# Patient Record
Sex: FEMALE | Race: ASIAN | NOT HISPANIC OR LATINO | URBAN - METROPOLITAN AREA
[De-identification: names, ages, dates, MRNs, and addresses within clinical notes are randomized per-mention and may not be internally consistent; named-entity substitution may affect disease eponyms.]

---

## 2021-10-31 ENCOUNTER — INPATIENT (INPATIENT)
Facility: HOSPITAL | Age: 86
LOS: 30 days | Discharge: NURSING, REHAB OR LONG-TERM CARE | DRG: 4 | End: 2021-12-01
Attending: INTERNAL MEDICINE | Admitting: INTERNAL MEDICINE
Payer: MEDICARE

## 2021-10-31 VITALS
SYSTOLIC BLOOD PRESSURE: 146 MMHG | DIASTOLIC BLOOD PRESSURE: 118 MMHG | HEART RATE: 152 BPM | RESPIRATION RATE: 48 BRPM | WEIGHT: 89.95 LBS | OXYGEN SATURATION: 79 %

## 2021-10-31 LAB
ALBUMIN SERPL ELPH-MCNC: 2.3 G/DL — LOW (ref 3.5–5)
ANION GAP SERPL CALC-SCNC: 14 MMOL/L — SIGNIFICANT CHANGE UP (ref 5–17)
BUN SERPL-MCNC: 44 MG/DL — HIGH (ref 7–18)
CALCIUM SERPL-MCNC: 9 MG/DL — SIGNIFICANT CHANGE UP (ref 8.4–10.5)
CHLORIDE SERPL-SCNC: 122 MMOL/L — HIGH (ref 96–108)
CO2 SERPL-SCNC: 18 MMOL/L — LOW (ref 22–31)
GLUCOSE SERPL-MCNC: 279 MG/DL — HIGH (ref 70–99)
HCT VFR BLD CALC: 41.7 % — SIGNIFICANT CHANGE UP (ref 34.5–45)
HGB BLD-MCNC: 12.4 G/DL — SIGNIFICANT CHANGE UP (ref 11.5–15.5)
MCHC RBC-ENTMCNC: 29.7 GM/DL — LOW (ref 32–36)
MCHC RBC-ENTMCNC: 30.1 PG — SIGNIFICANT CHANGE UP (ref 27–34)
MCV RBC AUTO: 101.2 FL — HIGH (ref 80–100)
POTASSIUM SERPL-MCNC: 4.2 MMOL/L — SIGNIFICANT CHANGE UP (ref 3.5–5.3)
POTASSIUM SERPL-SCNC: 4.2 MMOL/L — SIGNIFICANT CHANGE UP (ref 3.5–5.3)
RBC # BLD: 4.12 M/UL — SIGNIFICANT CHANGE UP (ref 3.8–5.2)
RBC # FLD: 13.6 % — SIGNIFICANT CHANGE UP (ref 10.3–14.5)
SARS-COV-2 RNA SPEC QL NAA+PROBE: SIGNIFICANT CHANGE UP
SODIUM SERPL-SCNC: 154 MMOL/L — HIGH (ref 135–145)
WBC # BLD: 5.8 K/UL — SIGNIFICANT CHANGE UP (ref 3.8–10.5)
WBC # FLD AUTO: 5.8 K/UL — SIGNIFICANT CHANGE UP (ref 3.8–10.5)

## 2021-10-31 PROCEDURE — 71045 X-RAY EXAM CHEST 1 VIEW: CPT | Mod: 26

## 2021-10-31 PROCEDURE — 31500 INSERT EMERGENCY AIRWAY: CPT

## 2021-10-31 PROCEDURE — 99291 CRITICAL CARE FIRST HOUR: CPT | Mod: CS,25

## 2021-10-31 RX ORDER — PIPERACILLIN AND TAZOBACTAM 4; .5 G/20ML; G/20ML
3.38 INJECTION, POWDER, LYOPHILIZED, FOR SOLUTION INTRAVENOUS ONCE
Refills: 0 | Status: COMPLETED | OUTPATIENT
Start: 2021-10-31 | End: 2021-10-31

## 2021-10-31 RX ORDER — SODIUM CHLORIDE 9 MG/ML
2000 INJECTION INTRAMUSCULAR; INTRAVENOUS; SUBCUTANEOUS ONCE
Refills: 0 | Status: COMPLETED | OUTPATIENT
Start: 2021-10-31 | End: 2021-10-31

## 2021-10-31 RX ORDER — CHLORHEXIDINE GLUCONATE 213 G/1000ML
15 SOLUTION TOPICAL EVERY 12 HOURS
Refills: 0 | Status: DISCONTINUED | OUTPATIENT
Start: 2021-10-31 | End: 2021-11-13

## 2021-10-31 RX ORDER — VANCOMYCIN HCL 1 G
1000 VIAL (EA) INTRAVENOUS ONCE
Refills: 0 | Status: COMPLETED | OUTPATIENT
Start: 2021-10-31 | End: 2021-10-31

## 2021-10-31 NOTE — ED ADULT NURSE NOTE - NSIMPLEMENTINTERV_GEN_ALL_ED
Implemented All Fall with Harm Risk Interventions:  [place] to call system. Call bell, personal items and telephone within reach. Instruct patient to call for assistance. Room bathroom lighting operational. Non-slip footwear when patient is off stretcher. Physically safe environment: no spills, clutter or unnecessary equipment. Stretcher in lowest position, wheels locked, appropriate side rails in place. Provide visual cue, wrist band, yellow gown, etc. Monitor gait and stability. Monitor for mental status changes and reorient to person, place, and time. Review medications for side effects contributing to fall risk. Reinforce activity limits and safety measures with patient and family. Provide visual clues: red socks.

## 2021-10-31 NOTE — ED ADULT NURSE NOTE - OBJECTIVE STATEMENT
Patient brought in to the ED by EMS as a respiratory notification for shortness of breath. Stage I noted to Rt. buttock, skin tear to left elbow and redness to B/L heels. . Patient brought in to the ED by EMS as a respiratory notification for shortness of breath. Scar from old sore noted to Rt. buttock, skin tear to left elbow and redness to B/L heels. Patient brought in to the ED by EMS as a respiratory notification for shortness of breath. Scar from old wound noted to Rt. buttock, skin tear to left elbow and redness to B/L heels. Patient brought in to the ED by EMS as a respiratory notification for shortness of breath. Scar from old wound noted to Rt. buttock, skin tear to left elbow and redness to B/L heels. Scratches noted to Left thigh.

## 2021-11-01 DIAGNOSIS — E43 UNSPECIFIED SEVERE PROTEIN-CALORIE MALNUTRITION: ICD-10-CM

## 2021-11-01 DIAGNOSIS — F03.90 UNSPECIFIED DEMENTIA WITHOUT BEHAVIORAL DISTURBANCE: ICD-10-CM

## 2021-11-01 DIAGNOSIS — J96.01 ACUTE RESPIRATORY FAILURE WITH HYPOXIA: ICD-10-CM

## 2021-11-01 DIAGNOSIS — A41.9 SEPSIS, UNSPECIFIED ORGANISM: ICD-10-CM

## 2021-11-01 DIAGNOSIS — Z51.5 ENCOUNTER FOR PALLIATIVE CARE: ICD-10-CM

## 2021-11-01 DIAGNOSIS — R53.2 FUNCTIONAL QUADRIPLEGIA: ICD-10-CM

## 2021-11-01 LAB
A1C WITH ESTIMATED AVERAGE GLUCOSE RESULT: 5.8 % — HIGH (ref 4–5.6)
ALBUMIN SERPL ELPH-MCNC: 1.6 G/DL — LOW (ref 3.5–5)
ALBUMIN SERPL ELPH-MCNC: 1.7 G/DL — LOW (ref 3.5–5)
ALP SERPL-CCNC: 46 U/L — SIGNIFICANT CHANGE UP (ref 40–120)
ALP SERPL-CCNC: 52 U/L — SIGNIFICANT CHANGE UP (ref 40–120)
ALP SERPL-CCNC: 69 U/L — SIGNIFICANT CHANGE UP (ref 40–120)
ALT FLD-CCNC: 19 U/L DA — SIGNIFICANT CHANGE UP (ref 10–60)
ALT FLD-CCNC: 21 U/L DA — SIGNIFICANT CHANGE UP (ref 10–60)
ALT FLD-CCNC: 31 U/L DA — SIGNIFICANT CHANGE UP (ref 10–60)
ANION GAP SERPL CALC-SCNC: 10 MMOL/L — SIGNIFICANT CHANGE UP (ref 5–17)
ANION GAP SERPL CALC-SCNC: 8 MMOL/L — SIGNIFICANT CHANGE UP (ref 5–17)
ANISOCYTOSIS BLD QL: SLIGHT — SIGNIFICANT CHANGE UP
ANISOCYTOSIS BLD QL: SLIGHT — SIGNIFICANT CHANGE UP
APPEARANCE UR: ABNORMAL
APTT BLD: 16.6 SEC — LOW (ref 27.5–35.5)
AST SERPL-CCNC: 20 U/L — SIGNIFICANT CHANGE UP (ref 10–40)
AST SERPL-CCNC: 29 U/L — SIGNIFICANT CHANGE UP (ref 10–40)
AST SERPL-CCNC: 47 U/L — HIGH (ref 10–40)
BACTERIA # UR AUTO: ABNORMAL /HPF
BASE EXCESS BLDA CALC-SCNC: -4.6 MMOL/L — LOW (ref -2–3)
BASE EXCESS BLDV CALC-SCNC: -5.3 MMOL/L — SIGNIFICANT CHANGE UP
BASOPHILS # BLD AUTO: 0 K/UL — SIGNIFICANT CHANGE UP (ref 0–0.2)
BASOPHILS NFR BLD AUTO: 0 % — SIGNIFICANT CHANGE UP (ref 0–2)
BILIRUB SERPL-MCNC: 0.3 MG/DL — SIGNIFICANT CHANGE UP (ref 0.2–1.2)
BILIRUB SERPL-MCNC: 0.3 MG/DL — SIGNIFICANT CHANGE UP (ref 0.2–1.2)
BILIRUB SERPL-MCNC: 0.5 MG/DL — SIGNIFICANT CHANGE UP (ref 0.2–1.2)
BILIRUB UR-MCNC: NEGATIVE — SIGNIFICANT CHANGE UP
BLOOD GAS COMMENTS ARTERIAL: SIGNIFICANT CHANGE UP
BUN SERPL-MCNC: 40 MG/DL — HIGH (ref 7–18)
BUN SERPL-MCNC: 42 MG/DL — HIGH (ref 7–18)
CALCIUM SERPL-MCNC: 7.2 MG/DL — LOW (ref 8.4–10.5)
CALCIUM SERPL-MCNC: 7.5 MG/DL — LOW (ref 8.4–10.5)
CHLORIDE SERPL-SCNC: 119 MMOL/L — HIGH (ref 96–108)
CHLORIDE SERPL-SCNC: 121 MMOL/L — HIGH (ref 96–108)
CHLORIDE UR-SCNC: 40 MMOL/L — SIGNIFICANT CHANGE UP
CO2 SERPL-SCNC: 19 MMOL/L — LOW (ref 22–31)
CO2 SERPL-SCNC: 22 MMOL/L — SIGNIFICANT CHANGE UP (ref 22–31)
COLOR SPEC: YELLOW — SIGNIFICANT CHANGE UP
CREAT ?TM UR-MCNC: 109 MG/DL — SIGNIFICANT CHANGE UP
CREAT SERPL-MCNC: 1.42 MG/DL — HIGH (ref 0.5–1.3)
CREAT SERPL-MCNC: 1.68 MG/DL — HIGH (ref 0.5–1.3)
CREAT SERPL-MCNC: 1.71 MG/DL — HIGH (ref 0.5–1.3)
DACRYOCYTES BLD QL SMEAR: SLIGHT — SIGNIFICANT CHANGE UP
DIFF PNL FLD: ABNORMAL
E COLI DNA BLD POS QL NAA+NON-PROBE: SIGNIFICANT CHANGE UP
ELLIPTOCYTES BLD QL SMEAR: SLIGHT — SIGNIFICANT CHANGE UP
EOSINOPHIL # BLD AUTO: 0 K/UL — SIGNIFICANT CHANGE UP (ref 0–0.5)
EOSINOPHIL NFR BLD AUTO: 0 % — SIGNIFICANT CHANGE UP (ref 0–6)
EPI CELLS # UR: SIGNIFICANT CHANGE UP /HPF
ESTIMATED AVERAGE GLUCOSE: 120 MG/DL — HIGH (ref 68–114)
GLUCOSE SERPL-MCNC: 277 MG/DL — HIGH (ref 70–99)
GLUCOSE SERPL-MCNC: 96 MG/DL — SIGNIFICANT CHANGE UP (ref 70–99)
GLUCOSE UR QL: NEGATIVE — SIGNIFICANT CHANGE UP
GRAM STN FLD: SIGNIFICANT CHANGE UP
GRAM STN FLD: SIGNIFICANT CHANGE UP
HCO3 BLDA-SCNC: 22 MMOL/L — SIGNIFICANT CHANGE UP (ref 21–28)
HCO3 BLDV-SCNC: 20 MMOL/L — LOW (ref 22–29)
HCT VFR BLD CALC: 30.3 % — LOW (ref 34.5–45)
HCT VFR BLD CALC: 33.3 % — LOW (ref 34.5–45)
HGB BLD-MCNC: 10 G/DL — LOW (ref 11.5–15.5)
HGB BLD-MCNC: 9 G/DL — LOW (ref 11.5–15.5)
HOROWITZ INDEX BLDA+IHG-RTO: 100 — SIGNIFICANT CHANGE UP
HYPOCHROMIA BLD QL: SLIGHT — SIGNIFICANT CHANGE UP
INR BLD: 1.11 RATIO — SIGNIFICANT CHANGE UP (ref 0.88–1.16)
INR BLD: 1.21 RATIO — HIGH (ref 0.88–1.16)
KETONES UR-MCNC: ABNORMAL
LACTATE SERPL-SCNC: 11.2 MMOL/L — CRITICAL HIGH (ref 0.7–2)
LACTATE SERPL-SCNC: 4.2 MMOL/L — CRITICAL HIGH (ref 0.7–2)
LACTATE SERPL-SCNC: 4.9 MMOL/L — CRITICAL HIGH (ref 0.7–2)
LEUKOCYTE ESTERASE UR-ACNC: ABNORMAL
LYMPHOCYTES # BLD AUTO: 0.75 K/UL — LOW (ref 1–3.3)
LYMPHOCYTES # BLD AUTO: 0.85 K/UL — LOW (ref 1–3.3)
LYMPHOCYTES # BLD AUTO: 0.88 K/UL — LOW (ref 1–3.3)
LYMPHOCYTES # BLD AUTO: 13 % — SIGNIFICANT CHANGE UP (ref 13–44)
LYMPHOCYTES # BLD AUTO: 14 % — SIGNIFICANT CHANGE UP (ref 13–44)
LYMPHOCYTES # BLD AUTO: 14 % — SIGNIFICANT CHANGE UP (ref 13–44)
MAGNESIUM SERPL-MCNC: 1.8 MG/DL — SIGNIFICANT CHANGE UP (ref 1.6–2.6)
MAGNESIUM SERPL-MCNC: 2.1 MG/DL — SIGNIFICANT CHANGE UP (ref 1.6–2.6)
MANUAL SMEAR VERIFICATION: SIGNIFICANT CHANGE UP
MANUAL SMEAR VERIFICATION: SIGNIFICANT CHANGE UP
MCHC RBC-ENTMCNC: 29.7 GM/DL — LOW (ref 32–36)
MCHC RBC-ENTMCNC: 29.8 PG — SIGNIFICANT CHANGE UP (ref 27–34)
MCHC RBC-ENTMCNC: 30 GM/DL — LOW (ref 32–36)
MCHC RBC-ENTMCNC: 30.6 PG — SIGNIFICANT CHANGE UP (ref 27–34)
MCV RBC AUTO: 100.3 FL — HIGH (ref 80–100)
MCV RBC AUTO: 101.8 FL — HIGH (ref 80–100)
METHOD TYPE: SIGNIFICANT CHANGE UP
MONOCYTES # BLD AUTO: 0.06 K/UL — SIGNIFICANT CHANGE UP (ref 0–0.9)
MONOCYTES # BLD AUTO: 0.19 K/UL — SIGNIFICANT CHANGE UP (ref 0–0.9)
MONOCYTES # BLD AUTO: 0.24 K/UL — SIGNIFICANT CHANGE UP (ref 0–0.9)
MONOCYTES NFR BLD AUTO: 1 % — LOW (ref 2–14)
MONOCYTES NFR BLD AUTO: 3 % — SIGNIFICANT CHANGE UP (ref 2–14)
MONOCYTES NFR BLD AUTO: 4 % — SIGNIFICANT CHANGE UP (ref 2–14)
NEUTROPHILS # BLD AUTO: 4.52 K/UL — SIGNIFICANT CHANGE UP (ref 1.8–7.4)
NEUTROPHILS # BLD AUTO: 4.98 K/UL — SIGNIFICANT CHANGE UP (ref 1.8–7.4)
NEUTROPHILS # BLD AUTO: 5.22 K/UL — SIGNIFICANT CHANGE UP (ref 1.8–7.4)
NEUTROPHILS NFR BLD AUTO: 69 % — SIGNIFICANT CHANGE UP (ref 43–77)
NEUTROPHILS NFR BLD AUTO: 82 % — HIGH (ref 43–77)
NEUTROPHILS NFR BLD AUTO: 83 % — HIGH (ref 43–77)
NITRITE UR-MCNC: NEGATIVE — SIGNIFICANT CHANGE UP
NRBC # BLD: 0 /100 — SIGNIFICANT CHANGE UP (ref 0–0)
NRBC # BLD: 0 /100 — SIGNIFICANT CHANGE UP (ref 0–0)
NT-PROBNP SERPL-SCNC: 4457 PG/ML — HIGH (ref 0–450)
OSMOLALITY UR: 430 MOS/KG — SIGNIFICANT CHANGE UP (ref 50–1200)
PCO2 BLDA: 46 MMHG — HIGH (ref 32–35)
PCO2 BLDV: 37 MMHG — LOW (ref 39–42)
PH BLDA: 7.29 — LOW (ref 7.35–7.45)
PH BLDV: 7.34 — SIGNIFICANT CHANGE UP (ref 7.32–7.43)
PH UR: 8 — SIGNIFICANT CHANGE UP (ref 5–8)
PHOSPHATE SERPL-MCNC: 3.1 MG/DL — SIGNIFICANT CHANGE UP (ref 2.5–4.5)
PHOSPHATE SERPL-MCNC: 3.6 MG/DL — SIGNIFICANT CHANGE UP (ref 2.5–4.5)
PLAT MORPH BLD: NORMAL — SIGNIFICANT CHANGE UP
PLAT MORPH BLD: NORMAL — SIGNIFICANT CHANGE UP
PLATELET # BLD AUTO: 236 K/UL — SIGNIFICANT CHANGE UP (ref 150–400)
PLATELET # BLD AUTO: 256 K/UL — SIGNIFICANT CHANGE UP (ref 150–400)
PLATELET # BLD AUTO: 271 K/UL — SIGNIFICANT CHANGE UP (ref 150–400)
PO2 BLDA: 275 MMHG — HIGH (ref 83–108)
PO2 BLDV: 35 MMHG — SIGNIFICANT CHANGE UP
POIKILOCYTOSIS BLD QL AUTO: SLIGHT — SIGNIFICANT CHANGE UP
POIKILOCYTOSIS BLD QL AUTO: SLIGHT — SIGNIFICANT CHANGE UP
POTASSIUM SERPL-MCNC: 3.6 MMOL/L — SIGNIFICANT CHANGE UP (ref 3.5–5.3)
POTASSIUM SERPL-MCNC: 3.8 MMOL/L — SIGNIFICANT CHANGE UP (ref 3.5–5.3)
POTASSIUM SERPL-SCNC: 3.6 MMOL/L — SIGNIFICANT CHANGE UP (ref 3.5–5.3)
POTASSIUM SERPL-SCNC: 3.8 MMOL/L — SIGNIFICANT CHANGE UP (ref 3.5–5.3)
PROCALCITONIN SERPL-MCNC: 44 NG/ML — HIGH (ref 0.02–0.1)
PROT SERPL-MCNC: 5.3 G/DL — LOW (ref 6–8.3)
PROT SERPL-MCNC: 5.7 G/DL — LOW (ref 6–8.3)
PROT SERPL-MCNC: 7.3 G/DL — SIGNIFICANT CHANGE UP (ref 6–8.3)
PROT UR-MCNC: 100
PROTHROM AB SERPL-ACNC: 13.1 SEC — SIGNIFICANT CHANGE UP (ref 10.6–13.6)
PROTHROM AB SERPL-ACNC: 14.3 SEC — HIGH (ref 10.6–13.6)
RBC # BLD: 3.02 M/UL — LOW (ref 3.8–5.2)
RBC # BLD: 3.27 M/UL — LOW (ref 3.8–5.2)
RBC # FLD: 13.6 % — SIGNIFICANT CHANGE UP (ref 10.3–14.5)
RBC # FLD: 13.8 % — SIGNIFICANT CHANGE UP (ref 10.3–14.5)
RBC BLD AUTO: ABNORMAL
RBC BLD AUTO: ABNORMAL
RBC CASTS # UR COMP ASSIST: ABNORMAL /HPF (ref 0–2)
SAO2 % BLDA: 99 % — SIGNIFICANT CHANGE UP
SAO2 % BLDV: 66.1 % — SIGNIFICANT CHANGE UP
SODIUM SERPL-SCNC: 149 MMOL/L — HIGH (ref 135–145)
SODIUM SERPL-SCNC: 150 MMOL/L — HIGH (ref 135–145)
SODIUM UR-SCNC: 27 MMOL/L — SIGNIFICANT CHANGE UP
SP GR SPEC: 1.01 — SIGNIFICANT CHANGE UP (ref 1.01–1.02)
SPECIMEN SOURCE: SIGNIFICANT CHANGE UP
SPECIMEN SOURCE: SIGNIFICANT CHANGE UP
TRI-PHOS CRY UR QL COMP ASSIST: ABNORMAL /HPF
TROPONIN I, HIGH SENSITIVITY RESULT: 196 NG/L — HIGH
TROPONIN I, HIGH SENSITIVITY RESULT: 380.4 NG/L — HIGH
UROBILINOGEN FLD QL: NEGATIVE — SIGNIFICANT CHANGE UP
WBC # BLD: 6.07 K/UL — SIGNIFICANT CHANGE UP (ref 3.8–10.5)
WBC # BLD: 6.29 K/UL — SIGNIFICANT CHANGE UP (ref 3.8–10.5)
WBC # FLD AUTO: 6.07 K/UL — SIGNIFICANT CHANGE UP (ref 3.8–10.5)
WBC # FLD AUTO: 6.29 K/UL — SIGNIFICANT CHANGE UP (ref 3.8–10.5)
WBC UR QL: ABNORMAL /HPF (ref 0–5)

## 2021-11-01 PROCEDURE — 71045 X-RAY EXAM CHEST 1 VIEW: CPT | Mod: 26,76

## 2021-11-01 PROCEDURE — 32556 INSERT CATH PLEURA W/O IMAGE: CPT | Mod: LT

## 2021-11-01 PROCEDURE — 99223 1ST HOSP IP/OBS HIGH 75: CPT

## 2021-11-01 PROCEDURE — 71045 X-RAY EXAM CHEST 1 VIEW: CPT | Mod: 26

## 2021-11-01 PROCEDURE — 71045 X-RAY EXAM CHEST 1 VIEW: CPT | Mod: 26,77

## 2021-11-01 PROCEDURE — 93010 ELECTROCARDIOGRAM REPORT: CPT

## 2021-11-01 PROCEDURE — 99223 1ST HOSP IP/OBS HIGH 75: CPT | Mod: 57

## 2021-11-01 RX ORDER — ACETAMINOPHEN 500 MG
650 TABLET ORAL ONCE
Refills: 0 | Status: COMPLETED | OUTPATIENT
Start: 2021-11-01 | End: 2021-11-01

## 2021-11-01 RX ORDER — ROCURONIUM BROMIDE 10 MG/ML
50 VIAL (ML) INTRAVENOUS ONCE
Refills: 0 | Status: COMPLETED | OUTPATIENT
Start: 2021-11-01 | End: 2021-11-01

## 2021-11-01 RX ORDER — SODIUM CHLORIDE 9 MG/ML
2000 INJECTION, SOLUTION INTRAVENOUS ONCE
Refills: 0 | Status: COMPLETED | OUTPATIENT
Start: 2021-11-01 | End: 2021-11-01

## 2021-11-01 RX ORDER — AZITHROMYCIN 500 MG/1
500 TABLET, FILM COATED ORAL EVERY 24 HOURS
Refills: 0 | Status: DISCONTINUED | OUTPATIENT
Start: 2021-11-01 | End: 2021-11-06

## 2021-11-01 RX ORDER — SODIUM CHLORIDE 9 MG/ML
10 INJECTION INTRAMUSCULAR; INTRAVENOUS; SUBCUTANEOUS
Refills: 0 | Status: DISCONTINUED | OUTPATIENT
Start: 2021-11-01 | End: 2021-12-01

## 2021-11-01 RX ORDER — SODIUM CHLORIDE 9 MG/ML
1000 INJECTION INTRAMUSCULAR; INTRAVENOUS; SUBCUTANEOUS ONCE
Refills: 0 | Status: COMPLETED | OUTPATIENT
Start: 2021-11-01 | End: 2021-11-01

## 2021-11-01 RX ORDER — SODIUM CHLORIDE 9 MG/ML
1000 INJECTION, SOLUTION INTRAVENOUS ONCE
Refills: 0 | Status: COMPLETED | OUTPATIENT
Start: 2021-11-01 | End: 2021-11-01

## 2021-11-01 RX ORDER — CEFTRIAXONE 500 MG/1
1000 INJECTION, POWDER, FOR SOLUTION INTRAMUSCULAR; INTRAVENOUS EVERY 24 HOURS
Refills: 0 | Status: DISCONTINUED | OUTPATIENT
Start: 2021-11-01 | End: 2021-11-02

## 2021-11-01 RX ORDER — FENTANYL CITRATE 50 UG/ML
0.5 INJECTION INTRAVENOUS
Qty: 5000 | Refills: 0 | Status: DISCONTINUED | OUTPATIENT
Start: 2021-11-01 | End: 2021-11-01

## 2021-11-01 RX ORDER — HEPARIN SODIUM 5000 [USP'U]/ML
5000 INJECTION INTRAVENOUS; SUBCUTANEOUS EVERY 8 HOURS
Refills: 0 | Status: DISCONTINUED | OUTPATIENT
Start: 2021-11-01 | End: 2021-11-10

## 2021-11-01 RX ORDER — FENTANYL CITRATE 50 UG/ML
0.5 INJECTION INTRAVENOUS
Qty: 2500 | Refills: 0 | Status: DISCONTINUED | OUTPATIENT
Start: 2021-11-01 | End: 2021-11-02

## 2021-11-01 RX ORDER — SODIUM CHLORIDE 9 MG/ML
1000 INJECTION, SOLUTION INTRAVENOUS
Refills: 0 | Status: DISCONTINUED | OUTPATIENT
Start: 2021-11-02 | End: 2021-11-08

## 2021-11-01 RX ORDER — FENTANYL CITRATE 50 UG/ML
0.5 INJECTION INTRAVENOUS
Qty: 2500 | Refills: 0 | Status: DISCONTINUED | OUTPATIENT
Start: 2021-11-01 | End: 2021-11-01

## 2021-11-01 RX ORDER — SODIUM CHLORIDE 9 MG/ML
1000 INJECTION, SOLUTION INTRAVENOUS
Refills: 0 | Status: DISCONTINUED | OUTPATIENT
Start: 2021-11-01 | End: 2021-11-08

## 2021-11-01 RX ORDER — NOREPINEPHRINE BITARTRATE/D5W 8 MG/250ML
0.05 PLASTIC BAG, INJECTION (ML) INTRAVENOUS
Qty: 16 | Refills: 0 | Status: DISCONTINUED | OUTPATIENT
Start: 2021-11-01 | End: 2021-11-02

## 2021-11-01 RX ORDER — DEXMEDETOMIDINE HYDROCHLORIDE IN 0.9% SODIUM CHLORIDE 4 UG/ML
0.5 INJECTION INTRAVENOUS
Qty: 200 | Refills: 0 | Status: DISCONTINUED | OUTPATIENT
Start: 2021-11-01 | End: 2021-11-02

## 2021-11-01 RX ORDER — ETOMIDATE 2 MG/ML
20 INJECTION INTRAVENOUS ONCE
Refills: 0 | Status: COMPLETED | OUTPATIENT
Start: 2021-11-01 | End: 2021-11-01

## 2021-11-01 RX ADMIN — AZITHROMYCIN 255 MILLIGRAM(S): 500 TABLET, FILM COATED ORAL at 04:25

## 2021-11-01 RX ADMIN — HEPARIN SODIUM 5000 UNIT(S): 5000 INJECTION INTRAVENOUS; SUBCUTANEOUS at 21:12

## 2021-11-01 RX ADMIN — CHLORHEXIDINE GLUCONATE 15 MILLILITER(S): 213 SOLUTION TOPICAL at 17:59

## 2021-11-01 RX ADMIN — Medication 1.92 MICROGRAM(S)/KG/MIN: at 14:08

## 2021-11-01 RX ADMIN — SODIUM CHLORIDE 1000 MILLILITER(S): 9 INJECTION, SOLUTION INTRAVENOUS at 07:14

## 2021-11-01 RX ADMIN — FENTANYL CITRATE 2.04 MICROGRAM(S)/KG/HR: 50 INJECTION INTRAVENOUS at 03:25

## 2021-11-01 RX ADMIN — HEPARIN SODIUM 5000 UNIT(S): 5000 INJECTION INTRAVENOUS; SUBCUTANEOUS at 15:21

## 2021-11-01 RX ADMIN — CHLORHEXIDINE GLUCONATE 15 MILLILITER(S): 213 SOLUTION TOPICAL at 05:41

## 2021-11-01 RX ADMIN — Medication 50 MILLIGRAM(S): at 01:30

## 2021-11-01 RX ADMIN — Medication 650 MILLIGRAM(S): at 03:17

## 2021-11-01 RX ADMIN — CEFTRIAXONE 100 MILLIGRAM(S): 500 INJECTION, POWDER, FOR SOLUTION INTRAMUSCULAR; INTRAVENOUS at 05:28

## 2021-11-01 RX ADMIN — Medication 650 MILLIGRAM(S): at 00:54

## 2021-11-01 RX ADMIN — SODIUM CHLORIDE 2000 MILLILITER(S): 9 INJECTION INTRAMUSCULAR; INTRAVENOUS; SUBCUTANEOUS at 03:30

## 2021-11-01 RX ADMIN — HEPARIN SODIUM 5000 UNIT(S): 5000 INJECTION INTRAVENOUS; SUBCUTANEOUS at 05:41

## 2021-11-01 RX ADMIN — DEXMEDETOMIDINE HYDROCHLORIDE IN 0.9% SODIUM CHLORIDE 5.1 MICROGRAM(S)/KG/HR: 4 INJECTION INTRAVENOUS at 04:26

## 2021-11-01 RX ADMIN — ETOMIDATE 20 MILLIGRAM(S): 2 INJECTION INTRAVENOUS at 01:30

## 2021-11-01 RX ADMIN — SODIUM CHLORIDE 75 MILLILITER(S): 9 INJECTION, SOLUTION INTRAVENOUS at 04:26

## 2021-11-01 RX ADMIN — SODIUM CHLORIDE 75 MILLILITER(S): 9 INJECTION, SOLUTION INTRAVENOUS at 10:59

## 2021-11-01 RX ADMIN — Medication 250 MILLIGRAM(S): at 01:25

## 2021-11-01 RX ADMIN — SODIUM CHLORIDE 2000 MILLILITER(S): 9 INJECTION, SOLUTION INTRAVENOUS at 02:59

## 2021-11-01 RX ADMIN — PIPERACILLIN AND TAZOBACTAM 200 GRAM(S): 4; .5 INJECTION, POWDER, LYOPHILIZED, FOR SOLUTION INTRAVENOUS at 00:20

## 2021-11-01 RX ADMIN — SODIUM CHLORIDE 2000 MILLILITER(S): 9 INJECTION INTRAMUSCULAR; INTRAVENOUS; SUBCUTANEOUS at 00:20

## 2021-11-01 NOTE — CONSULT NOTE ADULT - SUBJECTIVE AND OBJECTIVE BOX
HPI:  89 yo female from [facility] with medical h/o of HTN, Dementia, CVA with R sided hemiparesis, aphasia, parkinson's, GERD, CKD bedbound at baseline, dependant on assistance for ADL comes in with respiratory failure. Patient was found to have respiratory distress, saturating in 70s when EMS arrived, was placed on NRB on field. She was emergently intubated on arrival in ED. As per NH records patient was having fever and cough for past few days. Today she was found to have respiratory distress. She is vaccinated with moderna. Spoke to family son,  who stated the patient to remain full code. No other history could be obtained.  (01 Nov 2021 00:14)    Interval history: under ICU care, intubated, hypotensive. Full code on file.       PAST MEDICAL & SURGICAL HISTORY:  HTN (hypertension)    Dementia        SOCIAL HISTORY:  , has children, grandchildren  Admitted from:   [facility]        Surrogate/HCP/Guardian:     Jered dahl son        Phone#: [phone]  Dr. Tommie Dahl grandson     FAMILY HISTORY:    Baseline ADLs (prior to admission): confused, bedbound    Allergies    No Known Allergies    Intolerances      Present Symptoms:      Review of Systems:   Unable to obtain due to poor mentation     MEDICATIONS  (STANDING):  azithromycin  IVPB 500 milliGRAM(s) IV Intermittent every 24 hours  cefTRIAXone   IVPB 1000 milliGRAM(s) IV Intermittent every 24 hours  chlorhexidine 0.12% Liquid 15 milliLiter(s) Oral Mucosa every 12 hours  dexMEDEtomidine Infusion 0.5 MICROgram(s)/kG/Hr (5.1 mL/Hr) IV Continuous <Continuous>  fentaNYL   Infusion 0.5 MICROgram(s)/kG/Hr (2.04 mL/Hr) IV Continuous <Continuous>  heparin   Injectable 5000 Unit(s) SubCutaneous every 8 hours  lactated ringers. 1000 milliLiter(s) (75 mL/Hr) IV Continuous <Continuous>  norepinephrine Infusion 0.05 MICROgram(s)/kG/Min (1.92 mL/Hr) IV Continuous <Continuous>    MEDICATIONS  (PRN):  sodium chloride 0.9% lock flush 10 milliLiter(s) IV Push every 1 hour PRN Pre/post blood products, medications, blood draw, and to maintain line patency      PHYSICAL EXAM:    Vital Signs Last 24 Hrs  T(C): 35.6 (01 Nov 2021 07:00), Max: 39.9 (01 Nov 2021 00:25)  T(F): 96 (01 Nov 2021 07:00), Max: 103.8 (01 Nov 2021 00:25)  HR: 94 (01 Nov 2021 14:00) (85 - 152)  BP: 84/44 (01 Nov 2021 14:00) (68/40 - 146/118)  BP(mean): 46 (01 Nov 2021 14:00) (31 - 93)  RR: 23 (01 Nov 2021 12:00) (18 - 48)  SpO2: 97% (01 Nov 2021 12:35) (79% - 100%)       Karnofsky Performance Score/Palliative Performance Status Version2: 20     %     GENERAL: intubated, sedated, cachectic, NAD  HEENT: Atraumatic, oropharynx clear, neck supple  CHEST/LUNG: unlabored  HEART: Regular rate and rhythm    ABDOMEN: Soft, Nontender, Nondistended   MUSCULOSKELETAL:  No  edema, bedbound  NERVOUS SYSTEM:  sedated  SKIN: No rashes or lesions noted  Oral intake: npo       LABS:                        10.0   6.07  )-----------( 271      ( 01 Nov 2021 06:11 )             33.3     11-01    150<H>  |  121<H>  |  42<H>  ----------------------------<  277<H>  3.6   |  19<L>  |  1.68<H>    Ca    7.2<L>      01 Nov 2021 06:11  Phos  3.6     11-01  Mg     2.1     11-01    TPro  5.7<L>  /  Alb  1.7<L>  /  TBili  0.3  /  DBili  x   /  AST  29  /  ALT  19  /  AlkPhos  52  11-01        RADIOLOGY & ADDITIONAL STUDIES:    ADVANCE DIRECTIVES:

## 2021-11-01 NOTE — ED PROVIDER NOTE - OBJECTIVE STATEMENT
89 y/o F, nursing home resident with a significant PMHx of CVA with R sided hemiparesis, aphasia, parkinson's, GERD, dementia, CKD, presents to the ED in respiratory distress. Per EMS, patient was noted to have O2 saturation in the 70s in the nursing home, improved to 85% when placed on nonrebreather, and then transported to the ED. Patient is full code per  nursing home paperwork. Emergency contacts: Dr. Tommie Pathak (grandson) [phone], Jesus Pathak (spouse) [phone], Jered Pathak (son) [phone].

## 2021-11-01 NOTE — CONSULT NOTE ADULT - PROBLEM SELECTOR RECOMMENDATION 4
Clinical evidence indicates that the patient has Severe protein calorie malnutrition/ 3rd degree    In context of     Chronic Illness (>1 month)    Energy/Food intake <50% of estimated energy requirement >5 days   Body Fat loss: Severe   (Cachexia, temporal wasting,  muscle atrophy)     Strength: weakened severe (bedbound)    Recommend:   trial of NGT feeds  nutrition eval

## 2021-11-01 NOTE — CONSULT NOTE ADULT - CONVERSATION DETAILS
Spoke with patient's grandson by phone Dr Tommie Pathak regarding patient's current condition, hypotensive, may need central line/pressor support, guarded prognosis. Addressed risks/benefits of LST such as CPR, central lines/pressors in the context of her advanced illness and debilitated state. He stated that he understands that aggressive measures likely to be overly burdensome in her condition, however, his grandfather is primary decisionmaker, they assist w discussions, and he is having a difficult time coming to terms with her decline. He understands the severity of her condition and will speak with his father and grandfather again when we get off the phone. For now, given his last conversation with his grandfather, she remains FULL CODE until he can readdress w them again. Support provided.

## 2021-11-01 NOTE — H&P ADULT - ATTENDING COMMENTS
Patient seen and examined upon consultation request ar 11.35PM. Data reviewed. Case and plan of care discussed with ICU resident and agree with note except as otherwise stated in my note as follows.  This is 89 year old woman sent from [facility] with fever/cough and noted to be in acute respiratory distress in the ED and intubated. Exam- cachetic woman orally intubated and sedated, dry tongue and oral mucous membranes,  Labs/imaging reviewed.   Assessment  Acute hypoxemic respiratory failure from bacterial pneumonia, R/o SARS-CoV-2 infection  Severe Hypovolemia with dehydration  Hypernatremia from fluid deficit  CARO on CKD  Plan  Continue mechanical ventilator support. Adjust vent settings with ABG results.  IV fluid hydration to at least 4liters of LR and thereafter maintenance.   Send sputum culture, blood cultures,   Start IV Ceftriaxone and azithromycin for CAP  Commence free water via NG tube to correct water deficit causing hypernatremia.  Trend troponins, 12 lead EKG  Start tube feeding and bowel regime  DVT and GI prophylaxis.    Condition: Critical, Prognosis: Poor

## 2021-11-01 NOTE — H&P ADULT - CONVERSATION DETAILS
Spoke to Son and grandson. As per them, patient's  would like everything to be done. Patient remains FULL code. Patient's grandson Tommie is a radiologist at [employer].

## 2021-11-01 NOTE — ED PROVIDER NOTE - CLINICAL SUMMARY MEDICAL DECISION MAKING FREE TEXT BOX
89 y/o F, nursing home resident with a significant PMHx of CVA with R sided hemiparesis, aphasia, parkinson's, GERD, dementia, CKD, presents to the ED in respiratory distress. In the ED, patient noted to be in respiratory distress and emergently intubated. Will obtain labs, EKG, ICU consult for further management and given empiric antibiotics.

## 2021-11-01 NOTE — PROGRESS NOTE ADULT - ASSESSMENT
89 y/o F, nursing home resident with a significant medical history of CVA with R sided hemiparesis, aphasia, parkinson's, GERD, dementia, CKD, presents to the ED in respiratory distress, intubated while in ED. Patient is being admitted to medicine for sepsis and AHRF likely 2/2 ?PNA    # Acute hypoxic respiratory failure  # Sepsis 2/2 PNA  # Lactic acidosis  # Joby on CKD  # Hypernatremia    Neuro:  Patient is Aox0 at present  Started on fentanyl for pain  Will start on propofol    Pulmonary  Acute hypoxic respiratory failure  Patient presented with AHRF, intubated in ED  As per NH records, patient had cough and fever for past few days  CXR shows LLL infiltrate  [facility] had covid outbreak, will put on isolation. First covid was negative  s/p one dose vanc and zosyn  Will start on rocephin and zithro  Cultures were sent  ABG showed 7.26/46/275/22  Monitor resp status    Cardiovascular  Sinus tachycardia  Patient is in sinus tachycardia  Likely 2/2 sepsis  EKG showed sinus rythm with ST changes in inferior leads  Will consult cardiology in AM  Trop was mildly elevated  Will trend down trop    Gastrointestinal  NPO for now    ID  Sepsis 2/2 unknown source  Patient was tachycardic, had high temp 103.8  No leucocytosis  CXR shows LLL infiltrate  s/p 2L bolus  Started on LR @75cc/hr  Was given one dose of vanc and zosyn  Will start on rocephin and zithro   f/u cx  f/u lactate    Renal  Patient has h/o CKD  Clinically patient is severely dehydrated  has hypernatremia, likely 2/2 volume deficit  Not producing any urine  s/p 2 L bolus  Will start on standing IVF  Monitor I/O  Cr is elevated, unknown baseline  WIll send UA once she has some urine in the bag  f/u UA  Monitor BMP    Endo  BS are elevated  No h/o DM  Monitor BS    Prophylactic  On heparin for dvt prophylaxis  on PPI for GI prophylaxis    Palliative consult in AM 89 y/o F, nursing home resident with a significant medical history of CVA with R sided hemiparesis, aphasia, parkinson's, GERD, dementia, CKD, presents to the ED in respiratory distress, intubated while in ED. Patient is being admitted to medicine for sepsis and AHRF likely 2/2 ?PNA    # Acute hypoxic respiratory failure  # Sepsis 2/2 PNA  # Lactic acidosis  # Joby on CKD  # Hypernatremia    Neuro:  Patient is Aox0 at present  Started on fentanyl for pain  Will start on propofol    Pulmonary  Acute hypoxic respiratory failure  Patient presented with AHRF, intubated in ED  As per NH records, patient had cough and fever for past few days  CXR shows LLL infiltrate  [facility] had covid outbreak, will put on isolation. First covid was negative  s/p one dose vanc and zosyn  Will start on rocephin and zithro  Cultures were sent  ABG showed 7.26/46/275/22  repeat ABG at 1pm today  Monitor resp status    Cardiovascular  Sinus tachycardia  Patient is in sinus tachycardia  Likely 2/2 sepsis  EKG showed sinus rythm with ST changes in inferior leads  Will consult cardiology in AM  Trop was mildly elevated  Will trend down trop    Gastrointestinal  NPO for now  NGT inserted today    ID  Sepsis 2/2 unknown source  Patient was tachycardic, had high temp 103.8  No leucocytosis  CXR shows LLL infiltrate  s/p 2L bolus  Started on LR @75cc/hr  Was given one dose of vanc and zosyn  Will start on rocephin and zithro   cx - No evidence of pneumothorax, no infiltrates or effusion  lactate - 11.2>4.9    Renal  Patient has h/o CKD  Clinically patient is severely dehydrated  has hypernatremia, likely 2/2 volume deficit  Not producing any urine  s/p 2 L bolus  Will start on standing IVF  Monitor I/O  Cr is elevated, unknown baseline  WIll send UA once she has some urine in the bag  f/u UA  Monitor BMP    Endo  BS are elevated  No h/o DM  f/u A1c  Monitor BS    Prophylactic  On heparin for dvt prophylaxis (hold for central insertion)  on PPI for GI prophylaxis    Palliative consult - hold until repeat LABS

## 2021-11-01 NOTE — H&P ADULT - NSHPPHYSICALEXAM_GEN_ALL_CORE
Vital Signs (24 Hrs):  T(C): --  HR: 152 (10-31-21 @ 22:53) (152 - 152)  BP: 146/118 (10-31-21 @ 22:53) (146/118 - 146/118)  RR: 48 (10-31-21 @ 22:53) (48 - 48)  SpO2: 79% (10-31-21 @ 22:53) (79% - 79%)

## 2021-11-01 NOTE — ED PROVIDER NOTE - PHYSICAL EXAMINATION
Respiratory distress, tachypneic, accessory muscle use.  Tachycardic.   Patient lethargic.   Contracted R upper and lower extremities.   Belly flat, soft.

## 2021-11-01 NOTE — PROCEDURE NOTE - NSPROCDETAILS_GEN_ALL_CORE
secured in place/sterile dressing applied/supine position
guidewire recovered/lumen(s) aspirated and flushed/sterile dressing applied/sterile technique, catheter placed/ultrasound guidance with use of sterile gel and probe cove

## 2021-11-01 NOTE — H&P ADULT - ASSESSMENT
89 y/o F, nursing home resident with a significant medical history of CVA with R sided hemiparesis, aphasia, parkinson's, GERD, dementia, CKD, presents to the ED in respiratory distress, intubated while in ED. Patient is being admitted to medicine for sepsis and AHRF likely 2/2 ?PNA    #Acute hypoxic respiratory failure  #Sepsis 2/2 PNA  #Lactic acidosis  #CKD    Neuro:  Patient is Aox0 at present  Started on fentanyl for pain  Will start on propofol    Pulmonary  Acute hypoxic respiratory failure  Patient presented with AHRF, intubated in ED  As per NH records, patient had cough and fever for past few days  CXR shows LLL infiltrate  [facility] had covid outbreak, will put on isolation. First covid was negative  s/p one dose vanc and zosyn  Will start on rocephin and zithro  Cultures were sent  ABG showed...    Cardiovascular  Sinus tachycardia  Patient is in sinus tachycardia  Likely 2/2 sepsis  EKG showed sinus rythm with ST changes in inferior leads  Will consult cardiology in AM  Trop was mildly elevated  Will trend down trop    Gastrointestinal  NPO for now    ID  Sepsis 2/2 unknown source  Patient was tachycardic, had high temp 103.8  No leucocytosis  CXR shows LLL infiltrate  s/p 2L bolus  Started on LR @75cc/hr  Was given one dose of vanc and zosyn  Will start on rocephin and zithro   f/u cx  f/u lactate    Renal  Patient has h/o CKD  Clinically patient is severely dehydrated  Not producing any urine  s/p 2 L bolus  Will start on standing IVF  Monitor I/O  Cr is elevated, unknown baseline  WIll send UA once she has some urine in the bag  f/u UA  Monitor BMP    Endo  BS are elevated  No h/o DM  Monitor BS    Prophylactic  On heparin for dvt prophylaxis  on PPI for GI prophylaxis     89 y/o F, nursing home resident with a significant medical history of CVA with R sided hemiparesis, aphasia, parkinson's, GERD, dementia, CKD, presents to the ED in respiratory distress, intubated while in ED. Patient is being admitted to medicine for sepsis and AHRF likely 2/2 ?PNA    # Acute hypoxic respiratory failure  # Sepsis 2/2 PNA  # Lactic acidosis  # Joby on CKD  # Hypernatremia    Neuro:  Patient is Aox0 at present  Started on fentanyl for pain  Will start on propofol    Pulmonary  Acute hypoxic respiratory failure  Patient presented with AHRF, intubated in ED  As per NH records, patient had cough and fever for past few days  CXR shows LLL infiltrate  [facility] had covid outbreak, will put on isolation. First covid was negative  s/p one dose vanc and zosyn  Will start on rocephin and zithro  Cultures were sent  ABG showed...    Cardiovascular  Sinus tachycardia  Patient is in sinus tachycardia  Likely 2/2 sepsis  EKG showed sinus rythm with ST changes in inferior leads  Will consult cardiology in AM  Trop was mildly elevated  Will trend down trop    Gastrointestinal  NPO for now    ID  Sepsis 2/2 unknown source  Patient was tachycardic, had high temp 103.8  No leucocytosis  CXR shows LLL infiltrate  s/p 2L bolus  Started on LR @75cc/hr  Was given one dose of vanc and zosyn  Will start on rocephin and zithro   f/u cx  f/u lactate    Renal  Patient has h/o CKD  Clinically patient is severely dehydrated  has hypernatremia, likely 2/2 volume deficit  Not producing any urine  s/p 2 L bolus  Will start on standing IVF  Monitor I/O  Cr is elevated, unknown baseline  WIll send UA once she has some urine in the bag  f/u UA  Monitor BMP    Endo  BS are elevated  No h/o DM  Monitor BS    Prophylactic  On heparin for dvt prophylaxis  on PPI for GI prophylaxis     89 y/o F, nursing home resident with a significant medical history of CVA with R sided hemiparesis, aphasia, parkinson's, GERD, dementia, CKD, presents to the ED in respiratory distress, intubated while in ED. Patient is being admitted to medicine for sepsis and AHRF likely 2/2 ?PNA    # Acute hypoxic respiratory failure  # Sepsis 2/2 PNA  # Lactic acidosis  # Joby on CKD  # Hypernatremia    Neuro:  Patient is Aox0 at present  Started on fentanyl for pain  Will start on propofol    Pulmonary  Acute hypoxic respiratory failure  Patient presented with AHRF, intubated in ED  As per NH records, patient had cough and fever for past few days  CXR shows LLL infiltrate  [facility] had covid outbreak, will put on isolation. First covid was negative  s/p one dose vanc and zosyn  Will start on rocephin and zithro  Cultures were sent  ABG showed 7.26/46/275/22  Monitor resp status    Cardiovascular  Sinus tachycardia  Patient is in sinus tachycardia  Likely 2/2 sepsis  EKG showed sinus rythm with ST changes in inferior leads  Will consult cardiology in AM  Trop was mildly elevated  Will trend down trop    Gastrointestinal  NPO for now    ID  Sepsis 2/2 unknown source  Patient was tachycardic, had high temp 103.8  No leucocytosis  CXR shows LLL infiltrate  s/p 2L bolus  Started on LR @75cc/hr  Was given one dose of vanc and zosyn  Will start on rocephin and zithro   f/u cx  f/u lactate    Renal  Patient has h/o CKD  Clinically patient is severely dehydrated  has hypernatremia, likely 2/2 volume deficit  Not producing any urine  s/p 2 L bolus  Will start on standing IVF  Monitor I/O  Cr is elevated, unknown baseline  WIll send UA once she has some urine in the bag  f/u UA  Monitor BMP    Endo  BS are elevated  No h/o DM  Monitor BS    Prophylactic  On heparin for dvt prophylaxis  on PPI for GI prophylaxis    Palliative consult in AM

## 2021-11-01 NOTE — ED PROVIDER NOTE - CARE PLAN
Principal Discharge DX:	Acute respiratory failure with hypoxia  Secondary Diagnosis:	Pneumonia  Secondary Diagnosis:	Severe sepsis   1

## 2021-11-01 NOTE — CONSULT NOTE ADULT - ASSESSMENT
89y.o. Female with iatrogenic L pneumo    -28Fr L chest tube placed at bedside. Minimal bleeding. Chest tube connected to Pleurevac on suction  -f/u post-procedure CXR  -Daily CXR  -Keep chest tube while intubated  -Sedation and pain control per ICU team  -Will follow

## 2021-11-01 NOTE — CONSULT NOTE ADULT - PROBLEM SELECTOR RECOMMENDATION 9
on antibiotics for L PNA, fluid boluses as tolerated. may need central line/pressors. Cultures pending. Remains full code for now, grandson to readdress w family.

## 2021-11-01 NOTE — CONSULT NOTE ADULT - PROBLEM SELECTOR RECOMMENDATION 5
GOC discussion as above. Grandson to readdress GOC w family, patient's  is primary decisionmaker but son and grandson assist w communication. Palliative will follow.

## 2021-11-01 NOTE — CONSULT NOTE ADULT - SUBJECTIVE AND OBJECTIVE BOX
Patient is a 89y old  Female who presents with a chief complaint of AHRF (2021 14:13)      HPI  Called see and eval 89y.o. Female w/PMH significant for HTN, dementia for L pneumo. Pt admitted from [facility] for respiratory failure. Pt was urgently intubated in ER. After consent from family, pt had NGT and L IJ central line placed. Post procedure CXR showed large L pneumo. Currently saturating well on vent support. On pressor.     PAST MEDICAL & SURGICAL HISTORY:  HTN (hypertension)    Dementia    MEDICATIONS  (STANDING):  azithromycin  IVPB 500 milliGRAM(s) IV Intermittent every 24 hours  cefTRIAXone   IVPB 1000 milliGRAM(s) IV Intermittent every 24 hours  chlorhexidine 0.12% Liquid 15 milliLiter(s) Oral Mucosa every 12 hours  dexMEDEtomidine Infusion 0.5 MICROgram(s)/kG/Hr (5.1 mL/Hr) IV Continuous <Continuous>  fentaNYL   Infusion 0.5 MICROgram(s)/kG/Hr (2.04 mL/Hr) IV Continuous <Continuous>  heparin   Injectable 5000 Unit(s) SubCutaneous every 8 hours  lactated ringers. 1000 milliLiter(s) (75 mL/Hr) IV Continuous <Continuous>  norepinephrine Infusion 0.05 MICROgram(s)/kG/Min (1.92 mL/Hr) IV Continuous <Continuous>    MEDICATIONS  (PRN):  sodium chloride 0.9% lock flush 10 milliLiter(s) IV Push every 1 hour PRN Pre/post blood products, medications, blood draw, and to maintain line patency      Allergies    No Known Allergies    Vital Signs Last 24 Hrs  T(C): 35.9 (2021 15:42), Max: 39.9 (2021 00:25)  T(F): 96.7 (2021 15:42), Max: 103.8 (2021 00:25)  HR: 89 (2021 17:00) (85 - 152)  BP: 116/42 (2021 17:00) (68/40 - 146/118)  BP(mean): 61 (2021 17:00) (31 - 93)  RR: 20 (2021 17:00) (18 - 48)  SpO2: 97% (2021 17:00) (79% - 100%)    Physical:  Gen: Intubated. NAD.  Chest: Decreased BS L lung throughout.    LABS:                        9.0    6.29  )-----------( 236      ( 2021 14:26 )             30.3                  149<H>  |  119<H>  |  40<H>  ----------------------------<  96  3.8   |  22  |  1.42<H>    Ca    7.5<L>      2021 14:26  Phos  3.1       Mg     1.8         TPro  5.3<L>  /  Alb  1.6<L>  /  TBili  0.3  /  DBili  x   /  AST  47<H>  /  ALT  31  /  AlkPhos  46              PT/INR - ( 2021 14:26 )   PT: 14.3 sec;   INR: 1.21 ratio    PTT - ( 31 Oct 2021 23:55 )  PTT:16.6 sec    Urinalysis Basic - ( 2021 17:09 )    Color: Yellow / Appearance: very cloudy / S.010 / pH: x  Gluc: x / Ketone: Trace  / Bili: Negative / Urobili: Negative   Blood: x / Protein: 100 / Nitrite: Negative   Leuk Esterase: Moderate / RBC: 25-50 /HPF / WBC 26-50 /HPF   Sq Epi: x / Non Sq Epi: Few /HPF / Bacteria: Many /HPF    RADIOLOGY & ADDITIONAL STUDIES:  < from: Xray Chest 1 View-PORTABLE IMMEDIATE (Xray Chest 1 View-PORTABLE IMMEDIATE .) (21 @ 14:25) >  Gross heart enlargement and endotracheal tube remain.    Present film shows an NG tube inserted with tip in the lower esophagus and a left jugular line with the tip in the superior vena caval area.    Earlier in the day there is some degree left lower lobe infiltrate which may have evolved into atelectasis.    Follow-up AP erect chest on 2021 at 2:18 PM. NG tube now just enters the stomach area.    Both of the last 2 films raise the question of a upper outer left pneumothorax roughly 25-30%.    < end of copied text >

## 2021-11-01 NOTE — H&P ADULT - HISTORY OF PRESENT ILLNESS
89 yo female from [facility] with medical h/o of HTN, Dementia, bedbound at baseline, dependant on assistance for ADL comes in with respiratory failure. Patient was found to have respiratory distress, saturating in 70s when EMS arrived, was intubated on field. As per NH records patient was having fever and cough for past few days. Today she was found to have respiratory distress. She is vaccinated with moderna. Spoke to family son,  who stated the patient to remain full code. No other history could be obtained 89 yo female from [facility] with medical h/o of HTN, Dementia, CVA with R sided hemiparesis, aphasia, parkinson's, GERD, CKD bedbound at baseline, dependant on assistance for ADL comes in with respiratory failure. Patient was found to have respiratory distress, saturating in 70s when EMS arrived, was placed on NRB on field. She was emergently intubated on arrival in ED. As per NH records patient was having fever and cough for past few days. Today she was found to have respiratory distress. She is vaccinated with moderna. Spoke to family son,  who stated the patient to remain full code. No other history could be obtained.

## 2021-11-01 NOTE — PROGRESS NOTE ADULT - SUBJECTIVE AND OBJECTIVE BOX
INTERVAL HPI/OVERNIGHT EVENTS: ***    PRESSORS: [ ] YES [ ] NO  WHICH:    ANTIBIOTICS:                      Antimicrobial:  azithromycin  IVPB 500 milliGRAM(s) IV Intermittent every 24 hours  cefTRIAXone   IVPB 1000 milliGRAM(s) IV Intermittent every 24 hours    Cardiovascular:    Pulmonary:    Hematalogic:  heparin   Injectable 5000 Unit(s) SubCutaneous every 8 hours    Other:  chlorhexidine 0.12% Liquid 15 milliLiter(s) Oral Mucosa every 12 hours  dexMEDEtomidine Infusion 0.5 MICROgram(s)/kG/Hr IV Continuous <Continuous>  fentaNYL   Infusion 0.5 MICROgram(s)/kG/Hr IV Continuous <Continuous>  lactated ringers Bolus 2000 milliLiter(s) IV Bolus once  lactated ringers. 1000 milliLiter(s) IV Continuous <Continuous>    azithromycin  IVPB 500 milliGRAM(s) IV Intermittent every 24 hours  cefTRIAXone   IVPB 1000 milliGRAM(s) IV Intermittent every 24 hours  chlorhexidine 0.12% Liquid 15 milliLiter(s) Oral Mucosa every 12 hours  dexMEDEtomidine Infusion 0.5 MICROgram(s)/kG/Hr IV Continuous <Continuous>  fentaNYL   Infusion 0.5 MICROgram(s)/kG/Hr IV Continuous <Continuous>  heparin   Injectable 5000 Unit(s) SubCutaneous every 8 hours  lactated ringers Bolus 2000 milliLiter(s) IV Bolus once  lactated ringers. 1000 milliLiter(s) IV Continuous <Continuous>    Drug Dosing Weight  Height (cm): 168 (01 Nov 2021 02:20)  Weight (kg): 40.9 (01 Nov 2021 02:20)  BMI (kg/m2): 14.5 (01 Nov 2021 02:20)  BSA (m2): 1.43 (01 Nov 2021 02:20)    CENTRAL LINE: [ ] YES [ ] NO  LOCATION:   DATE INSERTED:  REMOVE: [ ] YES [ ] NO  EXPLAIN:    SAEED: [ ] YES [ ] NO    DATE INSERTED:  REMOVE:  [ ] YES [ ] NO  EXPLAIN:    A-LINE:  [ ] YES [ ] NO  LOCATION:   DATE INSERTED:  REMOVE:  [ ] YES [ ] NO  EXPLAIN:    PMH -reviewed admission note, no change since admission    ICU Vital Signs Last 24 Hrs  T(C): 35.6 (01 Nov 2021 07:00), Max: 39.9 (01 Nov 2021 00:25)  T(F): 96 (01 Nov 2021 07:00), Max: 103.8 (01 Nov 2021 00:25)  HR: 90 (01 Nov 2021 10:00) (90 - 152)  BP: 77/40 (01 Nov 2021 10:00) (68/40 - 146/118)  BP(mean): 45 (01 Nov 2021 10:00) (45 - 93)  ABP: --  ABP(mean): --  RR: 21 (01 Nov 2021 10:00) (18 - 48)  SpO2: 94% (01 Nov 2021 10:00) (79% - 100%)      ABG - ( 01 Nov 2021 01:19 )  pH, Arterial: 7.29  pH, Blood: x     /  pCO2: 46    /  pO2: 275   / HCO3: 22    / Base Excess: -4.6  /  SaO2: 99                    10-31 @ 07:01  -  11-01 @ 07:00  --------------------------------------------------------  IN: 2323 mL / OUT: 0 mL / NET: 2323 mL        Mode: AC/ CMV (Assist Control/ Continuous Mandatory Ventilation)  RR (machine): 20  TV (machine): 400  FiO2: 90  PEEP: 5  ITime: 0.8  MAP: 5  PIP: 10      PHYSICAL EXAM:    GENERAL: NAD, well-groomed, well-developed  HEAD:  Atraumatic, Normocephalic  EYES: EOMI, PERRLA, conjunctiva and sclera clear  ENMT: No tonsillar erythema, exudates, or enlargement; Moist mucous membranes, Good dentition, No lesions  NECK: Supple, normal appearance, No JVD; Normal thyroid; Trachea midline  NERVOUS SYSTEM:  Alert & Oriented X3, Good concentration; Motor Strength 5/5 B/L upper and lower extremities; DTRs 2+ intact and symmetric  CHEST/LUNG: No chest deformity; Normal percussion bilaterally; No rales, rhonchi, wheezing   HEART: Regular rate and rhythm; No murmurs, rubs, or gallops  ABDOMEN: Soft, Nontender, Nondistended; Bowel sounds present  EXTREMITIES:  2+ Peripheral Pulses, No clubbing, cyanosis, or edema  LYMPH: No lymphadenopathy noted  SKIN: No rashes or lesions; Good capillary refill      LABS:  CBC Full  -  ( 01 Nov 2021 06:11 )  WBC Count : x  RBC Count : 3.27 M/uL  Hemoglobin : 10.0 g/dL  Hematocrit : 33.3 %  Platelet Count - Automated : 271 K/uL  Mean Cell Volume : 101.8 fl  Mean Cell Hemoglobin : 30.6 pg  Mean Cell Hemoglobin Concentration : 30.0 gm/dL  Auto Neutrophil # : x  Auto Lymphocyte # : x  Auto Monocyte # : x  Auto Eosinophil # : x  Auto Basophil # : x  Auto Neutrophil % : x  Auto Lymphocyte % : x  Auto Monocyte % : x  Auto Eosinophil % : x  Auto Basophil % : x    11-01    150<H>  |  121<H>  |  42<H>  ----------------------------<  277<H>  3.6   |  19<L>  |  1.68<H>    Ca    7.2<L>      01 Nov 2021 06:11  Phos  3.6     11-01  Mg     2.1     11-01    TPro  5.7<L>  /  Alb  1.7<L>  /  TBili  0.3  /  DBili  x   /  AST  29  /  ALT  19  /  AlkPhos  52  11-01    PT/INR - ( 31 Oct 2021 23:55 )   PT: 13.1 sec;   INR: 1.11 ratio         PTT - ( 31 Oct 2021 23:55 )  PTT:16.6 sec        RADIOLOGY & ADDITIONAL STUDIES REVIEWED:  ***    GOALS OF CARE DISCUSSION WITH PATIENT/FAMILY/PROXY:    CRITICAL CARE TIME SPENT: 35 minutes INTERVAL HPI/OVERNIGHT EVENTS: ***    Patient examined at bedside, stable, sedated and intubated. Recently admitted earlier this morning. She had episodes of hypotension (77/40). Given boluses of LR. Still hypotensive at 73/40. Will insert central line today. Repeat labs at 1pm.     PRESSORS: [ ] YES [ ] NO  WHICH:    ANTIBIOTICS:                      Antimicrobial:  azithromycin  IVPB 500 milliGRAM(s) IV Intermittent every 24 hours  cefTRIAXone   IVPB 1000 milliGRAM(s) IV Intermittent every 24 hours    Cardiovascular:    Pulmonary:    Hematalogic:  heparin   Injectable 5000 Unit(s) SubCutaneous every 8 hours    Other:  chlorhexidine 0.12% Liquid 15 milliLiter(s) Oral Mucosa every 12 hours  dexMEDEtomidine Infusion 0.5 MICROgram(s)/kG/Hr IV Continuous <Continuous>  fentaNYL   Infusion 0.5 MICROgram(s)/kG/Hr IV Continuous <Continuous>  lactated ringers Bolus 2000 milliLiter(s) IV Bolus once  lactated ringers. 1000 milliLiter(s) IV Continuous <Continuous>    azithromycin  IVPB 500 milliGRAM(s) IV Intermittent every 24 hours  cefTRIAXone   IVPB 1000 milliGRAM(s) IV Intermittent every 24 hours  chlorhexidine 0.12% Liquid 15 milliLiter(s) Oral Mucosa every 12 hours  dexMEDEtomidine Infusion 0.5 MICROgram(s)/kG/Hr IV Continuous <Continuous>  fentaNYL   Infusion 0.5 MICROgram(s)/kG/Hr IV Continuous <Continuous>  heparin   Injectable 5000 Unit(s) SubCutaneous every 8 hours  lactated ringers Bolus 2000 milliLiter(s) IV Bolus once  lactated ringers. 1000 milliLiter(s) IV Continuous <Continuous>    Drug Dosing Weight  Height (cm): 168 (01 Nov 2021 02:20)  Weight (kg): 40.9 (01 Nov 2021 02:20)  BMI (kg/m2): 14.5 (01 Nov 2021 02:20)  BSA (m2): 1.43 (01 Nov 2021 02:20)    CENTRAL LINE: [ ] YES [X] NO  LOCATION:   DATE INSERTED:  REMOVE: [ ] YES [ ] NO  EXPLAIN:    SAEED: [X] YES [ ] NO    DATE INSERTED: 11/1/2021  REMOVE:  [ ] YES [ ] NO  EXPLAIN:    A-LINE:  [ ] YES [X] NO  LOCATION:   DATE INSERTED:  REMOVE:  [ ] YES [ ] NO  EXPLAIN:    PMH -reviewed admission note, no change since admission    ICU Vital Signs Last 24 Hrs  T(C): 35.6 (01 Nov 2021 07:00), Max: 39.9 (01 Nov 2021 00:25)  T(F): 96 (01 Nov 2021 07:00), Max: 103.8 (01 Nov 2021 00:25)  HR: 90 (01 Nov 2021 10:00) (90 - 152)  BP: 77/40 (01 Nov 2021 10:00) (68/40 - 146/118)  BP(mean): 45 (01 Nov 2021 10:00) (45 - 93)  ABP: --  ABP(mean): --  RR: 21 (01 Nov 2021 10:00) (18 - 48)  SpO2: 94% (01 Nov 2021 10:00) (79% - 100%)      ABG - ( 01 Nov 2021 01:19 )  pH, Arterial: 7.29  pH, Blood: x     /  pCO2: 46    /  pO2: 275   / HCO3: 22    / Base Excess: -4.6  /  SaO2: 99                    10-31 @ 07:01  -  11-01 @ 07:00  --------------------------------------------------------  IN: 2323 mL / OUT: 0 mL / NET: 2323 mL        Mode: AC/ CMV (Assist Control/ Continuous Mandatory Ventilation)  RR (machine): 20  TV (machine): 400  FiO2: 90  PEEP: 5  ITime: 0.8  MAP: 5  PIP: 10      PHYSICAL EXAM:    GENERAL: NAD, sedated and intubated to mechanical ventilator  HEAD:  Atraumatic, Normocephalic  EYES: EOMI, PERRLA, conjunctiva and sclera clear  ENMT: No tonsillar erythema, exudates, or enlargement; Moist mucous membranes, Good dentition, No lesions  NECK: Supple, normal appearance, No JVD; Normal thyroid; Trachea midline  NERVOUS SYSTEM: sedated and cannot be fully assessed  CHEST/LUNG: No chest deformity; Normal percussion bilaterally; No rales, rhonchi, wheezing   HEART: Regular rate and rhythm; No murmurs, rubs, or gallops  ABDOMEN: Soft, Nontender, Nondistended; Bowel sounds present  EXTREMITIES:  2+ Peripheral Pulses, No clubbing, cyanosis, or edema  LYMPH: No lymphadenopathy noted  SKIN: No rashes or lesions; Good capillary refill      LABS:  CBC Full  -  ( 01 Nov 2021 06:11 )  WBC Count : x  RBC Count : 3.27 M/uL  Hemoglobin : 10.0 g/dL  Hematocrit : 33.3 %  Platelet Count - Automated : 271 K/uL  Mean Cell Volume : 101.8 fl  Mean Cell Hemoglobin : 30.6 pg  Mean Cell Hemoglobin Concentration : 30.0 gm/dL  Auto Neutrophil # : x  Auto Lymphocyte # : x  Auto Monocyte # : x  Auto Eosinophil # : x  Auto Basophil # : x  Auto Neutrophil % : x  Auto Lymphocyte % : x  Auto Monocyte % : x  Auto Eosinophil % : x  Auto Basophil % : x    11-01    150<H>  |  121<H>  |  42<H>  ----------------------------<  277<H>  3.6   |  19<L>  |  1.68<H>    Ca    7.2<L>      01 Nov 2021 06:11  Phos  3.6     11-01  Mg     2.1     11-01    TPro  5.7<L>  /  Alb  1.7<L>  /  TBili  0.3  /  DBili  x   /  AST  29  /  ALT  19  /  AlkPhos  52  11-01    PT/INR - ( 31 Oct 2021 23:55 )   PT: 13.1 sec;   INR: 1.11 ratio         PTT - ( 31 Oct 2021 23:55 )  PTT:16.6 sec        RADIOLOGY & ADDITIONAL STUDIES REVIEWED:  ***    < from: Xray Chest 1 View-PORTABLE IMMEDIATE (Xray Chest 1 View-PORTABLE IMMEDIATE .) (11.01.21 @ 04:03) >  INTERPRETATION:  EXAMINATION: XR CHEST IMMEDIATE    DATE OF EXAM: 11/1/2021 4:03 AM    HISTORY: Attempted central line placement, rule out pneumothorax    COMPARISON: Chest x-ray from yesterday.    FINDINGS:    The patient is rotated. Within this limitation, no pneumothorax. No infiltrate or effusion. Endotracheal tube is stable.      IMPRESSION:    1.  No evidence of pneumothorax.    < end of copied text >      GOALS OF CARE DISCUSSION WITH PATIENT/FAMILY/PROXY:    CRITICAL CARE TIME SPENT: 35 minutes

## 2021-11-01 NOTE — CHART NOTE - NSCHARTNOTEFT_GEN_A_CORE
We called the patient's grandson, Dr. Jered Pathak ([phone]) and gave him an update regarding her grandmother. We informed them that we placed an NGT and [facility] central line. CXR was done which revealed pneumothorax. Thoracic surgery was called immediately to assess and left chest tube was placed. We also informed him that the patient is currently on pressors and the BP has since improved (110/70s). We also started her on tube feeding ([facility]). The grandson understands the situation and would like to have regular updates.

## 2021-11-01 NOTE — PROCEDURE NOTE - NSTIMEOUT_GEN_A_CORE
POD #1    S: patient reports positive pain. Wants pain meds. Tolerating PO. Voiding.      O  Temp:  [97.7 °F (36.5 °C)-98.5 °F (36.9 °C)] 98.4 °F (36.9 °C)  Pulse:  [64-99] 64  Resp:  [18] 18  SpO2:  [98 %-100 %] 100 %  BP: ()/(50-65) 95/50   Gen: A&Ox3, NAD  Abd: soft, appropriate ttp, incision with bandage c/d/i  Ext:  No edema    Lab Results   Component Value Date    WBC 8.76 2018    HGB 7.5 (L) 2018    HCT 23.7 (L) 2018    MCV 84 2018     2018     A POS    AP: 31 yo now  female s/p rLTCS, doing well  Continue routine pp care  Anemia from expected blood loss from surgery - asymptomatic currently - IRON BID ordered      shahida reynolds MD  
Patient's first and last name, , procedure, and correct site confirmed prior to the start of procedure.
Patient's first and last name, , procedure, and correct site confirmed prior to the start of procedure.

## 2021-11-02 LAB
ALBUMIN SERPL ELPH-MCNC: 1.6 G/DL — LOW (ref 3.5–5)
ALP SERPL-CCNC: 67 U/L — SIGNIFICANT CHANGE UP (ref 40–120)
ALT FLD-CCNC: 32 U/L DA — SIGNIFICANT CHANGE UP (ref 10–60)
ANION GAP SERPL CALC-SCNC: 7 MMOL/L — SIGNIFICANT CHANGE UP (ref 5–17)
ANISOCYTOSIS BLD QL: SLIGHT — SIGNIFICANT CHANGE UP
AST SERPL-CCNC: 44 U/L — HIGH (ref 10–40)
BASE EXCESS BLDA CALC-SCNC: -3.6 MMOL/L — LOW (ref -2–3)
BASOPHILS # BLD AUTO: 0 K/UL — SIGNIFICANT CHANGE UP (ref 0–0.2)
BASOPHILS NFR BLD AUTO: 0 % — SIGNIFICANT CHANGE UP (ref 0–2)
BILIRUB SERPL-MCNC: 0.3 MG/DL — SIGNIFICANT CHANGE UP (ref 0.2–1.2)
BLOOD GAS COMMENTS ARTERIAL: SIGNIFICANT CHANGE UP
BUN SERPL-MCNC: 38 MG/DL — HIGH (ref 7–18)
CALCIUM SERPL-MCNC: 7.5 MG/DL — LOW (ref 8.4–10.5)
CHLORIDE SERPL-SCNC: 116 MMOL/L — HIGH (ref 96–108)
CO2 SERPL-SCNC: 24 MMOL/L — SIGNIFICANT CHANGE UP (ref 22–31)
CREAT SERPL-MCNC: 1.26 MG/DL — SIGNIFICANT CHANGE UP (ref 0.5–1.3)
EOSINOPHIL # BLD AUTO: 0 K/UL — SIGNIFICANT CHANGE UP (ref 0–0.5)
EOSINOPHIL NFR BLD AUTO: 0 % — SIGNIFICANT CHANGE UP (ref 0–6)
GLUCOSE SERPL-MCNC: 145 MG/DL — HIGH (ref 70–99)
HCO3 BLDA-SCNC: 21 MMOL/L — SIGNIFICANT CHANGE UP (ref 21–28)
HCT VFR BLD CALC: 29.6 % — LOW (ref 34.5–45)
HGB BLD-MCNC: 8.9 G/DL — LOW (ref 11.5–15.5)
HOROWITZ INDEX BLDA+IHG-RTO: 80 — SIGNIFICANT CHANGE UP
HYPOGRAN NEUTS BLD QL SMEAR: PRESENT — SIGNIFICANT CHANGE UP
HYPOSEGMENTATION: PRESENT — SIGNIFICANT CHANGE UP
LG PLATELETS BLD QL AUTO: SLIGHT — SIGNIFICANT CHANGE UP
LYMPHOCYTES # BLD AUTO: 1.39 K/UL — SIGNIFICANT CHANGE UP (ref 1–3.3)
LYMPHOCYTES # BLD AUTO: 10 % — LOW (ref 13–44)
MANUAL SMEAR VERIFICATION: SIGNIFICANT CHANGE UP
MCHC RBC-ENTMCNC: 29.5 PG — SIGNIFICANT CHANGE UP (ref 27–34)
MCHC RBC-ENTMCNC: 30.1 GM/DL — LOW (ref 32–36)
MCV RBC AUTO: 98 FL — SIGNIFICANT CHANGE UP (ref 80–100)
METAMYELOCYTES # FLD: 2 % — HIGH (ref 0–0)
MONOCYTES # BLD AUTO: 0.14 K/UL — SIGNIFICANT CHANGE UP (ref 0–0.9)
MONOCYTES NFR BLD AUTO: 1 % — LOW (ref 2–14)
MYELOCYTES NFR BLD: 1 % — HIGH (ref 0–0)
NEUTROPHILS # BLD AUTO: 11.81 K/UL — HIGH (ref 1.8–7.4)
NEUTROPHILS NFR BLD AUTO: 57 % — SIGNIFICANT CHANGE UP (ref 43–77)
NEUTS BAND # BLD: 28 % — HIGH (ref 0–8)
NRBC # BLD: 0 /100 — SIGNIFICANT CHANGE UP (ref 0–0)
OVALOCYTES BLD QL SMEAR: SLIGHT — SIGNIFICANT CHANGE UP
PCO2 BLDA: 34 MMHG — SIGNIFICANT CHANGE UP (ref 32–35)
PH BLDA: 7.39 — SIGNIFICANT CHANGE UP (ref 7.35–7.45)
PLAT MORPH BLD: NORMAL — SIGNIFICANT CHANGE UP
PLATELET # BLD AUTO: 276 K/UL — SIGNIFICANT CHANGE UP (ref 150–400)
PO2 BLDA: 131 MMHG — HIGH (ref 83–108)
POIKILOCYTOSIS BLD QL AUTO: SLIGHT — SIGNIFICANT CHANGE UP
POLYCHROMASIA BLD QL SMEAR: SLIGHT — SIGNIFICANT CHANGE UP
POTASSIUM SERPL-MCNC: 4.1 MMOL/L — SIGNIFICANT CHANGE UP (ref 3.5–5.3)
POTASSIUM SERPL-SCNC: 4.1 MMOL/L — SIGNIFICANT CHANGE UP (ref 3.5–5.3)
PROT SERPL-MCNC: 5.6 G/DL — LOW (ref 6–8.3)
RBC # BLD: 3.02 M/UL — LOW (ref 3.8–5.2)
RBC # FLD: 13.9 % — SIGNIFICANT CHANGE UP (ref 10.3–14.5)
RBC BLD AUTO: ABNORMAL
SAO2 % BLDA: 99 % — SIGNIFICANT CHANGE UP
SODIUM SERPL-SCNC: 147 MMOL/L — HIGH (ref 135–145)
VARIANT LYMPHS # BLD: 1 % — SIGNIFICANT CHANGE UP (ref 0–6)
WBC # BLD: 13.89 K/UL — HIGH (ref 3.8–10.5)
WBC # FLD AUTO: 13.89 K/UL — HIGH (ref 3.8–10.5)

## 2021-11-02 PROCEDURE — 71045 X-RAY EXAM CHEST 1 VIEW: CPT | Mod: 26

## 2021-11-02 PROCEDURE — 93010 ELECTROCARDIOGRAM REPORT: CPT

## 2021-11-02 RX ORDER — FENTANYL CITRATE 50 UG/ML
0.5 INJECTION INTRAVENOUS
Qty: 2500 | Refills: 0 | Status: DISCONTINUED | OUTPATIENT
Start: 2021-11-02 | End: 2021-11-08

## 2021-11-02 RX ORDER — ACETAMINOPHEN 500 MG
1000 TABLET ORAL ONCE
Refills: 0 | Status: COMPLETED | OUTPATIENT
Start: 2021-11-02 | End: 2021-11-02

## 2021-11-02 RX ORDER — CEFTRIAXONE 500 MG/1
INJECTION, POWDER, FOR SOLUTION INTRAMUSCULAR; INTRAVENOUS
Refills: 0 | Status: DISCONTINUED | OUTPATIENT
Start: 2021-11-02 | End: 2021-11-02

## 2021-11-02 RX ORDER — PANTOPRAZOLE SODIUM 20 MG/1
40 TABLET, DELAYED RELEASE ORAL DAILY
Refills: 0 | Status: DISCONTINUED | OUTPATIENT
Start: 2021-11-02 | End: 2021-11-07

## 2021-11-02 RX ORDER — PHENYLEPHRINE HYDROCHLORIDE 10 MG/ML
0.1 INJECTION INTRAVENOUS
Qty: 160 | Refills: 0 | Status: DISCONTINUED | OUTPATIENT
Start: 2021-11-02 | End: 2021-11-02

## 2021-11-02 RX ORDER — PROPOFOL 10 MG/ML
10 INJECTION, EMULSION INTRAVENOUS
Qty: 1000 | Refills: 0 | Status: DISCONTINUED | OUTPATIENT
Start: 2021-11-02 | End: 2021-11-05

## 2021-11-02 RX ORDER — CEFTRIAXONE 500 MG/1
1000 INJECTION, POWDER, FOR SOLUTION INTRAMUSCULAR; INTRAVENOUS EVERY 24 HOURS
Refills: 0 | Status: DISCONTINUED | OUTPATIENT
Start: 2021-11-03 | End: 2021-11-06

## 2021-11-02 RX ORDER — FENTANYL CITRATE 50 UG/ML
0.5 INJECTION INTRAVENOUS
Qty: 2500 | Refills: 0 | Status: DISCONTINUED | OUTPATIENT
Start: 2021-11-02 | End: 2021-11-02

## 2021-11-02 RX ORDER — CEFTRIAXONE 500 MG/1
2000 INJECTION, POWDER, FOR SOLUTION INTRAMUSCULAR; INTRAVENOUS EVERY 24 HOURS
Refills: 0 | Status: DISCONTINUED | OUTPATIENT
Start: 2021-11-02 | End: 2021-11-02

## 2021-11-02 RX ORDER — PHENYLEPHRINE HYDROCHLORIDE 10 MG/ML
0.3 INJECTION INTRAVENOUS
Qty: 160 | Refills: 0 | Status: DISCONTINUED | OUTPATIENT
Start: 2021-11-02 | End: 2021-11-02

## 2021-11-02 RX ORDER — CEFTRIAXONE 500 MG/1
1000 INJECTION, POWDER, FOR SOLUTION INTRAMUSCULAR; INTRAVENOUS ONCE
Refills: 0 | Status: COMPLETED | OUTPATIENT
Start: 2021-11-02 | End: 2021-11-02

## 2021-11-02 RX ORDER — PHENYLEPHRINE HYDROCHLORIDE 10 MG/ML
3.7 INJECTION INTRAVENOUS
Qty: 160 | Refills: 0 | Status: DISCONTINUED | OUTPATIENT
Start: 2021-11-02 | End: 2021-11-03

## 2021-11-02 RX ADMIN — SODIUM CHLORIDE 75 MILLILITER(S): 9 INJECTION, SOLUTION INTRAVENOUS at 21:12

## 2021-11-02 RX ADMIN — AZITHROMYCIN 255 MILLIGRAM(S): 500 TABLET, FILM COATED ORAL at 04:21

## 2021-11-02 RX ADMIN — SODIUM CHLORIDE 75 MILLILITER(S): 9 INJECTION, SOLUTION INTRAVENOUS at 15:16

## 2021-11-02 RX ADMIN — CEFTRIAXONE 100 MILLIGRAM(S): 500 INJECTION, POWDER, FOR SOLUTION INTRAMUSCULAR; INTRAVENOUS at 13:00

## 2021-11-02 RX ADMIN — SODIUM CHLORIDE 75 MILLILITER(S): 9 INJECTION, SOLUTION INTRAVENOUS at 05:05

## 2021-11-02 RX ADMIN — FENTANYL CITRATE 2.05 MICROGRAM(S)/KG/HR: 50 INJECTION INTRAVENOUS at 21:16

## 2021-11-02 RX ADMIN — Medication 1000 MILLIGRAM(S): at 19:11

## 2021-11-02 RX ADMIN — PROPOFOL 2.45 MICROGRAM(S)/KG/MIN: 10 INJECTION, EMULSION INTRAVENOUS at 21:11

## 2021-11-02 RX ADMIN — CHLORHEXIDINE GLUCONATE 15 MILLILITER(S): 213 SOLUTION TOPICAL at 05:03

## 2021-11-02 RX ADMIN — HEPARIN SODIUM 5000 UNIT(S): 5000 INJECTION INTRAVENOUS; SUBCUTANEOUS at 23:06

## 2021-11-02 RX ADMIN — PHENYLEPHRINE HYDROCHLORIDE 28.4 MICROGRAM(S)/KG/MIN: 10 INJECTION INTRAVENOUS at 19:10

## 2021-11-02 RX ADMIN — HEPARIN SODIUM 5000 UNIT(S): 5000 INJECTION INTRAVENOUS; SUBCUTANEOUS at 05:03

## 2021-11-02 RX ADMIN — Medication 400 MILLIGRAM(S): at 18:41

## 2021-11-02 RX ADMIN — PANTOPRAZOLE SODIUM 40 MILLIGRAM(S): 20 TABLET, DELAYED RELEASE ORAL at 12:43

## 2021-11-02 RX ADMIN — CHLORHEXIDINE GLUCONATE 15 MILLILITER(S): 213 SOLUTION TOPICAL at 17:16

## 2021-11-02 RX ADMIN — CEFTRIAXONE 100 MILLIGRAM(S): 500 INJECTION, POWDER, FOR SOLUTION INTRAMUSCULAR; INTRAVENOUS at 04:55

## 2021-11-02 RX ADMIN — HEPARIN SODIUM 5000 UNIT(S): 5000 INJECTION INTRAVENOUS; SUBCUTANEOUS at 13:21

## 2021-11-02 NOTE — CHART NOTE - NSCHARTNOTEFT_GEN_A_CORE
Patient spiked a fever of 100.7 at around 5pm. She is also tachycardic at 130-140s bpm. We prescribed 1000 mg IV Tylenol. Repeat blood cultures to be redrawn tomorrow 48 hrs after initial culture.

## 2021-11-02 NOTE — PROGRESS NOTE ADULT - SUBJECTIVE AND OBJECTIVE BOX
INTERVAL HPI/OVERNIGHT EVENTS: ***    PRESSORS: [ ] YES [ ] NO  WHICH:    ANTIBIOTICS:                      Antimicrobial:  azithromycin  IVPB 500 milliGRAM(s) IV Intermittent every 24 hours  cefTRIAXone   IVPB 1000 milliGRAM(s) IV Intermittent every 24 hours    Cardiovascular:  norepinephrine Infusion 0.05 MICROgram(s)/kG/Min IV Continuous <Continuous>    Pulmonary:    Hematalogic:  heparin   Injectable 5000 Unit(s) SubCutaneous every 8 hours    Other:  chlorhexidine 0.12% Liquid 15 milliLiter(s) Oral Mucosa every 12 hours  dexMEDEtomidine Infusion 0.5 MICROgram(s)/kG/Hr IV Continuous <Continuous>  fentaNYL   Infusion 0.5 MICROgram(s)/kG/Hr IV Continuous <Continuous>  lactated ringers. 1000 milliLiter(s) IV Continuous <Continuous>  lactated ringers. 1000 milliLiter(s) IV Continuous <Continuous>  pantoprazole   Suspension 40 milliGRAM(s) Oral daily  sodium chloride 0.9% lock flush 10 milliLiter(s) IV Push every 1 hour PRN    azithromycin  IVPB 500 milliGRAM(s) IV Intermittent every 24 hours  cefTRIAXone   IVPB 1000 milliGRAM(s) IV Intermittent every 24 hours  chlorhexidine 0.12% Liquid 15 milliLiter(s) Oral Mucosa every 12 hours  dexMEDEtomidine Infusion 0.5 MICROgram(s)/kG/Hr IV Continuous <Continuous>  fentaNYL   Infusion 0.5 MICROgram(s)/kG/Hr IV Continuous <Continuous>  heparin   Injectable 5000 Unit(s) SubCutaneous every 8 hours  lactated ringers. 1000 milliLiter(s) IV Continuous <Continuous>  lactated ringers. 1000 milliLiter(s) IV Continuous <Continuous>  norepinephrine Infusion 0.05 MICROgram(s)/kG/Min IV Continuous <Continuous>  pantoprazole   Suspension 40 milliGRAM(s) Oral daily  sodium chloride 0.9% lock flush 10 milliLiter(s) IV Push every 1 hour PRN    Drug Dosing Weight  Height (cm): 168 (2021 02:20)  Weight (kg): 40.9 (2021 02:20)  BMI (kg/m2): 14.5 (2021 02:20)  BSA (m2): 1.43 (2021 02:20)    CENTRAL LINE: [ ] YES [ ] NO  LOCATION:   DATE INSERTED:  REMOVE: [ ] YES [ ] NO  EXPLAIN:    SAEED: [ ] YES [ ] NO    DATE INSERTED:  REMOVE:  [ ] YES [ ] NO  EXPLAIN:    A-LINE:  [ ] YES [ ] NO  LOCATION:   DATE INSERTED:  REMOVE:  [ ] YES [ ] NO  EXPLAIN:    PMH -reviewed admission note, no change since admission    ICU Vital Signs Last 24 Hrs  T(C): 37 (2021 04:00), Max: 37 (2021 04:00)  T(F): 98.6 (2021 04:00), Max: 98.6 (2021 04:00)  HR: 116 (2021 07:00) (85 - 116)  BP: 123/46 (2021 07:00) (69/45 - 147/53)  BP(mean): 63 (2021 07:00) (31 - 78)  ABP: --  ABP(mean): --  RR: 20 (2021 04:00) (19 - 23)  SpO2: 98% (2021 06:30) (94% - 100%)      ABG - ( 2021 09:34 )  pH, Arterial: 7.39  pH, Blood: x     /  pCO2: 34    /  pO2: 131   / HCO3: 21    / Base Excess: -3.6  /  SaO2: 99                     @ 07:01  -   @ 07:00  --------------------------------------------------------  IN: 4023.9 mL / OUT: 425 mL / NET: 3598.9 mL        Mode: AC/ CMV (Assist Control/ Continuous Mandatory Ventilation)  RR (machine): 20  TV (machine): 400  FiO2: 80  PEEP: 5  ITime: 1  MAP: 10  PIP: 25      PHYSICAL EXAM:    GENERAL: NAD, well-groomed, well-developed  HEAD:  Atraumatic, Normocephalic  EYES: EOMI, PERRLA, conjunctiva and sclera clear  ENMT: No tonsillar erythema, exudates, or enlargement; Moist mucous membranes, Good dentition, No lesions  NECK: Supple, normal appearance, No JVD; Normal thyroid; Trachea midline  NERVOUS SYSTEM:  Alert & Oriented X3, Good concentration; Motor Strength 5/5 B/L upper and lower extremities; DTRs 2+ intact and symmetric  CHEST/LUNG: No chest deformity; Normal percussion bilaterally; No rales, rhonchi, wheezing   HEART: Regular rate and rhythm; No murmurs, rubs, or gallops  ABDOMEN: Soft, Nontender, Nondistended; Bowel sounds present  EXTREMITIES:  2+ Peripheral Pulses, No clubbing, cyanosis, or edema  LYMPH: No lymphadenopathy noted  SKIN: No rashes or lesions; Good capillary refill      LABS:  CBC Full  -  ( 2021 04:04 )  WBC Count : 13.89 K/uL  RBC Count : 3.02 M/uL  Hemoglobin : 8.9 g/dL  Hematocrit : 29.6 %  Platelet Count - Automated : 276 K/uL  Mean Cell Volume : 98.0 fl  Mean Cell Hemoglobin : 29.5 pg  Mean Cell Hemoglobin Concentration : 30.1 gm/dL  Auto Neutrophil # : 11.81 K/uL  Auto Lymphocyte # : 1.39 K/uL  Auto Monocyte # : 0.14 K/uL  Auto Eosinophil # : 0.00 K/uL  Auto Basophil # : 0.00 K/uL  Auto Neutrophil % : 57.0 %  Auto Lymphocyte % : 10.0 %  Auto Monocyte % : 1.0 %  Auto Eosinophil % : 0.0 %  Auto Basophil % : 0.0 %        147<H>  |  116<H>  |  38<H>  ----------------------------<  145<H>  4.1   |  24  |  1.26    Ca    7.5<L>      2021 04:04  Phos  3.1       Mg     1.8         TPro  5.6<L>  /  Alb  1.6<L>  /  TBili  0.3  /  DBili  x   /  AST  44<H>  /  ALT  32  /  AlkPhos  67      PT/INR - ( 2021 14:26 )   PT: 14.3 sec;   INR: 1.21 ratio         PTT - ( 31 Oct 2021 23:55 )  PTT:16.6 sec  Urinalysis Basic - ( 2021 17:09 )    Color: Yellow / Appearance: very cloudy / S.010 / pH: x  Gluc: x / Ketone: Trace  / Bili: Negative / Urobili: Negative   Blood: x / Protein: 100 / Nitrite: Negative   Leuk Esterase: Moderate / RBC: 25-50 /HPF / WBC 26-50 /HPF   Sq Epi: x / Non Sq Epi: Few /HPF / Bacteria: Many /HPF      Culture Results:   No growth to date. ( @ 03:56)  Culture Results:   Growth in anaerobic bottle: Gram Negative Rods  ***Blood Panel PCR results on this specimen are available  approximately 3 hours after the Gram stain result.***  Gram stain, PCR, and/or culture results may not always  correspond due to difference in methodologies.  ************************************************************  This PCR assay was performed by multiplex PCR. This  Assay tests for 66 bacterial and resistance gene targets.  Please refer to the [facility] Labs test directory  at [email or web address] for details. ( @ 03:56)      RADIOLOGY & ADDITIONAL STUDIES REVIEWED:  ***    GOALS OF CARE DISCUSSION WITH PATIENT/FAMILY/PROXY:    CRITICAL CARE TIME SPENT: 35 minutes INTERVAL HPI/OVERNIGHT EVENTS: ***    Patient was examined at bedside. Stable, NAD and sedated. Intubated to mechanical ventilator. She had chest tube inserted at the left due to pneumothorax. Chest tube is intact and attached to drain. No leaks present. No other significant concerns overnight.    PRESSORS: [X] YES [ ] NO  WHICH: levophed    ANTIBIOTICS:                      Antimicrobial:  azithromycin  IVPB 500 milliGRAM(s) IV Intermittent every 24 hours  cefTRIAXone   IVPB 1000 milliGRAM(s) IV Intermittent every 24 hours    Cardiovascular:  norepinephrine Infusion 0.05 MICROgram(s)/kG/Min IV Continuous <Continuous>    Pulmonary:    Hematalogic:  heparin   Injectable 5000 Unit(s) SubCutaneous every 8 hours    Other:  chlorhexidine 0.12% Liquid 15 milliLiter(s) Oral Mucosa every 12 hours  dexMEDEtomidine Infusion 0.5 MICROgram(s)/kG/Hr IV Continuous <Continuous>  fentaNYL   Infusion 0.5 MICROgram(s)/kG/Hr IV Continuous <Continuous>  lactated ringers. 1000 milliLiter(s) IV Continuous <Continuous>  lactated ringers. 1000 milliLiter(s) IV Continuous <Continuous>  pantoprazole   Suspension 40 milliGRAM(s) Oral daily  sodium chloride 0.9% lock flush 10 milliLiter(s) IV Push every 1 hour PRN    azithromycin  IVPB 500 milliGRAM(s) IV Intermittent every 24 hours  cefTRIAXone   IVPB 1000 milliGRAM(s) IV Intermittent every 24 hours  chlorhexidine 0.12% Liquid 15 milliLiter(s) Oral Mucosa every 12 hours  dexMEDEtomidine Infusion 0.5 MICROgram(s)/kG/Hr IV Continuous <Continuous>  fentaNYL   Infusion 0.5 MICROgram(s)/kG/Hr IV Continuous <Continuous>  heparin   Injectable 5000 Unit(s) SubCutaneous every 8 hours  lactated ringers. 1000 milliLiter(s) IV Continuous <Continuous>  lactated ringers. 1000 milliLiter(s) IV Continuous <Continuous>  norepinephrine Infusion 0.05 MICROgram(s)/kG/Min IV Continuous <Continuous>  pantoprazole   Suspension 40 milliGRAM(s) Oral daily  sodium chloride 0.9% lock flush 10 milliLiter(s) IV Push every 1 hour PRN    Drug Dosing Weight  Height (cm): 168 (2021 02:20)  Weight (kg): 40.9 (2021 02:20)  BMI (kg/m2): 14.5 (2021 02:20)  BSA (m2): 1.43 (2021 02:20)    CENTRAL LINE: [X] YES [ ] NO  LOCATION: [facility]   DATE INSERTED: 21  REMOVE: [ ] YES [ ] NO  EXPLAIN:    SAEED: [X] YES [ ] NO    DATE INSERTED: 21  REMOVE:  [ ] YES [ ] NO  EXPLAIN:    A-LINE:  [ ] YES [X] NO  LOCATION:   DATE INSERTED:  REMOVE:  [ ] YES [ ] NO  EXPLAIN:    PMH -reviewed admission note, no change since admission    ICU Vital Signs Last 24 Hrs  T(C): 37 (2021 04:00), Max: 37 (2021 04:00)  T(F): 98.6 (2021 04:00), Max: 98.6 (2021 04:00)  HR: 116 (2021 07:00) (85 - 116)  BP: 123/46 (2021 07:00) (69/45 - 147/53)  BP(mean): 63 (2021 07:00) (31 - 78)  ABP: --  ABP(mean): --  RR: 20 (2021 04:00) (19 - 23)  SpO2: 98% (2021 06:30) (94% - 100%)      ABG - ( 2021 09:34 )  pH, Arterial: 7.39  pH, Blood: x     /  pCO2: 34    /  pO2: 131   / HCO3: 21    / Base Excess: -3.6  /  SaO2: 99                     @ 07:01  -   @ 07:00  --------------------------------------------------------  IN: 4023.9 mL / OUT: 425 mL / NET: 3598.9 mL        Mode: AC/ CMV (Assist Control/ Continuous Mandatory Ventilation)  RR (machine): 20  TV (machine): 400  FiO2: 80  PEEP: 5  ITime: 1  MAP: 10  PIP: 25      PHYSICAL EXAM:    GENERAL: NAD, sedated and intubated to mechanical ventilator  HEAD:  Atraumatic, Normocephalic  EYES: EOMI, PERRLA, conjunctiva and sclera clear  ENMT: No tonsillar erythema, exudates, or enlargement; Moist mucous membranes, Good dentition, No lesions  NECK: Supple, normal appearance, No JVD; Normal thyroid; Trachea midline  NERVOUS SYSTEM:  sedated and intubated to mechanical ventilator, cannot fully assess  CHEST/LUNG: No chest deformity; Normal percussion bilaterally; No rales, rhonchi, wheezing   HEART: Regular rate and rhythm; No murmurs, rubs, or gallops  ABDOMEN: Soft, Nontender, Nondistended; Bowel sounds present  EXTREMITIES:  2+ Peripheral Pulses, No clubbing, cyanosis, or edema  LYMPH: No lymphadenopathy noted  SKIN: No rashes or lesions; Good capillary refill      LABS:  CBC Full  -  ( 2021 04:04 )  WBC Count : 13.89 K/uL  RBC Count : 3.02 M/uL  Hemoglobin : 8.9 g/dL  Hematocrit : 29.6 %  Platelet Count - Automated : 276 K/uL  Mean Cell Volume : 98.0 fl  Mean Cell Hemoglobin : 29.5 pg  Mean Cell Hemoglobin Concentration : 30.1 gm/dL  Auto Neutrophil # : 11.81 K/uL  Auto Lymphocyte # : 1.39 K/uL  Auto Monocyte # : 0.14 K/uL  Auto Eosinophil # : 0.00 K/uL  Auto Basophil # : 0.00 K/uL  Auto Neutrophil % : 57.0 %  Auto Lymphocyte % : 10.0 %  Auto Monocyte % : 1.0 %  Auto Eosinophil % : 0.0 %  Auto Basophil % : 0.0 %        147<H>  |  116<H>  |  38<H>  ----------------------------<  145<H>  4.1   |  24  |  1.26    Ca    7.5<L>      2021 04:04  Phos  3.1       Mg     1.8         TPro  5.6<L>  /  Alb  1.6<L>  /  TBili  0.3  /  DBili  x   /  AST  44<H>  /  ALT  32  /  AlkPhos  67      PT/INR - ( 2021 14:26 )   PT: 14.3 sec;   INR: 1.21 ratio         PTT - ( 31 Oct 2021 23:55 )  PTT:16.6 sec  Urinalysis Basic - ( 2021 17:09 )    Color: Yellow / Appearance: very cloudy / S.010 / pH: x  Gluc: x / Ketone: Trace  / Bili: Negative / Urobili: Negative   Blood: x / Protein: 100 / Nitrite: Negative   Leuk Esterase: Moderate / RBC: 25-50 /HPF / WBC 26-50 /HPF   Sq Epi: x / Non Sq Epi: Few /HPF / Bacteria: Many /HPF      Culture Results:   No growth to date. ( @ 03:56)  Culture Results:   Growth in anaerobic bottle: Gram Negative Rods  ***Blood Panel PCR results on this specimen are available  approximately 3 hours after the Gram stain result.***  Gram stain, PCR, and/or culture results may not always  correspond due to difference in methodologies.  ************************************************************  This PCR assay was performed by multiplex PCR. This  Assay tests for 66 bacterial and resistance gene targets.  Please refer to the [facility] test directory  at [email or web address] for details. ( @ 03:56)      RADIOLOGY & ADDITIONAL STUDIES REVIEWED:  ***    < from: Xray Chest 1 View-PORTABLE IMMEDIATE (Xray Chest 1 View-PORTABLE IMMEDIATE .) (21 @ 14:25) >  INTERPRETATION:  AP chest on 2021 at 1:43 PM. Patient had NG tube placement. Patient has a CVA.    Gross heart enlargement and endotracheal tube remain.    Present film shows an NG tube inserted with tip in the lower esophagus and a left jugular line with the tip in the superior vena caval area.    Earlier in the day there is some degree left lower lobe infiltrate which may have evolved into atelectasis.    Follow-up AP erect chest on 2021 at 2:18 PM. NG tube now just enters the stomach area.    Both of the last 2 films raise the question of a upper outer left pneumothorax roughly 25-30%.    IMPRESSION: Critical finding. Probable left pneumothorax. Other findings as above. Discussed with Dr. Rigo with read back at 2:56 PM.    < end of copied text >      GOALS OF CARE DISCUSSION WITH PATIENT/FAMILY/PROXY: FULL CODE    CRITICAL CARE TIME SPENT: 35 minutes

## 2021-11-02 NOTE — CONSULT NOTE ADULT - SUBJECTIVE AND OBJECTIVE BOX
HPI:  89 yo female from [facility] with medical h/o of HTN, Dementia, CVA with R sided hemiparesis, aphasia, parkinson's, GERD, CKD bedbound at baseline, dependant on assistance for ADL comes in with respiratory failure. Patient was found to have respiratory distress, saturating in 70s when EMS arrived, was placed on NRB on field. She was emergently intubated on arrival in ED. As per NH records patient was having fever and cough for past few days. Today she was found to have respiratory distress. She is vaccinated with moderna. Spoke to family son,  who stated the patient to remain full code. No other history could be obtained.  (2021 00:14)      PAST MEDICAL & SURGICAL HISTORY:  HTN (hypertension)    Dementia        No Known Allergies      Meds:  azithromycin  IVPB 500 milliGRAM(s) IV Intermittent every 24 hours  cefTRIAXone   IVPB      chlorhexidine 0.12% Liquid 15 milliLiter(s) Oral Mucosa every 12 hours  dexMEDEtomidine Infusion 0.5 MICROgram(s)/kG/Hr IV Continuous <Continuous>  heparin   Injectable 5000 Unit(s) SubCutaneous every 8 hours  lactated ringers. 1000 milliLiter(s) IV Continuous <Continuous>  lactated ringers. 1000 milliLiter(s) IV Continuous <Continuous>  norepinephrine Infusion 0.05 MICROgram(s)/kG/Min IV Continuous <Continuous>  pantoprazole   Suspension 40 milliGRAM(s) Oral daily  sodium chloride 0.9% lock flush 10 milliLiter(s) IV Push every 1 hour PRN      SOCIAL HISTORY:  Smoker:  YES / NO        PACK YEARS:                         WHEN QUIT?  ETOH use:  YES / NO               FREQUENCY / QUANTITY:  Ilicit Drug use:  YES / NO  Occupation:  Assisted device use (Cane / Walker):  Live with:    FAMILY HISTORY:      VITALS:  Vital Signs Last 24 Hrs  T(C): 37.6 (2021 19:32), Max: 38.2 (2021 16:07)  T(F): 99.7 (2021 19:32), Max: 100.7 (2021 16:07)  HR: 132 (2021 18:15) (92 - 135)  BP: 132/46 (2021 18:15) (83/45 - 147/53)  BP(mean): 64 (2021 18:15) (49 - 78)  RR: 24 (2021 18:15) (19 - 25)  SpO2: 90% (2021 18:15) (90% - 100%)    LABS/DIAGNOSTIC TESTS:                          8.9    13.89 )-----------( 276      ( 2021 04:04 )             29.6     WBC Count: 13.89 K/uL ( @ 04:04)  WBC Count: 6.29 K/uL ( @ 14:26)  WBC Count: 6.07 K/uL ( @ 06:11)  WBC Count: 5.80 K/uL (10-31 @ 23:34)          147<H>  |  116<H>  |  38<H>  ----------------------------<  145<H>  4.1   |  24  |  1.26    Ca    7.5<L>      2021 04:04  Phos  3.1       Mg     1.8         TPro  5.6<L>  /  Alb  1.6<L>  /  TBili  0.3  /  DBili  x   /  AST  44<H>  /  ALT  32  /  AlkPhos  67        Urinalysis Basic - ( 2021 17:09 )    Color: Yellow / Appearance: very cloudy / S.010 / pH: x  Gluc: x / Ketone: Trace  / Bili: Negative / Urobili: Negative   Blood: x / Protein: 100 / Nitrite: Negative   Leuk Esterase: Moderate / RBC: 25-50 /HPF / WBC 26-50 /HPF   Sq Epi: x / Non Sq Epi: Few /HPF / Bacteria: Many /HPF        LIVER FUNCTIONS - ( 2021 04:04 )  Alb: 1.6 g/dL / Pro: 5.6 g/dL / ALK PHOS: 67 U/L / ALT: 32 U/L DA / AST: 44 U/L / GGT: x             PT/INR - ( 2021 14:26 )   PT: 14.3 sec;   INR: 1.21 ratio         PTT - ( 31 Oct 2021 23:55 )  PTT:16.6 sec    LACTATE:    ABG - ABG - ( 2021 09:34 )  pH, Arterial: 7.39  pH, Blood: x     /  pCO2: 34    /  pO2: 131   / HCO3: 21    / Base Excess: -3.6  /  SaO2: 99                  CULTURES:   .Sputum Sputum   @ 21:18   Moderate Streptococcus agalactiae (Group B) isolated  Group B streptococci are susceptible to ampicillin,  penicillin and cefazolin, but may be resistant to  erythromycin and clindamycin.  Recommendations for intrapartum prophylaxis for Group B  streptococci are penicillin or ampicillin.  Normal Respiratory [place] present  --    Rare polymorphonuclear leukocytes per low power field  No Squamous epithelial cells per low power field  Rare Yeast like cells seen per oil power field  Rare Gram Positive Rods seen per oil power field  Rare Gram positive cocci in pairs seen per oil power field      .Blood Blood-Peripheral   @ 03:56   No growth to date.  --  Blood Culture PCR    Culture - Blood (21 @ 03:56)   - Escherichia coli: Detec   Gram Stain:   Growth in anaerobic bottle: Gram Negative Rods   Specimen Source: .Blood Blood-Peripheral   Organism: Blood Culture PCR   Culture Results:   Growth in anaerobic bottle: Escherichia coli       RADIOLOGY:< from: Xray Chest 1 View- PORTABLE-Urgent (Xray Chest 1 View- PORTABLE-Urgent .) (21 @ 18:51) >  EXAM:  XR CHEST PORTABLE URGENT 1V                            PROCEDURE DATE:  2021          INTERPRETATION:  Portable chest radiograph    CLINICAL INFORMATION: LEFT pneumothorax. Chest tube placement.    TECHNIQUE:  Portable  AP view of the chest.    COMPARISON: 2021 chest available for review.    FINDINGS: ET tube tip above tracheal bifurcation.  NG tube tip beyond GE junction.    RIGHT chest tube tip overlies apex. LEFT lung reexpanded.  LEFT perihilar/basilar diffuse airspace disease.  RIGHT basilar diffuse airspace disease and/or effusion.  Visualized osseous structures are intact.    IMPRESSION: Reexpansion LEFT lung following chest tube placement.  LEFT perihilar basilar diffuse airspace disease.  RIGHT basilar diffuse airspace disease and/or effusion.        FOLLOW-UP AP PORTABLE CHEST RADIOGRAPH 2021 AT 9:02 AM:  LEFT chest tube in place. No pneumothorax.  Increased airspace consolidation LEFT lower lobe.  Residual bibasilar of airspace disease and/or effusion.   ET tube tip above tracheal bifurcation.  NG tube tip beyond GE junction.    --- End of Report ---            MILO FRANCOIS MD; Attending Radiologist  This document has been electronically signed. 2021  2:54PM    < end of copied text >        ROS  [  ] UNABLE TO ELICIT               HPI:  89 yo female from [facility] with medical h/o of HTN, Dementia, CVA with R sided hemiparesis, aphasia, parkinson's, GERD, CKD bedbound at baseline, dependant on assistance for ADL comes in with respiratory failure. Patient was found to have respiratory distress, saturating in 70s when EMS arrived, was placed on NRB on field. She was emergently intubated on arrival in ED. As per NH records patient was having fever and cough for past few days. Today she was found to have respiratory distress. She is vaccinated with moderna. Spoke to family son,  who stated the patient to remain full code. No other history could be obtained.  (2021 00:14)      History as above, asked to see this patient who presented from [facility] with resp distress and hypoxia and was intubated here, she is hypotensive also and hence on pressor support. She is currently in the ICU, she is intubated , ve         PAST MEDICAL & SURGICAL HISTORY:  HTN (hypertension)    Dementia        No Known Allergies      Meds:  azithromycin  IVPB 500 milliGRAM(s) IV Intermittent every 24 hours  cefTRIAXone   IVPB      chlorhexidine 0.12% Liquid 15 milliLiter(s) Oral Mucosa every 12 hours  dexMEDEtomidine Infusion 0.5 MICROgram(s)/kG/Hr IV Continuous <Continuous>  heparin   Injectable 5000 Unit(s) SubCutaneous every 8 hours  lactated ringers. 1000 milliLiter(s) IV Continuous <Continuous>  lactated ringers. 1000 milliLiter(s) IV Continuous <Continuous>  norepinephrine Infusion 0.05 MICROgram(s)/kG/Min IV Continuous <Continuous>  pantoprazole   Suspension 40 milliGRAM(s) Oral daily  sodium chloride 0.9% lock flush 10 milliLiter(s) IV Push every 1 hour PRN      SOCIAL HISTORY: unknown    FAMILY HISTORY: unknown      VITALS:  Vital Signs Last 24 Hrs  T(C): 37.6 (2021 19:32), Max: 38.2 (2021 16:07)  T(F): 99.7 (2021 19:32), Max: 100.7 (2021 16:07)  HR: 132 (2021 18:15) (92 - 135)  BP: 132/46 (2021 18:15) (83/45 - 147/53)  BP(mean): 64 (2021 18:15) (49 - 78)  RR: 24 (2021 18:15) (19 - 25)  SpO2: 90% (2021 18:15) (90% - 100%)    LABS/DIAGNOSTIC TESTS:                          8.9    13.89 )-----------( 276      ( 2021 04:04 )             29.6     WBC Count: 13.89 K/uL ( @ 04:04)  WBC Count: 6.29 K/uL ( @ 14:26)  WBC Count: 6.07 K/uL ( @ 06:11)  WBC Count: 5.80 K/uL (10-31 @ 23:34)          147<H>  |  116<H>  |  38<H>  ----------------------------<  145<H>  4.1   |  24  |  1.26    Ca    7.5<L>      2021 04:04  Phos  3.1       Mg     1.8         TPro  5.6<L>  /  Alb  1.6<L>  /  TBili  0.3  /  DBili  x   /  AST  44<H>  /  ALT  32  /  AlkPhos  67        Urinalysis Basic - ( 2021 17:09 )    Color: Yellow / Appearance: very cloudy / S.010 / pH: x  Gluc: x / Ketone: Trace  / Bili: Negative / Urobili: Negative   Blood: x / Protein: 100 / Nitrite: Negative   Leuk Esterase: Moderate / RBC: 25-50 /HPF / WBC 26-50 /HPF   Sq Epi: x / Non Sq Epi: Few /HPF / Bacteria: Many /HPF        LIVER FUNCTIONS - ( 2021 04:04 )  Alb: 1.6 g/dL / Pro: 5.6 g/dL / ALK PHOS: 67 U/L / ALT: 32 U/L DA / AST: 44 U/L / GGT: x             PT/INR - ( 2021 14:26 )   PT: 14.3 sec;   INR: 1.21 ratio         PTT - ( 31 Oct 2021 23:55 )  PTT:16.6 sec    LACTATE:    ABG - ABG - ( 2021 09:34 )  pH, Arterial: 7.39  pH, Blood: x     /  pCO2: 34    /  pO2: 131   / HCO3: 21    / Base Excess: -3.6  /  SaO2: 99                  CULTURES:   .Sputum Sputum   @ 21:18   Moderate Streptococcus agalactiae (Group B) isolated  Group B streptococci are susceptible to ampicillin,  penicillin and cefazolin, but may be resistant to  erythromycin and clindamycin.  Recommendations for intrapartum prophylaxis for Group B  streptococci are penicillin or ampicillin.  Normal Respiratory [place] present  --    Rare polymorphonuclear leukocytes per low power field  No Squamous epithelial cells per low power field  Rare Yeast like cells seen per oil power field  Rare Gram Positive Rods seen per oil power field  Rare Gram positive cocci in pairs seen per oil power field      .Blood Blood-Peripheral   @ 03:56   No growth to date.  --  Blood Culture PCR    Culture - Blood (21 @ 03:56)   - Escherichia coli: Detec   Gram Stain:   Growth in anaerobic bottle: Gram Negative Rods   Specimen Source: .Blood Blood-Peripheral   Organism: Blood Culture PCR   Culture Results:   Growth in anaerobic bottle: Escherichia coli       RADIOLOGY:< from: Xray Chest 1 View- PORTABLE-Urgent (Xray Chest 1 View- PORTABLE-Urgent .) (21 @ 18:51) >  EXAM:  XR CHEST PORTABLE URGENT 1V                            PROCEDURE DATE:  2021          INTERPRETATION:  Portable chest radiograph    CLINICAL INFORMATION: LEFT pneumothorax. Chest tube placement.    TECHNIQUE:  Portable  AP view of the chest.    COMPARISON: 2021 chest available for review.    FINDINGS: ET tube tip above tracheal bifurcation.  NG tube tip beyond GE junction.    RIGHT chest tube tip overlies apex. LEFT lung reexpanded.  LEFT perihilar/basilar diffuse airspace disease.  RIGHT basilar diffuse airspace disease and/or effusion.  Visualized osseous structures are intact.    IMPRESSION: Reexpansion LEFT lung following chest tube placement.  LEFT perihilar basilar diffuse airspace disease.  RIGHT basilar diffuse airspace disease and/or effusion.        FOLLOW-UP AP PORTABLE CHEST RADIOGRAPH 2021 AT 9:02 AM:  LEFT chest tube in place. No pneumothorax.  Increased airspace consolidation LEFT lower lobe.  Residual bibasilar of airspace disease and/or effusion.   ET tube tip above tracheal bifurcation.  NG tube tip beyond GE junction.    --- End of Report ---            MILO FRANCOIS MD; Attending Radiologist  This document has been electronically signed. 2021  2:54PM    < end of copied text >        ROS  [ x ] UNABLE TO ELICIT               HPI:  89 yo female from [facility] with medical h/o of HTN, Dementia, CVA with R sided hemiparesis, aphasia, parkinson's, GERD, CKD bedbound at baseline, dependant on assistance for ADL comes in with respiratory failure. Patient was found to have respiratory distress, saturating in 70s when EMS arrived, was placed on NRB on field. She was emergently intubated on arrival in ED. As per NH records patient was having fever and cough for past few days. Today she was found to have respiratory distress. She is vaccinated with moderna. Spoke to family son,  who stated the patient to remain full code. No other history could be obtained.  (2021 00:14)      History as above, asked to see this patient who presented from [facility] with resp distress and hypoxia and was intubated here, she is hypotensive also and hence on pressor support. She is currently in the ICU, she is intubated , vent dependent and is on pressor support for septic shock, she was found to have pneumonia here along with a UTI and E. Coli Bacteremia. She is febrile and has an increasing WBC count as well.        PAST MEDICAL & SURGICAL HISTORY:  HTN (hypertension)    Dementia        No Known Allergies      Meds:  azithromycin  IVPB 500 milliGRAM(s) IV Intermittent every 24 hours  cefTRIAXone   IVPB      chlorhexidine 0.12% Liquid 15 milliLiter(s) Oral Mucosa every 12 hours  dexMEDEtomidine Infusion 0.5 MICROgram(s)/kG/Hr IV Continuous <Continuous>  heparin   Injectable 5000 Unit(s) SubCutaneous every 8 hours  lactated ringers. 1000 milliLiter(s) IV Continuous <Continuous>  lactated ringers. 1000 milliLiter(s) IV Continuous <Continuous>  norepinephrine Infusion 0.05 MICROgram(s)/kG/Min IV Continuous <Continuous>  pantoprazole   Suspension 40 milliGRAM(s) Oral daily  sodium chloride 0.9% lock flush 10 milliLiter(s) IV Push every 1 hour PRN      SOCIAL HISTORY: unknown    FAMILY HISTORY: unknown      VITALS:  Vital Signs Last 24 Hrs  T(C): 37.6 (2021 19:32), Max: 38.2 (2021 16:07)  T(F): 99.7 (2021 19:32), Max: 100.7 (2021 16:07)  HR: 132 (2021 18:15) (92 - 135)  BP: 132/46 (2021 18:15) (83/45 - 147/53)  BP(mean): 64 (2021 18:15) (49 - 78)  RR: 24 (2021 18:15) (19 - 25)  SpO2: 90% (2021 18:15) (90% - 100%)    LABS/DIAGNOSTIC TESTS:                          8.9    13.89 )-----------( 276      ( 2021 04:04 )             29.6     WBC Count: 13.89 K/uL ( @ 04:04)  WBC Count: 6.29 K/uL ( @ 14:26)  WBC Count: 6.07 K/uL ( @ 06:11)  WBC Count: 5.80 K/uL (10-31 @ 23:34)          147<H>  |  116<H>  |  38<H>  ----------------------------<  145<H>  4.1   |  24  |  1.26    Ca    7.5<L>      2021 04:04  Phos  3.1       Mg     1.8         TPro  5.6<L>  /  Alb  1.6<L>  /  TBili  0.3  /  DBili  x   /  AST  44<H>  /  ALT  32  /  AlkPhos  67        Urinalysis Basic - ( 2021 17:09 )    Color: Yellow / Appearance: very cloudy / S.010 / pH: x  Gluc: x / Ketone: Trace  / Bili: Negative / Urobili: Negative   Blood: x / Protein: 100 / Nitrite: Negative   Leuk Esterase: Moderate / RBC: 25-50 /HPF / WBC 26-50 /HPF   Sq Epi: x / Non Sq Epi: Few /HPF / Bacteria: Many /HPF        LIVER FUNCTIONS - ( 2021 04:04 )  Alb: 1.6 g/dL / Pro: 5.6 g/dL / ALK PHOS: 67 U/L / ALT: 32 U/L DA / AST: 44 U/L / GGT: x             PT/INR - ( 2021 14:26 )   PT: 14.3 sec;   INR: 1.21 ratio         PTT - ( 31 Oct 2021 23:55 )  PTT:16.6 sec    LACTATE:    ABG - ABG - ( 2021 09:34 )  pH, Arterial: 7.39  pH, Blood: x     /  pCO2: 34    /  pO2: 131   / HCO3: 21    / Base Excess: -3.6  /  SaO2: 99                  CULTURES:   .Sputum Sputum   @ 21:18   Moderate Streptococcus agalactiae (Group B) isolated  Group B streptococci are susceptible to ampicillin,  penicillin and cefazolin, but may be resistant to  erythromycin and clindamycin.  Recommendations for intrapartum prophylaxis for Group B  streptococci are penicillin or ampicillin.  Normal Respiratory [place] present  --    Rare polymorphonuclear leukocytes per low power field  No Squamous epithelial cells per low power field  Rare Yeast like cells seen per oil power field  Rare Gram Positive Rods seen per oil power field  Rare Gram positive cocci in pairs seen per oil power field      .Blood Blood-Peripheral   @ 03:56   No growth to date.  --  Blood Culture PCR    Culture - Blood (21 @ 03:56)   - Escherichia coli: Detec   Gram Stain:   Growth in anaerobic bottle: Gram Negative Rods   Specimen Source: .Blood Blood-Peripheral   Organism: Blood Culture PCR   Culture Results:   Growth in anaerobic bottle: Escherichia coli       RADIOLOGY:< from: Xray Chest 1 View- PORTABLE-Urgent (Xray Chest 1 View- PORTABLE-Urgent .) (21 @ 18:51) >  EXAM:  XR CHEST PORTABLE URGENT 1V                            PROCEDURE DATE:  2021          INTERPRETATION:  Portable chest radiograph    CLINICAL INFORMATION: LEFT pneumothorax. Chest tube placement.    TECHNIQUE:  Portable  AP view of the chest.    COMPARISON: 2021 chest available for review.    FINDINGS: ET tube tip above tracheal bifurcation.  NG tube tip beyond GE junction.    RIGHT chest tube tip overlies apex. LEFT lung reexpanded.  LEFT perihilar/basilar diffuse airspace disease.  RIGHT basilar diffuse airspace disease and/or effusion.  Visualized osseous structures are intact.    IMPRESSION: Reexpansion LEFT lung following chest tube placement.  LEFT perihilar basilar diffuse airspace disease.  RIGHT basilar diffuse airspace disease and/or effusion.        FOLLOW-UP AP PORTABLE CHEST RADIOGRAPH 2021 AT 9:02 AM:  LEFT chest tube in place. No pneumothorax.  Increased airspace consolidation LEFT lower lobe.  Residual bibasilar of airspace disease and/or effusion.   ET tube tip above tracheal bifurcation.  NG tube tip beyond GE junction.    --- End of Report ---            MILO FRANCOIS MD; Attending Radiologist  This document has been electronically signed. 2021  2:54PM    < end of copied text >        ROS  [ x ] UNABLE TO ELICIT

## 2021-11-02 NOTE — CONSULT NOTE ADULT - ASSESSMENT
UTI  Bacteremia  Pneumonia  Septic Shock  Fevers  Leukocytosis    Plan - UTI  Bacteremia  Pneumonia  Septic Shock  Fevers  Leukocytosis    Plan - Decrease Rocephin 1 gm iv q24hrs  Cont Azithromycin 500mgs iv q24 hrs  repeat blood cultures  prognosis poor

## 2021-11-02 NOTE — PROGRESS NOTE ADULT - ASSESSMENT
89 y/o F, nursing home resident with a significant medical history of CVA with R sided hemiparesis, aphasia, parkinson's, GERD, dementia, CKD, presents to the ED in respiratory distress, intubated while in ED. Patient is being admitted to medicine for sepsis and AHRF likely 2/2 ?PNA    # Acute hypoxic respiratory failure  # Sepsis 2/2 PNA  # Lactic acidosis  # Joby on CKD  # Hypernatremia    Neuro:  Patient is Aox0 at present  Started on fentanyl for pain  Will start on propofol    Pulmonary  Acute hypoxic respiratory failure  Patient presented with AHRF, intubated in ED  As per NH records, patient had cough and fever for past few days  CXR shows LLL infiltrate  [facility] had covid outbreak, will put on isolation. First covid was negative  s/p one dose vanc and zosyn  Will start on rocephin and zithro  Cultures were sent  ABG showed 7.26/46/275/22  repeat ABG at 1pm today  Monitor resp status    Cardiovascular  Sinus tachycardia  Patient is in sinus tachycardia  Likely 2/2 sepsis  EKG showed sinus rythm with ST changes in inferior leads  Will consult cardiology in AM  Trop was mildly elevated  Will trend down trop    Gastrointestinal  NPO for now  NGT inserted today    ID  Sepsis 2/2 unknown source  Patient was tachycardic, had high temp 103.8  No leucocytosis  CXR shows LLL infiltrate  s/p 2L bolus  Started on LR @75cc/hr  Was given one dose of vanc and zosyn  Will start on rocephin and zithro   cx - No evidence of pneumothorax, no infiltrates or effusion  lactate - 11.2>4.9    Renal  Patient has h/o CKD  Clinically patient is severely dehydrated  has hypernatremia, likely 2/2 volume deficit  Not producing any urine  s/p 2 L bolus  Will start on standing IVF  Monitor I/O  Cr is elevated, unknown baseline  WIll send UA once she has some urine in the bag  f/u UA  Monitor BMP    Endo  BS are elevated  No h/o DM  f/u A1c  Monitor BS    Prophylactic  On heparin for dvt prophylaxis (hold for central insertion)  on PPI for GI prophylaxis    Palliative consult - hold until repeat LABS 89 y/o F, nursing home resident with a significant medical history of CVA with R sided hemiparesis, aphasia, parkinson's, GERD, dementia, CKD, presents to the ED in respiratory distress, intubated while in ED. Patient is being admitted to medicine for sepsis and AHRF likely 2/2 ?PNA    # Acute hypoxic respiratory failure  # Sepsis 2/2 PNA, E. coli bacteremia  # Lactic acidosis  # Joby on CKD  # Hypernatremia    Neuro:  Patient is sedated on Precedex  Patient is Aox0  Started on fentanyl for pain    Pulmonary  Acute hypoxic respiratory failure  Patient presented with AHRF, intubated in ED  As per NH records, patient had cough and fever for past few days  CXR shows LLL infiltrate  [facility] had covid outbreak, will put on isolation. First covid was negative  s/p one dose vanc and zosyn  Will start on rocephin 2mg and zithro  Cultures were sent- E. coli bacteremia  ABG showed 7.39/34/131/21 this morning  Monitor resp status    Cardiovascular  Sinus tachycardia  Patient is in sinus tachycardia  Likely 2/2 sepsis  EKG showed sinus rhythm with ST changes in inferior leads  Will consult cardiology in AM  Trop was mildly elevated but trended down    Gastrointestinal  NPO for now  NGT inserted today    ID  Sepsis 2/2 unknown source  Patient was tachycardic, had high temp 103.8  No leucocytosis  CXR shows LLL infiltrate  s/p 2L bolus  Started on LR @75cc/hr  Was given one dose of vanc and zosyn  Will start on rocephin and zithro   cx - No evidence of pneumothorax, no infiltrates or effusion  Blood Cx - E. coli bacteremia  lactate - 11.2>4.9    Renal  Patient has h/o CKD  Clinically patient is severely dehydrated  has hypernatremia, likely 2/2 volume deficit  Not producing any urine  s/p 2 L bolus  on standing IVF  Monitor I/O  Cr is elevated, unknown baseline (trending down)  WIll send UA once she has some urine in the bag  UA - pos. for UTI  f/u UCx  Monitor BMP    Endo  BS are elevated  No h/o DM  A1c - 5.8  Monitor BS    Prophylactic  On heparin for dvt prophylaxis (hold for central insertion)  on PPI for GI prophylaxis    Palliative consult - hold until repeat LABS

## 2021-11-03 LAB
-  AMIKACIN: SIGNIFICANT CHANGE UP
-  AMPICILLIN/SULBACTAM: SIGNIFICANT CHANGE UP
-  AMPICILLIN: SIGNIFICANT CHANGE UP
-  AZTREONAM: SIGNIFICANT CHANGE UP
-  CEFAZOLIN: SIGNIFICANT CHANGE UP
-  CEFEPIME: SIGNIFICANT CHANGE UP
-  CEFOXITIN: SIGNIFICANT CHANGE UP
-  CEFTRIAXONE: SIGNIFICANT CHANGE UP
-  CIPROFLOXACIN: SIGNIFICANT CHANGE UP
-  ERTAPENEM: SIGNIFICANT CHANGE UP
-  GENTAMICIN: SIGNIFICANT CHANGE UP
-  IMIPENEM: SIGNIFICANT CHANGE UP
-  LEVOFLOXACIN: SIGNIFICANT CHANGE UP
-  MEROPENEM: SIGNIFICANT CHANGE UP
-  PIPERACILLIN/TAZOBACTAM: SIGNIFICANT CHANGE UP
-  TOBRAMYCIN: SIGNIFICANT CHANGE UP
-  TRIMETHOPRIM/SULFAMETHOXAZOLE: SIGNIFICANT CHANGE UP
ALBUMIN SERPL ELPH-MCNC: 1.1 G/DL — LOW (ref 3.5–5)
ALP SERPL-CCNC: 95 U/L — SIGNIFICANT CHANGE UP (ref 40–120)
ALT FLD-CCNC: 25 U/L DA — SIGNIFICANT CHANGE UP (ref 10–60)
ANION GAP SERPL CALC-SCNC: 5 MMOL/L — SIGNIFICANT CHANGE UP (ref 5–17)
ANISOCYTOSIS BLD QL: SLIGHT — SIGNIFICANT CHANGE UP
AST SERPL-CCNC: 30 U/L — SIGNIFICANT CHANGE UP (ref 10–40)
BASE EXCESS BLDA CALC-SCNC: -1.5 MMOL/L — SIGNIFICANT CHANGE UP (ref -2–3)
BASOPHILS # BLD AUTO: 0 K/UL — SIGNIFICANT CHANGE UP (ref 0–0.2)
BASOPHILS NFR BLD AUTO: 0 % — SIGNIFICANT CHANGE UP (ref 0–2)
BILIRUB SERPL-MCNC: 0.2 MG/DL — SIGNIFICANT CHANGE UP (ref 0.2–1.2)
BLOOD GAS COMMENTS ARTERIAL: SIGNIFICANT CHANGE UP
BUN SERPL-MCNC: 31 MG/DL — HIGH (ref 7–18)
CALCIUM SERPL-MCNC: 7.6 MG/DL — LOW (ref 8.4–10.5)
CHLORIDE SERPL-SCNC: 114 MMOL/L — HIGH (ref 96–108)
CO2 SERPL-SCNC: 25 MMOL/L — SIGNIFICANT CHANGE UP (ref 22–31)
CREAT SERPL-MCNC: 0.96 MG/DL — SIGNIFICANT CHANGE UP (ref 0.5–1.3)
CULTURE RESULTS: NO GROWTH — SIGNIFICANT CHANGE UP
CULTURE RESULTS: SIGNIFICANT CHANGE UP
CULTURE RESULTS: SIGNIFICANT CHANGE UP
ELLIPTOCYTES BLD QL SMEAR: SLIGHT — SIGNIFICANT CHANGE UP
EOSINOPHIL # BLD AUTO: 0 K/UL — SIGNIFICANT CHANGE UP (ref 0–0.5)
EOSINOPHIL NFR BLD AUTO: 0 % — SIGNIFICANT CHANGE UP (ref 0–6)
GLUCOSE SERPL-MCNC: 168 MG/DL — HIGH (ref 70–99)
HCO3 BLDA-SCNC: 23 MMOL/L — SIGNIFICANT CHANGE UP (ref 21–28)
HCT VFR BLD CALC: 26.8 % — LOW (ref 34.5–45)
HGB BLD-MCNC: 8.2 G/DL — LOW (ref 11.5–15.5)
HOROWITZ INDEX BLDA+IHG-RTO: 40 — SIGNIFICANT CHANGE UP
HYPOGRAN NEUTS BLD QL SMEAR: PRESENT — SIGNIFICANT CHANGE UP
HYPOSEGMENTATION: PRESENT — SIGNIFICANT CHANGE UP
LACTATE SERPL-SCNC: 3.2 MMOL/L — HIGH (ref 0.7–2)
LG PLATELETS BLD QL AUTO: SLIGHT — SIGNIFICANT CHANGE UP
LYMPHOCYTES # BLD AUTO: 1.06 K/UL — SIGNIFICANT CHANGE UP (ref 1–3.3)
LYMPHOCYTES # BLD AUTO: 7 % — LOW (ref 13–44)
M PNEUMO DNA SPEC QL NAA+PROBE: NEGATIVE — SIGNIFICANT CHANGE UP
MAGNESIUM SERPL-MCNC: 1.8 MG/DL — SIGNIFICANT CHANGE UP (ref 1.6–2.6)
MANUAL SMEAR VERIFICATION: SIGNIFICANT CHANGE UP
MCHC RBC-ENTMCNC: 29.8 PG — SIGNIFICANT CHANGE UP (ref 27–34)
MCHC RBC-ENTMCNC: 30.6 GM/DL — LOW (ref 32–36)
MCV RBC AUTO: 97.5 FL — SIGNIFICANT CHANGE UP (ref 80–100)
METHOD TYPE: SIGNIFICANT CHANGE UP
MICROCYTES BLD QL: SLIGHT — SIGNIFICANT CHANGE UP
MONOCYTES # BLD AUTO: 0.3 K/UL — SIGNIFICANT CHANGE UP (ref 0–0.9)
MONOCYTES NFR BLD AUTO: 2 % — SIGNIFICANT CHANGE UP (ref 2–14)
NEUTROPHILS # BLD AUTO: 13.81 K/UL — HIGH (ref 1.8–7.4)
NEUTROPHILS NFR BLD AUTO: 89 % — HIGH (ref 43–77)
NEUTS BAND # BLD: 2 % — SIGNIFICANT CHANGE UP (ref 0–8)
NRBC # BLD: 0 /100 — SIGNIFICANT CHANGE UP (ref 0–0)
ORGANISM # SPEC MICROSCOPIC CNT: SIGNIFICANT CHANGE UP
OVALOCYTES BLD QL SMEAR: SLIGHT — SIGNIFICANT CHANGE UP
PCO2 BLDA: 37 MMHG — HIGH (ref 32–35)
PH BLDA: 7.4 — SIGNIFICANT CHANGE UP (ref 7.35–7.45)
PHOSPHATE SERPL-MCNC: 2.5 MG/DL — SIGNIFICANT CHANGE UP (ref 2.5–4.5)
PLAT MORPH BLD: NORMAL — SIGNIFICANT CHANGE UP
PLATELET # BLD AUTO: 220 K/UL — SIGNIFICANT CHANGE UP (ref 150–400)
PLATELET COUNT - ESTIMATE: NORMAL — SIGNIFICANT CHANGE UP
PO2 BLDA: 77 MMHG — LOW (ref 83–108)
POIKILOCYTOSIS BLD QL AUTO: SLIGHT — SIGNIFICANT CHANGE UP
POLYCHROMASIA BLD QL SMEAR: SLIGHT — SIGNIFICANT CHANGE UP
POTASSIUM SERPL-MCNC: 3.5 MMOL/L — SIGNIFICANT CHANGE UP (ref 3.5–5.3)
POTASSIUM SERPL-SCNC: 3.5 MMOL/L — SIGNIFICANT CHANGE UP (ref 3.5–5.3)
PROCALCITONIN SERPL-MCNC: 35.1 NG/ML — HIGH (ref 0.02–0.1)
PROT SERPL-MCNC: 4.8 G/DL — LOW (ref 6–8.3)
RBC # BLD: 2.75 M/UL — LOW (ref 3.8–5.2)
RBC # FLD: 14.4 % — SIGNIFICANT CHANGE UP (ref 10.3–14.5)
RBC BLD AUTO: ABNORMAL
SAO2 % BLDA: 97 % — SIGNIFICANT CHANGE UP
SCHISTOCYTES BLD QL AUTO: SLIGHT — SIGNIFICANT CHANGE UP
SODIUM SERPL-SCNC: 144 MMOL/L — SIGNIFICANT CHANGE UP (ref 135–145)
SPECIMEN SOURCE: SIGNIFICANT CHANGE UP
WBC # BLD: 15.18 K/UL — HIGH (ref 3.8–10.5)
WBC # FLD AUTO: 15.18 K/UL — HIGH (ref 3.8–10.5)

## 2021-11-03 PROCEDURE — 71045 X-RAY EXAM CHEST 1 VIEW: CPT | Mod: 26

## 2021-11-03 PROCEDURE — 99232 SBSQ HOSP IP/OBS MODERATE 35: CPT | Mod: 57

## 2021-11-03 RX ORDER — ACETAMINOPHEN 500 MG
1000 TABLET ORAL ONCE
Refills: 0 | Status: COMPLETED | OUTPATIENT
Start: 2021-11-03 | End: 2021-11-03

## 2021-11-03 RX ORDER — PHENYLEPHRINE HYDROCHLORIDE 10 MG/ML
2.2 INJECTION INTRAVENOUS
Qty: 160 | Refills: 0 | Status: DISCONTINUED | OUTPATIENT
Start: 2021-11-03 | End: 2021-11-05

## 2021-11-03 RX ORDER — SENNA PLUS 8.6 MG/1
2 TABLET ORAL AT BEDTIME
Refills: 0 | Status: DISCONTINUED | OUTPATIENT
Start: 2021-11-03 | End: 2021-11-11

## 2021-11-03 RX ORDER — MAGNESIUM SULFATE 500 MG/ML
1 VIAL (ML) INJECTION ONCE
Refills: 0 | Status: COMPLETED | OUTPATIENT
Start: 2021-11-03 | End: 2021-11-03

## 2021-11-03 RX ORDER — CHLORHEXIDINE GLUCONATE 213 G/1000ML
1 SOLUTION TOPICAL
Refills: 0 | Status: DISCONTINUED | OUTPATIENT
Start: 2021-11-03 | End: 2021-11-13

## 2021-11-03 RX ORDER — SODIUM CHLORIDE 9 MG/ML
1000 INJECTION, SOLUTION INTRAVENOUS
Refills: 0 | Status: DISCONTINUED | OUTPATIENT
Start: 2021-11-03 | End: 2021-11-08

## 2021-11-03 RX ORDER — POLYETHYLENE GLYCOL 3350 17 G/17G
17 POWDER, FOR SOLUTION ORAL DAILY
Refills: 0 | Status: DISCONTINUED | OUTPATIENT
Start: 2021-11-03 | End: 2021-11-11

## 2021-11-03 RX ORDER — POTASSIUM CHLORIDE 20 MEQ
40 PACKET (EA) ORAL ONCE
Refills: 0 | Status: COMPLETED | OUTPATIENT
Start: 2021-11-03 | End: 2021-11-03

## 2021-11-03 RX ADMIN — Medication 400 MILLIGRAM(S): at 19:59

## 2021-11-03 RX ADMIN — HEPARIN SODIUM 5000 UNIT(S): 5000 INJECTION INTRAVENOUS; SUBCUTANEOUS at 13:22

## 2021-11-03 RX ADMIN — CHLORHEXIDINE GLUCONATE 15 MILLILITER(S): 213 SOLUTION TOPICAL at 17:26

## 2021-11-03 RX ADMIN — CHLORHEXIDINE GLUCONATE 15 MILLILITER(S): 213 SOLUTION TOPICAL at 06:18

## 2021-11-03 RX ADMIN — Medication 1000 MILLIGRAM(S): at 21:15

## 2021-11-03 RX ADMIN — HEPARIN SODIUM 5000 UNIT(S): 5000 INJECTION INTRAVENOUS; SUBCUTANEOUS at 21:53

## 2021-11-03 RX ADMIN — PANTOPRAZOLE SODIUM 40 MILLIGRAM(S): 20 TABLET, DELAYED RELEASE ORAL at 11:11

## 2021-11-03 RX ADMIN — Medication 40 MILLIEQUIVALENT(S): at 09:47

## 2021-11-03 RX ADMIN — AZITHROMYCIN 255 MILLIGRAM(S): 500 TABLET, FILM COATED ORAL at 05:44

## 2021-11-03 RX ADMIN — Medication 100 GRAM(S): at 09:39

## 2021-11-03 RX ADMIN — CEFTRIAXONE 100 MILLIGRAM(S): 500 INJECTION, POWDER, FOR SOLUTION INTRAMUSCULAR; INTRAVENOUS at 05:46

## 2021-11-03 RX ADMIN — POLYETHYLENE GLYCOL 3350 17 GRAM(S): 17 POWDER, FOR SOLUTION ORAL at 11:11

## 2021-11-03 RX ADMIN — CHLORHEXIDINE GLUCONATE 1 APPLICATION(S): 213 SOLUTION TOPICAL at 17:27

## 2021-11-03 RX ADMIN — HEPARIN SODIUM 5000 UNIT(S): 5000 INJECTION INTRAVENOUS; SUBCUTANEOUS at 06:19

## 2021-11-03 RX ADMIN — SENNA PLUS 2 TABLET(S): 8.6 TABLET ORAL at 21:53

## 2021-11-03 NOTE — DIETITIAN NUTRITION RISK NOTIFICATION - TREATMENT: THE FOLLOWING DIET HAS BEEN RECOMMENDED
Diet, NPO:   Tube Feeding Modality: Nasogastric  Jevity 1.5 Bg  Total Volume for 24 Hours (mL): 720  Continuous  Starting Tube Feed Rate {mL per Hour}: 10  Increase Tube Feed Rate by (mL): 5     Every 4 hours  Until Goal Tube Feed Rate (mL per Hour): 30  Tube Feed Duration (in Hours): 24  Tube Feed Start Time: 17:00 (11-02-21 @ 12:08) [Active]  May add 1 Pkt Prosource/ day or change to Glucerna 1.5 (same rate). See document section.

## 2021-11-03 NOTE — PROGRESS NOTE ADULT - NUTRITIONAL ASSESSMENT
This patient has been assessed with a concern for Malnutrition and has been determined to have a diagnosis/diagnoses of Severe protein-calorie malnutrition and Underweight (BMI < 19).    This patient is being managed with:   Diet NPO-  Tube Feeding Modality: Nasogastric  Jevity 1.5 Bg  Total Volume for 24 Hours (mL): 720  Continuous  Starting Tube Feed Rate {mL per Hour}: 10  Increase Tube Feed Rate by (mL): 5     Every 4 hours  Until Goal Tube Feed Rate (mL per Hour): 30  Tube Feed Duration (in Hours): 24  Tube Feed Start Time: 17:00  Entered: Nov 2 2021 12:08PM

## 2021-11-03 NOTE — DIETITIAN INITIAL EVALUATION ADULT. - PERTINENT LABORATORY DATA
11-03 Na144 mmol/L Glu 168 mg/dL<H> K+ 3.5 mmol/L Cr  0.96 mg/dL BUN 31 mg/dL<H>   11-03 Phos 2.5 mg/dL   11-03 Alb 1.1 g/dL<L>        11-01-21 @ 13:17 HgbA1C 5.8

## 2021-11-03 NOTE — PROGRESS NOTE ADULT - SUBJECTIVE AND OBJECTIVE BOX
INTERVAL HPI/OVERNIGHT EVENTS:    No acute events overnight.   intubated/sedated      MEDICATIONS  (STANDING):  azithromycin  IVPB 500 milliGRAM(s) IV Intermittent every 24 hours  cefTRIAXone   IVPB 1000 milliGRAM(s) IV Intermittent every 24 hours  chlorhexidine 0.12% Liquid 15 milliLiter(s) Oral Mucosa every 12 hours  fentaNYL   Infusion. 0.501 MICROgram(s)/kG/Hr (2.05 mL/Hr) IV Continuous <Continuous>  heparin   Injectable 5000 Unit(s) SubCutaneous every 8 hours  lactated ringers. 1000 milliLiter(s) (75 mL/Hr) IV Continuous <Continuous>  lactated ringers. 1000 milliLiter(s) (75 mL/Hr) IV Continuous <Continuous>  lactated ringers. 1000 milliLiter(s) (75 mL/Hr) IV Continuous <Continuous>  pantoprazole   Suspension 40 milliGRAM(s) Oral daily  phenylephrine    Infusion 2.2 MICROgram(s)/kG/Min (16.9 mL/Hr) IV Continuous <Continuous>  propofol Infusion 10 MICROgram(s)/kG/Min (2.45 mL/Hr) IV Continuous <Continuous>    MEDICATIONS  (PRN):  sodium chloride 0.9% lock flush 10 milliLiter(s) IV Push every 1 hour PRN Pre/post blood products, medications, blood draw, and to maintain line patency      Vital Signs Last 24 Hrs  T(C): 37.6 (03 Nov 2021 04:00), Max: 39.3 (02 Nov 2021 21:00)  T(F): 99.6 (03 Nov 2021 04:00), Max: 102.7 (02 Nov 2021 21:00)  HR: 76 (03 Nov 2021 08:00) (75 - 135)  BP: 148/856 (03 Nov 2021 08:00) (83/45 - 148/856)  BP(mean): 76 (03 Nov 2021 07:00) (49 - 81)  RR: 20 (03 Nov 2021 08:00) (18 - 26)  SpO2: 98% (03 Nov 2021 08:00) (90% - 100%)      PHYSICAL EXAM  General: Alert and oriented, not in acute distress  Resp: Breathing unlabored  Abdomen: Soft, nondistended, nontender  : No orona catheter, no dysuria or hematuria  Extremities: No pedal edema    I&O's Detail    02 Nov 2021 07:01  -  03 Nov 2021 07:00  --------------------------------------------------------  IN:    FentaNYL: 30.7 mL    FentaNYL: 22 mL    Free Water: 1000 mL    Jevity 1.5: 720 mL    Lactated Ringers: 1575 mL    Norepinephrine: 17.1 mL    Phenylephrine: 19.6 mL    Phenylephrine: 312.4 mL    Propofol: 13.2 mL  Total IN: 3710 mL    OUT:    Chest Tube (mL): 190 mL    Indwelling Catheter - Urethral (mL): 1205 mL    Phenylephrine: 0 mL  Total OUT: 1395 mL    Total NET: 2315 mL          LABS:                        8.2    15.18 )-----------( 220      ( 03 Nov 2021 04:34 )             26.8             11-03    144  |  114<H>  |  31<H>  ----------------------------<  168<H>  3.5   |  25  |  0.96    Ca    7.6<L>      03 Nov 2021 04:34  Phos  2.5     11-03  Mg     1.8     11-03    TPro  4.8<L>  /  Alb  1.1<L>  /  TBili  0.2  /  DBili  x   /  AST  30  /  ALT  25  /  AlkPhos  95  11-03       INTERVAL HPI/OVERNIGHT EVENTS:    No acute events overnight.   intubated/sedated      MEDICATIONS  (STANDING):  azithromycin  IVPB 500 milliGRAM(s) IV Intermittent every 24 hours  cefTRIAXone   IVPB 1000 milliGRAM(s) IV Intermittent every 24 hours  chlorhexidine 0.12% Liquid 15 milliLiter(s) Oral Mucosa every 12 hours  fentaNYL   Infusion. 0.501 MICROgram(s)/kG/Hr (2.05 mL/Hr) IV Continuous <Continuous>  heparin   Injectable 5000 Unit(s) SubCutaneous every 8 hours  lactated ringers. 1000 milliLiter(s) (75 mL/Hr) IV Continuous <Continuous>  lactated ringers. 1000 milliLiter(s) (75 mL/Hr) IV Continuous <Continuous>  lactated ringers. 1000 milliLiter(s) (75 mL/Hr) IV Continuous <Continuous>  pantoprazole   Suspension 40 milliGRAM(s) Oral daily  phenylephrine    Infusion 2.2 MICROgram(s)/kG/Min (16.9 mL/Hr) IV Continuous <Continuous>  propofol Infusion 10 MICROgram(s)/kG/Min (2.45 mL/Hr) IV Continuous <Continuous>    MEDICATIONS  (PRN):  sodium chloride 0.9% lock flush 10 milliLiter(s) IV Push every 1 hour PRN Pre/post blood products, medications, blood draw, and to maintain line patency      Vital Signs Last 24 Hrs  T(C): 37.6 (03 Nov 2021 04:00), Max: 39.3 (02 Nov 2021 21:00)  T(F): 99.6 (03 Nov 2021 04:00), Max: 102.7 (02 Nov 2021 21:00)  HR: 76 (03 Nov 2021 08:00) (75 - 135)  BP: 148/856 (03 Nov 2021 08:00) (83/45 - 148/856)  BP(mean): 76 (03 Nov 2021 07:00) (49 - 81)  RR: 20 (03 Nov 2021 08:00) (18 - 26)  SpO2: 98% (03 Nov 2021 08:00) (90% - 100%)      PHYSICAL EXAM  General: sedated  Resp: intubated  Chest: L pigtail in place, suction, no airleak, 350cc in pleurVAc    I&O's Detail    02 Nov 2021 07:01  -  03 Nov 2021 07:00  --------------------------------------------------------  IN:    FentaNYL: 30.7 mL    FentaNYL: 22 mL    Free Water: 1000 mL    Jevity 1.5: 720 mL    Lactated Ringers: 1575 mL    Norepinephrine: 17.1 mL    Phenylephrine: 19.6 mL    Phenylephrine: 312.4 mL    Propofol: 13.2 mL  Total IN: 3710 mL    OUT:    Chest Tube (mL): 190 mL    Indwelling Catheter - Urethral (mL): 1205 mL    Phenylephrine: 0 mL  Total OUT: 1395 mL    Total NET: 2315 mL          LABS:                        8.2    15.18 )-----------( 220      ( 03 Nov 2021 04:34 )             26.8             11-03    144  |  114<H>  |  31<H>  ----------------------------<  168<H>  3.5   |  25  |  0.96    Ca    7.6<L>      03 Nov 2021 04:34  Phos  2.5     11-03  Mg     1.8     11-03    TPro  4.8<L>  /  Alb  1.1<L>  /  TBili  0.2  /  DBili  x   /  AST  30  /  ALT  25  /  AlkPhos  95  11-03

## 2021-11-03 NOTE — DIETITIAN INITIAL EVALUATION ADULT. - ADD RECOMMEND
PCM (severe). Vent/ TF (see above) If glucoses elevated >180 ongoing, may consider change to Glucerna 1.5 (30 ml/hr: same kcals, protein 59 gm protein). MD to monitor. RD available.

## 2021-11-03 NOTE — PROGRESS NOTE ADULT - ASSESSMENT
Patient was examined at bedside, sedated and intubated to mechanical ventilator. She had 2 febrile episodes yesterday (100.8-102.7F). She is currently on Rocephin 1g and Azithromycin 500 mg IV. She was seen by IDS (Dr. Wong) yesterday. She was also tachycardic and EKG showed SR and new-onset PACs. She has some air leak at her chest tube drainage. No other significant events overnight.  oraly intubated  awake eyes opn  vsstable  pt is known to me from NH  was admitted  sepsis esecondary to UTI has gram neg bacterimia  pt was intubated  on admission  but had pneumothorax has chest tube   vs stable afebrile  orally intubated  lungs, heart abd   labs hgb 8.2  wbc 15  k 3.5   a/p  resp failure on vent  with chest tube   C T  surgeon board    bacterimia  cont antibx    s/p cva, dementia , 89  bed bound poor prognosis  palleative

## 2021-11-03 NOTE — PROGRESS NOTE ADULT - ASSESSMENT
89 yoF with L ptx, iatrogenic, s/p L pigtail CT placement     CT with no airleak  vent setting at peep 5, 40 fio2    - continue on low sxn while intubated  - pigtail care per protocol  - mng per ICU

## 2021-11-03 NOTE — DIETITIAN INITIAL EVALUATION ADULT. - OTHER INFO
Pt seen 11/2 and 11/3. TF in progress at 30 ml/hr. This PM, Propofol @2.5 ml/hr (if x24 hrs= 66 kcals. Discussed with PGY 1 and RN 11/2. Pt admit with CARO on CKD, noted as severely dehydrated. No pressure injury, functional quad (bedbound), poor prognosis, being followed by Palliative care

## 2021-11-03 NOTE — PROGRESS NOTE ADULT - SUBJECTIVE AND OBJECTIVE BOX
INTERVAL HPI/OVERNIGHT EVENTS: ***    PRESSORS: [ ] YES [ ] NO  WHICH:    ANTIBIOTICS:                      Antimicrobial:  azithromycin  IVPB 500 milliGRAM(s) IV Intermittent every 24 hours  cefTRIAXone   IVPB 1000 milliGRAM(s) IV Intermittent every 24 hours    Cardiovascular:  phenylephrine    Infusion 2.2 MICROgram(s)/kG/Min IV Continuous <Continuous>    Pulmonary:    Hematalogic:  heparin   Injectable 5000 Unit(s) SubCutaneous every 8 hours    Other:  chlorhexidine 0.12% Liquid 15 milliLiter(s) Oral Mucosa every 12 hours  fentaNYL   Infusion. 0.501 MICROgram(s)/kG/Hr IV Continuous <Continuous>  lactated ringers. 1000 milliLiter(s) IV Continuous <Continuous>  lactated ringers. 1000 milliLiter(s) IV Continuous <Continuous>  lactated ringers. 1000 milliLiter(s) IV Continuous <Continuous>  pantoprazole   Suspension 40 milliGRAM(s) Oral daily  polyethylene glycol 3350 17 Gram(s) Oral daily  propofol Infusion 10 MICROgram(s)/kG/Min IV Continuous <Continuous>  senna 2 Tablet(s) Oral at bedtime  sodium chloride 0.9% lock flush 10 milliLiter(s) IV Push every 1 hour PRN    azithromycin  IVPB 500 milliGRAM(s) IV Intermittent every 24 hours  cefTRIAXone   IVPB 1000 milliGRAM(s) IV Intermittent every 24 hours  chlorhexidine 0.12% Liquid 15 milliLiter(s) Oral Mucosa every 12 hours  fentaNYL   Infusion. 0.501 MICROgram(s)/kG/Hr IV Continuous <Continuous>  heparin   Injectable 5000 Unit(s) SubCutaneous every 8 hours  lactated ringers. 1000 milliLiter(s) IV Continuous <Continuous>  lactated ringers. 1000 milliLiter(s) IV Continuous <Continuous>  lactated ringers. 1000 milliLiter(s) IV Continuous <Continuous>  pantoprazole   Suspension 40 milliGRAM(s) Oral daily  phenylephrine    Infusion 2.2 MICROgram(s)/kG/Min IV Continuous <Continuous>  polyethylene glycol 3350 17 Gram(s) Oral daily  propofol Infusion 10 MICROgram(s)/kG/Min IV Continuous <Continuous>  senna 2 Tablet(s) Oral at bedtime  sodium chloride 0.9% lock flush 10 milliLiter(s) IV Push every 1 hour PRN    Drug Dosing Weight  Height (cm): 168 (2021 02:20)  Weight (kg): 40.9 (2021 02:20)  BMI (kg/m2): 14.5 (2021 02:20)  BSA (m2): 1.43 (2021 02:20)    CENTRAL LINE: [ ] YES [ ] NO  LOCATION:   DATE INSERTED:  REMOVE: [ ] YES [ ] NO  EXPLAIN:    SAEED: [ ] YES [ ] NO    DATE INSERTED:  REMOVE:  [ ] YES [ ] NO  EXPLAIN:    A-LINE:  [ ] YES [ ] NO  LOCATION:   DATE INSERTED:  REMOVE:  [ ] YES [ ] NO  EXPLAIN:    PMH -reviewed admission note, no change since admission    ICU Vital Signs Last 24 Hrs  T(C): 37.2 (2021 09:00), Max: 39.3 (2021 21:00)  T(F): 99 (2021 09:00), Max: 102.7 (2021 21:00)  HR: 79 (2021 11:00) (75 - 132)  BP: 125/50 (2021 11:00) (83/45 - 163/63)  BP(mean): 67 (2021 11:00) (56 - 87)  ABP: --  ABP(mean): --  RR: 19 (2021 11:00) (18 - 26)  SpO2: 98% (2021 11:00) (90% - 100%)      ABG - ( 2021 03:56 )  pH, Arterial: 7.40  pH, Blood: x     /  pCO2: 37    /  pO2: 77    / HCO3: 23    / Base Excess: -1.5  /  SaO2: 97                     @ 07:01  -   @ 07:00  --------------------------------------------------------  IN: 3729.6 mL / OUT: 1395 mL / NET: 2334.6 mL        Mode: AC/ CMV (Assist Control/ Continuous Mandatory Ventilation)  RR (machine): 20  TV (machine): 400  FiO2: 40  PEEP: 5  ITime: 0.9  MAP: 9  PIP: 19      PHYSICAL EXAM:    GENERAL: NAD, well-groomed, well-developed  HEAD:  Atraumatic, Normocephalic  EYES: EOMI, PERRLA, conjunctiva and sclera clear  ENMT: No tonsillar erythema, exudates, or enlargement; Moist mucous membranes, Good dentition, No lesions  NECK: Supple, normal appearance, No JVD; Normal thyroid; Trachea midline  NERVOUS SYSTEM:  Alert & Oriented X3, Good concentration; Motor Strength 5/5 B/L upper and lower extremities; DTRs 2+ intact and symmetric  CHEST/LUNG: No chest deformity; Normal percussion bilaterally; No rales, rhonchi, wheezing   HEART: Regular rate and rhythm; No murmurs, rubs, or gallops  ABDOMEN: Soft, Nontender, Nondistended; Bowel sounds present  EXTREMITIES:  2+ Peripheral Pulses, No clubbing, cyanosis, or edema  LYMPH: No lymphadenopathy noted  SKIN: No rashes or lesions; Good capillary refill      LABS:  CBC Full  -  ( 2021 04:34 )  WBC Count : 15.18 K/uL  RBC Count : 2.75 M/uL  Hemoglobin : 8.2 g/dL  Hematocrit : 26.8 %  Platelet Count - Automated : 220 K/uL  Mean Cell Volume : 97.5 fl  Mean Cell Hemoglobin : 29.8 pg  Mean Cell Hemoglobin Concentration : 30.6 gm/dL  Auto Neutrophil # : 13.81 K/uL  Auto Lymphocyte # : 1.06 K/uL  Auto Monocyte # : 0.30 K/uL  Auto Eosinophil # : 0.00 K/uL  Auto Basophil # : 0.00 K/uL  Auto Neutrophil % : 89.0 %  Auto Lymphocyte % : 7.0 %  Auto Monocyte % : 2.0 %  Auto Eosinophil % : 0.0 %  Auto Basophil % : 0.0 %        144  |  114<H>  |  31<H>  ----------------------------<  168<H>  3.5   |  25  |  0.96    Ca    7.6<L>      2021 04:34  Phos  2.5       Mg     1.8         TPro  4.8<L>  /  Alb  1.1<L>  /  TBili  0.2  /  DBili  x   /  AST  30  /  ALT  25  /  AlkPhos  95      PT/INR - ( 2021 14:26 )   PT: 14.3 sec;   INR: 1.21 ratio           Urinalysis Basic - ( 2021 17:09 )    Color: Yellow / Appearance: very cloudy / S.010 / pH: x  Gluc: x / Ketone: Trace  / Bili: Negative / Urobili: Negative   Blood: x / Protein: 100 / Nitrite: Negative   Leuk Esterase: Moderate / RBC: 25-50 /HPF / WBC 26-50 /HPF   Sq Epi: x / Non Sq Epi: Few /HPF / Bacteria: Many /HPF      Culture Results:   Moderate Streptococcus agalactiae (Group B) isolated  Group B streptococci are susceptible to ampicillin,  penicillin and cefazolin, but may be resistant to  erythromycin and clindamycin.  Recommendations for intrapartum prophylaxis for Group B  streptococci are penicillin or ampicillin.  Normal Respiratory [place] present ( @ 21:18)  Culture Results:   No growth to date. ( @ 03:56)  Culture Results:   Growth in anaerobic bottle: Escherichia coli  ***Blood Panel PCR results on this specimen are available  approximately 3 hours after the Gram stain result.***  Gram stain, PCR, and/or culture results may not always  correspond due to difference in methodologies.  ************************************************************  This PCR assay was performed by multiplex PCR. This  Assay tests for 66 bacterial and resistance gene targets.  Please refer to the [facility] test directory  at [email or web address] for details. ( @ 03:56)      RADIOLOGY & ADDITIONAL STUDIES REVIEWED:  ***    GOALS OF CARE DISCUSSION WITH PATIENT/FAMILY/PROXY:    CRITICAL CARE TIME SPENT: 35 minutes INTERVAL HPI/OVERNIGHT EVENTS: ***    Patient was examined at bedside, sedated and intubated to mechanical ventilator. She had 2 febrile episodes yesterday (100.8-102.7F). She is currently on Rocephin 1g and Azithromycin 500 mg IV. She was seen by IDS (Dr. Wong) yesterday. She was also tachycardic and EKG showed SR and new-onset PACs. She has some air leak at her chest tube drainage. No other significant events overnight.    PRESSORS: [X] YES [ ] NO  WHICH: phenylephrine    ANTIBIOTICS:                      Antimicrobial:  azithromycin  IVPB 500 milliGRAM(s) IV Intermittent every 24 hours  cefTRIAXone   IVPB 1000 milliGRAM(s) IV Intermittent every 24 hours    Cardiovascular:  phenylephrine    Infusion 2.2 MICROgram(s)/kG/Min IV Continuous <Continuous>    Pulmonary:    Hematalogic:  heparin   Injectable 5000 Unit(s) SubCutaneous every 8 hours    Other:  chlorhexidine 0.12% Liquid 15 milliLiter(s) Oral Mucosa every 12 hours  fentaNYL   Infusion. 0.501 MICROgram(s)/kG/Hr IV Continuous <Continuous>  lactated ringers. 1000 milliLiter(s) IV Continuous <Continuous>  lactated ringers. 1000 milliLiter(s) IV Continuous <Continuous>  lactated ringers. 1000 milliLiter(s) IV Continuous <Continuous>  pantoprazole   Suspension 40 milliGRAM(s) Oral daily  polyethylene glycol 3350 17 Gram(s) Oral daily  propofol Infusion 10 MICROgram(s)/kG/Min IV Continuous <Continuous>  senna 2 Tablet(s) Oral at bedtime  sodium chloride 0.9% lock flush 10 milliLiter(s) IV Push every 1 hour PRN    azithromycin  IVPB 500 milliGRAM(s) IV Intermittent every 24 hours  cefTRIAXone   IVPB 1000 milliGRAM(s) IV Intermittent every 24 hours  chlorhexidine 0.12% Liquid 15 milliLiter(s) Oral Mucosa every 12 hours  fentaNYL   Infusion. 0.501 MICROgram(s)/kG/Hr IV Continuous <Continuous>  heparin   Injectable 5000 Unit(s) SubCutaneous every 8 hours  lactated ringers. 1000 milliLiter(s) IV Continuous <Continuous>  lactated ringers. 1000 milliLiter(s) IV Continuous <Continuous>  lactated ringers. 1000 milliLiter(s) IV Continuous <Continuous>  pantoprazole   Suspension 40 milliGRAM(s) Oral daily  phenylephrine    Infusion 2.2 MICROgram(s)/kG/Min IV Continuous <Continuous>  polyethylene glycol 3350 17 Gram(s) Oral daily  propofol Infusion 10 MICROgram(s)/kG/Min IV Continuous <Continuous>  senna 2 Tablet(s) Oral at bedtime  sodium chloride 0.9% lock flush 10 milliLiter(s) IV Push every 1 hour PRN    Drug Dosing Weight  Height (cm): 168 (2021 02:20)  Weight (kg): 40.9 (2021 02:20)  BMI (kg/m2): 14.5 (2021 02:20)  BSA (m2): 1.43 (2021 02:20)    CENTRAL LINE: [X] YES [ ] NO  LOCATION: [facility]   DATE INSERTED: 21  REMOVE: [ ] YES [ ] NO  EXPLAIN:    SAEED: [X] YES [ ] NO    DATE INSERTED: 21  REMOVE:  [ ] YES [ ] NO  EXPLAIN:    A-LINE:  [ ] YES [X] NO  LOCATION:   DATE INSERTED:  REMOVE:  [ ] YES [ ] NO  EXPLAIN:    PMH -reviewed admission note, no change since admission    ICU Vital Signs Last 24 Hrs  T(C): 37.2 (2021 09:00), Max: 39.3 (2021 21:00)  T(F): 99 (2021 09:00), Max: 102.7 (2021 21:00)  HR: 79 (2021 11:00) (75 - 132)  BP: 125/50 (2021 11:00) (83/45 - 163/63)  BP(mean): 67 (2021 11:00) (56 - 87)  ABP: --  ABP(mean): --  RR: 19 (2021 11:00) (18 - 26)  SpO2: 98% (2021 11:00) (90% - 100%)      ABG - ( 2021 03:56 )  pH, Arterial: 7.40  pH, Blood: x     /  pCO2: 37    /  pO2: 77    / HCO3: 23    / Base Excess: -1.5  /  SaO2: 97                     @ 07:01  -   @ 07:00  --------------------------------------------------------  IN: 3729.6 mL / OUT: 1395 mL / NET: 2334.6 mL        Mode: AC/ CMV (Assist Control/ Continuous Mandatory Ventilation)  RR (machine): 20  TV (machine): 400  FiO2: 40  PEEP: 5  ITime: 0.9  MAP: 9  PIP: 19      PHYSICAL EXAM:    GENERAL: NAD, sedated and intubated to mechanical ventilator  HEAD:  Atraumatic, Normocephalic  EYES: EOMI, PERRLA, conjunctiva and sclera clear  ENMT: No tonsillar erythema, exudates, or enlargement; Moist mucous membranes, Good dentition, No lesions  NECK: Supple, normal appearance, No JVD; Normal thyroid; Trachea midline  NERVOUS SYSTEM:  sedated and intubated (cannot be assessed)  CHEST/LUNG: No chest deformity; Normal percussion bilaterally; No rales, rhonchi, wheezing; left chest tube in place   HEART: Regular rate and rhythm; No murmurs, rubs, or gallops  ABDOMEN: Soft, Nontender, Nondistended; Bowel sounds present  EXTREMITIES:  2+ Peripheral Pulses, No clubbing, cyanosis, or edema  LYMPH: No lymphadenopathy noted  SKIN: No rashes or lesions; Good capillary refill      LABS:  CBC Full  -  ( 2021 04:34 )  WBC Count : 15.18 K/uL  RBC Count : 2.75 M/uL  Hemoglobin : 8.2 g/dL  Hematocrit : 26.8 %  Platelet Count - Automated : 220 K/uL  Mean Cell Volume : 97.5 fl  Mean Cell Hemoglobin : 29.8 pg  Mean Cell Hemoglobin Concentration : 30.6 gm/dL  Auto Neutrophil # : 13.81 K/uL  Auto Lymphocyte # : 1.06 K/uL  Auto Monocyte # : 0.30 K/uL  Auto Eosinophil # : 0.00 K/uL  Auto Basophil # : 0.00 K/uL  Auto Neutrophil % : 89.0 %  Auto Lymphocyte % : 7.0 %  Auto Monocyte % : 2.0 %  Auto Eosinophil % : 0.0 %  Auto Basophil % : 0.0 %        144  |  114<H>  |  31<H>  ----------------------------<  168<H>  3.5   |  25  |  0.96    Ca    7.6<L>      2021 04:34  Phos  2.5       Mg     1.8         TPro  4.8<L>  /  Alb  1.1<L>  /  TBili  0.2  /  DBili  x   /  AST  30  /  ALT  25  /  AlkPhos  95      PT/INR - ( 2021 14:26 )   PT: 14.3 sec;   INR: 1.21 ratio           Urinalysis Basic - ( 2021 17:09 )    Color: Yellow / Appearance: very cloudy / S.010 / pH: x  Gluc: x / Ketone: Trace  / Bili: Negative / Urobili: Negative   Blood: x / Protein: 100 / Nitrite: Negative   Leuk Esterase: Moderate / RBC: 25-50 /HPF / WBC 26-50 /HPF   Sq Epi: x / Non Sq Epi: Few /HPF / Bacteria: Many /HPF      Culture Results:   Moderate Streptococcus agalactiae (Group B) isolated  Group B streptococci are susceptible to ampicillin,  penicillin and cefazolin, but may be resistant to  erythromycin and clindamycin.  Recommendations for intrapartum prophylaxis for Group B  streptococci are penicillin or ampicillin.  Normal Respiratory [place] present ( @ 21:18)  Culture Results:   No growth to date. ( @ 03:56)  Culture Results:   Growth in anaerobic bottle: Escherichia coli  ***Blood Panel PCR results on this specimen are available  approximately 3 hours after the Gram stain result.***  Gram stain, PCR, and/or culture results may not always  correspond due to difference in methodologies.  ************************************************************  This PCR assay was performed by multiplex PCR. This  Assay tests for 66 bacterial and resistance gene targets.  Please refer to the [facility] Labs test directory  at [email or web address] for details. ( @ 03:56)      RADIOLOGY & ADDITIONAL STUDIES REVIEWED:  ***    GOALS OF CARE DISCUSSION WITH PATIENT/FAMILY/PROXY: FULL CODE    CRITICAL CARE TIME SPENT: 35 minutes

## 2021-11-03 NOTE — PROGRESS NOTE ADULT - SUBJECTIVE AND OBJECTIVE BOX
HPI:  89 yo female from [facility] with medical h/o of HTN, Dementia, CVA with R sided hemiparesis, aphasia, parkinson's, GERD, CKD bedbound at baseline, dependant on assistance for ADL comes in with respiratory failure. Patient was found to have respiratory distress, saturating in 70s when EMS arrived, was placed on NRB on field. She was emergently intubated on arrival in ED. As per NH records patient was having fever and cough for past few days. Today she was found to have respiratory distress. She is vaccinated with moderna. Spoke to family son,  who stated the patient to remain full code. No other history could be obtained.  (01 Nov 2021 00:14)      Patient is a 89y old  Female who presents with a chief complaint of AHRF (03 Nov 2021 16:02)      INTERVAL HPI/OVERNIGHT EVENTS:  T(C): 37.7 (11-03-21 @ 17:00), Max: 39.3 (11-02-21 @ 21:00)  HR: 82 (11-03-21 @ 19:00) (75 - 115)  BP: 118/50 (11-03-21 @ 19:00) (110/55 - 163/63)  RR: 21 (11-03-21 @ 19:00) (18 - 26)  SpO2: 93% (11-03-21 @ 19:00) (93% - 100%)  Wt(kg): --  I&O's Summary    02 Nov 2021 07:01  -  03 Nov 2021 07:00  --------------------------------------------------------  IN: 3729.6 mL / OUT: 1395 mL / NET: 2334.6 mL    03 Nov 2021 07:01  -  03 Nov 2021 19:17  --------------------------------------------------------  IN: 1224.4 mL / OUT: 760 mL / NET: 464.4 mL        REVIEW OF SYSTEMS: denies fever, chills, SOB, palpitations, chest pain, abdominal pain, nausea, vomitting, diarrhea, constipation, dizziness    MEDICATIONS  (STANDING):  azithromycin  IVPB 500 milliGRAM(s) IV Intermittent every 24 hours  cefTRIAXone   IVPB 1000 milliGRAM(s) IV Intermittent every 24 hours  chlorhexidine 0.12% Liquid 15 milliLiter(s) Oral Mucosa every 12 hours  chlorhexidine 2% Cloths 1 Application(s) Topical <User Schedule>  fentaNYL   Infusion. 0.501 MICROgram(s)/kG/Hr (2.05 mL/Hr) IV Continuous <Continuous>  heparin   Injectable 5000 Unit(s) SubCutaneous every 8 hours  lactated ringers. 1000 milliLiter(s) (75 mL/Hr) IV Continuous <Continuous>  lactated ringers. 1000 milliLiter(s) (75 mL/Hr) IV Continuous <Continuous>  lactated ringers. 1000 milliLiter(s) (75 mL/Hr) IV Continuous <Continuous>  pantoprazole   Suspension 40 milliGRAM(s) Oral daily  phenylephrine    Infusion 2.2 MICROgram(s)/kG/Min (16.9 mL/Hr) IV Continuous <Continuous>  polyethylene glycol 3350 17 Gram(s) Oral daily  propofol Infusion 10 MICROgram(s)/kG/Min (2.45 mL/Hr) IV Continuous <Continuous>  senna 2 Tablet(s) Oral at bedtime    MEDICATIONS  (PRN):  sodium chloride 0.9% lock flush 10 milliLiter(s) IV Push every 1 hour PRN Pre/post blood products, medications, blood draw, and to maintain line patency      PHYSICAL EXAM:  GENERAL: NAD, well-groomed, well-developed  HEAD:  Atraumatic, Normocephalic  EYES: EOMI, PERRLA, conjunctiva and sclera clear  ENMT: No tonsillar erythema, exudates, or enlargement; Moist mucous membranes, Good dentition, No lesions  NECK: Supple, No JVD, Normal thyroid  NERVOUS SYSTEM:  Alert & Oriented X3, Good concentration; Motor Strength 5/5 B/L upper and lower extremities; DTRs 2+ intact and symmetric  CHEST/LUNG: Clear to percussion bilaterally; No rales, rhonchi, wheezing, or rubs  HEART: Regular rate and rhythm; No murmurs, rubs, or gallops  ABDOMEN: Soft, Nontender, Nondistended; Bowel sounds present  EXTREMITIES:  2+ Peripheral Pulses, No clubbing, cyanosis, or edema  LYMPH: No lymphadenopathy noted  SKIN: No rashes or lesions  LABS:                        8.2    15.18 )-----------( 220      ( 03 Nov 2021 04:34 )             26.8     11-03    144  |  114<H>  |  31<H>  ----------------------------<  168<H>  3.5   |  25  |  0.96    Ca    7.6<L>      03 Nov 2021 04:34  Phos  2.5     11-03  Mg     1.8     11-03    TPro  4.8<L>  /  Alb  1.1<L>  /  TBili  0.2  /  DBili  x   /  AST  30  /  ALT  25  /  AlkPhos  95  11-03        CAPILLARY BLOOD GLUCOSE      POCT Blood Glucose.: 165 mg/dL (03 Nov 2021 00:44)      ABG - ( 03 Nov 2021 03:56 )  pH, Arterial: 7.40  pH, Blood: x     /  pCO2: 37    /  pO2: 77    / HCO3: 23    / Base Excess: -1.5  /  SaO2: 97

## 2021-11-03 NOTE — DIETITIAN INITIAL EVALUATION ADULT. - ENTERAL
Jevity 1.5 30x24 provides: 720 kcals, 1080 kcals, 46 gm protein. May add 1 Pkt Prosource/ day (+ 15 gm protein, 60 kcals). MD to monitor. RD available.

## 2021-11-03 NOTE — CHART NOTE - NSCHARTNOTEFT_GEN_A_CORE
The patient's grandson, Dr. Tommie Pathak ([phone]) called to get an updated regarding her grandmother. According to them, they are still discussing the GOC with the patient's  who is the HCP. They would keep in touch with Palliative Care (Dr. Michele).

## 2021-11-03 NOTE — PROGRESS NOTE ADULT - ASSESSMENT
89 y/o F, nursing home resident with a significant medical history of CVA with R sided hemiparesis, aphasia, parkinson's, GERD, dementia, CKD, presents to the ED in respiratory distress, intubated while in ED. Patient is being admitted to medicine for sepsis and AHRF likely 2/2 ?PNA    # Acute hypoxic respiratory failure  # Sepsis 2/2 PNA, E. coli bacteremia  # Lactic acidosis  # Joby on CKD  # Hypernatremia    Neuro:  Patient is sedated on Precedex  Patient is Aox0  Started on fentanyl for pain    Pulmonary  Acute hypoxic respiratory failure  Patient presented with AHRF, intubated in ED  As per NH records, patient had cough and fever for past few days  CXR shows LLL infiltrate  [facility] had covid outbreak, will put on isolation. First covid was negative  s/p one dose vanc and zosyn  Will start on rocephin 2mg and zithro  Cultures were sent- E. coli bacteremia  ABG showed 7.39/34/131/21 this morning  Monitor resp status    Cardiovascular  Sinus tachycardia  Patient is in sinus tachycardia  Likely 2/2 sepsis  EKG showed sinus rhythm with ST changes in inferior leads  Will consult cardiology in AM  Trop was mildly elevated but trended down    Gastrointestinal  NPO for now  NGT inserted today    ID  Sepsis 2/2 unknown source  Patient was tachycardic, had high temp 103.8  No leucocytosis  CXR shows LLL infiltrate  s/p 2L bolus  Started on LR @75cc/hr  Was given one dose of vanc and zosyn  Will start on rocephin and zithro   cx - No evidence of pneumothorax, no infiltrates or effusion  Blood Cx - E. coli bacteremia  lactate - 11.2>4.9    Renal  Patient has h/o CKD  Clinically patient is severely dehydrated  has hypernatremia, likely 2/2 volume deficit  Not producing any urine  s/p 2 L bolus  on standing IVF  Monitor I/O  Cr is elevated, unknown baseline (trending down)  WIll send UA once she has some urine in the bag  UA - pos. for UTI  f/u UCx  Monitor BMP    Endo  BS are elevated  No h/o DM  A1c - 5.8  Monitor BS    Prophylactic  On heparin for dvt prophylaxis (hold for central insertion)  on PPI for GI prophylaxis    Palliative consult - hold until repeat LABS 89 y/o F, nursing home resident with a significant medical history of CVA with R sided hemiparesis, aphasia, parkinson's, GERD, dementia, CKD, presents to the ED in respiratory distress, intubated while in ED. Patient is being admitted to medicine for sepsis and AHRF likely 2/2 ?PNA    # Acute hypoxic respiratory failure  # Sepsis 2/2 PNA, E. coli bacteremia  # Lactic acidosis  # Joby on CKD  # Hypernatremia    Neuro:  Patient is sedated on propofol (monitor TG)  Started on fentanyl for pain    Pulmonary  Acute hypoxic respiratory failure  Patient presented with AHRF, intubated in ED  As per NH records, patient had cough and fever for past few days  CXR shows LLL infiltrate  off isolation, covid negative  s/p one dose vanc and zosyn  on rocephin 1mg and zithro (ID: Dr. Wong)  BCx - E. coli bacteremia; Sputum - Strep agalactiae  f/u repeat BCx, UCx  ABG showed 7.40/37/77/23 this morning  Monitor resp status    Cardiovascular  Sinus tachycardia  Patient is in sinus tachycardia  Likely 2/2 sepsis  EKG showed sinus rhythm with ST changes in inferior leads (11/1/21)  repeat EKG - SR w/ new-onset PACs  Trop was mildly elevated but trended down    Gastrointestinal  NPO for now  NGT inserted today  started on tube feeds (Jevity)    ID  Sepsis 2/2  Patient was tachycardic, had high temp 103.8  WBC inc. since admission  CXR shows LLL infiltrate  s/p 2L bolus  Was given one dose of vanc and zosyn  Will start on rocephin and zithro   cx - No evidence of pneumothorax, no infiltrates or effusion  CX (s/p [facility]) - L pneumothorax > (s/p chest tube - 11/01/21)   Blood Cx - E. coli bacteremia  Sputum Cx - Strep agalactiae  f/u repeat BCx, UCx  lactate - 11.2>4.9>4.2>3.2 (trending down  ID consult (Dr. Wong)    Renal  Patient has h/o CKD  Clinically patient is severely dehydrated  has hypernatremia, likely 2/2 volume deficit  Not producing any urine  s/p 2 L bolus  on standing IVF  Monitor I/O  Cr is elevated, unknown baseline (trending down to normal)  UA - pos. for UTI  f/u UCx  Monitor BMP    Endo  BS are elevated  No h/o DM  A1c - 5.8  Monitor BS    Prophylactic  On heparin for dvt prophylaxis  on PPI for GI prophylaxis    Palliative consult - hold until repeat LABS

## 2021-11-03 NOTE — DIETITIAN INITIAL EVALUATION ADULT. - PERTINENT MEDS FT
MEDICATIONS  (STANDING):  azithromycin  IVPB 500 milliGRAM(s) IV Intermittent every 24 hours  cefTRIAXone   IVPB 1000 milliGRAM(s) IV Intermittent every 24 hours  chlorhexidine 0.12% Liquid 15 milliLiter(s) Oral Mucosa every 12 hours  chlorhexidine 2% Cloths 1 Application(s) Topical <User Schedule>  fentaNYL   Infusion. 0.501 MICROgram(s)/kG/Hr (2.05 mL/Hr) IV Continuous <Continuous>  heparin   Injectable 5000 Unit(s) SubCutaneous every 8 hours  lactated ringers. 1000 milliLiter(s) (75 mL/Hr) IV Continuous <Continuous>  lactated ringers. 1000 milliLiter(s) (75 mL/Hr) IV Continuous <Continuous>  lactated ringers. 1000 milliLiter(s) (75 mL/Hr) IV Continuous <Continuous>  pantoprazole   Suspension 40 milliGRAM(s) Oral daily  phenylephrine    Infusion 2.2 MICROgram(s)/kG/Min (16.9 mL/Hr) IV Continuous <Continuous>  polyethylene glycol 3350 17 Gram(s) Oral daily  propofol Infusion 10 MICROgram(s)/kG/Min (2.45 mL/Hr) IV Continuous <Continuous>  senna 2 Tablet(s) Oral at bedtime    MEDICATIONS  (PRN):  sodium chloride 0.9% lock flush 10 milliLiter(s) IV Push every 1 hour PRN Pre/post blood products, medications, blood draw, and to maintain line patency

## 2021-11-04 LAB
ALBUMIN SERPL ELPH-MCNC: 1 G/DL — LOW (ref 3.5–5)
ALP SERPL-CCNC: 108 U/L — SIGNIFICANT CHANGE UP (ref 40–120)
ALT FLD-CCNC: 22 U/L DA — SIGNIFICANT CHANGE UP (ref 10–60)
ANION GAP SERPL CALC-SCNC: 6 MMOL/L — SIGNIFICANT CHANGE UP (ref 5–17)
ANISOCYTOSIS BLD QL: SLIGHT — SIGNIFICANT CHANGE UP
APTT BLD: 50.4 SEC — HIGH (ref 27.5–35.5)
AST SERPL-CCNC: 26 U/L — SIGNIFICANT CHANGE UP (ref 10–40)
BASE EXCESS BLDA CALC-SCNC: -2.5 MMOL/L — LOW (ref -2–3)
BASOPHILS # BLD AUTO: 0 K/UL — SIGNIFICANT CHANGE UP (ref 0–0.2)
BASOPHILS NFR BLD AUTO: 0 % — SIGNIFICANT CHANGE UP (ref 0–2)
BILIRUB SERPL-MCNC: 0.2 MG/DL — SIGNIFICANT CHANGE UP (ref 0.2–1.2)
BLOOD GAS COMMENTS ARTERIAL: SIGNIFICANT CHANGE UP
BUN SERPL-MCNC: 22 MG/DL — HIGH (ref 7–18)
CALCIUM SERPL-MCNC: 8 MG/DL — LOW (ref 8.4–10.5)
CHLORIDE SERPL-SCNC: 111 MMOL/L — HIGH (ref 96–108)
CO2 SERPL-SCNC: 25 MMOL/L — SIGNIFICANT CHANGE UP (ref 22–31)
CREAT SERPL-MCNC: 0.76 MG/DL — SIGNIFICANT CHANGE UP (ref 0.5–1.3)
ELLIPTOCYTES BLD QL SMEAR: SLIGHT — SIGNIFICANT CHANGE UP
EOSINOPHIL # BLD AUTO: 0.11 K/UL — SIGNIFICANT CHANGE UP (ref 0–0.5)
EOSINOPHIL NFR BLD AUTO: 1 % — SIGNIFICANT CHANGE UP (ref 0–6)
GLUCOSE SERPL-MCNC: 128 MG/DL — HIGH (ref 70–99)
HCO3 BLDA-SCNC: 22 MMOL/L — SIGNIFICANT CHANGE UP (ref 21–28)
HCT VFR BLD CALC: 25.6 % — LOW (ref 34.5–45)
HGB BLD-MCNC: 7.9 G/DL — LOW (ref 11.5–15.5)
HOROWITZ INDEX BLDA+IHG-RTO: 40 — SIGNIFICANT CHANGE UP
HYPOSEGMENTATION: PRESENT — SIGNIFICANT CHANGE UP
INR BLD: 0.96 RATIO — SIGNIFICANT CHANGE UP (ref 0.88–1.16)
LACTATE SERPL-SCNC: 2.1 MMOL/L — HIGH (ref 0.7–2)
LG PLATELETS BLD QL AUTO: SLIGHT — SIGNIFICANT CHANGE UP
LYMPHOCYTES # BLD AUTO: 0.75 K/UL — LOW (ref 1–3.3)
LYMPHOCYTES # BLD AUTO: 7 % — LOW (ref 13–44)
MACROCYTES BLD QL: SLIGHT — SIGNIFICANT CHANGE UP
MAGNESIUM SERPL-MCNC: 2 MG/DL — SIGNIFICANT CHANGE UP (ref 1.6–2.6)
MANUAL SMEAR VERIFICATION: SIGNIFICANT CHANGE UP
MCHC RBC-ENTMCNC: 29.4 PG — SIGNIFICANT CHANGE UP (ref 27–34)
MCHC RBC-ENTMCNC: 30.9 GM/DL — LOW (ref 32–36)
MCV RBC AUTO: 95.2 FL — SIGNIFICANT CHANGE UP (ref 80–100)
MICROCYTES BLD QL: SLIGHT — SIGNIFICANT CHANGE UP
MONOCYTES # BLD AUTO: 0.11 K/UL — SIGNIFICANT CHANGE UP (ref 0–0.9)
MONOCYTES NFR BLD AUTO: 1 % — LOW (ref 2–14)
MRSA PCR RESULT.: SIGNIFICANT CHANGE UP
NEUTROPHILS # BLD AUTO: 9.77 K/UL — HIGH (ref 1.8–7.4)
NEUTROPHILS NFR BLD AUTO: 91 % — HIGH (ref 43–77)
NRBC # BLD: 0 /100 — SIGNIFICANT CHANGE UP (ref 0–0)
PCO2 BLDA: 35 MMHG — SIGNIFICANT CHANGE UP (ref 32–35)
PH BLDA: 7.4 — SIGNIFICANT CHANGE UP (ref 7.35–7.45)
PHOSPHATE SERPL-MCNC: 2.4 MG/DL — LOW (ref 2.5–4.5)
PLAT MORPH BLD: NORMAL — SIGNIFICANT CHANGE UP
PLATELET # BLD AUTO: 201 K/UL — SIGNIFICANT CHANGE UP (ref 150–400)
PLATELET COUNT - ESTIMATE: NORMAL — SIGNIFICANT CHANGE UP
PO2 BLDA: 85 MMHG — SIGNIFICANT CHANGE UP (ref 83–108)
POIKILOCYTOSIS BLD QL AUTO: SLIGHT — SIGNIFICANT CHANGE UP
POTASSIUM SERPL-MCNC: 4.1 MMOL/L — SIGNIFICANT CHANGE UP (ref 3.5–5.3)
POTASSIUM SERPL-SCNC: 4.1 MMOL/L — SIGNIFICANT CHANGE UP (ref 3.5–5.3)
PROCALCITONIN SERPL-MCNC: 21.1 NG/ML — HIGH (ref 0.02–0.1)
PROT SERPL-MCNC: 4.8 G/DL — LOW (ref 6–8.3)
PROTHROM AB SERPL-ACNC: 11.5 SEC — SIGNIFICANT CHANGE UP (ref 10.6–13.6)
RBC # BLD: 2.69 M/UL — LOW (ref 3.8–5.2)
RBC # FLD: 14.1 % — SIGNIFICANT CHANGE UP (ref 10.3–14.5)
RBC BLD AUTO: ABNORMAL
S AUREUS DNA NOSE QL NAA+PROBE: DETECTED
SAO2 % BLDA: 99 % — SIGNIFICANT CHANGE UP
SCHISTOCYTES BLD QL AUTO: SLIGHT — SIGNIFICANT CHANGE UP
SODIUM SERPL-SCNC: 142 MMOL/L — SIGNIFICANT CHANGE UP (ref 135–145)
TRIGL SERPL-MCNC: 131 MG/DL — SIGNIFICANT CHANGE UP
WBC # BLD: 10.74 K/UL — HIGH (ref 3.8–10.5)
WBC # FLD AUTO: 10.74 K/UL — HIGH (ref 3.8–10.5)

## 2021-11-04 PROCEDURE — 71045 X-RAY EXAM CHEST 1 VIEW: CPT | Mod: 26

## 2021-11-04 RX ORDER — ACETAMINOPHEN 500 MG
1000 TABLET ORAL ONCE
Refills: 0 | Status: COMPLETED | OUTPATIENT
Start: 2021-11-04 | End: 2021-11-04

## 2021-11-04 RX ORDER — POTASSIUM PHOSPHATE, MONOBASIC POTASSIUM PHOSPHATE, DIBASIC 236; 224 MG/ML; MG/ML
15 INJECTION, SOLUTION INTRAVENOUS ONCE
Refills: 0 | Status: COMPLETED | OUTPATIENT
Start: 2021-11-04 | End: 2021-11-04

## 2021-11-04 RX ADMIN — HEPARIN SODIUM 5000 UNIT(S): 5000 INJECTION INTRAVENOUS; SUBCUTANEOUS at 21:04

## 2021-11-04 RX ADMIN — CHLORHEXIDINE GLUCONATE 1 APPLICATION(S): 213 SOLUTION TOPICAL at 05:15

## 2021-11-04 RX ADMIN — PROPOFOL 2.45 MICROGRAM(S)/KG/MIN: 10 INJECTION, EMULSION INTRAVENOUS at 21:05

## 2021-11-04 RX ADMIN — HEPARIN SODIUM 5000 UNIT(S): 5000 INJECTION INTRAVENOUS; SUBCUTANEOUS at 13:05

## 2021-11-04 RX ADMIN — CHLORHEXIDINE GLUCONATE 15 MILLILITER(S): 213 SOLUTION TOPICAL at 17:06

## 2021-11-04 RX ADMIN — PANTOPRAZOLE SODIUM 40 MILLIGRAM(S): 20 TABLET, DELAYED RELEASE ORAL at 11:02

## 2021-11-04 RX ADMIN — Medication 400 MILLIGRAM(S): at 20:44

## 2021-11-04 RX ADMIN — Medication 1000 MILLIGRAM(S): at 21:35

## 2021-11-04 RX ADMIN — POTASSIUM PHOSPHATE, MONOBASIC POTASSIUM PHOSPHATE, DIBASIC 62.5 MILLIMOLE(S): 236; 224 INJECTION, SOLUTION INTRAVENOUS at 07:45

## 2021-11-04 RX ADMIN — HEPARIN SODIUM 5000 UNIT(S): 5000 INJECTION INTRAVENOUS; SUBCUTANEOUS at 05:15

## 2021-11-04 RX ADMIN — PHENYLEPHRINE HYDROCHLORIDE 16.9 MICROGRAM(S)/KG/MIN: 10 INJECTION INTRAVENOUS at 05:14

## 2021-11-04 RX ADMIN — SENNA PLUS 2 TABLET(S): 8.6 TABLET ORAL at 21:04

## 2021-11-04 RX ADMIN — CEFTRIAXONE 100 MILLIGRAM(S): 500 INJECTION, POWDER, FOR SOLUTION INTRAMUSCULAR; INTRAVENOUS at 05:15

## 2021-11-04 RX ADMIN — AZITHROMYCIN 255 MILLIGRAM(S): 500 TABLET, FILM COATED ORAL at 04:38

## 2021-11-04 RX ADMIN — PROPOFOL 2.45 MICROGRAM(S)/KG/MIN: 10 INJECTION, EMULSION INTRAVENOUS at 04:39

## 2021-11-04 RX ADMIN — POLYETHYLENE GLYCOL 3350 17 GRAM(S): 17 POWDER, FOR SOLUTION ORAL at 11:02

## 2021-11-04 RX ADMIN — CHLORHEXIDINE GLUCONATE 15 MILLILITER(S): 213 SOLUTION TOPICAL at 05:15

## 2021-11-04 NOTE — CHART NOTE - NSCHARTNOTEFT_GEN_A_CORE
We spoke to the patient's grandson, Dr. Tommie Pathak ([phone]) and gave him the latest update and plan for her grandmother. As per grandson, he is relaying the message to the patient's  who is the HCP and the GOC remains FULL CODE. He will reach out to Palliative Care (Dr. Michele) to discuss further the GOC.

## 2021-11-04 NOTE — PROGRESS NOTE ADULT - ASSESSMENT
89 y/o F, nursing home resident with a significant medical history of CVA with R sided hemiparesis, aphasia, parkinson's, GERD, dementia, CKD, presents to the ED in respiratory distress, intubated while in ED. Patient is being admitted to medicine for sepsis and AHRF likely 2/2 ?PNA    # Acute hypoxic respiratory failure  # Sepsis 2/2 PNA, E. coli bacteremia  # Lactic acidosis  # Joby on CKD  # Hypernatremia    Neuro:  Patient is sedated on propofol (monitor TG)  Started on fentanyl for pain    Pulmonary  Acute hypoxic respiratory failure  Patient presented with AHRF, intubated in ED  As per NH records, patient had cough and fever for past few days  CXR shows LLL infiltrate  off isolation, covid negative  s/p one dose vanc and zosyn  on rocephin 1mg and zithro (ID: Dr. Wong)  BCx - E. coli bacteremia; Sputum - Strep agalactiae  f/u repeat BCx, UCx  ABG showed 7.40/37/77/23 this morning  Monitor resp status    Cardiovascular  Sinus tachycardia  Patient is in sinus tachycardia  Likely 2/2 sepsis  EKG showed sinus rhythm with ST changes in inferior leads (11/1/21)  repeat EKG - SR w/ new-onset PACs  Trop was mildly elevated but trended down    Gastrointestinal  NPO for now  NGT inserted today  started on tube feeds (Jevity)    ID  Sepsis 2/2  Patient was tachycardic, had high temp 103.8  WBC inc. since admission  CXR shows LLL infiltrate  s/p 2L bolus  Was given one dose of vanc and zosyn  Will start on rocephin and zithro   cx - No evidence of pneumothorax, no infiltrates or effusion  CX (s/p [facility]) - L pneumothorax > (s/p chest tube - 11/01/21)   Blood Cx - E. coli bacteremia  Sputum Cx - Strep agalactiae  f/u repeat BCx, UCx  lactate - 11.2>4.9>4.2>3.2 (trending down  ID consult (Dr. Wong)    Renal  Patient has h/o CKD  Clinically patient is severely dehydrated  has hypernatremia, likely 2/2 volume deficit  Not producing any urine  s/p 2 L bolus  on standing IVF  Monitor I/O  Cr is elevated, unknown baseline (trending down to normal)  UA - pos. for UTI  f/u UCx  Monitor BMP    Endo  BS are elevated  No h/o DM  A1c - 5.8  Monitor BS    Prophylactic  On heparin for dvt prophylaxis  on PPI for GI prophylaxis    Palliative consult - hold until repeat LABS 89 y/o F, nursing home resident with a significant medical history of CVA with R sided hemiparesis, aphasia, parkinson's, GERD, dementia, CKD, presents to the ED in respiratory distress, intubated while in ED. Patient is being admitted to medicine for sepsis and AHRF likely 2/2 ?PNA    # Acute hypoxic respiratory failure  # Sepsis 2/2 PNA, E. coli bacteremia  # Lactic acidosis  # Joby on CKD  # Hypernatremia    Neuro:  Patient is sedated on propofol (monitor TG)  Started on fentanyl for pain    Pulmonary  Acute hypoxic respiratory failure  Patient presented with AHRF, intubated in ED  As per NH records, patient had cough and fever for past few days  CXR shows LLL infiltrate  off isolation, covid negative  s/p one dose vanc and zosyn  on rocephin 1mg and zithro (ID: Dr. Wong)  BCx - E. coli bacteremia; Sputum - Strep agalactiae  f/u repeat BCx, UCx  ABG showed 7.40/35/85/22 this morning  Monitor resp status    Cardiovascular  Sinus tachycardia  currently patient is not tachycardic  Likely 2/2 sepsis and dehydration  EKG showed sinus rhythm with ST changes in inferior leads (11/1/21)  repeat EKG - SR w/ new-onset PACs  Trop was mildly elevated but trended down    Gastrointestinal  NPO for now  NGT inserted today  started on tube feeds (Jevity)  Nutrition consult    ID  Sepsis 2/2  Patient was tachycardic, had high temp 103.8  WBC inc. since admission  CXR shows LLL infiltrate on admission  CXR today showed progression of infiltrates  s/p 2L bolus  Was given one dose of vanc and zosyn  Will start on rocephin and zithro   cx - No evidence of pneumothorax, no infiltrates or effusion  CX (s/p [facility]) - L pneumothorax > (s/p chest tube - 11/01/21)   Blood Cx - E. coli bacteremia; repeat BCx was neg (11/3/21)  Sputum Cx - Strep agalactiae  UCx was neg  lactate - 11.2>4.9>4.2>3.2>2.1 (trending down)  ID consult (Dr. Wong)    Renal  Patient has h/o CKD  Clinically patient is severely dehydrated  has hypernatremia, likely 2/2 volume deficit  Not producing any urine  s/p 2 L bolus  on standing IVF  Monitor I/O  Cr is elevated, unknown baseline (trending down to normal)  UA - pos. for UTI  UCx - neg  Monitor BMP    Endo  BS are elevated  No h/o DM  A1c - 5.8  Monitor BS    Prophylactic  On heparin for dvt prophylaxis  on PPI for GI prophylaxis    Palliative consult (Dr. Michele) - FULL CODE

## 2021-11-04 NOTE — PROGRESS NOTE ADULT - ASSESSMENT
seen and examined orally intubated  blinking wyes   not in nay distress  chest tube seems ok  draining  vsstable afebrile   labs noted hgb 7.9  a/p sepsis  orally intubated   pneumonia  poor prognosis  ahs pneumothorax has mild leak

## 2021-11-04 NOTE — PROGRESS NOTE ADULT - SUBJECTIVE AND OBJECTIVE BOX
INTERVAL HPI/OVERNIGHT EVENTS: ***    PRESSORS: [ ] YES [ ] NO  WHICH:    ANTIBIOTICS:                      Antimicrobial:  azithromycin  IVPB 500 milliGRAM(s) IV Intermittent every 24 hours  cefTRIAXone   IVPB 1000 milliGRAM(s) IV Intermittent every 24 hours    Cardiovascular:  phenylephrine    Infusion 2.2 MICROgram(s)/kG/Min IV Continuous <Continuous>    Pulmonary:    Hematalogic:  heparin   Injectable 5000 Unit(s) SubCutaneous every 8 hours    Other:  chlorhexidine 0.12% Liquid 15 milliLiter(s) Oral Mucosa every 12 hours  chlorhexidine 2% Cloths 1 Application(s) Topical <User Schedule>  fentaNYL   Infusion. 0.501 MICROgram(s)/kG/Hr IV Continuous <Continuous>  lactated ringers. 1000 milliLiter(s) IV Continuous <Continuous>  lactated ringers. 1000 milliLiter(s) IV Continuous <Continuous>  lactated ringers. 1000 milliLiter(s) IV Continuous <Continuous>  pantoprazole   Suspension 40 milliGRAM(s) Oral daily  polyethylene glycol 3350 17 Gram(s) Oral daily  propofol Infusion 10 MICROgram(s)/kG/Min IV Continuous <Continuous>  senna 2 Tablet(s) Oral at bedtime  sodium chloride 0.9% lock flush 10 milliLiter(s) IV Push every 1 hour PRN    azithromycin  IVPB 500 milliGRAM(s) IV Intermittent every 24 hours  cefTRIAXone   IVPB 1000 milliGRAM(s) IV Intermittent every 24 hours  chlorhexidine 0.12% Liquid 15 milliLiter(s) Oral Mucosa every 12 hours  chlorhexidine 2% Cloths 1 Application(s) Topical <User Schedule>  fentaNYL   Infusion. 0.501 MICROgram(s)/kG/Hr IV Continuous <Continuous>  heparin   Injectable 5000 Unit(s) SubCutaneous every 8 hours  lactated ringers. 1000 milliLiter(s) IV Continuous <Continuous>  lactated ringers. 1000 milliLiter(s) IV Continuous <Continuous>  lactated ringers. 1000 milliLiter(s) IV Continuous <Continuous>  pantoprazole   Suspension 40 milliGRAM(s) Oral daily  phenylephrine    Infusion 2.2 MICROgram(s)/kG/Min IV Continuous <Continuous>  polyethylene glycol 3350 17 Gram(s) Oral daily  propofol Infusion 10 MICROgram(s)/kG/Min IV Continuous <Continuous>  senna 2 Tablet(s) Oral at bedtime  sodium chloride 0.9% lock flush 10 milliLiter(s) IV Push every 1 hour PRN    Drug Dosing Weight  Height (cm): 167.6 (03 Nov 2021 16:02)  Weight (kg): 40.9 (01 Nov 2021 02:20)  BMI (kg/m2): 14.6 (03 Nov 2021 16:02)  BSA (m2): 1.43 (03 Nov 2021 16:02)    CENTRAL LINE: [ ] YES [ ] NO  LOCATION:   DATE INSERTED:  REMOVE: [ ] YES [ ] NO  EXPLAIN:    SAEED: [ ] YES [ ] NO    DATE INSERTED:  REMOVE:  [ ] YES [ ] NO  EXPLAIN:    A-LINE:  [ ] YES [ ] NO  LOCATION:   DATE INSERTED:  REMOVE:  [ ] YES [ ] NO  EXPLAIN:    PMH -reviewed admission note, no change since admission    ICU Vital Signs Last 24 Hrs  T(C): 36.1 (04 Nov 2021 04:00), Max: 38.3 (03 Nov 2021 19:45)  T(F): 97 (04 Nov 2021 04:00), Max: 100.9 (03 Nov 2021 19:45)  HR: 74 (04 Nov 2021 08:00) (67 - 140)  BP: 161/58 (04 Nov 2021 08:00) (61/32 - 174/69)  BP(mean): 84 (04 Nov 2021 08:00) (40 - 124)  ABP: --  ABP(mean): --  RR: 20 (04 Nov 2021 08:00) (18 - 24)  SpO2: 95% (04 Nov 2021 08:00) (89% - 100%)      ABG - ( 04 Nov 2021 03:56 )  pH, Arterial: 7.40  pH, Blood: x     /  pCO2: 35    /  pO2: 85    / HCO3: 22    / Base Excess: -2.5  /  SaO2: 99                    11-03 @ 07:01  -  11-04 @ 07:00  --------------------------------------------------------  IN: 3116.3 mL / OUT: 1300 mL / NET: 1816.3 mL        Mode: AC/ CMV (Assist Control/ Continuous Mandatory Ventilation)  RR (machine): 20  TV (machine): 400  FiO2: 40  PEEP: 5  ITime: 1  MAP: 9  PIP: 22      PHYSICAL EXAM:    GENERAL: NAD, well-groomed, well-developed  HEAD:  Atraumatic, Normocephalic  EYES: EOMI, PERRLA, conjunctiva and sclera clear  ENMT: No tonsillar erythema, exudates, or enlargement; Moist mucous membranes, Good dentition, No lesions  NECK: Supple, normal appearance, No JVD; Normal thyroid; Trachea midline  NERVOUS SYSTEM:  Alert & Oriented X3, Good concentration; Motor Strength 5/5 B/L upper and lower extremities; DTRs 2+ intact and symmetric  CHEST/LUNG: No chest deformity; Normal percussion bilaterally; No rales, rhonchi, wheezing   HEART: Regular rate and rhythm; No murmurs, rubs, or gallops  ABDOMEN: Soft, Nontender, Nondistended; Bowel sounds present  EXTREMITIES:  2+ Peripheral Pulses, No clubbing, cyanosis, or edema  LYMPH: No lymphadenopathy noted  SKIN: No rashes or lesions; Good capillary refill      LABS:  CBC Full  -  ( 04 Nov 2021 04:07 )  WBC Count : 10.74 K/uL  RBC Count : 2.69 M/uL  Hemoglobin : 7.9 g/dL  Hematocrit : 25.6 %  Platelet Count - Automated : 201 K/uL  Mean Cell Volume : 95.2 fl  Mean Cell Hemoglobin : 29.4 pg  Mean Cell Hemoglobin Concentration : 30.9 gm/dL  Auto Neutrophil # : 9.77 K/uL  Auto Lymphocyte # : 0.75 K/uL  Auto Monocyte # : 0.11 K/uL  Auto Eosinophil # : 0.11 K/uL  Auto Basophil # : 0.00 K/uL  Auto Neutrophil % : 91.0 %  Auto Lymphocyte % : 7.0 %  Auto Monocyte % : 1.0 %  Auto Eosinophil % : 1.0 %  Auto Basophil % : 0.0 %    11-04    142  |  111<H>  |  22<H>  ----------------------------<  128<H>  4.1   |  25  |  0.76    Ca    8.0<L>      04 Nov 2021 04:07  Phos  2.4     11-04  Mg     2.0     11-04    TPro  4.8<L>  /  Alb  1.0<L>  /  TBili  0.2  /  DBili  x   /  AST  26  /  ALT  22  /  AlkPhos  108  11-04    PT/INR - ( 04 Nov 2021 04:07 )   PT: 11.5 sec;   INR: 0.96 ratio         PTT - ( 04 Nov 2021 04:07 )  PTT:50.4 sec    Culture Results:   No growth (11-02 @ 21:01)  Culture Results:   Moderate Streptococcus agalactiae (Group B) isolated  Group B streptococci are susceptible to ampicillin,  penicillin and cefazolin, but may be resistant to  erythromycin and clindamycin.  Recommendations for intrapartum prophylaxis for Group B  streptococci are penicillin or ampicillin.  Normal Respiratory [place] present (11-01 @ 21:18)      RADIOLOGY & ADDITIONAL STUDIES REVIEWED:  ***    GOALS OF CARE DISCUSSION WITH PATIENT/FAMILY/PROXY:    CRITICAL CARE TIME SPENT: 35 minutes INTERVAL HPI/OVERNIGHT EVENTS: ***    Patient was examined at the bedside, still sedated, stable and NAD. She is intubated to mechanical ventilator. She has a left pigtail chest tube connected to a water-sealed drain. There is some air leak but the output overnight was 100cc. she had episodes of hypotension earlier in the morning, which prompted the night team to adjust her phenylephrine. She was afebrile and not tachycardic. There are no other significant events overnight.    PRESSORS: [X] YES [ ] NO  WHICH: phenylephrine    ANTIBIOTICS:                      Antimicrobial:  azithromycin  IVPB 500 milliGRAM(s) IV Intermittent every 24 hours  cefTRIAXone   IVPB 1000 milliGRAM(s) IV Intermittent every 24 hours    Cardiovascular:  phenylephrine    Infusion 2.2 MICROgram(s)/kG/Min IV Continuous <Continuous>    Pulmonary:    Hematalogic:  heparin   Injectable 5000 Unit(s) SubCutaneous every 8 hours    Other:  chlorhexidine 0.12% Liquid 15 milliLiter(s) Oral Mucosa every 12 hours  chlorhexidine 2% Cloths 1 Application(s) Topical <User Schedule>  fentaNYL   Infusion. 0.501 MICROgram(s)/kG/Hr IV Continuous <Continuous>  lactated ringers. 1000 milliLiter(s) IV Continuous <Continuous>  lactated ringers. 1000 milliLiter(s) IV Continuous <Continuous>  lactated ringers. 1000 milliLiter(s) IV Continuous <Continuous>  pantoprazole   Suspension 40 milliGRAM(s) Oral daily  polyethylene glycol 3350 17 Gram(s) Oral daily  propofol Infusion 10 MICROgram(s)/kG/Min IV Continuous <Continuous>  senna 2 Tablet(s) Oral at bedtime  sodium chloride 0.9% lock flush 10 milliLiter(s) IV Push every 1 hour PRN    azithromycin  IVPB 500 milliGRAM(s) IV Intermittent every 24 hours  cefTRIAXone   IVPB 1000 milliGRAM(s) IV Intermittent every 24 hours  chlorhexidine 0.12% Liquid 15 milliLiter(s) Oral Mucosa every 12 hours  chlorhexidine 2% Cloths 1 Application(s) Topical <User Schedule>  fentaNYL   Infusion. 0.501 MICROgram(s)/kG/Hr IV Continuous <Continuous>  heparin   Injectable 5000 Unit(s) SubCutaneous every 8 hours  lactated ringers. 1000 milliLiter(s) IV Continuous <Continuous>  lactated ringers. 1000 milliLiter(s) IV Continuous <Continuous>  lactated ringers. 1000 milliLiter(s) IV Continuous <Continuous>  pantoprazole   Suspension 40 milliGRAM(s) Oral daily  phenylephrine    Infusion 2.2 MICROgram(s)/kG/Min IV Continuous <Continuous>  polyethylene glycol 3350 17 Gram(s) Oral daily  propofol Infusion 10 MICROgram(s)/kG/Min IV Continuous <Continuous>  senna 2 Tablet(s) Oral at bedtime  sodium chloride 0.9% lock flush 10 milliLiter(s) IV Push every 1 hour PRN    Drug Dosing Weight  Height (cm): 167.6 (03 Nov 2021 16:02)  Weight (kg): 40.9 (01 Nov 2021 02:20)  BMI (kg/m2): 14.6 (03 Nov 2021 16:02)  BSA (m2): 1.43 (03 Nov 2021 16:02)    CENTRAL LINE: [ ] YES [ ] NO  LOCATION:   DATE INSERTED:  REMOVE: [ ] YES [ ] NO  EXPLAIN:    SAEED: [ ] YES [ ] NO    DATE INSERTED:  REMOVE:  [ ] YES [ ] NO  EXPLAIN:    A-LINE:  [ ] YES [ ] NO  LOCATION:   DATE INSERTED:  REMOVE:  [ ] YES [ ] NO  EXPLAIN:    PMH -reviewed admission note, no change since admission    ICU Vital Signs Last 24 Hrs  T(C): 36.1 (04 Nov 2021 04:00), Max: 38.3 (03 Nov 2021 19:45)  T(F): 97 (04 Nov 2021 04:00), Max: 100.9 (03 Nov 2021 19:45)  HR: 74 (04 Nov 2021 08:00) (67 - 140)  BP: 161/58 (04 Nov 2021 08:00) (61/32 - 174/69)  BP(mean): 84 (04 Nov 2021 08:00) (40 - 124)  ABP: --  ABP(mean): --  RR: 20 (04 Nov 2021 08:00) (18 - 24)  SpO2: 95% (04 Nov 2021 08:00) (89% - 100%)      ABG - ( 04 Nov 2021 03:56 )  pH, Arterial: 7.40  pH, Blood: x     /  pCO2: 35    /  pO2: 85    / HCO3: 22    / Base Excess: -2.5  /  SaO2: 99                    11-03 @ 07:01  -  11-04 @ 07:00  --------------------------------------------------------  IN: 3116.3 mL / OUT: 1300 mL / NET: 1816.3 mL        Mode: AC/ CMV (Assist Control/ Continuous Mandatory Ventilation)  RR (machine): 20  TV (machine): 400  FiO2: 40  PEEP: 5  ITime: 1  MAP: 9  PIP: 22      PHYSICAL EXAM:    GENERAL: NAD, sedated and intubated  HEAD:  Atraumatic, Normocephalic  EYES: EOMI, PERRLA, conjunctiva and sclera clear  ENMT: No tonsillar erythema, exudates, or enlargement; Moist mucous membranes, Good dentition, No lesions  NECK: Supple, normal appearance, No JVD; Normal thyroid; Trachea midline  NERVOUS SYSTEM:  sedated and intubated  CHEST/LUNG: No chest deformity; Normal percussion bilaterally; No rales, rhonchi, wheezing; on mechanical ventilator   HEART: Regular rate and rhythm; No murmurs, rubs, or gallops  ABDOMEN: Soft, Nontender, Nondistended; Bowel sounds present  EXTREMITIES:  2+ Peripheral Pulses, No clubbing, cyanosis, or edema  LYMPH: No lymphadenopathy noted  SKIN: No rashes or lesions; Good capillary refill      LABS:  CBC Full  -  ( 04 Nov 2021 04:07 )  WBC Count : 10.74 K/uL  RBC Count : 2.69 M/uL  Hemoglobin : 7.9 g/dL  Hematocrit : 25.6 %  Platelet Count - Automated : 201 K/uL  Mean Cell Volume : 95.2 fl  Mean Cell Hemoglobin : 29.4 pg  Mean Cell Hemoglobin Concentration : 30.9 gm/dL  Auto Neutrophil # : 9.77 K/uL  Auto Lymphocyte # : 0.75 K/uL  Auto Monocyte # : 0.11 K/uL  Auto Eosinophil # : 0.11 K/uL  Auto Basophil # : 0.00 K/uL  Auto Neutrophil % : 91.0 %  Auto Lymphocyte % : 7.0 %  Auto Monocyte % : 1.0 %  Auto Eosinophil % : 1.0 %  Auto Basophil % : 0.0 %    11-04    142  |  111<H>  |  22<H>  ----------------------------<  128<H>  4.1   |  25  |  0.76    Ca    8.0<L>      04 Nov 2021 04:07  Phos  2.4     11-04  Mg     2.0     11-04    TPro  4.8<L>  /  Alb  1.0<L>  /  TBili  0.2  /  DBili  x   /  AST  26  /  ALT  22  /  AlkPhos  108  11-04    PT/INR - ( 04 Nov 2021 04:07 )   PT: 11.5 sec;   INR: 0.96 ratio         PTT - ( 04 Nov 2021 04:07 )  PTT:50.4 sec    Culture Results:   No growth (11-02 @ 21:01)  Culture Results:   Moderate Streptococcus agalactiae (Group B) isolated  Group B streptococci are susceptible to ampicillin,  penicillin and cefazolin, but may be resistant to  erythromycin and clindamycin.  Recommendations for intrapartum prophylaxis for Group B  streptococci are penicillin or ampicillin.  Normal Respiratory [place] present (11-01 @ 21:18)      RADIOLOGY & ADDITIONAL STUDIES REVIEWED:  ***    < from: Xray Chest 1 View- PORTABLE-Routine (Xray Chest 1 View- PORTABLE-Routine in AM.) (11.03.21 @ 10:39) >  INTERPRETATION:  Chest one view    HISTORY: Aspiration pneumonia    COMPARISON STUDY: 11/2/2021    Frontal expiratory view of the chest shows the heart to be similar in size. Endotracheal tube, left jugular line, left chest tube and feeding tube are present.    The lungs show progression of right lung infiltrates with similar left infiltrates. Right pleural effusion is again noted. Left effusion is smaller and there is no evidence of pneumothorax.    IMPRESSION:  Progression of right infiltrates. No pneumothorax.    < end of copied text >      GOALS OF CARE DISCUSSION WITH PATIENT/FAMILY/PROXY: FULL CODE    CRITICAL CARE TIME SPENT: 35 minutes

## 2021-11-04 NOTE — PROGRESS NOTE ADULT - ASSESSMENT
89 yoF with L ptx,  s/p L pigtail CT placement 11/1    pigtail with airleak  vent setting at peep 5, 40 fio2    - f/u AM CXR  - continue pigtail to suction, increase to -20  - pigtail care per protocol  - remainder of care per primary team   89 yoF with L ptx,  s/p L  CT placement 11/1    CT with persistent small airleak  vent setting at peep 5, 40 fio2    - f/u AM CXR  - continue pigtail to suction, increase to -20  - pigtail care per protocol  - remainder of care per primary team

## 2021-11-04 NOTE — PROGRESS NOTE ADULT - SUBJECTIVE AND OBJECTIVE BOX
INTERVAL HPI/OVERNIGHT EVENTS:  Pt seen and examined.   Intubated/sedated    MEDICATIONS  (STANDING):  azithromycin  IVPB 500 milliGRAM(s) IV Intermittent every 24 hours  cefTRIAXone   IVPB 1000 milliGRAM(s) IV Intermittent every 24 hours  chlorhexidine 0.12% Liquid 15 milliLiter(s) Oral Mucosa every 12 hours  chlorhexidine 2% Cloths 1 Application(s) Topical <User Schedule>  fentaNYL   Infusion. 0.501 MICROgram(s)/kG/Hr (2.05 mL/Hr) IV Continuous <Continuous>  heparin   Injectable 5000 Unit(s) SubCutaneous every 8 hours  lactated ringers. 1000 milliLiter(s) (75 mL/Hr) IV Continuous <Continuous>  lactated ringers. 1000 milliLiter(s) (75 mL/Hr) IV Continuous <Continuous>  lactated ringers. 1000 milliLiter(s) (75 mL/Hr) IV Continuous <Continuous>  pantoprazole   Suspension 40 milliGRAM(s) Oral daily  phenylephrine    Infusion 2.2 MICROgram(s)/kG/Min (16.9 mL/Hr) IV Continuous <Continuous>  polyethylene glycol 3350 17 Gram(s) Oral daily  propofol Infusion 10 MICROgram(s)/kG/Min (2.45 mL/Hr) IV Continuous <Continuous>  senna 2 Tablet(s) Oral at bedtime    MEDICATIONS  (PRN):  sodium chloride 0.9% lock flush 10 milliLiter(s) IV Push every 1 hour PRN Pre/post blood products, medications, blood draw, and to maintain line patency      Vital Signs Last 24 Hrs  T(C): 36.1 (04 Nov 2021 04:00), Max: 38.3 (03 Nov 2021 19:45)  T(F): 97 (04 Nov 2021 04:00), Max: 100.9 (03 Nov 2021 19:45)  HR: 74 (04 Nov 2021 08:00) (67 - 140)  BP: 161/58 (04 Nov 2021 08:00) (61/32 - 174/69)  BP(mean): 84 (04 Nov 2021 08:00) (40 - 124)  RR: 20 (04 Nov 2021 08:00) (18 - 24)  SpO2: 95% (04 Nov 2021 08:00) (89% - 100%)    Physical:  General: intubated sedated  Chest: symmetrical chest rise, Left pigtail with 100cc output overnight, ss. +AIRLEAK. Pigtail on -10 continuous suction.    I&O's Detail    03 Nov 2021 07:01  -  04 Nov 2021 07:00  --------------------------------------------------------  IN:    FentaNYL: 50.4 mL    Free Water: 500 mL    IV PiggyBack: 400 mL    Jevity 1.5: 480 mL    Lactated Ringers: 1275 mL    Phenylephrine: 360.7 mL    Propofol: 50.2 mL  Total IN: 3116.3 mL    OUT:    Chest Tube (mL): 150 mL    Indwelling Catheter - Urethral (mL): 1150 mL  Total OUT: 1300 mL    Total NET: 1816.3 mL      LABS:             7.9    10.74 )-----------( 201      ( 04 Nov 2021 04:07 )             25.6             11-04    142  |  111<H>  |  22<H>  ----------------------------<  128<H>  4.1   |  25  |  0.76    Ca    8.0<L>      04 Nov 2021 04:07  Phos  2.4     11-04  Mg     2.0     11-04    TPro  4.8<L>  /  Alb  1.0<L>  /  TBili  0.2  /  DBili  x   /  AST  26  /  ALT  22  /  AlkPhos  108  11-04

## 2021-11-04 NOTE — PROGRESS NOTE ADULT - SUBJECTIVE AND OBJECTIVE BOX
HPI:  89 yo female from [facility] with medical h/o of HTN, Dementia, CVA with R sided hemiparesis, aphasia, parkinson's, GERD, CKD bedbound at baseline, dependant on assistance for ADL comes in with respiratory failure. Patient was found to have respiratory distress, saturating in 70s when EMS arrived, was placed on NRB on field. She was emergently intubated on arrival in ED. As per NH records patient was having fever and cough for past few days. Today she was found to have respiratory distress. She is vaccinated with moderna. Spoke to family son,  who stated the patient to remain full code. No other history could be obtained.  (01 Nov 2021 00:14)      Patient is a 89y old  Female who presents with a chief complaint of AHRF (04 Nov 2021 08:44)      INTERVAL HPI/OVERNIGHT EVENTS:  T(C): 38 (11-04-21 @ 14:00), Max: 38.3 (11-03-21 @ 19:45)  HR: 81 (11-04-21 @ 14:00) (67 - 140)  BP: 131/53 (11-04-21 @ 14:00) (61/32 - 174/69)  RR: 20 (11-04-21 @ 14:00) (17 - 24)  SpO2: 96% (11-04-21 @ 14:00) (89% - 100%)  Wt(kg): --  I&O's Summary    03 Nov 2021 07:01  -  04 Nov 2021 07:00  --------------------------------------------------------  IN: 3616.3 mL / OUT: 1365 mL / NET: 2251.3 mL    04 Nov 2021 07:01  -  04 Nov 2021 14:15  --------------------------------------------------------  IN: 494.1 mL / OUT: 325 mL / NET: 169.1 mL        REVIEW OF SYSTEMS: denies fever, chills, SOB, palpitations, chest pain, abdominal pain, nausea, vomitting, diarrhea, constipation, dizziness    MEDICATIONS  (STANDING):  azithromycin  IVPB 500 milliGRAM(s) IV Intermittent every 24 hours  cefTRIAXone   IVPB 1000 milliGRAM(s) IV Intermittent every 24 hours  chlorhexidine 0.12% Liquid 15 milliLiter(s) Oral Mucosa every 12 hours  chlorhexidine 2% Cloths 1 Application(s) Topical <User Schedule>  fentaNYL   Infusion. 0.501 MICROgram(s)/kG/Hr (2.05 mL/Hr) IV Continuous <Continuous>  heparin   Injectable 5000 Unit(s) SubCutaneous every 8 hours  lactated ringers. 1000 milliLiter(s) (75 mL/Hr) IV Continuous <Continuous>  lactated ringers. 1000 milliLiter(s) (75 mL/Hr) IV Continuous <Continuous>  lactated ringers. 1000 milliLiter(s) (75 mL/Hr) IV Continuous <Continuous>  pantoprazole   Suspension 40 milliGRAM(s) Oral daily  phenylephrine    Infusion 2.2 MICROgram(s)/kG/Min (16.9 mL/Hr) IV Continuous <Continuous>  polyethylene glycol 3350 17 Gram(s) Oral daily  propofol Infusion 10 MICROgram(s)/kG/Min (2.45 mL/Hr) IV Continuous <Continuous>  senna 2 Tablet(s) Oral at bedtime    MEDICATIONS  (PRN):  sodium chloride 0.9% lock flush 10 milliLiter(s) IV Push every 1 hour PRN Pre/post blood products, medications, blood draw, and to maintain line patency      PHYSICAL EXAM:  GENERAL: NAD, well-groomed, well-developed  HEAD:  Atraumatic, Normocephalic  EYES: EOMI, PERRLA, conjunctiva and sclera clear  ENMT: No tonsillar erythema, exudates, or enlargement; Moist mucous membranes, Good dentition, No lesions  NECK: Supple, No JVD, Normal thyroid  NERVOUS SYSTEM:  Alert & Oriented X3, Good concentration; Motor Strength 5/5 B/L upper and lower extremities; DTRs 2+ intact and symmetric  CHEST/LUNG: Clear to percussion bilaterally; No rales, rhonchi, wheezing, or rubs  HEART: Regular rate and rhythm; No murmurs, rubs, or gallops  ABDOMEN: Soft, Nontender, Nondistended; Bowel sounds present  EXTREMITIES:  2+ Peripheral Pulses, No clubbing, cyanosis, or edema  LYMPH: No lymphadenopathy noted  SKIN: No rashes or lesions  LABS:                        7.9    10.74 )-----------( 201      ( 04 Nov 2021 04:07 )             25.6     11-04    142  |  111<H>  |  22<H>  ----------------------------<  128<H>  4.1   |  25  |  0.76    Ca    8.0<L>      04 Nov 2021 04:07  Phos  2.4     11-04  Mg     2.0     11-04    TPro  4.8<L>  /  Alb  1.0<L>  /  TBili  0.2  /  DBili  x   /  AST  26  /  ALT  22  /  AlkPhos  108  11-04    PT/INR - ( 04 Nov 2021 04:07 )   PT: 11.5 sec;   INR: 0.96 ratio         PTT - ( 04 Nov 2021 04:07 )  PTT:50.4 sec    CAPILLARY BLOOD GLUCOSE      POCT Blood Glucose.: 129 mg/dL (04 Nov 2021 02:22)      ABG - ( 04 Nov 2021 03:56 )  pH, Arterial: 7.40  pH, Blood: x     /  pCO2: 35    /  pO2: 85    / HCO3: 22    / Base Excess: -2.5  /  SaO2: 99

## 2021-11-05 LAB
ALBUMIN SERPL ELPH-MCNC: 1 G/DL — LOW (ref 3.5–5)
ALP SERPL-CCNC: 143 U/L — HIGH (ref 40–120)
ALT FLD-CCNC: 24 U/L DA — SIGNIFICANT CHANGE UP (ref 10–60)
ANION GAP SERPL CALC-SCNC: 6 MMOL/L — SIGNIFICANT CHANGE UP (ref 5–17)
ANISOCYTOSIS BLD QL: SLIGHT — SIGNIFICANT CHANGE UP
AST SERPL-CCNC: 32 U/L — SIGNIFICANT CHANGE UP (ref 10–40)
BASE EXCESS BLDA CALC-SCNC: 1 MMOL/L — SIGNIFICANT CHANGE UP (ref -2–3)
BASOPHILS # BLD AUTO: 0 K/UL — SIGNIFICANT CHANGE UP (ref 0–0.2)
BASOPHILS NFR BLD AUTO: 0 % — SIGNIFICANT CHANGE UP (ref 0–2)
BILIRUB SERPL-MCNC: 0.3 MG/DL — SIGNIFICANT CHANGE UP (ref 0.2–1.2)
BLOOD GAS COMMENTS ARTERIAL: SIGNIFICANT CHANGE UP
BUN SERPL-MCNC: 22 MG/DL — HIGH (ref 7–18)
CALCIUM SERPL-MCNC: 8 MG/DL — LOW (ref 8.4–10.5)
CHLORIDE SERPL-SCNC: 110 MMOL/L — HIGH (ref 96–108)
CO2 SERPL-SCNC: 27 MMOL/L — SIGNIFICANT CHANGE UP (ref 22–31)
CREAT SERPL-MCNC: 0.72 MG/DL — SIGNIFICANT CHANGE UP (ref 0.5–1.3)
ELLIPTOCYTES BLD QL SMEAR: SLIGHT — SIGNIFICANT CHANGE UP
EOSINOPHIL # BLD AUTO: 0.22 K/UL — SIGNIFICANT CHANGE UP (ref 0–0.5)
EOSINOPHIL NFR BLD AUTO: 2 % — SIGNIFICANT CHANGE UP (ref 0–6)
GLUCOSE SERPL-MCNC: 125 MG/DL — HIGH (ref 70–99)
HCO3 BLDA-SCNC: 25 MMOL/L — SIGNIFICANT CHANGE UP (ref 21–28)
HCT VFR BLD CALC: 26.5 % — LOW (ref 34.5–45)
HGB BLD-MCNC: 8.3 G/DL — LOW (ref 11.5–15.5)
HOROWITZ INDEX BLDA+IHG-RTO: 40 — SIGNIFICANT CHANGE UP
HYPOSEGMENTATION: PRESENT — SIGNIFICANT CHANGE UP
LACTATE SERPL-SCNC: 2 MMOL/L — SIGNIFICANT CHANGE UP (ref 0.7–2)
LG PLATELETS BLD QL AUTO: SLIGHT — SIGNIFICANT CHANGE UP
LYMPHOCYTES # BLD AUTO: 0.66 K/UL — LOW (ref 1–3.3)
LYMPHOCYTES # BLD AUTO: 6 % — LOW (ref 13–44)
MACROCYTES BLD QL: SLIGHT — SIGNIFICANT CHANGE UP
MAGNESIUM SERPL-MCNC: 1.9 MG/DL — SIGNIFICANT CHANGE UP (ref 1.6–2.6)
MANUAL SMEAR VERIFICATION: SIGNIFICANT CHANGE UP
MCHC RBC-ENTMCNC: 29.4 PG — SIGNIFICANT CHANGE UP (ref 27–34)
MCHC RBC-ENTMCNC: 31.3 GM/DL — LOW (ref 32–36)
MCV RBC AUTO: 94 FL — SIGNIFICANT CHANGE UP (ref 80–100)
MICROCYTES BLD QL: SLIGHT — SIGNIFICANT CHANGE UP
MONOCYTES # BLD AUTO: 0.33 K/UL — SIGNIFICANT CHANGE UP (ref 0–0.9)
MONOCYTES NFR BLD AUTO: 3 % — SIGNIFICANT CHANGE UP (ref 2–14)
NEUTROPHILS # BLD AUTO: 9.75 K/UL — HIGH (ref 1.8–7.4)
NEUTROPHILS NFR BLD AUTO: 89 % — HIGH (ref 43–77)
NRBC # BLD: 0 /100 — SIGNIFICANT CHANGE UP (ref 0–0)
PCO2 BLDA: 37 MMHG — HIGH (ref 32–35)
PH BLDA: 7.44 — SIGNIFICANT CHANGE UP (ref 7.35–7.45)
PHOSPHATE SERPL-MCNC: 3.1 MG/DL — SIGNIFICANT CHANGE UP (ref 2.5–4.5)
PLAT MORPH BLD: NORMAL — SIGNIFICANT CHANGE UP
PLATELET # BLD AUTO: 195 K/UL — SIGNIFICANT CHANGE UP (ref 150–400)
PLATELET COUNT - ESTIMATE: NORMAL — SIGNIFICANT CHANGE UP
PO2 BLDA: 83 MMHG — SIGNIFICANT CHANGE UP (ref 83–108)
POIKILOCYTOSIS BLD QL AUTO: SLIGHT — SIGNIFICANT CHANGE UP
POTASSIUM SERPL-MCNC: 4.2 MMOL/L — SIGNIFICANT CHANGE UP (ref 3.5–5.3)
POTASSIUM SERPL-SCNC: 4.2 MMOL/L — SIGNIFICANT CHANGE UP (ref 3.5–5.3)
PROCALCITONIN SERPL-MCNC: 12.9 NG/ML — HIGH (ref 0.02–0.1)
PROT SERPL-MCNC: 4.9 G/DL — LOW (ref 6–8.3)
RBC # BLD: 2.82 M/UL — LOW (ref 3.8–5.2)
RBC # FLD: 14.1 % — SIGNIFICANT CHANGE UP (ref 10.3–14.5)
RBC BLD AUTO: ABNORMAL
SAO2 % BLDA: 100 % — SIGNIFICANT CHANGE UP
SODIUM SERPL-SCNC: 143 MMOL/L — SIGNIFICANT CHANGE UP (ref 135–145)
WBC # BLD: 10.96 K/UL — HIGH (ref 3.8–10.5)
WBC # FLD AUTO: 10.96 K/UL — HIGH (ref 3.8–10.5)

## 2021-11-05 PROCEDURE — 99233 SBSQ HOSP IP/OBS HIGH 50: CPT

## 2021-11-05 PROCEDURE — 71045 X-RAY EXAM CHEST 1 VIEW: CPT | Mod: 26

## 2021-11-05 PROCEDURE — 99231 SBSQ HOSP IP/OBS SF/LOW 25: CPT

## 2021-11-05 RX ORDER — ALBUMIN HUMAN 25 %
25 VIAL (ML) INTRAVENOUS EVERY 6 HOURS
Refills: 0 | Status: DISCONTINUED | OUTPATIENT
Start: 2021-11-05 | End: 2021-11-05

## 2021-11-05 RX ORDER — ALBUMIN HUMAN 25 %
50 VIAL (ML) INTRAVENOUS EVERY 6 HOURS
Refills: 0 | Status: COMPLETED | OUTPATIENT
Start: 2021-11-05 | End: 2021-11-07

## 2021-11-05 RX ORDER — PHENYLEPHRINE HYDROCHLORIDE 10 MG/ML
1.2 INJECTION INTRAVENOUS
Qty: 40 | Refills: 0 | Status: DISCONTINUED | OUTPATIENT
Start: 2021-11-05 | End: 2021-11-08

## 2021-11-05 RX ORDER — FUROSEMIDE 40 MG
40 TABLET ORAL
Refills: 0 | Status: COMPLETED | OUTPATIENT
Start: 2021-11-05 | End: 2021-11-07

## 2021-11-05 RX ORDER — ACETAMINOPHEN 500 MG
1000 TABLET ORAL ONCE
Refills: 0 | Status: COMPLETED | OUTPATIENT
Start: 2021-11-05 | End: 2021-11-05

## 2021-11-05 RX ADMIN — CHLORHEXIDINE GLUCONATE 1 APPLICATION(S): 213 SOLUTION TOPICAL at 05:14

## 2021-11-05 RX ADMIN — SENNA PLUS 2 TABLET(S): 8.6 TABLET ORAL at 21:17

## 2021-11-05 RX ADMIN — Medication 1000 MILLIGRAM(S): at 21:09

## 2021-11-05 RX ADMIN — CHLORHEXIDINE GLUCONATE 15 MILLILITER(S): 213 SOLUTION TOPICAL at 05:14

## 2021-11-05 RX ADMIN — HEPARIN SODIUM 5000 UNIT(S): 5000 INJECTION INTRAVENOUS; SUBCUTANEOUS at 05:13

## 2021-11-05 RX ADMIN — HEPARIN SODIUM 5000 UNIT(S): 5000 INJECTION INTRAVENOUS; SUBCUTANEOUS at 13:17

## 2021-11-05 RX ADMIN — Medication 50 MILLILITER(S): at 19:38

## 2021-11-05 RX ADMIN — POLYETHYLENE GLYCOL 3350 17 GRAM(S): 17 POWDER, FOR SOLUTION ORAL at 13:17

## 2021-11-05 RX ADMIN — HEPARIN SODIUM 5000 UNIT(S): 5000 INJECTION INTRAVENOUS; SUBCUTANEOUS at 21:17

## 2021-11-05 RX ADMIN — CHLORHEXIDINE GLUCONATE 15 MILLILITER(S): 213 SOLUTION TOPICAL at 18:09

## 2021-11-05 RX ADMIN — Medication 40 MILLIGRAM(S): at 18:33

## 2021-11-05 RX ADMIN — Medication 400 MILLIGRAM(S): at 20:31

## 2021-11-05 RX ADMIN — PHENYLEPHRINE HYDROCHLORIDE 18.4 MICROGRAM(S)/KG/MIN: 10 INJECTION INTRAVENOUS at 21:08

## 2021-11-05 RX ADMIN — AZITHROMYCIN 255 MILLIGRAM(S): 500 TABLET, FILM COATED ORAL at 04:22

## 2021-11-05 RX ADMIN — Medication 50 MILLILITER(S): at 14:21

## 2021-11-05 RX ADMIN — CEFTRIAXONE 100 MILLIGRAM(S): 500 INJECTION, POWDER, FOR SOLUTION INTRAMUSCULAR; INTRAVENOUS at 05:13

## 2021-11-05 RX ADMIN — PANTOPRAZOLE SODIUM 40 MILLIGRAM(S): 20 TABLET, DELAYED RELEASE ORAL at 13:17

## 2021-11-05 NOTE — PROGRESS NOTE ADULT - ASSESSMENT
89 y/o F, nursing home resident with a significant medical history of CVA with R sided hemiparesis, aphasia, parkinson's, GERD, dementia, CKD, presents to the ED in respiratory distress, intubated while in ED. Patient is being admitted to medicine for sepsis and AHRF likely 2/2 ?PNA    # Acute hypoxic respiratory failure  # Sepsis 2/2 PNA, E. coli bacteremia  # Lactic acidosis  # Joby on CKD  # Hypernatremia    Neuro:  Patient is sedated on propofol (monitor TG)  Started on fentanyl for pain    Pulmonary  Acute hypoxic respiratory failure  Patient presented with AHRF, intubated in ED  As per NH records, patient had cough and fever for past few days  CXR shows LLL infiltrate  off isolation, covid negative  s/p one dose vanc and zosyn  on rocephin 1mg and zithro (ID: Dr. Wong)  BCx - E. coli bacteremia; Sputum - Strep agalactiae  f/u repeat BCx, UCx  ABG showed 7.40/35/85/22 this morning  Monitor resp status    Cardiovascular  Sinus tachycardia  currently patient is not tachycardic  Likely 2/2 sepsis and dehydration  EKG showed sinus rhythm with ST changes in inferior leads (11/1/21)  repeat EKG - SR w/ new-onset PACs  Trop was mildly elevated but trended down    Gastrointestinal  NPO for now  NGT inserted today  started on tube feeds (Jevity)  Nutrition consult    ID  Sepsis 2/2  Patient was tachycardic, had high temp 103.8  WBC inc. since admission  CXR shows LLL infiltrate on admission  CXR today showed progression of infiltrates  s/p 2L bolus  Was given one dose of vanc and zosyn  Will start on rocephin and zithro   cx - No evidence of pneumothorax, no infiltrates or effusion  CX (s/p LIJ) - L pneumothorax > (s/p chest tube - 11/01/21)   Blood Cx - E. coli bacteremia; repeat BCx was neg (11/3/21)  Sputum Cx - Strep agalactiae  UCx was neg  lactate - 11.2>4.9>4.2>3.2>2.1 (trending down)  ID consult (Dr. Wong)    Renal  Patient has h/o CKD  Clinically patient is severely dehydrated  has hypernatremia, likely 2/2 volume deficit  Not producing any urine  s/p 2 L bolus  on standing IVF  Monitor I/O  Cr is elevated, unknown baseline (trending down to normal)  UA - pos. for UTI  UCx - neg  Monitor BMP    Endo  BS are elevated  No h/o DM  A1c - 5.8  Monitor BS    Prophylactic  On heparin for dvt prophylaxis  on PPI for GI prophylaxis    Palliative consult (Dr. Michele) - FULL CODE 89 y/o F, nursing home resident with a significant medical history of CVA with R sided hemiparesis, aphasia, parkinson's, GERD, dementia, CKD, presents to the ED in respiratory distress, intubated while in ED. Patient is being admitted to medicine for sepsis and AHRF likely 2/2 ?PNA    # Acute hypoxic respiratory failure  # Sepsis 2/2 PNA, E. coli bacteremia  # Lactic acidosis  # Joby on CKD  # Hypernatremia    Neuro:  Patient is sedated on propofol (monitor TG)  Started on fentanyl for pain    Pulmonary  Acute hypoxic respiratory failure  Patient presented with AHRF, intubated in ED  As per NH records, patient had cough and fever for past few days  CXR shows LLL infiltrate  off isolation, covid negative  s/p one dose vanc and zosyn  on rocephin 1mg and zithro (ID: Dr. Wong)  BCx - E. coli bacteremia; Sputum - Strep agalactiae  Repeat BCx - neg  ABG showed 7.44/37/83/25 this morning  Monitor resp status    Pneumothorax  CXR s/p LIJ insertion showed L pneumothorax  chest tube inserted (11/1/21)  drained 90 cc overnight  has air leak but maintained at -20 pressure suction  Thoracic Surgery f/u    Cardiovascular  Sinus tachycardia  currently patient is not tachycardic  Likely 2/2 sepsis and dehydration  EKG showed sinus rhythm with ST changes in inferior leads (11/1/21)  repeat EKG - SR w/ new-onset PACs  Trop was mildly elevated but trended down    Gastrointestinal  NPO for now  NGT inserted  started on tube feeds (Jevity)  Nutrition consult    ID  Sepsis 2/2  Patient was tachycardic, had high temp 103.8  WBC inc. since admission  CXR shows LLL infiltrate on admission  repeat CXR showed progression of infiltrates, bilateral  s/p 2L bolus  Was given one dose of vanc and zosyn  Will start on rocephin and zithro   cx - No evidence of pneumothorax, no infiltrates or effusion  CX (s/p LIJ) - L pneumothorax > (s/p chest tube - 11/01/21)   Blood Cx - E. coli bacteremia; repeat BCx was neg (11/3/21)  Sputum Cx - Strep agalactiae  UCx was neg  lactate - 11.2>4.9>4.2>3.2>2.1>2.0 (trending down to normal)  Procal - 44.00>35.10>21.10>12.90  Keep output net negative  Start Lasix 40 mg IV BID and Albumin 25 mg q6 for 48 hours.  ID consult (Dr. Wong)    Renal  Patient has h/o CKD  Clinically patient is severely dehydrated  has hypernatremia, likely 2/2 volume deficit  Not producing any urine  s/p 2 L bolus  on standing IVF  Monitor I/O  Cr is elevated, unknown baseline (trending down to normal)  UA - pos. for UTI  UCx - neg  Monitor BMP    Endo  BS are elevated  No h/o DM  A1c - 5.8  Monitor BS    Prophylactic  On heparin for dvt prophylaxis  on PPI for GI prophylaxis    Palliative consult (Dr. Michele) - FULL CODE

## 2021-11-05 NOTE — PROGRESS NOTE ADULT - PROBLEM SELECTOR PLAN 1
on antibiotics for L PNA, E coli bacteremia, weaned off pressor today. Remains intubated. Rpt blood cx neg so far. Course c/b pthx, s/p L CT. Mgmt per ICU. Remains FULL CODE.

## 2021-11-05 NOTE — PROGRESS NOTE ADULT - SUBJECTIVE AND OBJECTIVE BOX
INTERVAL HPI/OVERNIGHT EVENTS: ***    PRESSORS: [ ] YES [ ] NO  WHICH:    ANTIBIOTICS:                      Antimicrobial:  azithromycin  IVPB 500 milliGRAM(s) IV Intermittent every 24 hours  cefTRIAXone   IVPB 1000 milliGRAM(s) IV Intermittent every 24 hours    Cardiovascular:  phenylephrine    Infusion 2.2 MICROgram(s)/kG/Min IV Continuous <Continuous>    Pulmonary:    Hematalogic:  heparin   Injectable 5000 Unit(s) SubCutaneous every 8 hours    Other:  chlorhexidine 0.12% Liquid 15 milliLiter(s) Oral Mucosa every 12 hours  chlorhexidine 2% Cloths 1 Application(s) Topical <User Schedule>  fentaNYL   Infusion. 0.501 MICROgram(s)/kG/Hr IV Continuous <Continuous>  lactated ringers. 1000 milliLiter(s) IV Continuous <Continuous>  lactated ringers. 1000 milliLiter(s) IV Continuous <Continuous>  lactated ringers. 1000 milliLiter(s) IV Continuous <Continuous>  pantoprazole   Suspension 40 milliGRAM(s) Oral daily  polyethylene glycol 3350 17 Gram(s) Oral daily  propofol Infusion 10 MICROgram(s)/kG/Min IV Continuous <Continuous>  senna 2 Tablet(s) Oral at bedtime  sodium chloride 0.9% lock flush 10 milliLiter(s) IV Push every 1 hour PRN    azithromycin  IVPB 500 milliGRAM(s) IV Intermittent every 24 hours  cefTRIAXone   IVPB 1000 milliGRAM(s) IV Intermittent every 24 hours  chlorhexidine 0.12% Liquid 15 milliLiter(s) Oral Mucosa every 12 hours  chlorhexidine 2% Cloths 1 Application(s) Topical <User Schedule>  fentaNYL   Infusion. 0.501 MICROgram(s)/kG/Hr IV Continuous <Continuous>  heparin   Injectable 5000 Unit(s) SubCutaneous every 8 hours  lactated ringers. 1000 milliLiter(s) IV Continuous <Continuous>  lactated ringers. 1000 milliLiter(s) IV Continuous <Continuous>  lactated ringers. 1000 milliLiter(s) IV Continuous <Continuous>  pantoprazole   Suspension 40 milliGRAM(s) Oral daily  phenylephrine    Infusion 2.2 MICROgram(s)/kG/Min IV Continuous <Continuous>  polyethylene glycol 3350 17 Gram(s) Oral daily  propofol Infusion 10 MICROgram(s)/kG/Min IV Continuous <Continuous>  senna 2 Tablet(s) Oral at bedtime  sodium chloride 0.9% lock flush 10 milliLiter(s) IV Push every 1 hour PRN    Drug Dosing Weight  Height (cm): 167.6 (03 Nov 2021 16:02)  Weight (kg): 40.9 (01 Nov 2021 02:20)  BMI (kg/m2): 14.6 (03 Nov 2021 16:02)  BSA (m2): 1.43 (03 Nov 2021 16:02)    CENTRAL LINE: [ ] YES [ ] NO  LOCATION:   DATE INSERTED:  REMOVE: [ ] YES [ ] NO  EXPLAIN:    SAEED: [ ] YES [ ] NO    DATE INSERTED:  REMOVE:  [ ] YES [ ] NO  EXPLAIN:    A-LINE:  [ ] YES [ ] NO  LOCATION:   DATE INSERTED:  REMOVE:  [ ] YES [ ] NO  EXPLAIN:    PMH -reviewed admission note, no change since admission    ICU Vital Signs Last 24 Hrs  T(C): 36.4 (05 Nov 2021 04:00), Max: 38.1 (04 Nov 2021 19:00)  T(F): 97.5 (05 Nov 2021 04:00), Max: 100.5 (04 Nov 2021 19:00)  HR: 72 (05 Nov 2021 06:45) (68 - 142)  BP: 145/53 (05 Nov 2021 06:45) (67/37 - 166/55)  BP(mean): 77 (05 Nov 2021 06:45) (44 - 88)  ABP: --  ABP(mean): --  RR: 20 (05 Nov 2021 06:45) (17 - 26)  SpO2: 100% (05 Nov 2021 06:45) (91% - 100%)      ABG - ( 04 Nov 2021 03:56 )  pH, Arterial: 7.40  pH, Blood: x     /  pCO2: 35    /  pO2: 85    / HCO3: 22    / Base Excess: -2.5  /  SaO2: 99                    11-04 @ 07:01  -  11-05 @ 07:00  --------------------------------------------------------  IN: 1836.3 mL / OUT: 1965 mL / NET: -128.7 mL        Mode: AC/ CMV (Assist Control/ Continuous Mandatory Ventilation)  RR (machine): 20  TV (machine): 400  FiO2: 40  PEEP: 5  ITime: 0.9  MAP: 9  PIP: 23      PHYSICAL EXAM:    GENERAL: NAD, well-groomed, well-developed  HEAD:  Atraumatic, Normocephalic  EYES: EOMI, PERRLA, conjunctiva and sclera clear  ENMT: No tonsillar erythema, exudates, or enlargement; Moist mucous membranes, Good dentition, No lesions  NECK: Supple, normal appearance, No JVD; Normal thyroid; Trachea midline  NERVOUS SYSTEM:  Alert & Oriented X3, Good concentration; Motor Strength 5/5 B/L upper and lower extremities; DTRs 2+ intact and symmetric  CHEST/LUNG: No chest deformity; Normal percussion bilaterally; No rales, rhonchi, wheezing   HEART: Regular rate and rhythm; No murmurs, rubs, or gallops  ABDOMEN: Soft, Nontender, Nondistended; Bowel sounds present  EXTREMITIES:  2+ Peripheral Pulses, No clubbing, cyanosis, or edema  LYMPH: No lymphadenopathy noted  SKIN: No rashes or lesions; Good capillary refill      LABS:  CBC Full  -  ( 05 Nov 2021 03:36 )  WBC Count : 10.96 K/uL  RBC Count : 2.82 M/uL  Hemoglobin : 8.3 g/dL  Hematocrit : 26.5 %  Platelet Count - Automated : 195 K/uL  Mean Cell Volume : 94.0 fl  Mean Cell Hemoglobin : 29.4 pg  Mean Cell Hemoglobin Concentration : 31.3 gm/dL  Auto Neutrophil # : 9.75 K/uL  Auto Lymphocyte # : 0.66 K/uL  Auto Monocyte # : 0.33 K/uL  Auto Eosinophil # : 0.22 K/uL  Auto Basophil # : 0.00 K/uL  Auto Neutrophil % : 89.0 %  Auto Lymphocyte % : 6.0 %  Auto Monocyte % : 3.0 %  Auto Eosinophil % : 2.0 %  Auto Basophil % : 0.0 %    11-05    143  |  110<H>  |  22<H>  ----------------------------<  125<H>  4.2   |  27  |  0.72    Ca    8.0<L>      05 Nov 2021 03:36  Phos  3.1     11-05  Mg     1.9     11-05    TPro  4.9<L>  /  Alb  1.0<L>  /  TBili  0.3  /  DBili  x   /  AST  32  /  ALT  24  /  AlkPhos  143<H>  11-05    PT/INR - ( 04 Nov 2021 04:07 )   PT: 11.5 sec;   INR: 0.96 ratio         PTT - ( 04 Nov 2021 04:07 )  PTT:50.4 sec    Culture Results:   No growth to date. (11-03 @ 10:22)  Culture Results:   No growth to date. (11-03 @ 10:22)  Culture Results:   No growth (11-02 @ 21:01)      RADIOLOGY & ADDITIONAL STUDIES REVIEWED:  ***    GOALS OF CARE DISCUSSION WITH PATIENT/FAMILY/PROXY:    CRITICAL CARE TIME SPENT: 35 minutes INTERVAL HPI/OVERNIGHT EVENTS: ***    Patient was examined at bedside, sedated with 1 mm sluggish pupils equally reactive to light. She opens her eyes more and moves her lips when stimulated by sternal rub. She is tachypneic with 2 episodes of fever overnight (100.4-100.5F). Auscultation showed bilateral coarse breath sounds with rales and crackles. She has a left chest tube with air leak in the drainage. She had 90cc output through the chest tube overnight. Thoracic surgery maintained her at -20 cc suction pressure. She is currently neg -129 urine output via orona catheter. Her albumin this morning was low at 1.0. She was started on lasix 40 mg BID and albumin q6 for 48 hrs. No other significant events overnight.    PRESSORS: [X] YES [ ] NO  WHICH: phenylephrine    ANTIBIOTICS:                      Antimicrobial:  azithromycin  IVPB 500 milliGRAM(s) IV Intermittent every 24 hours  cefTRIAXone   IVPB 1000 milliGRAM(s) IV Intermittent every 24 hours    Cardiovascular:  phenylephrine    Infusion 2.2 MICROgram(s)/kG/Min IV Continuous <Continuous>    Pulmonary:    Hematalogic:  heparin   Injectable 5000 Unit(s) SubCutaneous every 8 hours    Other:  chlorhexidine 0.12% Liquid 15 milliLiter(s) Oral Mucosa every 12 hours  chlorhexidine 2% Cloths 1 Application(s) Topical <User Schedule>  fentaNYL   Infusion. 0.501 MICROgram(s)/kG/Hr IV Continuous <Continuous>  lactated ringers. 1000 milliLiter(s) IV Continuous <Continuous>  lactated ringers. 1000 milliLiter(s) IV Continuous <Continuous>  lactated ringers. 1000 milliLiter(s) IV Continuous <Continuous>  pantoprazole   Suspension 40 milliGRAM(s) Oral daily  polyethylene glycol 3350 17 Gram(s) Oral daily  propofol Infusion 10 MICROgram(s)/kG/Min IV Continuous <Continuous>  senna 2 Tablet(s) Oral at bedtime  sodium chloride 0.9% lock flush 10 milliLiter(s) IV Push every 1 hour PRN    azithromycin  IVPB 500 milliGRAM(s) IV Intermittent every 24 hours  cefTRIAXone   IVPB 1000 milliGRAM(s) IV Intermittent every 24 hours  chlorhexidine 0.12% Liquid 15 milliLiter(s) Oral Mucosa every 12 hours  chlorhexidine 2% Cloths 1 Application(s) Topical <User Schedule>  fentaNYL   Infusion. 0.501 MICROgram(s)/kG/Hr IV Continuous <Continuous>  heparin   Injectable 5000 Unit(s) SubCutaneous every 8 hours  lactated ringers. 1000 milliLiter(s) IV Continuous <Continuous>  lactated ringers. 1000 milliLiter(s) IV Continuous <Continuous>  lactated ringers. 1000 milliLiter(s) IV Continuous <Continuous>  pantoprazole   Suspension 40 milliGRAM(s) Oral daily  phenylephrine    Infusion 2.2 MICROgram(s)/kG/Min IV Continuous <Continuous>  polyethylene glycol 3350 17 Gram(s) Oral daily  propofol Infusion 10 MICROgram(s)/kG/Min IV Continuous <Continuous>  senna 2 Tablet(s) Oral at bedtime  sodium chloride 0.9% lock flush 10 milliLiter(s) IV Push every 1 hour PRN    Drug Dosing Weight  Height (cm): 167.6 (03 Nov 2021 16:02)  Weight (kg): 40.9 (01 Nov 2021 02:20)  BMI (kg/m2): 14.6 (03 Nov 2021 16:02)  BSA (m2): 1.43 (03 Nov 2021 16:02)    CENTRAL LINE: [X] YES [ ] NO  LOCATION: J  DATE INSERTED: 11/1/21  REMOVE: [ ] YES [ ] NO  EXPLAIN:    ORONA: [X] YES [ ] NO    DATE INSERTED: 11/1/21  REMOVE:  [ ] YES [ ] NO  EXPLAIN:    A-LINE:  [X] YES [ ] NO  LOCATION:   DATE INSERTED:  REMOVE:  [ ] YES [ ] NO  EXPLAIN:    PMH -reviewed admission note, no change since admission    ICU Vital Signs Last 24 Hrs  T(C): 36.4 (05 Nov 2021 04:00), Max: 38.1 (04 Nov 2021 19:00)  T(F): 97.5 (05 Nov 2021 04:00), Max: 100.5 (04 Nov 2021 19:00)  HR: 72 (05 Nov 2021 06:45) (68 - 142)  BP: 145/53 (05 Nov 2021 06:45) (67/37 - 166/55)  BP(mean): 77 (05 Nov 2021 06:45) (44 - 88)  ABP: --  ABP(mean): --  RR: 20 (05 Nov 2021 06:45) (17 - 26)  SpO2: 100% (05 Nov 2021 06:45) (91% - 100%)      ABG - ( 04 Nov 2021 03:56 )  pH, Arterial: 7.40  pH, Blood: x     /  pCO2: 35    /  pO2: 85    / HCO3: 22    / Base Excess: -2.5  /  SaO2: 99                    11-04 @ 07:01  -  11-05 @ 07:00  --------------------------------------------------------  IN: 1836.3 mL / OUT: 1965 mL / NET: -128.7 mL        Mode: AC/ CMV (Assist Control/ Continuous Mandatory Ventilation)  RR (machine): 20  TV (machine): 400  FiO2: 40  PEEP: 5  ITime: 0.9  MAP: 9  PIP: 23      PHYSICAL EXAM:    GENERAL: NAD, sedated and intubated to mechanical ventilator  HEAD:  Atraumatic, Normocephalic  EYES: 1 mm pupils sluggish but equally reactive to light conjunctiva and sclera clear  ENMT: No tonsillar erythema, exudates, or enlargement; Moist mucous membranes, Good dentition, No lesions  NECK: Supple, normal appearance, No JVD; Normal thyroid; Trachea midline  NERVOUS SYSTEM:  sedated and intubated; eye opening and lip movement with sternal rub stimulation  CHEST/LUNG: No chest deformity; bilateral coarse breath sounds with rales and crackles; tachypneic, on mechanical ventilator  HEART: Regular rate and rhythm; No murmurs, rubs, or gallops  ABDOMEN: Soft, Nontender, Nondistended; Bowel sounds present  EXTREMITIES:  2+ Peripheral Pulses, No clubbing, cyanosis, or edema  LYMPH: No lymphadenopathy noted  SKIN: No rashes or lesions; Good capillary refill      LABS:  CBC Full  -  ( 05 Nov 2021 03:36 )  WBC Count : 10.96 K/uL  RBC Count : 2.82 M/uL  Hemoglobin : 8.3 g/dL  Hematocrit : 26.5 %  Platelet Count - Automated : 195 K/uL  Mean Cell Volume : 94.0 fl  Mean Cell Hemoglobin : 29.4 pg  Mean Cell Hemoglobin Concentration : 31.3 gm/dL  Auto Neutrophil # : 9.75 K/uL  Auto Lymphocyte # : 0.66 K/uL  Auto Monocyte # : 0.33 K/uL  Auto Eosinophil # : 0.22 K/uL  Auto Basophil # : 0.00 K/uL  Auto Neutrophil % : 89.0 %  Auto Lymphocyte % : 6.0 %  Auto Monocyte % : 3.0 %  Auto Eosinophil % : 2.0 %  Auto Basophil % : 0.0 %    11-05    143  |  110<H>  |  22<H>  ----------------------------<  125<H>  4.2   |  27  |  0.72    Ca    8.0<L>      05 Nov 2021 03:36  Phos  3.1     11-05  Mg     1.9     11-05    TPro  4.9<L>  /  Alb  1.0<L>  /  TBili  0.3  /  DBili  x   /  AST  32  /  ALT  24  /  AlkPhos  143<H>  11-05    PT/INR - ( 04 Nov 2021 04:07 )   PT: 11.5 sec;   INR: 0.96 ratio         PTT - ( 04 Nov 2021 04:07 )  PTT:50.4 sec    Culture Results:   No growth to date. (11-03 @ 10:22)  Culture Results:   No growth to date. (11-03 @ 10:22)  Culture Results:   No growth (11-02 @ 21:01)      RADIOLOGY & ADDITIONAL STUDIES REVIEWED:  ***    < from: Xray Chest 1 View- PORTABLE-Routine (Xray Chest 1 View- PORTABLE-Routine in AM.) (11.04.21 @ 09:15) >  INTERPRETATION:  Portable chest radiograph    CLINICAL INFORMATION: Dyspnea, shortness of breath.    TECHNIQUE:  Portable  AP view of the chest.    COMPARISON: 11/3/2021 chest radiograph available for review.    FINDINGS:    ET tube tip above tracheal bifurcation.  NG tube tip beyond GE junction.  LEFT IJ catheter tip in SVC.      Increasing bilateral diffuse RIGHT lung and LEFT perihilar/basilarmultifocal airspace consolidations and/or RIGHT effusion...   No pneumothorax.    The  heart is enlarged in transverse diameter.    Visualized osseous structures are intact.    IMPRESSION:   Increasing bilateral pulmonary airspace disease..  Catheters and tubes in place.    < end of copied text >      GOALS OF CARE DISCUSSION WITH PATIENT/FAMILY/PROXY: FULL CODE    CRITICAL CARE TIME SPENT: 35 minutes

## 2021-11-05 NOTE — PROGRESS NOTE ADULT - PROBLEM SELECTOR PLAN 4
Clinical evidence indicates that the patient has Severe protein calorie malnutrition/ 3rd degree    In context of     Chronic Illness (>1 month)    Energy/Food intake <50% of estimated energy requirement >5 days   Body Fat loss: Severe   (Cachexia, temporal wasting,  muscle atrophy)     Strength: weakened severe (bedbound)    Recommend:   trial of NGT feeds  nutrition eval.

## 2021-11-05 NOTE — PROGRESS NOTE ADULT - ASSESSMENT
89 yo F with L ptx,  s/p L  CT placement 11/1    - f/u AM CXR  - daily CXRs   - chest tube to suction  - care as per ICU   - will follow

## 2021-11-05 NOTE — PROGRESS NOTE ADULT - CONVERSATION DETAILS
Spoke with patient's grandson Dr Pathak who has been assisting w communication with his grandfather who is primary surrogate. Discussed patient remains at high risk for being unable to wean and overall poor prognosis given advanced illness and poor baseline cognitive and functional status. If patient continues to decline and heart stops despite aggressive medical measures, CPR will not be beneficial. He verbalized understanding. For now, his grandfather has maintained that he wants all measures taken, but he will readdress again and is in agreement those measures are not likely to help. Support provided.

## 2021-11-05 NOTE — PROGRESS NOTE ADULT - SUBJECTIVE AND OBJECTIVE BOX
INTERVAL HPI/OVERNIGHT EVENTS:    Pt seen and examined at bedside. No acute events overnight.   Intubated, sedated, on pressor support.     Vital Signs Last 24 Hrs  T(C): 36.4 (05 Nov 2021 04:00), Max: 38.1 (04 Nov 2021 19:00)  T(F): 97.5 (05 Nov 2021 04:00), Max: 100.5 (04 Nov 2021 19:00)  HR: 72 (05 Nov 2021 06:45) (68 - 142)  BP: 145/53 (05 Nov 2021 06:45) (67/37 - 166/55)  BP(mean): 77 (05 Nov 2021 06:45) (44 - 88)  RR: 20 (05 Nov 2021 06:45) (17 - 26)  SpO2: 100% (05 Nov 2021 06:45) (91% - 100%)  I&O's Detail    04 Nov 2021 07:01  -  05 Nov 2021 07:00  --------------------------------------------------------  IN:    Enteral Tube Flush: 60 mL    FentaNYL: 48.3 mL    Free Water: 250 mL    IV PiggyBack: 250 mL    Jevity 1.5: 690 mL    Phenylephrine: 457.7 mL    Propofol: 80.3 mL  Total IN: 1836.3 mL    OUT:    Chest Tube (mL): 10 mL    Indwelling Catheter - Urethral (mL): 1955 mL  Total OUT: 1965 mL    Total NET: -128.7 mL        azithromycin  IVPB 500 milliGRAM(s) IV Intermittent every 24 hours  cefTRIAXone   IVPB 1000 milliGRAM(s) IV Intermittent every 24 hours  pantoprazole   Suspension 40 milliGRAM(s) Oral daily  polyethylene glycol 3350 17 Gram(s) Oral daily  senna 2 Tablet(s) Oral at bedtime      Physical Exam  General: Intubated, sedated   Chest: left chest tube in place, dressing c/d/i, no crepitus palpable, +AL, CT to suction       Labs:                        8.3    10.96 )-----------( 195      ( 05 Nov 2021 03:36 )             26.5     11-05    143  |  110<H>  |  22<H>  ----------------------------<  125<H>  4.2   |  27  |  0.72    Ca    8.0<L>      05 Nov 2021 03:36  Phos  3.1     11-05  Mg     1.9     11-05    TPro  4.9<L>  /  Alb  1.0<L>  /  TBili  0.3  /  DBili  x   /  AST  32  /  ALT  24  /  AlkPhos  143<H>  11-05    PT/INR - ( 04 Nov 2021 04:07 )   PT: 11.5 sec;   INR: 0.96 ratio         PTT - ( 04 Nov 2021 04:07 )  PTT:50.4 sec

## 2021-11-05 NOTE — PROGRESS NOTE ADULT - ASSESSMENT
seen and examined vs stable  physical done  orally intubated  awake not in any distress  labs noted   a/p resp failure orally intubated   poor prognosis   palleative

## 2021-11-05 NOTE — PROGRESS NOTE ADULT - SUBJECTIVE AND OBJECTIVE BOX
follow up on:  complex medical decision making related to goals of care    OVERNIGHT EVENTS: weaned off pressor. Remains intubated,  L CT in place    Present Symptoms:      Review of Systems:   Unable to obtain due to poor mentation     MEDICATIONS  (STANDING):  albumin human 25% IVPB 50 milliLiter(s) IV Intermittent every 6 hours  azithromycin  IVPB 500 milliGRAM(s) IV Intermittent every 24 hours  cefTRIAXone   IVPB 1000 milliGRAM(s) IV Intermittent every 24 hours  chlorhexidine 0.12% Liquid 15 milliLiter(s) Oral Mucosa every 12 hours  chlorhexidine 2% Cloths 1 Application(s) Topical <User Schedule>  fentaNYL   Infusion. 0.501 MICROgram(s)/kG/Hr (2.05 mL/Hr) IV Continuous <Continuous>  furosemide   Injectable 40 milliGRAM(s) IV Push two times a day  heparin   Injectable 5000 Unit(s) SubCutaneous every 8 hours  lactated ringers. 1000 milliLiter(s) (75 mL/Hr) IV Continuous <Continuous>  lactated ringers. 1000 milliLiter(s) (75 mL/Hr) IV Continuous <Continuous>  lactated ringers. 1000 milliLiter(s) (75 mL/Hr) IV Continuous <Continuous>  pantoprazole   Suspension 40 milliGRAM(s) Oral daily  phenylephrine    Infusion 2.2 MICROgram(s)/kG/Min (16.9 mL/Hr) IV Continuous <Continuous>  polyethylene glycol 3350 17 Gram(s) Oral daily  propofol Infusion 10 MICROgram(s)/kG/Min (2.45 mL/Hr) IV Continuous <Continuous>  senna 2 Tablet(s) Oral at bedtime    MEDICATIONS  (PRN):  sodium chloride 0.9% lock flush 10 milliLiter(s) IV Push every 1 hour PRN Pre/post blood products, medications, blood draw, and to maintain line patency      PHYSICAL EXAM:  Vital Signs Last 24 Hrs  T(C): 36.4 (05 Nov 2021 04:00), Max: 38.1 (04 Nov 2021 19:00)  T(F): 97.5 (05 Nov 2021 04:00), Max: 100.5 (04 Nov 2021 19:00)  HR: 109 (05 Nov 2021 12:30) (68 - 142)  BP: 131/44 (05 Nov 2021 12:30) (67/37 - 166/55)  BP(mean): 97 (05 Nov 2021 12:30) (44 - 97)  RR: 30 (05 Nov 2021 12:30) (13 - 30)  SpO2: 96% (05 Nov 2021 12:30) (91% - 100%)    Karnofsky Performance Score/Palliative Performance Status Version2: 20     %     GENERAL: intubated, sedated, cachectic, NAD  HEENT: Atraumatic, oropharynx clear, neck supple  CHEST/LUNG: unlabored  HEART: Regular rate and rhythm    ABDOMEN: Soft, Nontender, Nondistended   MUSCULOSKELETAL:  No  edema, bedbound  NERVOUS SYSTEM:  sedated  SKIN: No rashes or lesions noted  Oral intake: npo/TF    LABS:                          8.3    10.96 )-----------( 195      ( 05 Nov 2021 03:36 )             26.5     11-05    143  |  110<H>  |  22<H>  ----------------------------<  125<H>  4.2   |  27  |  0.72    Ca    8.0<L>      05 Nov 2021 03:36  Phos  3.1     11-05  Mg     1.9     11-05    TPro  4.9<L>  /  Alb  1.0<L>  /  TBili  0.3  /  DBili  x   /  AST  32  /  ALT  24  /  AlkPhos  143<H>  11-05        RADIOLOGY & ADDITIONAL STUDIES:

## 2021-11-05 NOTE — CHART NOTE - NSCHARTNOTEFT_GEN_A_CORE
The patient's grandson, Dr. Tommie Pathak ([phone]). We provided him with the latest update and plan for the patient. We told him that her condition is still guarded because of the sepsis. The family was also able to speak with Palliative care (Dr. Michele) crispin The patient's grandson, Dr. Rivasel Lawson ([phone]). We provided him with the latest update and plan for the patient. We told him that her condition is still guarded because of the sepsis. The family was also able to speak with Palliative care (Dr. Michele) regarding the goals of care.

## 2021-11-05 NOTE — PROGRESS NOTE ADULT - SUBJECTIVE AND OBJECTIVE BOX
HPI:  89 yo female from [facility] with medical h/o of HTN, Dementia, CVA with R sided hemiparesis, aphasia, parkinson's, GERD, CKD bedbound at baseline, dependant on assistance for ADL comes in with respiratory failure. Patient was found to have respiratory distress, saturating in 70s when EMS arrived, was placed on NRB on field. She was emergently intubated on arrival in ED. As per NH records patient was having fever and cough for past few days. Today she was found to have respiratory distress. She is vaccinated with moderna. Spoke to family son,  who stated the patient to remain full code. No other history could be obtained.  (01 Nov 2021 00:14)      Patient is a 89y old  Female who presents with a chief complaint of AHRF (05 Nov 2021 14:27)      INTERVAL HPI/OVERNIGHT EVENTS:  T(C): 36.4 (11-05-21 @ 04:00), Max: 38.1 (11-04-21 @ 19:00)  HR: 109 (11-05-21 @ 16:11) (68 - 142)  BP: 131/44 (11-05-21 @ 12:30) (67/37 - 166/55)  RR: 30 (11-05-21 @ 12:30) (13 - 30)  SpO2: 99% (11-05-21 @ 16:11) (91% - 100%)  Wt(kg): --  I&O's Summary    04 Nov 2021 07:01  -  05 Nov 2021 07:00  --------------------------------------------------------  IN: 1836.3 mL / OUT: 1965 mL / NET: -128.7 mL        REVIEW OF SYSTEMS: denies fever, chills, SOB, palpitations, chest pain, abdominal pain, nausea, vomitting, diarrhea, constipation, dizziness    MEDICATIONS  (STANDING):  albumin human 25% IVPB 50 milliLiter(s) IV Intermittent every 6 hours  azithromycin  IVPB 500 milliGRAM(s) IV Intermittent every 24 hours  cefTRIAXone   IVPB 1000 milliGRAM(s) IV Intermittent every 24 hours  chlorhexidine 0.12% Liquid 15 milliLiter(s) Oral Mucosa every 12 hours  chlorhexidine 2% Cloths 1 Application(s) Topical <User Schedule>  fentaNYL   Infusion. 0.501 MICROgram(s)/kG/Hr (2.05 mL/Hr) IV Continuous <Continuous>  furosemide   Injectable 40 milliGRAM(s) IV Push two times a day  heparin   Injectable 5000 Unit(s) SubCutaneous every 8 hours  lactated ringers. 1000 milliLiter(s) (75 mL/Hr) IV Continuous <Continuous>  lactated ringers. 1000 milliLiter(s) (75 mL/Hr) IV Continuous <Continuous>  lactated ringers. 1000 milliLiter(s) (75 mL/Hr) IV Continuous <Continuous>  pantoprazole   Suspension 40 milliGRAM(s) Oral daily  phenylephrine    Infusion 2.2 MICROgram(s)/kG/Min (16.9 mL/Hr) IV Continuous <Continuous>  polyethylene glycol 3350 17 Gram(s) Oral daily  propofol Infusion 10 MICROgram(s)/kG/Min (2.45 mL/Hr) IV Continuous <Continuous>  senna 2 Tablet(s) Oral at bedtime    MEDICATIONS  (PRN):  sodium chloride 0.9% lock flush 10 milliLiter(s) IV Push every 1 hour PRN Pre/post blood products, medications, blood draw, and to maintain line patency      PHYSICAL EXAM:  GENERAL: NAD, well-groomed, well-developed  HEAD:  Atraumatic, Normocephalic  EYES: EOMI, PERRLA, conjunctiva and sclera clear  ENMT: No tonsillar erythema, exudates, or enlargement; Moist mucous membranes, Good dentition, No lesions  NECK: Supple, No JVD, Normal thyroid  NERVOUS SYSTEM:  Alert & Oriented X3, Good concentration; Motor Strength 5/5 B/L upper and lower extremities; DTRs 2+ intact and symmetric  CHEST/LUNG: Clear to percussion bilaterally; No rales, rhonchi, wheezing, or rubs  HEART: Regular rate and rhythm; No murmurs, rubs, or gallops  ABDOMEN: Soft, Nontender, Nondistended; Bowel sounds present  EXTREMITIES:  2+ Peripheral Pulses, No clubbing, cyanosis, or edema  LYMPH: No lymphadenopathy noted  SKIN: No rashes or lesions  LABS:                        8.3    10.96 )-----------( 195      ( 05 Nov 2021 03:36 )             26.5     11-05    143  |  110<H>  |  22<H>  ----------------------------<  125<H>  4.2   |  27  |  0.72    Ca    8.0<L>      05 Nov 2021 03:36  Phos  3.1     11-05  Mg     1.9     11-05    TPro  4.9<L>  /  Alb  1.0<L>  /  TBili  0.3  /  DBili  x   /  AST  32  /  ALT  24  /  AlkPhos  143<H>  11-05    PT/INR - ( 04 Nov 2021 04:07 )   PT: 11.5 sec;   INR: 0.96 ratio         PTT - ( 04 Nov 2021 04:07 )  PTT:50.4 sec    CAPILLARY BLOOD GLUCOSE      POCT Blood Glucose.: 138 mg/dL (05 Nov 2021 12:58)  POCT Blood Glucose.: 106 mg/dL (05 Nov 2021 01:23)      ABG - ( 05 Nov 2021 08:41 )  pH, Arterial: 7.44  pH, Blood: x     /  pCO2: 37    /  pO2: 83    / HCO3: 25    / Base Excess: 1.0   /  SaO2: 100

## 2021-11-06 LAB
ABO RH CONFIRMATION: SIGNIFICANT CHANGE UP
ALBUMIN SERPL ELPH-MCNC: 1.5 G/DL — LOW (ref 3.5–5)
ALP SERPL-CCNC: 134 U/L — HIGH (ref 40–120)
ALT FLD-CCNC: 22 U/L DA — SIGNIFICANT CHANGE UP (ref 10–60)
ANION GAP SERPL CALC-SCNC: 5 MMOL/L — SIGNIFICANT CHANGE UP (ref 5–17)
ANISOCYTOSIS BLD QL: SLIGHT — SIGNIFICANT CHANGE UP
ANISOCYTOSIS BLD QL: SLIGHT — SIGNIFICANT CHANGE UP
APTT BLD: 58.7 SEC — HIGH (ref 27.5–35.5)
AST SERPL-CCNC: 28 U/L — SIGNIFICANT CHANGE UP (ref 10–40)
BASE EXCESS BLDA CALC-SCNC: 6.2 MMOL/L — HIGH (ref -2–3)
BASOPHILS # BLD AUTO: 0 K/UL — SIGNIFICANT CHANGE UP (ref 0–0.2)
BASOPHILS NFR BLD AUTO: 0 % — SIGNIFICANT CHANGE UP (ref 0–2)
BILIRUB SERPL-MCNC: 0.4 MG/DL — SIGNIFICANT CHANGE UP (ref 0.2–1.2)
BLD GP AB SCN SERPL QL: SIGNIFICANT CHANGE UP
BLOOD GAS COMMENTS ARTERIAL: SIGNIFICANT CHANGE UP
BUN SERPL-MCNC: 24 MG/DL — HIGH (ref 7–18)
CALCIUM SERPL-MCNC: 7.6 MG/DL — LOW (ref 8.4–10.5)
CHLORIDE SERPL-SCNC: 106 MMOL/L — SIGNIFICANT CHANGE UP (ref 96–108)
CO2 SERPL-SCNC: 30 MMOL/L — SIGNIFICANT CHANGE UP (ref 22–31)
CREAT SERPL-MCNC: 0.78 MG/DL — SIGNIFICANT CHANGE UP (ref 0.5–1.3)
CULTURE RESULTS: SIGNIFICANT CHANGE UP
EOSINOPHIL # BLD AUTO: 0 K/UL — SIGNIFICANT CHANGE UP (ref 0–0.5)
EOSINOPHIL NFR BLD AUTO: 0 % — SIGNIFICANT CHANGE UP (ref 0–6)
GLUCOSE SERPL-MCNC: 179 MG/DL — HIGH (ref 70–99)
HCO3 BLDA-SCNC: 30 MMOL/L — HIGH (ref 21–28)
HCT VFR BLD CALC: 20.4 % — CRITICAL LOW (ref 34.5–45)
HCT VFR BLD CALC: 20.6 % — CRITICAL LOW (ref 34.5–45)
HCT VFR BLD CALC: 20.8 % — CRITICAL LOW (ref 34.5–45)
HCT VFR BLD CALC: 22 % — LOW (ref 34.5–45)
HCT VFR BLD CALC: 25.9 % — LOW (ref 34.5–45)
HGB BLD-MCNC: 6.5 G/DL — CRITICAL LOW (ref 11.5–15.5)
HGB BLD-MCNC: 6.7 G/DL — CRITICAL LOW (ref 11.5–15.5)
HGB BLD-MCNC: 6.7 G/DL — CRITICAL LOW (ref 11.5–15.5)
HGB BLD-MCNC: 7.2 G/DL — LOW (ref 11.5–15.5)
HGB BLD-MCNC: 8.5 G/DL — LOW (ref 11.5–15.5)
HOROWITZ INDEX BLDA+IHG-RTO: 40 — SIGNIFICANT CHANGE UP
INR BLD: 1.01 RATIO — SIGNIFICANT CHANGE UP (ref 0.88–1.16)
LACTATE SERPL-SCNC: 1.9 MMOL/L — SIGNIFICANT CHANGE UP (ref 0.7–2)
LYMPHOCYTES # BLD AUTO: 1.17 K/UL — SIGNIFICANT CHANGE UP (ref 1–3.3)
LYMPHOCYTES # BLD AUTO: 11 % — LOW (ref 13–44)
MACROCYTES BLD QL: SLIGHT — SIGNIFICANT CHANGE UP
MAGNESIUM SERPL-MCNC: 2 MG/DL — SIGNIFICANT CHANGE UP (ref 1.6–2.6)
MANUAL SMEAR VERIFICATION: SIGNIFICANT CHANGE UP
MANUAL SMEAR VERIFICATION: SIGNIFICANT CHANGE UP
MCHC RBC-ENTMCNC: 29.3 PG — SIGNIFICANT CHANGE UP (ref 27–34)
MCHC RBC-ENTMCNC: 29.3 PG — SIGNIFICANT CHANGE UP (ref 27–34)
MCHC RBC-ENTMCNC: 29.5 PG — SIGNIFICANT CHANGE UP (ref 27–34)
MCHC RBC-ENTMCNC: 30 PG — SIGNIFICANT CHANGE UP (ref 27–34)
MCHC RBC-ENTMCNC: 30 PG — SIGNIFICANT CHANGE UP (ref 27–34)
MCHC RBC-ENTMCNC: 31.9 GM/DL — LOW (ref 32–36)
MCHC RBC-ENTMCNC: 32.2 GM/DL — SIGNIFICANT CHANGE UP (ref 32–36)
MCHC RBC-ENTMCNC: 32.5 GM/DL — SIGNIFICANT CHANGE UP (ref 32–36)
MCHC RBC-ENTMCNC: 32.7 GM/DL — SIGNIFICANT CHANGE UP (ref 32–36)
MCHC RBC-ENTMCNC: 32.8 GM/DL — SIGNIFICANT CHANGE UP (ref 32–36)
MCV RBC AUTO: 89.3 FL — SIGNIFICANT CHANGE UP (ref 80–100)
MCV RBC AUTO: 90.8 FL — SIGNIFICANT CHANGE UP (ref 80–100)
MCV RBC AUTO: 91.7 FL — SIGNIFICANT CHANGE UP (ref 80–100)
MCV RBC AUTO: 92.4 FL — SIGNIFICANT CHANGE UP (ref 80–100)
MCV RBC AUTO: 92.7 FL — SIGNIFICANT CHANGE UP (ref 80–100)
MICROCYTES BLD QL: SLIGHT — SIGNIFICANT CHANGE UP
MONOCYTES # BLD AUTO: 0.32 K/UL — SIGNIFICANT CHANGE UP (ref 0–0.9)
MONOCYTES NFR BLD AUTO: 3 % — SIGNIFICANT CHANGE UP (ref 2–14)
NEUTROPHILS # BLD AUTO: 9.15 K/UL — HIGH (ref 1.8–7.4)
NEUTROPHILS NFR BLD AUTO: 86 % — HIGH (ref 43–77)
NRBC # BLD: 0 /100 WBCS — SIGNIFICANT CHANGE UP (ref 0–0)
NRBC # BLD: 0 /100 — SIGNIFICANT CHANGE UP (ref 0–0)
OVALOCYTES BLD QL SMEAR: SLIGHT — SIGNIFICANT CHANGE UP
PCO2 BLDA: 37 MMHG — HIGH (ref 32–35)
PH BLDA: 7.51 — HIGH (ref 7.35–7.45)
PHOSPHATE SERPL-MCNC: 3.2 MG/DL — SIGNIFICANT CHANGE UP (ref 2.5–4.5)
PLAT MORPH BLD: NORMAL — SIGNIFICANT CHANGE UP
PLAT MORPH BLD: NORMAL — SIGNIFICANT CHANGE UP
PLATELET # BLD AUTO: 157 K/UL — SIGNIFICANT CHANGE UP (ref 150–400)
PLATELET # BLD AUTO: 159 K/UL — SIGNIFICANT CHANGE UP (ref 150–400)
PLATELET # BLD AUTO: 168 K/UL — SIGNIFICANT CHANGE UP (ref 150–400)
PLATELET # BLD AUTO: 178 K/UL — SIGNIFICANT CHANGE UP (ref 150–400)
PLATELET # BLD AUTO: 185 K/UL — SIGNIFICANT CHANGE UP (ref 150–400)
PO2 BLDA: 89 MMHG — SIGNIFICANT CHANGE UP (ref 83–108)
POIKILOCYTOSIS BLD QL AUTO: SLIGHT — SIGNIFICANT CHANGE UP
POIKILOCYTOSIS BLD QL AUTO: SLIGHT — SIGNIFICANT CHANGE UP
POTASSIUM SERPL-MCNC: 3.3 MMOL/L — LOW (ref 3.5–5.3)
POTASSIUM SERPL-SCNC: 3.3 MMOL/L — LOW (ref 3.5–5.3)
PROLACTIN SERPL-MCNC: 46 NG/ML — HIGH (ref 3.4–24.1)
PROT SERPL-MCNC: 4.9 G/DL — LOW (ref 6–8.3)
PROTHROM AB SERPL-ACNC: 12 SEC — SIGNIFICANT CHANGE UP (ref 10.6–13.6)
RBC # BLD: 2.2 M/UL — LOW (ref 3.8–5.2)
RBC # BLD: 2.23 M/UL — LOW (ref 3.8–5.2)
RBC # BLD: 2.29 M/UL — LOW (ref 3.8–5.2)
RBC # BLD: 2.4 M/UL — LOW (ref 3.8–5.2)
RBC # BLD: 2.9 M/UL — LOW (ref 3.8–5.2)
RBC # FLD: 14.1 % — SIGNIFICANT CHANGE UP (ref 10.3–14.5)
RBC # FLD: 14.1 % — SIGNIFICANT CHANGE UP (ref 10.3–14.5)
RBC # FLD: 14.2 % — SIGNIFICANT CHANGE UP (ref 10.3–14.5)
RBC # FLD: 14.2 % — SIGNIFICANT CHANGE UP (ref 10.3–14.5)
RBC # FLD: 14.6 % — HIGH (ref 10.3–14.5)
RBC BLD AUTO: ABNORMAL
RBC BLD AUTO: ABNORMAL
SAO2 % BLDA: 98 % — SIGNIFICANT CHANGE UP
SODIUM SERPL-SCNC: 141 MMOL/L — SIGNIFICANT CHANGE UP (ref 135–145)
SPECIMEN SOURCE: SIGNIFICANT CHANGE UP
WBC # BLD: 10.64 K/UL — HIGH (ref 3.8–10.5)
WBC # BLD: 11.51 K/UL — HIGH (ref 3.8–10.5)
WBC # BLD: 11.53 K/UL — HIGH (ref 3.8–10.5)
WBC # BLD: 11.69 K/UL — HIGH (ref 3.8–10.5)
WBC # BLD: 12.19 K/UL — HIGH (ref 3.8–10.5)
WBC # FLD AUTO: 10.64 K/UL — HIGH (ref 3.8–10.5)
WBC # FLD AUTO: 11.51 K/UL — HIGH (ref 3.8–10.5)
WBC # FLD AUTO: 11.53 K/UL — HIGH (ref 3.8–10.5)
WBC # FLD AUTO: 11.69 K/UL — HIGH (ref 3.8–10.5)
WBC # FLD AUTO: 12.19 K/UL — HIGH (ref 3.8–10.5)

## 2021-11-06 PROCEDURE — 99232 SBSQ HOSP IP/OBS MODERATE 35: CPT

## 2021-11-06 PROCEDURE — 71045 X-RAY EXAM CHEST 1 VIEW: CPT | Mod: 26

## 2021-11-06 RX ORDER — POTASSIUM CHLORIDE 20 MEQ
40 PACKET (EA) ORAL ONCE
Refills: 0 | Status: COMPLETED | OUTPATIENT
Start: 2021-11-06 | End: 2021-11-06

## 2021-11-06 RX ORDER — ACETAMINOPHEN 500 MG
1000 TABLET ORAL ONCE
Refills: 0 | Status: COMPLETED | OUTPATIENT
Start: 2021-11-06 | End: 2021-11-06

## 2021-11-06 RX ORDER — VANCOMYCIN HCL 1 G
750 VIAL (EA) INTRAVENOUS EVERY 12 HOURS
Refills: 0 | Status: DISCONTINUED | OUTPATIENT
Start: 2021-11-06 | End: 2021-11-07

## 2021-11-06 RX ORDER — CHLORHEXIDINE GLUCONATE 213 G/1000ML
1 SOLUTION TOPICAL
Refills: 0 | Status: DISCONTINUED | OUTPATIENT
Start: 2021-11-06 | End: 2021-11-06

## 2021-11-06 RX ORDER — CEFEPIME 1 G/1
2000 INJECTION, POWDER, FOR SOLUTION INTRAMUSCULAR; INTRAVENOUS EVERY 12 HOURS
Refills: 0 | Status: DISCONTINUED | OUTPATIENT
Start: 2021-11-06 | End: 2021-11-07

## 2021-11-06 RX ADMIN — Medication 40 MILLIEQUIVALENT(S): at 10:26

## 2021-11-06 RX ADMIN — Medication 40 MILLIGRAM(S): at 05:41

## 2021-11-06 RX ADMIN — POLYETHYLENE GLYCOL 3350 17 GRAM(S): 17 POWDER, FOR SOLUTION ORAL at 11:47

## 2021-11-06 RX ADMIN — PANTOPRAZOLE SODIUM 40 MILLIGRAM(S): 20 TABLET, DELAYED RELEASE ORAL at 11:46

## 2021-11-06 RX ADMIN — Medication 50 MILLILITER(S): at 00:29

## 2021-11-06 RX ADMIN — Medication 1000 MILLIGRAM(S): at 20:11

## 2021-11-06 RX ADMIN — HEPARIN SODIUM 5000 UNIT(S): 5000 INJECTION INTRAVENOUS; SUBCUTANEOUS at 05:41

## 2021-11-06 RX ADMIN — CHLORHEXIDINE GLUCONATE 1 APPLICATION(S): 213 SOLUTION TOPICAL at 05:41

## 2021-11-06 RX ADMIN — CHLORHEXIDINE GLUCONATE 15 MILLILITER(S): 213 SOLUTION TOPICAL at 17:36

## 2021-11-06 RX ADMIN — HEPARIN SODIUM 5000 UNIT(S): 5000 INJECTION INTRAVENOUS; SUBCUTANEOUS at 13:30

## 2021-11-06 RX ADMIN — FENTANYL CITRATE 2.05 MICROGRAM(S)/KG/HR: 50 INJECTION INTRAVENOUS at 17:37

## 2021-11-06 RX ADMIN — Medication 250 MILLIGRAM(S): at 17:37

## 2021-11-06 RX ADMIN — SENNA PLUS 2 TABLET(S): 8.6 TABLET ORAL at 21:35

## 2021-11-06 RX ADMIN — AZITHROMYCIN 255 MILLIGRAM(S): 500 TABLET, FILM COATED ORAL at 03:31

## 2021-11-06 RX ADMIN — CHLORHEXIDINE GLUCONATE 15 MILLILITER(S): 213 SOLUTION TOPICAL at 05:40

## 2021-11-06 RX ADMIN — PHENYLEPHRINE HYDROCHLORIDE 18.4 MICROGRAM(S)/KG/MIN: 10 INJECTION INTRAVENOUS at 17:37

## 2021-11-06 RX ADMIN — Medication 50 MILLILITER(S): at 11:47

## 2021-11-06 RX ADMIN — CEFTRIAXONE 100 MILLIGRAM(S): 500 INJECTION, POWDER, FOR SOLUTION INTRAMUSCULAR; INTRAVENOUS at 05:40

## 2021-11-06 RX ADMIN — Medication 40 MILLIGRAM(S): at 19:34

## 2021-11-06 RX ADMIN — Medication 50 MILLILITER(S): at 23:07

## 2021-11-06 RX ADMIN — CEFEPIME 100 MILLIGRAM(S): 1 INJECTION, POWDER, FOR SOLUTION INTRAMUSCULAR; INTRAVENOUS at 17:37

## 2021-11-06 RX ADMIN — HEPARIN SODIUM 5000 UNIT(S): 5000 INJECTION INTRAVENOUS; SUBCUTANEOUS at 21:35

## 2021-11-06 RX ADMIN — Medication 50 MILLILITER(S): at 05:42

## 2021-11-06 RX ADMIN — Medication 50 MILLILITER(S): at 17:36

## 2021-11-06 RX ADMIN — Medication 400 MILLIGRAM(S): at 18:51

## 2021-11-06 NOTE — PROGRESS NOTE ADULT - SUBJECTIVE AND OBJECTIVE BOX
PGY-1 Progress Note discussed with attending    PAGER #: [[phone]] TILL 5:00 PM  PLEASE CONTACT ON CALL TEAM:  - On Call Team (Please refer to Vita) FROM 5:00 PM - 8:30PM  - Nightfloat Team FROM 8:30 -7:30 AM        INTERVAL HPI/OVERNIGHT EVENTS:     Patient spiked fever overnight and was given IV tylenol , was restaretd on pheny     REVIEW OF SYSTEMS:  Cannot be assessed   MEDICATIONS  (STANDING):  albumin human 25% IVPB 50 milliLiter(s) IV Intermittent every 6 hours  azithromycin  IVPB 500 milliGRAM(s) IV Intermittent every 24 hours  cefTRIAXone   IVPB 1000 milliGRAM(s) IV Intermittent every 24 hours  chlorhexidine 0.12% Liquid 15 milliLiter(s) Oral Mucosa every 12 hours  chlorhexidine 2% Cloths 1 Application(s) Topical <User Schedule>  fentaNYL   Infusion. 0.501 MICROgram(s)/kG/Hr (2.05 mL/Hr) IV Continuous <Continuous>  furosemide   Injectable 40 milliGRAM(s) IV Push two times a day  heparin   Injectable 5000 Unit(s) SubCutaneous every 8 hours  lactated ringers. 1000 milliLiter(s) (75 mL/Hr) IV Continuous <Continuous>  lactated ringers. 1000 milliLiter(s) (75 mL/Hr) IV Continuous <Continuous>  lactated ringers. 1000 milliLiter(s) (75 mL/Hr) IV Continuous <Continuous>  pantoprazole   Suspension 40 milliGRAM(s) Oral daily  phenylephrine    Infusion 1.2 MICROgram(s)/kG/Min (18.4 mL/Hr) IV Continuous <Continuous>  polyethylene glycol 3350 17 Gram(s) Oral daily  senna 2 Tablet(s) Oral at bedtime    MEDICATIONS  (PRN):  sodium chloride 0.9% lock flush 10 milliLiter(s) IV Push every 1 hour PRN Pre/post blood products, medications, blood draw, and to maintain line patency      Vital Signs Last 24 Hrs  T(C): 36.2 (05 Nov 2021 23:29), Max: 39 (05 Nov 2021 20:00)  T(F): 97.1 (05 Nov 2021 23:29), Max: 102.2 (05 Nov 2021 20:00)  HR: 83 (06 Nov 2021 00:45) (68 - 116)  BP: 127/54 (06 Nov 2021 00:45) (88/42 - 166/55)  BP(mean): 73 (06 Nov 2021 00:45) (52 - 98)  RR: 20 (06 Nov 2021 00:45) (13 - 30)  SpO2: 100% (06 Nov 2021 00:45) (94% - 100%)    PHYSICAL EXAMINATION:  GENERAL: NAD, sedated and intubated to mechanical ventilator  HEAD:  Atraumatic, Normocephalic  EYES: 1 mm pupils sluggish but equally reactive to light conjunctiva and sclera clear  ENMT: No tonsillar erythema, exudates, or enlargement; Moist mucous membranes, Good dentition, No lesions  NECK: Supple, normal appearance, No JVD; Normal thyroid; Trachea midline  NERVOUS SYSTEM:  sedated and intubated; eye opening and lip movement with sternal rub stimulation  CHEST/LUNG: No chest deformity; bilateral coarse breath sounds with rales and crackles; tachypneic, on mechanical ventilator  HEART: Regular rate and rhythm; No murmurs, rubs, or gallops  ABDOMEN: Soft, Nontender, Nondistended; Bowel sounds present  EXTREMITIES:  2+ Peripheral Pulses, No clubbing, cyanosis, or edema  LYMPH: No lymphadenopathy noted  SKIN: No rashes or lesions; Good capillary refill                        8.3    10.96 )-----------( 195      ( 05 Nov 2021 03:36 )             26.5     11-05    143  |  110<H>  |  22<H>  ----------------------------<  125<H>  4.2   |  27  |  0.72    Ca    8.0<L>      05 Nov 2021 03:36  Phos  3.1     11-05  Mg     1.9     11-05    TPro  4.9<L>  /  Alb  1.0<L>  /  TBili  0.3  /  DBili  x   /  AST  32  /  ALT  24  /  AlkPhos  143<H>  11-05    LIVER FUNCTIONS - ( 05 Nov 2021 03:36 )  Alb: 1.0 g/dL / Pro: 4.9 g/dL / ALK PHOS: 143 U/L / ALT: 24 U/L DA / AST: 32 U/L / GGT: x               PT/INR - ( 04 Nov 2021 04:07 )   PT: 11.5 sec;   INR: 0.96 ratio         PTT - ( 04 Nov 2021 04:07 )  PTT:50.4 sec        CAPILLARY BLOOD GLUCOSE  CAPILLARY BLOOD GLUCOSE      POCT Blood Glucose.: 144 mg/dL (06 Nov 2021 00:17)    CAPILLARY BLOOD GLUCOSE      POCT Blood Glucose.: 144 mg/dL (06 Nov 2021 00:17)  POCT Blood Glucose.: 138 mg/dL (05 Nov 2021 12:58)  POCT Blood Glucose.: 106 mg/dL (05 Nov 2021 01:23)      RADIOLOGY & ADDITIONAL TESTS:                   PGY-1 Progress Note discussed with attending    PAGER #: [[phone]] TILL 5:00 PM  PLEASE CONTACT ON CALL TEAM:  - On Call Team (Please refer to Vita) FROM 5:00 PM - 8:30PM  - Nightfloat Team FROM 8:30 -7:30 AM        INTERVAL HPI/OVERNIGHT EVENTS:     Patient spiked fever overnight and was given IV tylenol , was restaretd on pheny, spiking fevers, Will change to Vancomycin and Cefepime to cover for HAP. if pt continues to spike fevers, will change central line.      REVIEW OF SYSTEMS:  Cannot be assessed   MEDICATIONS  (STANDING):  albumin human 25% IVPB 50 milliLiter(s) IV Intermittent every 6 hours  azithromycin  IVPB 500 milliGRAM(s) IV Intermittent every 24 hours  cefTRIAXone   IVPB 1000 milliGRAM(s) IV Intermittent every 24 hours  chlorhexidine 0.12% Liquid 15 milliLiter(s) Oral Mucosa every 12 hours  chlorhexidine 2% Cloths 1 Application(s) Topical <User Schedule>  fentaNYL   Infusion. 0.501 MICROgram(s)/kG/Hr (2.05 mL/Hr) IV Continuous <Continuous>  furosemide   Injectable 40 milliGRAM(s) IV Push two times a day  heparin   Injectable 5000 Unit(s) SubCutaneous every 8 hours  lactated ringers. 1000 milliLiter(s) (75 mL/Hr) IV Continuous <Continuous>  lactated ringers. 1000 milliLiter(s) (75 mL/Hr) IV Continuous <Continuous>  lactated ringers. 1000 milliLiter(s) (75 mL/Hr) IV Continuous <Continuous>  pantoprazole   Suspension 40 milliGRAM(s) Oral daily  phenylephrine    Infusion 1.2 MICROgram(s)/kG/Min (18.4 mL/Hr) IV Continuous <Continuous>  polyethylene glycol 3350 17 Gram(s) Oral daily  senna 2 Tablet(s) Oral at bedtime    MEDICATIONS  (PRN):  sodium chloride 0.9% lock flush 10 milliLiter(s) IV Push every 1 hour PRN Pre/post blood products, medications, blood draw, and to maintain line patency      Vital Signs Last 24 Hrs  T(C): 36.2 (05 Nov 2021 23:29), Max: 39 (05 Nov 2021 20:00)  T(F): 97.1 (05 Nov 2021 23:29), Max: 102.2 (05 Nov 2021 20:00)  HR: 83 (06 Nov 2021 00:45) (68 - 116)  BP: 127/54 (06 Nov 2021 00:45) (88/42 - 166/55)  BP(mean): 73 (06 Nov 2021 00:45) (52 - 98)  RR: 20 (06 Nov 2021 00:45) (13 - 30)  SpO2: 100% (06 Nov 2021 00:45) (94% - 100%)    PHYSICAL EXAMINATION:  GENERAL: NAD, sedated and intubated to mechanical ventilator  HEAD:  Atraumatic, Normocephalic  EYES: 1 mm pupils sluggish but equally reactive to light conjunctiva and sclera clear  ENMT: No tonsillar erythema, exudates, or enlargement; Moist mucous membranes, Good dentition, No lesions  NECK: Supple, normal appearance, No JVD; Normal thyroid; Trachea midline  NERVOUS SYSTEM:  sedated and intubated; eye opening and lip movement with sternal rub stimulation  CHEST/LUNG: No chest deformity; bilateral coarse breath sounds with rales and crackles; tachypneic, on mechanical ventilator, chest tube  HEART: Regular rate and rhythm; No murmurs, rubs, or gallops  ABDOMEN: Soft, Nontender, Nondistended; Bowel sounds present  EXTREMITIES:  2+ Peripheral Pulses, No clubbing, cyanosis, or edema  LYMPH: No lymphadenopathy noted  SKIN: No rashes or lesions; Good capillary refill                        8.3    10.96 )-----------( 195      ( 05 Nov 2021 03:36 )             26.5     11-05    143  |  110<H>  |  22<H>  ----------------------------<  125<H>  4.2   |  27  |  0.72    Ca    8.0<L>      05 Nov 2021 03:36  Phos  3.1     11-05  Mg     1.9     11-05    TPro  4.9<L>  /  Alb  1.0<L>  /  TBili  0.3  /  DBili  x   /  AST  32  /  ALT  24  /  AlkPhos  143<H>  11-05    LIVER FUNCTIONS - ( 05 Nov 2021 03:36 )  Alb: 1.0 g/dL / Pro: 4.9 g/dL / ALK PHOS: 143 U/L / ALT: 24 U/L DA / AST: 32 U/L / GGT: x               PT/INR - ( 04 Nov 2021 04:07 )   PT: 11.5 sec;   INR: 0.96 ratio         PTT - ( 04 Nov 2021 04:07 )  PTT:50.4 sec        CAPILLARY BLOOD GLUCOSE  CAPILLARY BLOOD GLUCOSE      POCT Blood Glucose.: 144 mg/dL (06 Nov 2021 00:17)    CAPILLARY BLOOD GLUCOSE      POCT Blood Glucose.: 144 mg/dL (06 Nov 2021 00:17)  POCT Blood Glucose.: 138 mg/dL (05 Nov 2021 12:58)  POCT Blood Glucose.: 106 mg/dL (05 Nov 2021 01:23)      RADIOLOGY & ADDITIONAL TESTS:                   PGY-1 Progress Note discussed with attending    PAGER #: [[phone]] TILL 5:00 PM  PLEASE CONTACT ON CALL TEAM:  - On Call Team (Please refer to Vita) FROM 5:00 PM - 8:30PM  - Nightfloat Team FROM 8:30 -7:30 AM        INTERVAL HPI/OVERNIGHT EVENTS:     Patient spiked fever overnight and was given IV tylenol , was restaretd on pheny, spiking fevers, Will change to Vancomycin and Cefepime to cover for HAP. if pt continues to spike fevers, will change central line.      REVIEW OF SYSTEMS:  Cannot be assessed   MEDICATIONS  (STANDING):  albumin human 25% IVPB 50 milliLiter(s) IV Intermittent every 6 hours  azithromycin  IVPB 500 milliGRAM(s) IV Intermittent every 24 hours  cefTRIAXone   IVPB 1000 milliGRAM(s) IV Intermittent every 24 hours  chlorhexidine 0.12% Liquid 15 milliLiter(s) Oral Mucosa every 12 hours  chlorhexidine 2% Cloths 1 Application(s) Topical <User Schedule>  fentaNYL   Infusion. 0.501 MICROgram(s)/kG/Hr (2.05 mL/Hr) IV Continuous <Continuous>  furosemide   Injectable 40 milliGRAM(s) IV Push two times a day  heparin   Injectable 5000 Unit(s) SubCutaneous every 8 hours  lactated ringers. 1000 milliLiter(s) (75 mL/Hr) IV Continuous <Continuous>  lactated ringers. 1000 milliLiter(s) (75 mL/Hr) IV Continuous <Continuous>  lactated ringers. 1000 milliLiter(s) (75 mL/Hr) IV Continuous <Continuous>  pantoprazole   Suspension 40 milliGRAM(s) Oral daily  phenylephrine    Infusion 1.2 MICROgram(s)/kG/Min (18.4 mL/Hr) IV Continuous <Continuous>  polyethylene glycol 3350 17 Gram(s) Oral daily  senna 2 Tablet(s) Oral at bedtime    MEDICATIONS  (PRN):  sodium chloride 0.9% lock flush 10 milliLiter(s) IV Push every 1 hour PRN Pre/post blood products, medications, blood draw, and to maintain line patency      Vital Signs Last 24 Hrs  T(C): 36.2 (05 Nov 2021 23:29), Max: 39 (05 Nov 2021 20:00)  T(F): 97.1 (05 Nov 2021 23:29), Max: 102.2 (05 Nov 2021 20:00)  HR: 83 (06 Nov 2021 00:45) (68 - 116)  BP: 127/54 (06 Nov 2021 00:45) (88/42 - 166/55)  BP(mean): 73 (06 Nov 2021 00:45) (52 - 98)  RR: 20 (06 Nov 2021 00:45) (13 - 30)  SpO2: 100% (06 Nov 2021 00:45) (94% - 100%)    PHYSICAL EXAMINATION:  GENERAL: NAD, sedated and intubated to mechanical ventilator  HEAD:  Atraumatic, Normocephalic  EYES: 1 mm pupils sluggish but equally reactive to light conjunctiva and sclera clear  ENMT: No tonsillar erythema, exudates, or enlargement; Moist mucous membranes, Good dentition, No lesions  NECK: Supple, normal appearance, No JVD; Normal thyroid; Trachea midline  NERVOUS SYSTEM:  sedated and intubated; eye opening and lip movement with sternal rub stimulation  CHEST/LUNG: No chest deformity; bilateral coarse breath sounds with rales and crackles; tachypneic, on mechanical ventilator, chest tube with air leak   HEART: Regular rate and rhythm; No murmurs, rubs, or gallops  ABDOMEN: Soft, Nontender, Nondistended; Bowel sounds present  EXTREMITIES:  2+ Peripheral Pulses, No clubbing, cyanosis, or edema  LYMPH: No lymphadenopathy noted  SKIN: No rashes or lesions; Good capillary refill                        8.3    10.96 )-----------( 195      ( 05 Nov 2021 03:36 )             26.5     11-05    143  |  110<H>  |  22<H>  ----------------------------<  125<H>  4.2   |  27  |  0.72    Ca    8.0<L>      05 Nov 2021 03:36  Phos  3.1     11-05  Mg     1.9     11-05    TPro  4.9<L>  /  Alb  1.0<L>  /  TBili  0.3  /  DBili  x   /  AST  32  /  ALT  24  /  AlkPhos  143<H>  11-05    LIVER FUNCTIONS - ( 05 Nov 2021 03:36 )  Alb: 1.0 g/dL / Pro: 4.9 g/dL / ALK PHOS: 143 U/L / ALT: 24 U/L DA / AST: 32 U/L / GGT: x               PT/INR - ( 04 Nov 2021 04:07 )   PT: 11.5 sec;   INR: 0.96 ratio         PTT - ( 04 Nov 2021 04:07 )  PTT:50.4 sec        CAPILLARY BLOOD GLUCOSE  CAPILLARY BLOOD GLUCOSE      POCT Blood Glucose.: 144 mg/dL (06 Nov 2021 00:17)    CAPILLARY BLOOD GLUCOSE      POCT Blood Glucose.: 144 mg/dL (06 Nov 2021 00:17)  POCT Blood Glucose.: 138 mg/dL (05 Nov 2021 12:58)  POCT Blood Glucose.: 106 mg/dL (05 Nov 2021 01:23)      RADIOLOGY & ADDITIONAL TESTS:

## 2021-11-06 NOTE — PROGRESS NOTE ADULT - SUBJECTIVE AND OBJECTIVE BOX
HPI:  89 yo female from [facility] with medical h/o of HTN, Dementia, CVA with R sided hemiparesis, aphasia, parkinson's, GERD, CKD bedbound at baseline, dependant on assistance for ADL comes in with respiratory failure. Patient was found to have respiratory distress, saturating in 70s when EMS arrived, was placed on NRB on field. She was emergently intubated on arrival in ED. As per NH records patient was having fever and cough for past few days. Today she was found to have respiratory distress. She is vaccinated with moderna. Spoke to family son,  who stated the patient to remain full code. No other history could be obtained.  (01 Nov 2021 00:14)      Patient is a 89y old  Female who presents with a chief complaint of AHRF (06 Nov 2021 09:32)      INTERVAL HPI/OVERNIGHT EVENTS:  T(C): 37.3 (11-06-21 @ 15:01), Max: 39 (11-05-21 @ 20:00)  HR: 89 (11-06-21 @ 17:00) (74 - 154)  BP: 111/39 (11-06-21 @ 17:00) (76/50 - 157/60)  RR: 20 (11-06-21 @ 17:00) (11 - 28)  SpO2: 98% (11-06-21 @ 17:00) (96% - 100%)  Wt(kg): --  I&O's Summary    05 Nov 2021 07:01  -  06 Nov 2021 07:00  --------------------------------------------------------  IN: 1490.5 mL / OUT: 2280 mL / NET: -789.5 mL    06 Nov 2021 08:01  -  06 Nov 2021 17:51  --------------------------------------------------------  IN: 380.4 mL / OUT: 2500 mL / NET: -2119.6 mL        REVIEW OF SYSTEMS: denies fever, chills, SOB, palpitations, chest pain, abdominal pain, nausea, vomitting, diarrhea, constipation, dizziness    MEDICATIONS  (STANDING):  albumin human 25% IVPB 50 milliLiter(s) IV Intermittent every 6 hours  cefepime   IVPB 2000 milliGRAM(s) IV Intermittent every 12 hours  chlorhexidine 0.12% Liquid 15 milliLiter(s) Oral Mucosa every 12 hours  chlorhexidine 2% Cloths 1 Application(s) Topical <User Schedule>  fentaNYL   Infusion. 0.501 MICROgram(s)/kG/Hr (2.05 mL/Hr) IV Continuous <Continuous>  furosemide   Injectable 40 milliGRAM(s) IV Push two times a day  heparin   Injectable 5000 Unit(s) SubCutaneous every 8 hours  lactated ringers. 1000 milliLiter(s) (75 mL/Hr) IV Continuous <Continuous>  lactated ringers. 1000 milliLiter(s) (75 mL/Hr) IV Continuous <Continuous>  lactated ringers. 1000 milliLiter(s) (75 mL/Hr) IV Continuous <Continuous>  pantoprazole   Suspension 40 milliGRAM(s) Oral daily  phenylephrine    Infusion 1.2 MICROgram(s)/kG/Min (18.4 mL/Hr) IV Continuous <Continuous>  polyethylene glycol 3350 17 Gram(s) Oral daily  senna 2 Tablet(s) Oral at bedtime  vancomycin  IVPB 750 milliGRAM(s) IV Intermittent every 12 hours    MEDICATIONS  (PRN):  sodium chloride 0.9% lock flush 10 milliLiter(s) IV Push every 1 hour PRN Pre/post blood products, medications, blood draw, and to maintain line patency      PHYSICAL EXAM:  GENERAL: NAD, well-groomed, well-developed  HEAD:  Atraumatic, Normocephalic  EYES: EOMI, PERRLA, conjunctiva and sclera clear  ENMT: No tonsillar erythema, exudates, or enlargement; Moist mucous membranes, Good dentition, No lesions  NECK: Supple, No JVD, Normal thyroid  NERVOUS SYSTEM:  Alert & Oriented X3, Good concentration; Motor Strength 5/5 B/L upper and lower extremities; DTRs 2+ intact and symmetric  CHEST/LUNG: Clear to percussion bilaterally; No rales, rhonchi, wheezing, or rubs  HEART: Regular rate and rhythm; No murmurs, rubs, or gallops  ABDOMEN: Soft, Nontender, Nondistended; Bowel sounds present  EXTREMITIES:  2+ Peripheral Pulses, No clubbing, cyanosis, or edema  LYMPH: No lymphadenopathy noted  SKIN: No rashes or lesions  LABS:                        6.7    11.69 )-----------( 178      ( 06 Nov 2021 15:30 )             20.8     11-06    141  |  106  |  24<H>  ----------------------------<  179<H>  3.3<L>   |  30  |  0.78    Ca    7.6<L>      06 Nov 2021 04:29  Phos  3.2     11-06  Mg     2.0     11-06    TPro  4.9<L>  /  Alb  1.5<L>  /  TBili  0.4  /  DBili  x   /  AST  28  /  ALT  22  /  AlkPhos  134<H>  11-06    PT/INR - ( 06 Nov 2021 04:29 )   PT: 12.0 sec;   INR: 1.01 ratio         PTT - ( 06 Nov 2021 04:29 )  PTT:58.7 sec    CAPILLARY BLOOD GLUCOSE      POCT Blood Glucose.: 158 mg/dL (06 Nov 2021 16:19)  POCT Blood Glucose.: 149 mg/dL (06 Nov 2021 10:53)  POCT Blood Glucose.: 144 mg/dL (06 Nov 2021 00:17)      ABG - ( 06 Nov 2021 03:40 )  pH, Arterial: 7.51  pH, Blood: x     /  pCO2: 37    /  pO2: 89    / HCO3: 30    / Base Excess: 6.2   /  SaO2: 98

## 2021-11-06 NOTE — PROGRESS NOTE ADULT - SUBJECTIVE AND OBJECTIVE BOX
INTERVAL HPI/OVERNIGHT EVENTS:  Patient seen and examined at bedside.  Intubated, sedated.     MEDICATIONS  (STANDING):  albumin human 25% IVPB 50 milliLiter(s) IV Intermittent every 6 hours  cefepime   IVPB 2000 milliGRAM(s) IV Intermittent every 12 hours  chlorhexidine 0.12% Liquid 15 milliLiter(s) Oral Mucosa every 12 hours  chlorhexidine 2% Cloths 1 Application(s) Topical <User Schedule>  chlorhexidine 4% Liquid 1 Application(s) Topical <User Schedule>  fentaNYL   Infusion. 0.501 MICROgram(s)/kG/Hr (2.05 mL/Hr) IV Continuous <Continuous>  furosemide   Injectable 40 milliGRAM(s) IV Push two times a day  heparin   Injectable 5000 Unit(s) SubCutaneous every 8 hours  lactated ringers. 1000 milliLiter(s) (75 mL/Hr) IV Continuous <Continuous>  lactated ringers. 1000 milliLiter(s) (75 mL/Hr) IV Continuous <Continuous>  lactated ringers. 1000 milliLiter(s) (75 mL/Hr) IV Continuous <Continuous>  pantoprazole   Suspension 40 milliGRAM(s) Oral daily  phenylephrine    Infusion 1.2 MICROgram(s)/kG/Min (18.4 mL/Hr) IV Continuous <Continuous>  polyethylene glycol 3350 17 Gram(s) Oral daily  potassium chloride   Powder 40 milliEquivalent(s) Oral once  senna 2 Tablet(s) Oral at bedtime  vancomycin  IVPB 750 milliGRAM(s) IV Intermittent every 12 hours    MEDICATIONS  (PRN):  sodium chloride 0.9% lock flush 10 milliLiter(s) IV Push every 1 hour PRN Pre/post blood products, medications, blood draw, and to maintain line patency      Vital Signs Last 24 Hrs  T(C): 36.2 (06 Nov 2021 08:00), Max: 39 (05 Nov 2021 20:00)  T(F): 97.2 (06 Nov 2021 08:00), Max: 102.2 (05 Nov 2021 20:00)  HR: 80 (06 Nov 2021 09:30) (74 - 154)  BP: 113/51 (06 Nov 2021 09:30) (76/50 - 157/60)  BP(mean): 64 (06 Nov 2021 09:30) (52 - 98)  RR: 20 (06 Nov 2021 09:30) (20 - 30)  SpO2: 98% (06 Nov 2021 09:30) (95% - 100%)    Physical:  General: Sedated  Respirations: Intubated   Chest: L chest tube in place, +1 airleak, serous output noted, tube to suction, dressing C/D/I, no surrounding crepitus    I&O's Detail    05 Nov 2021 07:01  -  06 Nov 2021 07:00  --------------------------------------------------------  IN:    Enteral Tube Flush: 40 mL    FentaNYL: 39.3 mL    IV PiggyBack: 250 mL    IV PiggyBack: 50 mL    IV PiggyBack: 150 mL    IV PiggyBack: 100 mL    Jevity 1.5: 720 mL    Phenylephrine: 141.2 mL  Total IN: 1490.5 mL    OUT:    Chest Tube (mL): 50 mL    Indwelling Catheter - Urethral (mL): 2230 mL  Total OUT: 2280 mL    Total NET: -789.5 mL      06 Nov 2021 08:01  -  06 Nov 2021 09:33  --------------------------------------------------------  IN:    FentaNYL: 2.1 mL    Jevity 1.5: 30 mL    Phenylephrine: 9.2 mL  Total IN: 41.3 mL    OUT:  Total OUT: 0 mL    Total NET: 41.3 mL          LABS:                        7.2    12.19 )-----------( 185      ( 06 Nov 2021 06:30 )             22.0             11-06    141  |  106  |  24<H>  ----------------------------<  179<H>  3.3<L>   |  30  |  0.78    Ca    7.6<L>      06 Nov 2021 04:29  Phos  3.2     11-06  Mg     2.0     11-06    TPro  4.9<L>  /  Alb  1.5<L>  /  TBili  0.4  /  DBili  x   /  AST  28  /  ALT  22  /  AlkPhos  134<H>  11-06      CXR: pending

## 2021-11-06 NOTE — PROGRESS NOTE ADULT - ASSESSMENT
89 yo female from [facility] with medical h/o of HTN, Dementia, CVA with R sided hemiparesis, aphasia, parkinson's, GERD, CKD bedbound at baseline, dependant on assistance for ADL comes in with respiratory failure. Patient was found to have respiratory distress, saturating in 70s when EMS arrived, was placed on NRB on field. She was emergently intubated on arrival in ED. As per NH records patient was having fever and cough for past few days. Today she was found to have respiratory distress. She is vaccinated with moderna. Spoke to family son,  who stated the patient to remain full code. No other history could be obtained.  seen and examined orally intubated awake not in any distress  nott in any distress   vsstable  physical done  lungs clear  abd soft bs nt nd  no epigastric  tenderness  labs noted  hgb 6.7   k 3.3    a/p resp failure  sec to pneumonia   still intubated  in ICU    anemia  stool for occult blood  anemia profile  BT   89 year old female  with dementia, cva poor prognosis    palleative on board

## 2021-11-06 NOTE — PROGRESS NOTE ADULT - ASSESSMENT
89 y/o F, nursing home resident with a significant medical history of CVA with R sided hemiparesis, aphasia, parkinson's, GERD, dementia, CKD, presents to the ED in respiratory distress, intubated while in ED. Patient is being admitted to medicine for sepsis and AHRF likely 2/2 ?PNA    # Acute hypoxic respiratory failure  # Sepsis 2/2 PNA, E. coli bacteremia  # Lactic acidosis  # Joby on CKD  # Hypernatremia    Neuro:   on fentanyl for pain    Pulmonary  Acute hypoxic respiratory failure  Patient presented with AHRF, intubated in ED  As per NH records, patient had cough and fever for past few days  CXR shows LLL infiltrate  off isolation, covid negative  s/p one dose vanc and zosyn  on rocephin 1mg and zithro (ID: Dr. Wong)  BCx - E. coli bacteremia; Sputum - Strep agalactiae  Repeat BCx - neg  ABG showed 7.44/37/83/25 this morning  Monitor resp status    Pneumothorax  CXR s/p LIJ insertion showed L pneumothorax  chest tube inserted (11/1/21)  drained 90 cc overnight  has air leak but maintained at -20 pressure suction  Thoracic Surgery f/u    Cardiovascular  Sinus tachycardia  currently patient is not tachycardic  Likely 2/2 sepsis and dehydration  EKG showed sinus rhythm with ST changes in inferior leads (11/1/21)  repeat EKG - SR w/ new-onset PACs  Trop was mildly elevated but trended down    Gastrointestinal  NPO for now  NGT inserted  started on tube feeds (Jevity)  Nutrition consult    ID  Sepsis 2/2  Patient was tachycardic, had high temp 103.8  WBC inc. since admission  CXR shows LLL infiltrate on admission  repeat CXR showed progression of infiltrates, bilateral  s/p 2L bolus  Was given one dose of vanc and zosyn  Will start on rocephin and zithro   cx - No evidence of pneumothorax, no infiltrates or effusion  CX (s/p LIJ) - L pneumothorax > (s/p chest tube - 11/01/21)   Blood Cx - E. coli bacteremia; repeat BCx was neg (11/3/21)  Sputum Cx - Strep agalactiae  UCx was neg  lactate - 11.2>4.9>4.2>3.2>2.1>2.0 (trending down to normal)  Procal - 44.00>35.10>21.10>12.90  Keep output net negative  Start Lasix 40 mg IV BID and Albumin 25 mg q6 for 48 hours.  ID consult (Dr. Wong)    Renal  Patient has h/o CKD  Clinically patient is severely dehydrated  has hypernatremia, likely 2/2 volume deficit  Not producing any urine  s/p 2 L bolus  on standing IVF  Monitor I/O  Cr is elevated, unknown baseline (trending down to normal)  UA - pos. for UTI  UCx - neg  Monitor BMP    Endo  BS are elevated  No h/o DM  A1c - 5.8  Monitor BS    Prophylactic  On heparin for dvt prophylaxis  on PPI for GI prophylaxis    Palliative consult (Dr. Michele) - FULL CODE 89 y/o F, nursing home resident with a significant medical history of CVA with R sided hemiparesis, aphasia, parkinson's, GERD, dementia, CKD, presents to the ED in respiratory distress, intubated while in ED. Patient is being admitted to medicine for sepsis and AHRF likely 2/2 ?PNA    # Acute hypoxic respiratory failure  # Sepsis 2/2 PNA, E. coli bacteremia  # Lactic acidosis  # Joby on CKD  # Hypernatremia    Neuro:   on fentanyl for pain    Pulmonary  Acute hypoxic respiratory failure  Patient presented with AHRF, intubated in ED  As per NH records, patient had cough and fever for past few days  CXR shows LLL infiltrate  off isolation, covid negative  s/p one dose vanc and zosyn  BCx - E. coli bacteremia; Sputum - Strep agalactiae  Repeat BCx - neg  ABG showed 7.44/37/83/25 this morning  Monitor resp status  was on rocephin 1mg and zithro,   spiking fevers, Will change to Vancomycin and Cefepime to cover for HAP  if continues to spike fever, will D/C line    Pneumothorax  CXR s/p LIJ insertion showed L pneumothorax  chest tube inserted (11/1/21)  has air leak but maintained at -20 pressure suction  Thoracic Surgery f/u    Cardiovascular  Sinus tachycardia  currently patient is not tachycardic  Likely 2/2 sepsis and dehydration  EKG showed sinus rhythm with ST changes in inferior leads (11/1/21)  repeat EKG - SR w/ new-onset PACs  Trop was mildly elevated but trended down    Gastrointestinal  NGT inserted  started on tube feeds (Jevity)  Nutrition consult    ID  Sepsis 2/2  Patient was tachycardic, had high temp 103.8  WBC inc. since admission  CXR shows LLL infiltrate on admission  repeat CXR showed progression of infiltrates, bilateral  s/p 2L bolus  Was given one dose of vanc and zosyn  Will start on rocephin and zithro   cx - No evidence of pneumothorax, no infiltrates or effusion  CX (s/p LIJ) - L pneumothorax > (s/p chest tube - 11/01/21)   Blood Cx - E. coli bacteremia; repeat BCx was neg (11/3/21)  Sputum Cx - Strep agalactiae  UCx was neg  lactate - 11.2>4.9>4.2>3.2>2.1>2.0 (trending down to normal)  Procal - 44.00>35.10>21.10>12.90  Keep output net negative  Start Lasix 40 mg IV BID and Albumin 25 mg q6 for 48 hours.  spiking fevers, Will change to Vancomycin and Cefepime to cover for HAP  if continues to spike fever, will D/C line  ID consult (Dr. Wong)    Renal  Patient has h/o CKD  Clinically patient is severely dehydrated  has hypernatremia, likely 2/2 volume deficit  Not producing any urine  s/p 2 L bolus  on standing IVF  Monitor I/O -789ml  Cr is elevated, unknown baseline (trending down to normal)  UA - pos. for UTI  UCx - neg  Monitor BMP    Endo  BS are elevated  No h/o DM  A1c - 5.8  Monitor BS    Prophylactic  On heparin for dvt prophylaxis  on PPI for GI prophylaxis    Palliative consult (Dr. Michele) - FULL CODE

## 2021-11-06 NOTE — PROGRESS NOTE ADULT - ASSESSMENT
89 yo F with L ptx,  s/p L CT placement 11/1    -F/u AM CXR  -Continue daily CXRs   -Continue tube to suction, monitor output   -Remainder of care as per ICU team   -Thoracic will follow

## 2021-11-07 LAB
ALBUMIN SERPL ELPH-MCNC: 2.4 G/DL — LOW (ref 3.5–5)
ALP SERPL-CCNC: 153 U/L — HIGH (ref 40–120)
ALT FLD-CCNC: 27 U/L DA — SIGNIFICANT CHANGE UP (ref 10–60)
ANION GAP SERPL CALC-SCNC: 4 MMOL/L — LOW (ref 5–17)
ANION GAP SERPL CALC-SCNC: 7 MMOL/L — SIGNIFICANT CHANGE UP (ref 5–17)
AST SERPL-CCNC: 40 U/L — SIGNIFICANT CHANGE UP (ref 10–40)
BASE EXCESS BLDA CALC-SCNC: 10.4 MMOL/L — HIGH (ref -2–3)
BASOPHILS # BLD AUTO: 0.06 K/UL — SIGNIFICANT CHANGE UP (ref 0–0.2)
BASOPHILS NFR BLD AUTO: 0.5 % — SIGNIFICANT CHANGE UP (ref 0–2)
BILIRUB SERPL-MCNC: 0.8 MG/DL — SIGNIFICANT CHANGE UP (ref 0.2–1.2)
BLOOD GAS COMMENTS ARTERIAL: SIGNIFICANT CHANGE UP
BUN SERPL-MCNC: 28 MG/DL — HIGH (ref 7–18)
BUN SERPL-MCNC: 30 MG/DL — HIGH (ref 7–18)
CALCIUM SERPL-MCNC: 8.2 MG/DL — LOW (ref 8.4–10.5)
CALCIUM SERPL-MCNC: 8.5 MG/DL — SIGNIFICANT CHANGE UP (ref 8.4–10.5)
CHLORIDE SERPL-SCNC: 102 MMOL/L — SIGNIFICANT CHANGE UP (ref 96–108)
CHLORIDE SERPL-SCNC: 99 MMOL/L — SIGNIFICANT CHANGE UP (ref 96–108)
CO2 SERPL-SCNC: 32 MMOL/L — HIGH (ref 22–31)
CO2 SERPL-SCNC: 33 MMOL/L — HIGH (ref 22–31)
CREAT SERPL-MCNC: 0.85 MG/DL — SIGNIFICANT CHANGE UP (ref 0.5–1.3)
CREAT SERPL-MCNC: 0.88 MG/DL — SIGNIFICANT CHANGE UP (ref 0.5–1.3)
EOSINOPHIL # BLD AUTO: 0.27 K/UL — SIGNIFICANT CHANGE UP (ref 0–0.5)
EOSINOPHIL NFR BLD AUTO: 2.2 % — SIGNIFICANT CHANGE UP (ref 0–6)
GLUCOSE SERPL-MCNC: 116 MG/DL — HIGH (ref 70–99)
GLUCOSE SERPL-MCNC: 142 MG/DL — HIGH (ref 70–99)
HCO3 BLDA-SCNC: 33 MMOL/L — HIGH (ref 21–28)
HCT VFR BLD CALC: 26.6 % — LOW (ref 34.5–45)
HGB BLD-MCNC: 8.7 G/DL — LOW (ref 11.5–15.5)
HOROWITZ INDEX BLDA+IHG-RTO: 40 — SIGNIFICANT CHANGE UP
IMM GRANULOCYTES NFR BLD AUTO: 1.1 % — SIGNIFICANT CHANGE UP (ref 0–1.5)
LACTATE SERPL-SCNC: 2.2 MMOL/L — HIGH (ref 0.7–2)
LYMPHOCYTES # BLD AUTO: 0.69 K/UL — LOW (ref 1–3.3)
LYMPHOCYTES # BLD AUTO: 5.6 % — LOW (ref 13–44)
MAGNESIUM SERPL-MCNC: 2 MG/DL — SIGNIFICANT CHANGE UP (ref 1.6–2.6)
MCHC RBC-ENTMCNC: 29.2 PG — SIGNIFICANT CHANGE UP (ref 27–34)
MCHC RBC-ENTMCNC: 32.7 GM/DL — SIGNIFICANT CHANGE UP (ref 32–36)
MCV RBC AUTO: 89.3 FL — SIGNIFICANT CHANGE UP (ref 80–100)
MONOCYTES # BLD AUTO: 0.37 K/UL — SIGNIFICANT CHANGE UP (ref 0–0.9)
MONOCYTES NFR BLD AUTO: 3 % — SIGNIFICANT CHANGE UP (ref 2–14)
NEUTROPHILS # BLD AUTO: 10.86 K/UL — HIGH (ref 1.8–7.4)
NEUTROPHILS NFR BLD AUTO: 87.6 % — HIGH (ref 43–77)
NRBC # BLD: 0 /100 WBCS — SIGNIFICANT CHANGE UP (ref 0–0)
PCO2 BLDA: 36 MMHG — HIGH (ref 32–35)
PH BLDA: 7.57 — HIGH (ref 7.35–7.45)
PHOSPHATE SERPL-MCNC: 2.9 MG/DL — SIGNIFICANT CHANGE UP (ref 2.5–4.5)
PLATELET # BLD AUTO: 162 K/UL — SIGNIFICANT CHANGE UP (ref 150–400)
PO2 BLDA: 85 MMHG — SIGNIFICANT CHANGE UP (ref 83–108)
POTASSIUM SERPL-MCNC: 3.4 MMOL/L — LOW (ref 3.5–5.3)
POTASSIUM SERPL-MCNC: 3.6 MMOL/L — SIGNIFICANT CHANGE UP (ref 3.5–5.3)
POTASSIUM SERPL-SCNC: 3.4 MMOL/L — LOW (ref 3.5–5.3)
POTASSIUM SERPL-SCNC: 3.6 MMOL/L — SIGNIFICANT CHANGE UP (ref 3.5–5.3)
PROCALCITONIN SERPL-MCNC: 5.01 NG/ML — HIGH (ref 0.02–0.1)
PROLACTIN SERPL-MCNC: 45.6 NG/ML — HIGH (ref 3.4–24.1)
PROT SERPL-MCNC: 5.8 G/DL — LOW (ref 6–8.3)
RBC # BLD: 2.98 M/UL — LOW (ref 3.8–5.2)
RBC # FLD: 15 % — HIGH (ref 10.3–14.5)
SAO2 % BLDA: 100 % — SIGNIFICANT CHANGE UP
SODIUM SERPL-SCNC: 138 MMOL/L — SIGNIFICANT CHANGE UP (ref 135–145)
SODIUM SERPL-SCNC: 139 MMOL/L — SIGNIFICANT CHANGE UP (ref 135–145)
TRIGL SERPL-MCNC: 118 MG/DL — SIGNIFICANT CHANGE UP
WBC # BLD: 12.38 K/UL — HIGH (ref 3.8–10.5)
WBC # FLD AUTO: 12.38 K/UL — HIGH (ref 3.8–10.5)

## 2021-11-07 PROCEDURE — 99232 SBSQ HOSP IP/OBS MODERATE 35: CPT

## 2021-11-07 PROCEDURE — 71045 X-RAY EXAM CHEST 1 VIEW: CPT | Mod: 26

## 2021-11-07 RX ORDER — POTASSIUM CHLORIDE 20 MEQ
10 PACKET (EA) ORAL
Refills: 0 | Status: COMPLETED | OUTPATIENT
Start: 2021-11-07 | End: 2021-11-07

## 2021-11-07 RX ORDER — CEFEPIME 1 G/1
1000 INJECTION, POWDER, FOR SOLUTION INTRAMUSCULAR; INTRAVENOUS EVERY 12 HOURS
Refills: 0 | Status: DISCONTINUED | OUTPATIENT
Start: 2021-11-07 | End: 2021-11-10

## 2021-11-07 RX ORDER — PANTOPRAZOLE SODIUM 20 MG/1
40 TABLET, DELAYED RELEASE ORAL DAILY
Refills: 0 | Status: DISCONTINUED | OUTPATIENT
Start: 2021-11-07 | End: 2021-11-10

## 2021-11-07 RX ADMIN — POLYETHYLENE GLYCOL 3350 17 GRAM(S): 17 POWDER, FOR SOLUTION ORAL at 11:47

## 2021-11-07 RX ADMIN — Medication 100 MILLIEQUIVALENT(S): at 06:00

## 2021-11-07 RX ADMIN — Medication 50 MILLILITER(S): at 05:56

## 2021-11-07 RX ADMIN — Medication 40 MILLIGRAM(S): at 05:21

## 2021-11-07 RX ADMIN — CEFEPIME 100 MILLIGRAM(S): 1 INJECTION, POWDER, FOR SOLUTION INTRAMUSCULAR; INTRAVENOUS at 17:22

## 2021-11-07 RX ADMIN — Medication 250 MILLIGRAM(S): at 05:21

## 2021-11-07 RX ADMIN — HEPARIN SODIUM 5000 UNIT(S): 5000 INJECTION INTRAVENOUS; SUBCUTANEOUS at 21:02

## 2021-11-07 RX ADMIN — HEPARIN SODIUM 5000 UNIT(S): 5000 INJECTION INTRAVENOUS; SUBCUTANEOUS at 05:21

## 2021-11-07 RX ADMIN — CHLORHEXIDINE GLUCONATE 15 MILLILITER(S): 213 SOLUTION TOPICAL at 05:21

## 2021-11-07 RX ADMIN — PANTOPRAZOLE SODIUM 40 MILLIGRAM(S): 20 TABLET, DELAYED RELEASE ORAL at 21:02

## 2021-11-07 RX ADMIN — Medication 100 MILLIEQUIVALENT(S): at 05:22

## 2021-11-07 RX ADMIN — CHLORHEXIDINE GLUCONATE 15 MILLILITER(S): 213 SOLUTION TOPICAL at 17:22

## 2021-11-07 RX ADMIN — CHLORHEXIDINE GLUCONATE 1 APPLICATION(S): 213 SOLUTION TOPICAL at 05:22

## 2021-11-07 RX ADMIN — HEPARIN SODIUM 5000 UNIT(S): 5000 INJECTION INTRAVENOUS; SUBCUTANEOUS at 13:28

## 2021-11-07 RX ADMIN — PANTOPRAZOLE SODIUM 40 MILLIGRAM(S): 20 TABLET, DELAYED RELEASE ORAL at 11:47

## 2021-11-07 RX ADMIN — Medication 100 MILLIEQUIVALENT(S): at 06:55

## 2021-11-07 RX ADMIN — CEFEPIME 100 MILLIGRAM(S): 1 INJECTION, POWDER, FOR SOLUTION INTRAMUSCULAR; INTRAVENOUS at 05:21

## 2021-11-07 NOTE — PROGRESS NOTE ADULT - ASSESSMENT
89 y/o F, nursing home resident with a significant medical history of CVA with R sided hemiparesis, aphasia, parkinson's, GERD, dementia, CKD, presents to the ED in respiratory distress, intubated while in ED. Patient is being admitted to medicine for sepsis and AHRF likely 2/2 ?PNA    # Acute hypoxic respiratory failure  # Sepsis 2/2 PNA, E. coli bacteremia  # Lactic acidosis  # Joby on CKD  # Hypernatremia    Neuro:   on fentanyl for pain    Pulmonary  Acute hypoxic respiratory failure  Patient presented with AHRF, intubated in ED  As per NH records, patient had cough and fever for past few days  CXR shows LLL infiltrate  off isolation, covid negative  s/p one dose vanc and zosyn  BCx - E. coli bacteremia; Sputum - Strep agalactiae  Repeat BCx - neg  ABG showed 7.44/37/83/25 this morning  Monitor resp status  was on rocephin 1mg and zithro,   spiking fevers, Will change to Vancomycin and Cefepime to cover for HAP  if continues to spike fever, will D/C line    Pneumothorax  CXR s/p LIJ insertion showed L pneumothorax  chest tube inserted (11/1/21)  has air leak but maintained at -20 pressure suction  Thoracic Surgery f/u    Cardiovascular  Sinus tachycardia  currently patient is not tachycardic  Likely 2/2 sepsis and dehydration  EKG showed sinus rhythm with ST changes in inferior leads (11/1/21)  repeat EKG - SR w/ new-onset PACs  Trop was mildly elevated but trended down    Gastrointestinal  NGT inserted  started on tube feeds (Jevity)  Nutrition consult    ID  Sepsis 2/2  Patient was tachycardic, had high temp 103.8  WBC inc. since admission  CXR shows LLL infiltrate on admission  repeat CXR showed progression of infiltrates, bilateral  s/p 2L bolus  Was given one dose of vanc and zosyn  Will start on rocephin and zithro   cx - No evidence of pneumothorax, no infiltrates or effusion  CX (s/p LIJ) - L pneumothorax > (s/p chest tube - 11/01/21)   Blood Cx - E. coli bacteremia; repeat BCx was neg (11/3/21)  Sputum Cx - Strep agalactiae  UCx was neg  lactate - 11.2>4.9>4.2>3.2>2.1>2.0 (trending down to normal)  Procal - 44.00>35.10>21.10>12.90  Keep output net negative  Start Lasix 40 mg IV BID and Albumin 25 mg q6 for 48 hours.  spiking fevers, Will change to Vancomycin and Cefepime to cover for HAP  if continues to spike fever, will D/C line  ID consult (Dr. Wong)    Renal  Patient has h/o CKD  Clinically patient is severely dehydrated  has hypernatremia, likely 2/2 volume deficit  Not producing any urine  s/p 2 L bolus  on standing IVF  Monitor I/O -789ml  Cr is elevated, unknown baseline (trending down to normal)  UA - pos. for UTI  UCx - neg  Monitor BMP    Endo  BS are elevated  No h/o DM  A1c - 5.8  Monitor BS    Prophylactic  On heparin for dvt prophylaxis  on PPI for GI prophylaxis    Palliative consult (Dr. Michele) - FULL CODE

## 2021-11-07 NOTE — CHART NOTE - NSCHARTNOTEFT_GEN_A_CORE
We spoke with the patient's grandson, Dr. Tommie Pathak ([phone]) and provided the latest update and plan for the patient. They would try to speak to the patient's  with regards to the GOC and CODE Status.

## 2021-11-07 NOTE — PROGRESS NOTE ADULT - ASSESSMENT
UTI  Bacteremia  Pneumonia  Septic Shock  Fevers  Leukocytosis    Plan:  ·	decrease maxipime to 1 gm IV q12hrs  ·	dc vanco  ·	poor prognosis, patient is full code    Time spent - 33 mins UTI  Bacteremia  Pneumonia  Septic Shock  Fevers  Leukocytosis    Plan:  ·	decrease maxipime to 1 gm IV q12hrs  ·	dc vanco  ·	poor prognosis, patient is full code    Time spent - 33 mins    I agree with above

## 2021-11-07 NOTE — PROGRESS NOTE ADULT - SUBJECTIVE AND OBJECTIVE BOX
Patient is a 89y old  Female who presents with a chief complaint of AHRF (06 Nov 2021 17:50)      INTERVAL HPI/OVERNIGHT EVENTS:  ICU Vital Signs Last 24 Hrs  T(C): 36.3 (06 Nov 2021 23:30), Max: 37.4 (06 Nov 2021 19:00)  T(F): 97.4 (06 Nov 2021 23:30), Max: 99.4 (06 Nov 2021 19:00)  HR: 79 (06 Nov 2021 23:15) (64 - 154)  BP: 124/58 (06 Nov 2021 23:15) (76/50 - 161/55)  BP(mean): 75 (06 Nov 2021 23:15) (53 - 95)  ABP: --  ABP(mean): --  RR: 20 (06 Nov 2021 23:15) (11 - 24)  SpO2: 100% (06 Nov 2021 23:15) (95% - 100%)    I&O's Summary    05 Nov 2021 07:01  -  06 Nov 2021 07:00  --------------------------------------------------------  IN: 1490.5 mL / OUT: 2280 mL / NET: -789.5 mL    06 Nov 2021 08:01  -  07 Nov 2021 00:27  --------------------------------------------------------  IN: 950.3 mL / OUT: 3915 mL / NET: -2964.7 mL      Mode: AC/ CMV (Assist Control/ Continuous Mandatory Ventilation)  RR (machine): 20  TV (machine): 400  FiO2: 40  PEEP: 5  ITime: 1  MAP: 10  PIP: 26      LABS:                        8.5    11.51 )-----------( 157      ( 06 Nov 2021 22:06 )             25.9     11-06    141  |  106  |  24<H>  ----------------------------<  179<H>  3.3<L>   |  30  |  0.78    Ca    7.6<L>      06 Nov 2021 04:29  Phos  3.2     11-06  Mg     2.0     11-06    TPro  4.9<L>  /  Alb  1.5<L>  /  TBili  0.4  /  DBili  x   /  AST  28  /  ALT  22  /  AlkPhos  134<H>  11-06    PT/INR - ( 06 Nov 2021 04:29 )   PT: 12.0 sec;   INR: 1.01 ratio         PTT - ( 06 Nov 2021 04:29 )  PTT:58.7 sec    CAPILLARY BLOOD GLUCOSE      POCT Blood Glucose.: 159 mg/dL (06 Nov 2021 23:12)  POCT Blood Glucose.: 158 mg/dL (06 Nov 2021 16:19)  POCT Blood Glucose.: 149 mg/dL (06 Nov 2021 10:53)    ABG - ( 06 Nov 2021 03:40 )  pH, Arterial: 7.51  pH, Blood: x     /  pCO2: 37    /  pO2: 89    / HCO3: 30    / Base Excess: 6.2   /  SaO2: 98                  RADIOLOGY & ADDITIONAL TESTS:    Consultant(s) Notes Reviewed:  [x ] YES  [ ] NO    MEDICATIONS  (STANDING):  albumin human 25% IVPB 50 milliLiter(s) IV Intermittent every 6 hours  cefepime   IVPB 2000 milliGRAM(s) IV Intermittent every 12 hours  chlorhexidine 0.12% Liquid 15 milliLiter(s) Oral Mucosa every 12 hours  chlorhexidine 2% Cloths 1 Application(s) Topical <User Schedule>  fentaNYL   Infusion. 0.501 MICROgram(s)/kG/Hr (2.05 mL/Hr) IV Continuous <Continuous>  furosemide   Injectable 40 milliGRAM(s) IV Push two times a day  heparin   Injectable 5000 Unit(s) SubCutaneous every 8 hours  lactated ringers. 1000 milliLiter(s) (75 mL/Hr) IV Continuous <Continuous>  lactated ringers. 1000 milliLiter(s) (75 mL/Hr) IV Continuous <Continuous>  lactated ringers. 1000 milliLiter(s) (75 mL/Hr) IV Continuous <Continuous>  pantoprazole   Suspension 40 milliGRAM(s) Oral daily  phenylephrine    Infusion 1.2 MICROgram(s)/kG/Min (18.4 mL/Hr) IV Continuous <Continuous>  polyethylene glycol 3350 17 Gram(s) Oral daily  senna 2 Tablet(s) Oral at bedtime  vancomycin  IVPB 750 milliGRAM(s) IV Intermittent every 12 hours    MEDICATIONS  (PRN):  sodium chloride 0.9% lock flush 10 milliLiter(s) IV Push every 1 hour PRN Pre/post blood products, medications, blood draw, and to maintain line patency      PHYSICAL EXAM:  GENERAL: NAD, sedated and intubated to mechanical ventilator  HEAD:  Atraumatic, Normocephalic  EYES: 1 mm pupils sluggish but equally reactive to light conjunctiva and sclera clear  ENMT: No tonsillar erythema, exudates, or enlargement; Moist mucous membranes, Good dentition, No lesions  NECK: Supple, normal appearance, No JVD; Normal thyroid; Trachea midline  NERVOUS SYSTEM:  sedated and intubated; eye opening and lip movement with sternal rub stimulation  CHEST/LUNG: No chest deformity; bilateral coarse breath sounds with rales and crackles; tachypneic, on mechanical ventilator, chest tube with air leak   HEART: Regular rate and rhythm; No murmurs, rubs, or gallops  ABDOMEN: Soft, Nontender, Nondistended; Bowel sounds present  EXTREMITIES:  2+ Peripheral Pulses, No clubbing, cyanosis, or edema  LYMPH: No lymphadenopathy noted  SKIN: No rashes or lesions; Good capillary refill  Care Discussed with Consultants/Other Providers [ x] YES  [ ] NO Patient is a 89y old  Female who presents with a chief complaint of AHRF (06 Nov 2021 17:50)      INTERVAL HPI/OVERNIGHT EVENTS:    Patient examined at bedside. Sedated and intubated to mechanical ventilator. She opens her eyes slightly with sternal stimulation. Her pupils are 2 mm sluggish but reactive to light. Last febrile episode was 11/5/21. There was no reported febrile episodes over the weekend. No other significant events overnight.    ICU Vital Signs Last 24 Hrs  T(C): 36.3 (06 Nov 2021 23:30), Max: 37.4 (06 Nov 2021 19:00)  T(F): 97.4 (06 Nov 2021 23:30), Max: 99.4 (06 Nov 2021 19:00)  HR: 79 (06 Nov 2021 23:15) (64 - 154)  BP: 124/58 (06 Nov 2021 23:15) (76/50 - 161/55)  BP(mean): 75 (06 Nov 2021 23:15) (53 - 95)  ABP: --  ABP(mean): --  RR: 20 (06 Nov 2021 23:15) (11 - 24)  SpO2: 100% (06 Nov 2021 23:15) (95% - 100%)    I&O's Summary    05 Nov 2021 07:01  -  06 Nov 2021 07:00  --------------------------------------------------------  IN: 1490.5 mL / OUT: 2280 mL / NET: -789.5 mL    06 Nov 2021 08:01  -  07 Nov 2021 00:27  --------------------------------------------------------  IN: 950.3 mL / OUT: 3915 mL / NET: -2964.7 mL      Mode: AC/ CMV (Assist Control/ Continuous Mandatory Ventilation)  RR (machine): 20  TV (machine): 400  FiO2: 40  PEEP: 5  ITime: 1  MAP: 10  PIP: 26      LABS:                        8.5    11.51 )-----------( 157      ( 06 Nov 2021 22:06 )             25.9     11-06    141  |  106  |  24<H>  ----------------------------<  179<H>  3.3<L>   |  30  |  0.78    Ca    7.6<L>      06 Nov 2021 04:29  Phos  3.2     11-06  Mg     2.0     11-06    TPro  4.9<L>  /  Alb  1.5<L>  /  TBili  0.4  /  DBili  x   /  AST  28  /  ALT  22  /  AlkPhos  134<H>  11-06    PT/INR - ( 06 Nov 2021 04:29 )   PT: 12.0 sec;   INR: 1.01 ratio         PTT - ( 06 Nov 2021 04:29 )  PTT:58.7 sec    CAPILLARY BLOOD GLUCOSE      POCT Blood Glucose.: 159 mg/dL (06 Nov 2021 23:12)  POCT Blood Glucose.: 158 mg/dL (06 Nov 2021 16:19)  POCT Blood Glucose.: 149 mg/dL (06 Nov 2021 10:53)    ABG - ( 06 Nov 2021 03:40 )  pH, Arterial: 7.51  pH, Blood: x     /  pCO2: 37    /  pO2: 89    / HCO3: 30    / Base Excess: 6.2   /  SaO2: 98                  RADIOLOGY & ADDITIONAL TESTS:    Consultant(s) Notes Reviewed:  [x ] YES  [ ] NO    MEDICATIONS  (STANDING):  albumin human 25% IVPB 50 milliLiter(s) IV Intermittent every 6 hours  cefepime   IVPB 2000 milliGRAM(s) IV Intermittent every 12 hours  chlorhexidine 0.12% Liquid 15 milliLiter(s) Oral Mucosa every 12 hours  chlorhexidine 2% Cloths 1 Application(s) Topical <User Schedule>  fentaNYL   Infusion. 0.501 MICROgram(s)/kG/Hr (2.05 mL/Hr) IV Continuous <Continuous>  furosemide   Injectable 40 milliGRAM(s) IV Push two times a day  heparin   Injectable 5000 Unit(s) SubCutaneous every 8 hours  lactated ringers. 1000 milliLiter(s) (75 mL/Hr) IV Continuous <Continuous>  lactated ringers. 1000 milliLiter(s) (75 mL/Hr) IV Continuous <Continuous>  lactated ringers. 1000 milliLiter(s) (75 mL/Hr) IV Continuous <Continuous>  pantoprazole   Suspension 40 milliGRAM(s) Oral daily  phenylephrine    Infusion 1.2 MICROgram(s)/kG/Min (18.4 mL/Hr) IV Continuous <Continuous>  polyethylene glycol 3350 17 Gram(s) Oral daily  senna 2 Tablet(s) Oral at bedtime  vancomycin  IVPB 750 milliGRAM(s) IV Intermittent every 12 hours    MEDICATIONS  (PRN):  sodium chloride 0.9% lock flush 10 milliLiter(s) IV Push every 1 hour PRN Pre/post blood products, medications, blood draw, and to maintain line patency      PHYSICAL EXAM:  GENERAL: NAD, sedated and intubated to mechanical ventilator  HEAD:  Atraumatic, Normocephalic  EYES: 1 mm pupils sluggish but equally reactive to light conjunctiva and sclera clear  ENMT: No tonsillar erythema, exudates, or enlargement; Moist mucous membranes, Good dentition, No lesions  NECK: Supple, normal appearance, No JVD; Normal thyroid; Trachea midline  NERVOUS SYSTEM:  sedated and intubated; eye opening and lip movement with sternal rub stimulation  CHEST/LUNG: No chest deformity; bilateral coarse breath sounds with rales and crackles; tachypneic, on mechanical ventilator, chest tube with air leak   HEART: Regular rate and rhythm; No murmurs, rubs, or gallops  ABDOMEN: Soft, Nontender, Nondistended; Bowel sounds present  EXTREMITIES:  2+ Peripheral Pulses, No clubbing, cyanosis, or edema  LYMPH: No lymphadenopathy noted  SKIN: No rashes or lesions; Good capillary refill    Care Discussed with Consultants/Other Providers [ x] YES  [ ] NO

## 2021-11-07 NOTE — PROGRESS NOTE ADULT - ASSESSMENT
89 yo F with L ptx,  s/p L CT placement 11/1    -Continue daily CXRs   -Continue tube to suction -10, monitor output   -Remainder of care as per ICU team

## 2021-11-07 NOTE — PROGRESS NOTE ADULT - SUBJECTIVE AND OBJECTIVE BOX
INTERVAL HPI/OVERNIGHT EVENTS:  Pt seen and examined at bedside.     MEDICATIONS  (STANDING):  cefepime   IVPB 2000 milliGRAM(s) IV Intermittent every 12 hours  chlorhexidine 0.12% Liquid 15 milliLiter(s) Oral Mucosa every 12 hours  chlorhexidine 2% Cloths 1 Application(s) Topical <User Schedule>  fentaNYL   Infusion. 0.501 MICROgram(s)/kG/Hr (2.05 mL/Hr) IV Continuous <Continuous>  heparin   Injectable 5000 Unit(s) SubCutaneous every 8 hours  lactated ringers. 1000 milliLiter(s) (75 mL/Hr) IV Continuous <Continuous>  lactated ringers. 1000 milliLiter(s) (75 mL/Hr) IV Continuous <Continuous>  lactated ringers. 1000 milliLiter(s) (75 mL/Hr) IV Continuous <Continuous>  pantoprazole   Suspension 40 milliGRAM(s) Oral daily  phenylephrine    Infusion 1.2 MICROgram(s)/kG/Min (18.4 mL/Hr) IV Continuous <Continuous>  polyethylene glycol 3350 17 Gram(s) Oral daily  senna 2 Tablet(s) Oral at bedtime  vancomycin  IVPB 750 milliGRAM(s) IV Intermittent every 12 hours    MEDICATIONS  (PRN):  sodium chloride 0.9% lock flush 10 milliLiter(s) IV Push every 1 hour PRN Pre/post blood products, medications, blood draw, and to maintain line patency      Vital Signs Last 24 Hrs  T(C): 35.8 (07 Nov 2021 04:00), Max: 37.4 (06 Nov 2021 19:00)  T(F): 96.4 (07 Nov 2021 04:00), Max: 99.4 (06 Nov 2021 19:00)  HR: 95 (07 Nov 2021 08:38) (64 - 136)  BP: 118/46 (07 Nov 2021 07:00) (99/39 - 161/55)  BP(mean): 63 (07 Nov 2021 07:00) (53 - 95)  RR: 20 (07 Nov 2021 07:00) (11 - 24)  SpO2: 100% (07 Nov 2021 08:38) (95% - 100%)    Physical:  General: Sedated  Respirations: Intubated  Chest: Left pigtail in place to suction -10, +1 airleak, output noted, dressing C/D/I no surrounding crepitus     I&O's Detail    06 Nov 2021 08:01  -  07 Nov 2021 07:00  --------------------------------------------------------  IN:    FentaNYL: 48.3 mL    IV PiggyBack: 50 mL    IV PiggyBack: 100 mL    IV PiggyBack: 250 mL    IV PiggyBack: 50 mL    IV PiggyBack: 200 mL    IV PiggyBack: 200 mL    Jevity 1.5: 690 mL    Phenylephrine: 161.5 mL  Total IN: 1749.8 mL    OUT:    Chest Tube (mL): 40 mL    Indwelling Catheter - Urethral (mL): 4675 mL  Total OUT: 4715 mL    Total NET: -2965.2 mL          LABS:                        8.7    12.38 )-----------( 162      ( 07 Nov 2021 04:02 )             26.6             11-07    138  |  102  |  28<H>  ----------------------------<  142<H>  3.4<L>   |  32<H>  |  0.85    Ca    8.2<L>      07 Nov 2021 04:02  Phos  2.9     11-07  Mg     2.0     11-07    TPro  5.8<L>  /  Alb  2.4<L>  /  TBili  0.8  /  DBili  x   /  AST  40  /  ALT  27  /  AlkPhos  153<H>  11-07    CXR: Pending read

## 2021-11-07 NOTE — PROGRESS NOTE ADULT - SUBJECTIVE AND OBJECTIVE BOX
ICU visit:  89y Female is under our care for UTI, bacteremia, pneumonia, septic shock, fevers and leukocytosis. Patient has been afebrile for over 36 hours and leukocytosis is mils. Patient remains full code. Repeat BC remain negative.    MEDS:  cefepime   IVPB 1000 milliGRAM(s) IV Intermittent every 12 hours    ALLERGIES: Allergies    No Known Allergies    Intolerances      VITALS:  ICU Vital Signs Last 24 Hrs  T(C): 35.8 (07 Nov 2021 04:00), Max: 37.4 (06 Nov 2021 19:00)  T(F): 96.4 (07 Nov 2021 04:00), Max: 99.4 (06 Nov 2021 19:00)  HR: 90 (07 Nov 2021 12:47) (64 - 136)  BP: 112/44 (07 Nov 2021 09:45) (99/39 - 161/55)  BP(mean): 60 (07 Nov 2021 09:45) (53 - 88)  ABP: --  ABP(mean): --  RR: 20 (07 Nov 2021 09:45) (12 - 21)  SpO2: 99% (07 Nov 2021 12:47) (95% - 100%)    REVIEW OF SYSTEMS:  [ X ] Not able to illicit      PHYSICAL EXAM:  HEENT: +vent via ETT  Neck: supple no LN's, left neck CVP, right neck EJ line  Respiratory: scattered few rhonchi of right, diminished breath sounds on left no rales +left CT with pleurvac on wall suction  Cardiovascular: S1 S2 reg no murmurs  Gastrointestinal: +BS with soft, nondistended abdomen; nontender +orona  Extremities: bilateral hand edema  Skin: no rashes  Ortho: n/a  Neuro: sedated      LABS/DIAGNOSTIC TESTS:                        8.7    12.38 )-----------( 162      ( 07 Nov 2021 04:02 )             26.6     WBC Count: 12.38 K/uL (11-07 @ 04:02)  WBC Count: 11.51 K/uL (11-06 @ 22:06)  WBC Count: 11.69 K/uL (11-06 @ 15:30)  WBC Count: 11.53 K/uL (11-06 @ 14:47)  WBC Count: 12.19 K/uL (11-06 @ 06:30)    11-07    139  |  99  |  30<H>  ----------------------------<  116<H>  3.6   |  33<H>  |  0.88    Ca    8.5      07 Nov 2021 14:37  Phos  2.9     11-07  Mg     2.0     11-07    TPro  5.8<L>  /  Alb  2.4<L>  /  TBili  0.8  /  DBili  x   /  AST  40  /  ALT  27  /  AlkPhos  153<H>  11-07    Lactate, Blood: 2.2 mmol/L (11-07 @ 04:02)    ABG - ( 07 Nov 2021 03:15 )  pH: 7.57  /  pCO2: 36    /  pO2: 85    / HCO3: 33    / Base Excess: 10.4  /  SaO2: 100           CULTURES:   .Blood Blood  11-03 @ 10:22   No growth to date.  --  --      Clean Catch Clean Catch (Midstream)  11-02 @ 21:01   No growth  --  --      .Sputum Sputum  11-01 @ 21:18   Moderate Streptococcus agalactiae (Group B) isolated  Group B streptococci are susceptible to ampicillin,  penicillin and cefazolin, but may be resistant to  erythromycin and clindamycin.  Recommendations for intrapartum prophylaxis for Group B  streptococci are penicillin or ampicillin.  Normal Respiratory [place] present  --    Rare polymorphonuclear leukocytes per low power field  No Squamous epithelial cells per low power field  Rare Yeast like cells seen per oil power field  Rare Gram Positive Rods seen per oil power field  Rare Gram positive cocci in pairs seen per oil power field      .Blood Blood-Peripheral  11-01 @ 03:56   No Growth Final  --  Blood Culture PCR  Escherichia coli        RADIOLOGY:  no new studies

## 2021-11-08 LAB
ALBUMIN SERPL ELPH-MCNC: 2.1 G/DL — LOW (ref 3.5–5)
ALP SERPL-CCNC: 155 U/L — HIGH (ref 40–120)
ALT FLD-CCNC: 27 U/L DA — SIGNIFICANT CHANGE UP (ref 10–60)
ANION GAP SERPL CALC-SCNC: 11 MMOL/L — SIGNIFICANT CHANGE UP (ref 5–17)
ANION GAP SERPL CALC-SCNC: 5 MMOL/L — SIGNIFICANT CHANGE UP (ref 5–17)
AST SERPL-CCNC: 36 U/L — SIGNIFICANT CHANGE UP (ref 10–40)
BASE EXCESS BLDA CALC-SCNC: 9.6 MMOL/L — HIGH (ref -2–3)
BASOPHILS # BLD AUTO: 0.05 K/UL — SIGNIFICANT CHANGE UP (ref 0–0.2)
BASOPHILS NFR BLD AUTO: 0.3 % — SIGNIFICANT CHANGE UP (ref 0–2)
BILIRUB SERPL-MCNC: 1 MG/DL — SIGNIFICANT CHANGE UP (ref 0.2–1.2)
BLOOD GAS COMMENTS ARTERIAL: SIGNIFICANT CHANGE UP
BUN SERPL-MCNC: 32 MG/DL — HIGH (ref 7–18)
BUN SERPL-MCNC: 34 MG/DL — HIGH (ref 7–18)
CALCIUM SERPL-MCNC: 8.2 MG/DL — LOW (ref 8.4–10.5)
CALCIUM SERPL-MCNC: 8.5 MG/DL — SIGNIFICANT CHANGE UP (ref 8.4–10.5)
CHLORIDE SERPL-SCNC: 101 MMOL/L — SIGNIFICANT CHANGE UP (ref 96–108)
CHLORIDE SERPL-SCNC: 102 MMOL/L — SIGNIFICANT CHANGE UP (ref 96–108)
CO2 SERPL-SCNC: 27 MMOL/L — SIGNIFICANT CHANGE UP (ref 22–31)
CO2 SERPL-SCNC: 31 MMOL/L — SIGNIFICANT CHANGE UP (ref 22–31)
CREAT SERPL-MCNC: 0.88 MG/DL — SIGNIFICANT CHANGE UP (ref 0.5–1.3)
CREAT SERPL-MCNC: 0.97 MG/DL — SIGNIFICANT CHANGE UP (ref 0.5–1.3)
CULTURE RESULTS: SIGNIFICANT CHANGE UP
CULTURE RESULTS: SIGNIFICANT CHANGE UP
EOSINOPHIL # BLD AUTO: 0.13 K/UL — SIGNIFICANT CHANGE UP (ref 0–0.5)
EOSINOPHIL NFR BLD AUTO: 0.8 % — SIGNIFICANT CHANGE UP (ref 0–6)
GLUCOSE SERPL-MCNC: 114 MG/DL — HIGH (ref 70–99)
GLUCOSE SERPL-MCNC: 99 MG/DL — SIGNIFICANT CHANGE UP (ref 70–99)
HCO3 BLDA-SCNC: 32 MMOL/L — HIGH (ref 21–28)
HCT VFR BLD CALC: 26.5 % — LOW (ref 34.5–45)
HGB BLD-MCNC: 8.6 G/DL — LOW (ref 11.5–15.5)
HOROWITZ INDEX BLDA+IHG-RTO: 40 — SIGNIFICANT CHANGE UP
IMM GRANULOCYTES NFR BLD AUTO: 1.5 % — SIGNIFICANT CHANGE UP (ref 0–1.5)
LACTATE SERPL-SCNC: 1.5 MMOL/L — SIGNIFICANT CHANGE UP (ref 0.7–2)
LYMPHOCYTES # BLD AUTO: 0.82 K/UL — LOW (ref 1–3.3)
LYMPHOCYTES # BLD AUTO: 5.3 % — LOW (ref 13–44)
MAGNESIUM SERPL-MCNC: 2.1 MG/DL — SIGNIFICANT CHANGE UP (ref 1.6–2.6)
MCHC RBC-ENTMCNC: 29.1 PG — SIGNIFICANT CHANGE UP (ref 27–34)
MCHC RBC-ENTMCNC: 32.5 GM/DL — SIGNIFICANT CHANGE UP (ref 32–36)
MCV RBC AUTO: 89.5 FL — SIGNIFICANT CHANGE UP (ref 80–100)
MONOCYTES # BLD AUTO: 0.49 K/UL — SIGNIFICANT CHANGE UP (ref 0–0.9)
MONOCYTES NFR BLD AUTO: 3.2 % — SIGNIFICANT CHANGE UP (ref 2–14)
NEUTROPHILS # BLD AUTO: 13.77 K/UL — HIGH (ref 1.8–7.4)
NEUTROPHILS NFR BLD AUTO: 88.9 % — HIGH (ref 43–77)
NRBC # BLD: 0 /100 WBCS — SIGNIFICANT CHANGE UP (ref 0–0)
PCO2 BLDA: 35 MMHG — SIGNIFICANT CHANGE UP (ref 32–35)
PH BLDA: 7.57 — HIGH (ref 7.35–7.45)
PHOSPHATE SERPL-MCNC: 3.1 MG/DL — SIGNIFICANT CHANGE UP (ref 2.5–4.5)
PLATELET # BLD AUTO: 234 K/UL — SIGNIFICANT CHANGE UP (ref 150–400)
PO2 BLDA: 68 MMHG — LOW (ref 83–108)
POTASSIUM SERPL-MCNC: 3.3 MMOL/L — LOW (ref 3.5–5.3)
POTASSIUM SERPL-MCNC: 3.9 MMOL/L — SIGNIFICANT CHANGE UP (ref 3.5–5.3)
POTASSIUM SERPL-SCNC: 3.3 MMOL/L — LOW (ref 3.5–5.3)
POTASSIUM SERPL-SCNC: 3.9 MMOL/L — SIGNIFICANT CHANGE UP (ref 3.5–5.3)
PROCALCITONIN SERPL-MCNC: 3.38 NG/ML — HIGH (ref 0.02–0.1)
PROLACTIN SERPL-MCNC: 46.9 NG/ML — HIGH (ref 3.4–24.1)
PROT SERPL-MCNC: 5.9 G/DL — LOW (ref 6–8.3)
RBC # BLD: 2.96 M/UL — LOW (ref 3.8–5.2)
RBC # FLD: 14.7 % — HIGH (ref 10.3–14.5)
SAO2 % BLDA: 97 % — SIGNIFICANT CHANGE UP
SODIUM SERPL-SCNC: 138 MMOL/L — SIGNIFICANT CHANGE UP (ref 135–145)
SODIUM SERPL-SCNC: 139 MMOL/L — SIGNIFICANT CHANGE UP (ref 135–145)
SPECIMEN SOURCE: SIGNIFICANT CHANGE UP
SPECIMEN SOURCE: SIGNIFICANT CHANGE UP
WBC # BLD: 15.5 K/UL — HIGH (ref 3.8–10.5)
WBC # FLD AUTO: 15.5 K/UL — HIGH (ref 3.8–10.5)

## 2021-11-08 PROCEDURE — 93306 TTE W/DOPPLER COMPLETE: CPT | Mod: 26

## 2021-11-08 PROCEDURE — 99233 SBSQ HOSP IP/OBS HIGH 50: CPT

## 2021-11-08 PROCEDURE — 71045 X-RAY EXAM CHEST 1 VIEW: CPT | Mod: 26,77

## 2021-11-08 PROCEDURE — 71045 X-RAY EXAM CHEST 1 VIEW: CPT | Mod: 26

## 2021-11-08 RX ORDER — ACETAMINOPHEN 500 MG
1000 TABLET ORAL ONCE
Refills: 0 | Status: COMPLETED | OUTPATIENT
Start: 2021-11-08 | End: 2021-11-08

## 2021-11-08 RX ORDER — LACTULOSE 10 G/15ML
10 SOLUTION ORAL EVERY 8 HOURS
Refills: 0 | Status: DISCONTINUED | OUTPATIENT
Start: 2021-11-08 | End: 2021-11-11

## 2021-11-08 RX ORDER — MIDAZOLAM HYDROCHLORIDE 1 MG/ML
1 INJECTION, SOLUTION INTRAMUSCULAR; INTRAVENOUS ONCE
Refills: 0 | Status: DISCONTINUED | OUTPATIENT
Start: 2021-11-08 | End: 2021-11-08

## 2021-11-08 RX ORDER — MIDAZOLAM HYDROCHLORIDE 1 MG/ML
10 INJECTION, SOLUTION INTRAMUSCULAR; INTRAVENOUS ONCE
Refills: 0 | Status: DISCONTINUED | OUTPATIENT
Start: 2021-11-08 | End: 2021-11-08

## 2021-11-08 RX ORDER — FENTANYL CITRATE 50 UG/ML
25 INJECTION INTRAVENOUS ONCE
Refills: 0 | Status: DISCONTINUED | OUTPATIENT
Start: 2021-11-08 | End: 2021-11-08

## 2021-11-08 RX ORDER — PROPOFOL 10 MG/ML
10 INJECTION, EMULSION INTRAVENOUS
Qty: 1000 | Refills: 0 | Status: DISCONTINUED | OUTPATIENT
Start: 2021-11-08 | End: 2021-11-08

## 2021-11-08 RX ORDER — METOPROLOL TARTRATE 50 MG
2.5 TABLET ORAL ONCE
Refills: 0 | Status: DISCONTINUED | OUTPATIENT
Start: 2021-11-08 | End: 2021-11-08

## 2021-11-08 RX ORDER — MIDAZOLAM HYDROCHLORIDE 1 MG/ML
5 INJECTION, SOLUTION INTRAMUSCULAR; INTRAVENOUS ONCE
Refills: 0 | Status: DISCONTINUED | OUTPATIENT
Start: 2021-11-08 | End: 2021-11-08

## 2021-11-08 RX ORDER — FENTANYL CITRATE 50 UG/ML
50 INJECTION INTRAVENOUS ONCE
Refills: 0 | Status: DISCONTINUED | OUTPATIENT
Start: 2021-11-08 | End: 2021-11-08

## 2021-11-08 RX ORDER — PHENYLEPHRINE HYDROCHLORIDE 10 MG/ML
0.1 INJECTION INTRAVENOUS
Qty: 160 | Refills: 0 | Status: DISCONTINUED | OUTPATIENT
Start: 2021-11-08 | End: 2021-11-08

## 2021-11-08 RX ORDER — MIDAZOLAM HYDROCHLORIDE 1 MG/ML
2 INJECTION, SOLUTION INTRAMUSCULAR; INTRAVENOUS ONCE
Refills: 0 | Status: DISCONTINUED | OUTPATIENT
Start: 2021-11-08 | End: 2021-11-08

## 2021-11-08 RX ORDER — POTASSIUM CHLORIDE 20 MEQ
10 PACKET (EA) ORAL
Refills: 0 | Status: COMPLETED | OUTPATIENT
Start: 2021-11-08 | End: 2021-11-08

## 2021-11-08 RX ADMIN — Medication 400 MILLIGRAM(S): at 19:20

## 2021-11-08 RX ADMIN — Medication 100 MILLIEQUIVALENT(S): at 08:30

## 2021-11-08 RX ADMIN — CHLORHEXIDINE GLUCONATE 1 APPLICATION(S): 213 SOLUTION TOPICAL at 05:01

## 2021-11-08 RX ADMIN — HEPARIN SODIUM 5000 UNIT(S): 5000 INJECTION INTRAVENOUS; SUBCUTANEOUS at 13:23

## 2021-11-08 RX ADMIN — CHLORHEXIDINE GLUCONATE 15 MILLILITER(S): 213 SOLUTION TOPICAL at 18:09

## 2021-11-08 RX ADMIN — FENTANYL CITRATE 25 MICROGRAM(S): 50 INJECTION INTRAVENOUS at 18:08

## 2021-11-08 RX ADMIN — PROPOFOL 2.45 MICROGRAM(S)/KG/MIN: 10 INJECTION, EMULSION INTRAVENOUS at 01:30

## 2021-11-08 RX ADMIN — MIDAZOLAM HYDROCHLORIDE 1 MILLIGRAM(S): 1 INJECTION, SOLUTION INTRAMUSCULAR; INTRAVENOUS at 14:07

## 2021-11-08 RX ADMIN — Medication 100 MILLIEQUIVALENT(S): at 07:14

## 2021-11-08 RX ADMIN — HEPARIN SODIUM 5000 UNIT(S): 5000 INJECTION INTRAVENOUS; SUBCUTANEOUS at 05:01

## 2021-11-08 RX ADMIN — Medication 10 MILLIGRAM(S): at 10:09

## 2021-11-08 RX ADMIN — CHLORHEXIDINE GLUCONATE 15 MILLILITER(S): 213 SOLUTION TOPICAL at 05:02

## 2021-11-08 RX ADMIN — PANTOPRAZOLE SODIUM 40 MILLIGRAM(S): 20 TABLET, DELAYED RELEASE ORAL at 12:06

## 2021-11-08 RX ADMIN — FENTANYL CITRATE 25 MICROGRAM(S): 50 INJECTION INTRAVENOUS at 18:38

## 2021-11-08 RX ADMIN — CEFEPIME 100 MILLIGRAM(S): 1 INJECTION, POWDER, FOR SOLUTION INTRAMUSCULAR; INTRAVENOUS at 05:03

## 2021-11-08 RX ADMIN — CEFEPIME 100 MILLIGRAM(S): 1 INJECTION, POWDER, FOR SOLUTION INTRAMUSCULAR; INTRAVENOUS at 18:08

## 2021-11-08 RX ADMIN — Medication 1000 MILLIGRAM(S): at 20:24

## 2021-11-08 RX ADMIN — MIDAZOLAM HYDROCHLORIDE 2 MILLIGRAM(S): 1 INJECTION, SOLUTION INTRAMUSCULAR; INTRAVENOUS at 18:13

## 2021-11-08 RX ADMIN — HEPARIN SODIUM 5000 UNIT(S): 5000 INJECTION INTRAVENOUS; SUBCUTANEOUS at 21:23

## 2021-11-08 RX ADMIN — Medication 100 MILLIEQUIVALENT(S): at 09:18

## 2021-11-08 NOTE — PROGRESS NOTE ADULT - SUBJECTIVE AND OBJECTIVE BOX
follow up on:  complex medical decision making related to goals of care    OVERNIGHT EVENTS: no overnight events. Remains intubated, L CT in place, sedated, on pressor support.     Present Symptoms:      Review of Systems:   Unable to obtain due to poor mentation     MEDICATIONS  (STANDING):  cefepime   IVPB 1000 milliGRAM(s) IV Intermittent every 12 hours  chlorhexidine 0.12% Liquid 15 milliLiter(s) Oral Mucosa every 12 hours  chlorhexidine 2% Cloths 1 Application(s) Topical <User Schedule>  heparin   Injectable 5000 Unit(s) SubCutaneous every 8 hours  lactulose Syrup 10 Gram(s) Oral every 8 hours  pantoprazole  Injectable 40 milliGRAM(s) IV Push daily  phenylephrine    Infusion 0.1 MICROgram(s)/kG/Min (0.77 mL/Hr) IV Continuous <Continuous>  polyethylene glycol 3350 17 Gram(s) Oral daily  propofol Infusion 10 MICROgram(s)/kG/Min (2.45 mL/Hr) IV Continuous <Continuous>  senna 2 Tablet(s) Oral at bedtime    MEDICATIONS  (PRN):  sodium chloride 0.9% lock flush 10 milliLiter(s) IV Push every 1 hour PRN Pre/post blood products, medications, blood draw, and to maintain line patency      PHYSICAL EXAM:  Vital Signs Last 24 Hrs  T(C): 37.9 (08 Nov 2021 11:00), Max: 38.4 (08 Nov 2021 10:00)  T(F): 100.2 (08 Nov 2021 11:00), Max: 101.1 (08 Nov 2021 10:00)  HR: 103 (08 Nov 2021 14:00) (67 - 147)  BP: 157/64 (08 Nov 2021 14:00) (86/46 - 198/73)  BP(mean): 86 (08 Nov 2021 14:00) (55 - 104)  RR: 31 (08 Nov 2021 14:00) (19 - 31)  SpO2: 100% (08 Nov 2021 14:00) (97% - 100%)    Karnofsky Performance Score/Palliative Performance Status Version2: 20     %     GENERAL: intubated, sedated, cachectic, NAD  HEENT: Atraumatic, oropharynx clear, neck supple  CHEST/LUNG: unlabored  HEART: Regular rate and rhythm    ABDOMEN: Soft, Nontender, Nondistended   MUSCULOSKELETAL:  No  edema, bedbound  NERVOUS SYSTEM:  sedated  SKIN: No rashes or lesions noted  Oral intake: npo/TF      LABS:                          8.6    15.50 )-----------( 234      ( 08 Nov 2021 04:22 )             26.5     11-08    139  |  101  |  34<H>  ----------------------------<  114<H>  3.9   |  27  |  0.97    Ca    8.5      08 Nov 2021 14:19  Phos  3.1     11-08  Mg     2.1     11-08    TPro  5.9<L>  /  Alb  2.1<L>  /  TBili  1.0  /  DBili  x   /  AST  36  /  ALT  27  /  AlkPhos  155<H>  11-08        RADIOLOGY & ADDITIONAL STUDIES:

## 2021-11-08 NOTE — CHART NOTE - NSCHARTNOTEFT_GEN_A_CORE
Assessment:   89yFemalePatient is a 89y old  Female who presents with a chief complaint of AHRF (08 Nov 2021 14:57)      Factors impacting intake: [ ] none [ ] nausea  [ ] vomiting [ ] diarrhea [ ] constipation  [ ]chewing problems [ ] swallowing issues  [x ] other: patient currently not receiving tube feeding as NGT pulled out yesterday. plan is To replace NGT. if replaced, suggest To resume tube feeding with Jevity 1.5 at goal of 30ml/hx24( 720ml, 1080kcal, 46g protein, 547ml free water). may add 1 packet Of prosource for an additional 15g protein/d. MD to monitor/assess fluid status as needed.  remains ventilator dependent. propofol stopped.     Diet Presciption: Diet, NPO:   Tube Feeding Modality: Nasogastric  Jevity 1.5 Bg  Total Volume for 24 Hours (mL): 720  Continuous  Starting Tube Feed Rate {mL per Hour}: 10  Increase Tube Feed Rate by (mL): 5     Every 4 hours  Until Goal Tube Feed Rate (mL per Hour): 30  Tube Feed Duration (in Hours): 24  Tube Feed Start Time: 17:00 (11-02-21 @ 12:08)    Intake: meeting needs with tube feeding when running     Current Weight: 42.3kg(11/8)   % Weight change: patient with 16% wt loss in 6 days.     Pertinent Medications: MEDICATIONS  (STANDING):  cefepime   IVPB 1000 milliGRAM(s) IV Intermittent every 12 hours  chlorhexidine 0.12% Liquid 15 milliLiter(s) Oral Mucosa every 12 hours  chlorhexidine 2% Cloths 1 Application(s) Topical <User Schedule>  heparin   Injectable 5000 Unit(s) SubCutaneous every 8 hours  lactulose Syrup 10 Gram(s) Oral every 8 hours  pantoprazole  Injectable 40 milliGRAM(s) IV Push daily  polyethylene glycol 3350 17 Gram(s) Oral daily  senna 2 Tablet(s) Oral at bedtime    MEDICATIONS  (PRN):  sodium chloride 0.9% lock flush 10 milliLiter(s) IV Push every 1 hour PRN Pre/post blood products, medications, blood draw, and to maintain line patency    Pertinent Labs: 11-08 Na139 mmol/L Glu 114 mg/dL<H> K+ 3.9 mmol/L Cr  0.97 mg/dL BUN 34 mg/dL<H> 11-08 Phos 3.1 mg/dL 11-08 Alb 2.1 g/dL<L> 11-07 Chol --    LDL --    HDL --    Trig 118 mg/dL     CAPILLARY BLOOD GLUCOSE      POCT Blood Glucose.: 113 mg/dL (08 Nov 2021 11:58)  POCT Blood Glucose.: 135 mg/dL (07 Nov 2021 22:21)  POCT Blood Glucose.: 123 mg/dL (07 Nov 2021 16:58)    Skin: No pressure injury     Estimated Needs:   [x ] no change since previous assessment  [ ] recalculated:     Previous Nutrition Diagnosis:   [ ] Inadequate Energy Intake [ ]Inadequate Oral Intake [ ] Excessive Energy Intake   [ ] Underweight [ ] Increased Nutrient Needs [ ] Overweight/Obesity   [ ] Altered GI Function [ ] Unintended Weight Loss [ ] Food & Nutrition Related Knowledge Deficit [x ] Malnutrition , severe     Nutrition Diagnosis is [ x] ongoing  [ ] resolved [ ] not applicable     New Nutrition Diagnosis: [ ] not applicable       Interventions:   Recommend  [ ] Change Diet To:  [ ] Nutrition Supplement  [ ] Nutrition Support  [x ] Other: if tube replaced, suggest To resume tube feeding with Jevity 1.5 at goal of 30ml/hx24( 720ml, 1080kcal, 46g protein, 547ml free water). may add 1 packet Of prosource for an additional 15g protein/d. MD to monitor/assess fluid status as needed.    Monitoring and Evaluation:   [ ] PO intake [ x ] Tolerance to diet prescription [ x ] weights [ x ] labs[ x ] follow up per protocol  [ ] other:

## 2021-11-08 NOTE — PROGRESS NOTE ADULT - PROBLEM SELECTOR PLAN 1
on antibiotics for L PNA, E coli bacteremia, back on pressor support.  Remains intubated. Rpt blood cx neg. Course c/b pthx, s/p L CT. Mgmt per ICU. Remains FULL CODE.

## 2021-11-08 NOTE — PROGRESS NOTE ADULT - SUBJECTIVE AND OBJECTIVE BOX
HPI:  89 yo female from [facility] with medical h/o of HTN, Dementia, CVA with R sided hemiparesis, aphasia, parkinson's, GERD, CKD bedbound at baseline, dependant on assistance for ADL comes in with respiratory failure. Patient was found to have respiratory distress, saturating in 70s when EMS arrived, was placed on NRB on field. She was emergently intubated on arrival in ED. As per NH records patient was having fever and cough for past few days. Today she was found to have respiratory distress. She is vaccinated with moderna. Spoke to family son,  who stated the patient to remain full code. No other history could be obtained.  (01 Nov 2021 00:14)      Patient is a 89y old  Female who presents with a chief complaint of AHRF (08 Nov 2021 10:45)      INTERVAL HPI/OVERNIGHT EVENTS:  T(C): 37.9 (11-08-21 @ 11:00), Max: 38.4 (11-08-21 @ 10:00)  HR: 91 (11-08-21 @ 12:27) (67 - 147)  BP: 134/59 (11-08-21 @ 11:00) (86/46 - 198/73)  RR: 20 (11-08-21 @ 11:00) (16 - 27)  SpO2: 98% (11-08-21 @ 12:27) (97% - 100%)  Wt(kg): --  I&O's Summary    07 Nov 2021 07:01  -  08 Nov 2021 07:00  --------------------------------------------------------  IN: 279.4 mL / OUT: 1750 mL / NET: -1470.6 mL    08 Nov 2021 07:01  -  08 Nov 2021 13:06  --------------------------------------------------------  IN: 11.3 mL / OUT: 145 mL / NET: -133.7 mL        REVIEW OF SYSTEMS: denies fever, chills, SOB, palpitations, chest pain, abdominal pain, nausea, vomitting, diarrhea, constipation, dizziness    MEDICATIONS  (STANDING):  cefepime   IVPB 1000 milliGRAM(s) IV Intermittent every 12 hours  chlorhexidine 0.12% Liquid 15 milliLiter(s) Oral Mucosa every 12 hours  chlorhexidine 2% Cloths 1 Application(s) Topical <User Schedule>  heparin   Injectable 5000 Unit(s) SubCutaneous every 8 hours  lactulose Syrup 10 Gram(s) Oral every 8 hours  midazolam Injectable 1 milliGRAM(s) IV Push once  pantoprazole  Injectable 40 milliGRAM(s) IV Push daily  phenylephrine    Infusion 0.1 MICROgram(s)/kG/Min (0.77 mL/Hr) IV Continuous <Continuous>  polyethylene glycol 3350 17 Gram(s) Oral daily  propofol Infusion 10 MICROgram(s)/kG/Min (2.45 mL/Hr) IV Continuous <Continuous>  senna 2 Tablet(s) Oral at bedtime    MEDICATIONS  (PRN):  sodium chloride 0.9% lock flush 10 milliLiter(s) IV Push every 1 hour PRN Pre/post blood products, medications, blood draw, and to maintain line patency      PHYSICAL EXAM:  GENERAL: NAD, well-groomed, well-developed  HEAD:  Atraumatic, Normocephalic  EYES: EOMI, PERRLA, conjunctiva and sclera clear  ENMT: No tonsillar erythema, exudates, or enlargement; Moist mucous membranes, Good dentition, No lesions  NECK: Supple, No JVD, Normal thyroid  NERVOUS SYSTEM:  Alert & Oriented X3, Good concentration; Motor Strength 5/5 B/L upper and lower extremities; DTRs 2+ intact and symmetric  CHEST/LUNG: Clear to percussion bilaterally; No rales, rhonchi, wheezing, or rubs  HEART: Regular rate and rhythm; No murmurs, rubs, or gallops  ABDOMEN: Soft, Nontender, Nondistended; Bowel sounds present  EXTREMITIES:  2+ Peripheral Pulses, No clubbing, cyanosis, or edema  LYMPH: No lymphadenopathy noted  SKIN: No rashes or lesions  LABS:                        8.6    15.50 )-----------( 234      ( 08 Nov 2021 04:22 )             26.5     11-08    138  |  102  |  32<H>  ----------------------------<  99  3.3<L>   |  31  |  0.88    Ca    8.2<L>      08 Nov 2021 04:22  Phos  3.1     11-08  Mg     2.1     11-08    TPro  5.9<L>  /  Alb  2.1<L>  /  TBili  1.0  /  DBili  x   /  AST  36  /  ALT  27  /  AlkPhos  155<H>  11-08        CAPILLARY BLOOD GLUCOSE      POCT Blood Glucose.: 113 mg/dL (08 Nov 2021 11:58)  POCT Blood Glucose.: 135 mg/dL (07 Nov 2021 22:21)  POCT Blood Glucose.: 123 mg/dL (07 Nov 2021 16:58)      ABG - ( 08 Nov 2021 03:23 )  pH, Arterial: 7.57  pH, Blood: x     /  pCO2: 35    /  pO2: 68    / HCO3: 32    / Base Excess: 9.6   /  SaO2: 97

## 2021-11-08 NOTE — PROGRESS NOTE ADULT - SUBJECTIVE AND OBJECTIVE BOX
ICU visit:  89y Female is under our care for    REVIEW OF SYSTEMS:  [  ] Not able to elicit  General:	  Chest:	  GI:	  :  Skin:	  Musculoskeletal:	  Neuro:	    MEDS:  cefepime   IVPB 1000 milliGRAM(s) IV Intermittent every 12 hours    ALLERGIES: Allergies    No Known Allergies    Intolerances        VITALS:  ICU Vital Signs Last 24 Hrs  T(C): 37.6 (08 Nov 2021 03:30), Max: 38.3 (07 Nov 2021 19:01)  T(F): 99.6 (08 Nov 2021 03:30), Max: 101 (07 Nov 2021 19:01)  HR: 72 (08 Nov 2021 08:08) (67 - 147)  BP: 141/53 (08 Nov 2021 08:00) (86/46 - 198/73)  BP(mean): 72 (08 Nov 2021 08:00) (55 - 104)  ABP: --  ABP(mean): --  RR: 20 (08 Nov 2021 08:00) (16 - 27)  SpO2: 100% (08 Nov 2021 08:08) (97% - 100%)      PHYSICAL EXAM:  HEENT:  Neck:  Respiratory:  Cardiovascular:  Gastrointestinal:  Extremities:  Skin:  Ortho:  Neuro:    LABS/DIAGNOSTIC TESTS:                        8.6    15.50 )-----------( 234      ( 08 Nov 2021 04:22 )             26.5     WBC Count: 15.50 K/uL (11-08 @ 04:22)  WBC Count: 12.38 K/uL (11-07 @ 04:02)  WBC Count: 11.51 K/uL (11-06 @ 22:06)  WBC Count: 11.69 K/uL (11-06 @ 15:30)  WBC Count: 11.53 K/uL (11-06 @ 14:47)    11-08    138  |  102  |  32<H>  ----------------------------<  99  3.3<L>   |  31  |  0.88    Ca    8.2<L>      08 Nov 2021 04:22  Phos  3.1     11-08  Mg     2.1     11-08    TPro  5.9<L>  /  Alb  2.1<L>  /  TBili  1.0  /  DBili  x   /  AST  36  /  ALT  27  /  AlkPhos  155<H>  11-08    Lactate, Blood: 1.5 mmol/L (11-08 @ 04:22)    Lactate, Blood: 1.5 mmol/L (11-08 @ 04:22)    ABG - ( 08 Nov 2021 03:23 )  pH: 7.57  /  pCO2: 35    /  pO2: 68    / HCO3: 32    / Base Excess: 9.6   /  SaO2: 97                CULTURES:   .Blood Blood  11-03 @ 10:22   No growth to date.  --  --      Clean Catch Clean Catch (Midstream)  11-02 @ 21:01   No growth  --  --      .Sputum Sputum  11-01 @ 21:18   Moderate Streptococcus agalactiae (Group B) isolated  Group B streptococci are susceptible to ampicillin,  penicillin and cefazolin, but may be resistant to  erythromycin and clindamycin.  Recommendations for intrapartum prophylaxis for Group B  streptococci are penicillin or ampicillin.  Normal Respiratory [place] present  --    Rare polymorphonuclear leukocytes per low power field  No Squamous epithelial cells per low power field  Rare Yeast like cells seen per oil power field  Rare Gram Positive Rods seen per oil power field  Rare Gram positive cocci in pairs seen per oil power field      .Blood Blood-Peripheral  11-01 @ 03:56   No Growth Final  --  Blood Culture PCR  Escherichia coli        RADIOLOGY:  no new studies ICU visit:  89y Female is under our care for E. coli bacteremia and pneumonia.  Patient was seen in the ICU intubated with an FIO2 of 40% and an oxygen saturation of 99%.  She is currently on one pressor with a blood pressure of 115/48 and is off sedation.  She had a fever of 101 last night with mildly elevated WBC count.  Patient repeat blood cultures from 11/3 are negative.    REVIEW OF SYSTEMS:  [ x ] Not able to elicit      MEDS:  cefepime   IVPB 1000 milliGRAM(s) IV Intermittent every 12 hours    ALLERGIES: Allergies    No Known Allergies    Intolerances        VITALS:  ICU Vital Signs Last 24 Hrs  T(C): 37.6 (08 Nov 2021 03:30), Max: 38.3 (07 Nov 2021 19:01)  T(F): 99.6 (08 Nov 2021 03:30), Max: 101 (07 Nov 2021 19:01)  HR: 72 (08 Nov 2021 08:08) (67 - 147)  BP: 141/53 (08 Nov 2021 08:00) (86/46 - 198/73)  BP(mean): 72 (08 Nov 2021 08:00) (55 - 104)  ABP: --  ABP(mean): --  RR: 20 (08 Nov 2021 08:00) (16 - 27)  SpO2: 100% (08 Nov 2021 08:08) (97% - 100%)      PHYSICAL EXAM:  HEENT: ETT  Neck: supple no LN's, left neck CVP, right neck EJ line  Respiratory: diminished breath sounds on left, left sided pigtail +  Cardiovascular: S1 S2 reg no murmurs  Gastrointestinal: +BS with soft, nondistended abdomen; nontender  : Pizarro catheter in place with clear urine  Extremities: bilateral hand edema +2  Skin: no rashes  Ortho: n/a  Neuro: Awake and alert    LABS/DIAGNOSTIC TESTS:                        8.6    15.50 )-----------( 234      ( 08 Nov 2021 04:22 )             26.5     WBC Count: 15.50 K/uL (11-08 @ 04:22)  WBC Count: 12.38 K/uL (11-07 @ 04:02)  WBC Count: 11.51 K/uL (11-06 @ 22:06)  WBC Count: 11.69 K/uL (11-06 @ 15:30)  WBC Count: 11.53 K/uL (11-06 @ 14:47)    11-08    138  |  102  |  32<H>  ----------------------------<  99  3.3<L>   |  31  |  0.88    Ca    8.2<L>      08 Nov 2021 04:22  Phos  3.1     11-08  Mg     2.1     11-08    TPro  5.9<L>  /  Alb  2.1<L>  /  TBili  1.0  /  DBili  x   /  AST  36  /  ALT  27  /  AlkPhos  155<H>  11-08    Lactate, Blood: 1.5 mmol/L (11-08 @ 04:22)    Lactate, Blood: 1.5 mmol/L (11-08 @ 04:22)    ABG - ( 08 Nov 2021 03:23 )  pH: 7.57  /  pCO2: 35    /  pO2: 68    / HCO3: 32    / Base Excess: 9.6   /  SaO2: 97                CULTURES:   .Blood Blood  11-03 @ 10:22   No growth to date.  --  --      Clean Catch Clean Catch (Midstream)  11-02 @ 21:01   No growth  --  --      .Sputum Sputum  11-01 @ 21:18   Moderate Streptococcus agalactiae (Group B) isolated  Group B streptococci are susceptible to ampicillin,  penicillin and cefazolin, but may be resistant to  erythromycin and clindamycin.  Recommendations for intrapartum prophylaxis for Group B  streptococci are penicillin or ampicillin.  Normal Respiratory [place] present  --    Rare polymorphonuclear leukocytes per low power field  No Squamous epithelial cells per low power field  Rare Yeast like cells seen per oil power field  Rare Gram Positive Rods seen per oil power field  Rare Gram positive cocci in pairs seen per oil power field      .Blood Blood-Peripheral  11-01 @ 03:56   No Growth Final  --  Blood Culture PCR  Escherichia coli        RADIOLOGY:  no new studies

## 2021-11-08 NOTE — PROGRESS NOTE ADULT - SUBJECTIVE AND OBJECTIVE BOX
INTERVAL HPI/OVERNIGHT EVENTS: ***    PRESSORS: [ ] YES [ ] NO  WHICH:    ANTIBIOTICS:                      Antimicrobial:  cefepime   IVPB 1000 milliGRAM(s) IV Intermittent every 12 hours    Cardiovascular:  phenylephrine    Infusion 0.1 MICROgram(s)/kG/Min IV Continuous <Continuous>    Pulmonary:    Hematalogic:  heparin   Injectable 5000 Unit(s) SubCutaneous every 8 hours    Other:  chlorhexidine 0.12% Liquid 15 milliLiter(s) Oral Mucosa every 12 hours  chlorhexidine 2% Cloths 1 Application(s) Topical <User Schedule>  pantoprazole  Injectable 40 milliGRAM(s) IV Push daily  polyethylene glycol 3350 17 Gram(s) Oral daily  propofol Infusion 10 MICROgram(s)/kG/Min IV Continuous <Continuous>  senna 2 Tablet(s) Oral at bedtime  sodium chloride 0.9% lock flush 10 milliLiter(s) IV Push every 1 hour PRN    cefepime   IVPB 1000 milliGRAM(s) IV Intermittent every 12 hours  chlorhexidine 0.12% Liquid 15 milliLiter(s) Oral Mucosa every 12 hours  chlorhexidine 2% Cloths 1 Application(s) Topical <User Schedule>  heparin   Injectable 5000 Unit(s) SubCutaneous every 8 hours  pantoprazole  Injectable 40 milliGRAM(s) IV Push daily  phenylephrine    Infusion 0.1 MICROgram(s)/kG/Min IV Continuous <Continuous>  polyethylene glycol 3350 17 Gram(s) Oral daily  propofol Infusion 10 MICROgram(s)/kG/Min IV Continuous <Continuous>  senna 2 Tablet(s) Oral at bedtime  sodium chloride 0.9% lock flush 10 milliLiter(s) IV Push every 1 hour PRN    Drug Dosing Weight  Height (cm): 167.6 (03 Nov 2021 16:02)  Weight (kg): 40.9 (01 Nov 2021 02:20)  BMI (kg/m2): 14.6 (03 Nov 2021 16:02)  BSA (m2): 1.43 (03 Nov 2021 16:02)    CENTRAL LINE: [ ] YES [ ] NO  LOCATION:   DATE INSERTED:  REMOVE: [ ] YES [ ] NO  EXPLAIN:    SAEED: [ ] YES [ ] NO    DATE INSERTED:  REMOVE:  [ ] YES [ ] NO  EXPLAIN:    A-LINE:  [ ] YES [ ] NO  LOCATION:   DATE INSERTED:  REMOVE:  [ ] YES [ ] NO  EXPLAIN:    PMH -reviewed admission note, no change since admission    ICU Vital Signs Last 24 Hrs  T(C): 37.6 (08 Nov 2021 03:30), Max: 38.3 (07 Nov 2021 19:01)  T(F): 99.6 (08 Nov 2021 03:30), Max: 101 (07 Nov 2021 19:01)  HR: 72 (08 Nov 2021 08:08) (67 - 147)  BP: 141/53 (08 Nov 2021 08:00) (86/46 - 198/73)  BP(mean): 72 (08 Nov 2021 08:00) (55 - 104)  ABP: --  ABP(mean): --  RR: 20 (08 Nov 2021 08:00) (16 - 27)  SpO2: 100% (08 Nov 2021 08:08) (97% - 100%)      ABG - ( 08 Nov 2021 03:23 )  pH, Arterial: 7.57  pH, Blood: x     /  pCO2: 35    /  pO2: 68    / HCO3: 32    / Base Excess: 9.6   /  SaO2: 97                    11-07 @ 07:01  -  11-08 @ 07:00  --------------------------------------------------------  IN: 284.8 mL / OUT: 1750 mL / NET: -1465.2 mL        Mode: AC/ CMV (Assist Control/ Continuous Mandatory Ventilation)  RR (machine): 20  TV (machine): 400  FiO2: 40  PEEP: 5  ITime: 1  MAP: 9  PIP: 23      PHYSICAL EXAM:    GENERAL: NAD, well-groomed, well-developed  HEAD:  Atraumatic, Normocephalic  EYES: EOMI, PERRLA, conjunctiva and sclera clear  ENMT: No tonsillar erythema, exudates, or enlargement; Moist mucous membranes, Good dentition, No lesions  NECK: Supple, normal appearance, No JVD; Normal thyroid; Trachea midline  NERVOUS SYSTEM:  Alert & Oriented X3, Good concentration; Motor Strength 5/5 B/L upper and lower extremities; DTRs 2+ intact and symmetric  CHEST/LUNG: No chest deformity; Normal percussion bilaterally; No rales, rhonchi, wheezing   HEART: Regular rate and rhythm; No murmurs, rubs, or gallops  ABDOMEN: Soft, Nontender, Nondistended; Bowel sounds present  EXTREMITIES:  2+ Peripheral Pulses, No clubbing, cyanosis, or edema  LYMPH: No lymphadenopathy noted  SKIN: No rashes or lesions; Good capillary refill      LABS:  CBC Full  -  ( 08 Nov 2021 04:22 )  WBC Count : 15.50 K/uL  RBC Count : 2.96 M/uL  Hemoglobin : 8.6 g/dL  Hematocrit : 26.5 %  Platelet Count - Automated : 234 K/uL  Mean Cell Volume : 89.5 fl  Mean Cell Hemoglobin : 29.1 pg  Mean Cell Hemoglobin Concentration : 32.5 gm/dL  Auto Neutrophil # : 13.77 K/uL  Auto Lymphocyte # : 0.82 K/uL  Auto Monocyte # : 0.49 K/uL  Auto Eosinophil # : 0.13 K/uL  Auto Basophil # : 0.05 K/uL  Auto Neutrophil % : 88.9 %  Auto Lymphocyte % : 5.3 %  Auto Monocyte % : 3.2 %  Auto Eosinophil % : 0.8 %  Auto Basophil % : 0.3 %    11-08    138  |  102  |  32<H>  ----------------------------<  99  3.3<L>   |  31  |  0.88    Ca    8.2<L>      08 Nov 2021 04:22  Phos  3.1     11-08  Mg     2.1     11-08    TPro  5.9<L>  /  Alb  2.1<L>  /  TBili  1.0  /  DBili  x   /  AST  36  /  ALT  27  /  AlkPhos  155<H>  11-08            RADIOLOGY & ADDITIONAL STUDIES REVIEWED:  ***    GOALS OF CARE DISCUSSION WITH PATIENT/FAMILY/PROXY:    CRITICAL CARE TIME SPENT: 35 minutes INTERVAL HPI/OVERNIGHT EVENTS: ***    Patient was examined at bedside, awake, opens eyes. Noted to still have 2mm sluggish JOVANY. She had 2 febriles episodes yesterday. She was restless and hypertensive at -160/50. She was started on propofol. She became tachycardic on 150s and was then started on Phenylephrine. She still has bilateral rhonchi and her chest tube has mild air leak with suction at -10 pressure. She has not had any bowel movement since admission. Her NGT was also removed last night due to clogging. She will be tapered off sedation today. Central line to be removed. NGT reinsertion today. Lactulose for bowel movement.    PRESSORS: [X] YES [ ] NO  WHICH: phenylephrine    ANTIBIOTICS:                      Antimicrobial:  cefepime   IVPB 1000 milliGRAM(s) IV Intermittent every 12 hours    Cardiovascular:  phenylephrine    Infusion 0.1 MICROgram(s)/kG/Min IV Continuous <Continuous>    Pulmonary:    Hematalogic:  heparin   Injectable 5000 Unit(s) SubCutaneous every 8 hours    Other:  chlorhexidine 0.12% Liquid 15 milliLiter(s) Oral Mucosa every 12 hours  chlorhexidine 2% Cloths 1 Application(s) Topical <User Schedule>  pantoprazole  Injectable 40 milliGRAM(s) IV Push daily  polyethylene glycol 3350 17 Gram(s) Oral daily  propofol Infusion 10 MICROgram(s)/kG/Min IV Continuous <Continuous>  senna 2 Tablet(s) Oral at bedtime  sodium chloride 0.9% lock flush 10 milliLiter(s) IV Push every 1 hour PRN    cefepime   IVPB 1000 milliGRAM(s) IV Intermittent every 12 hours  chlorhexidine 0.12% Liquid 15 milliLiter(s) Oral Mucosa every 12 hours  chlorhexidine 2% Cloths 1 Application(s) Topical <User Schedule>  heparin   Injectable 5000 Unit(s) SubCutaneous every 8 hours  pantoprazole  Injectable 40 milliGRAM(s) IV Push daily  phenylephrine    Infusion 0.1 MICROgram(s)/kG/Min IV Continuous <Continuous>  polyethylene glycol 3350 17 Gram(s) Oral daily  propofol Infusion 10 MICROgram(s)/kG/Min IV Continuous <Continuous>  senna 2 Tablet(s) Oral at bedtime  sodium chloride 0.9% lock flush 10 milliLiter(s) IV Push every 1 hour PRN    Drug Dosing Weight  Height (cm): 167.6 (03 Nov 2021 16:02)  Weight (kg): 40.9 (01 Nov 2021 02:20)  BMI (kg/m2): 14.6 (03 Nov 2021 16:02)  BSA (m2): 1.43 (03 Nov 2021 16:02)    CENTRAL LINE: [X] YES [ ] NO  LOCATION: [facility]  DATE INSERTED: 11/1/21  REMOVE: [ ] YES [ ] NO  EXPLAIN:    SAEED: [X] YES [ ] NO    DATE INSERTED: 11/1/21  REMOVE:  [ ] YES [ ] NO  EXPLAIN:    A-LINE:  [ ] YES [X] NO  LOCATION:   DATE INSERTED:  REMOVE:  [ ] YES [ ] NO  EXPLAIN:    PMH -reviewed admission note, no change since admission    ICU Vital Signs Last 24 Hrs  T(C): 37.6 (08 Nov 2021 03:30), Max: 38.3 (07 Nov 2021 19:01)  T(F): 99.6 (08 Nov 2021 03:30), Max: 101 (07 Nov 2021 19:01)  HR: 72 (08 Nov 2021 08:08) (67 - 147)  BP: 141/53 (08 Nov 2021 08:00) (86/46 - 198/73)  BP(mean): 72 (08 Nov 2021 08:00) (55 - 104)  ABP: --  ABP(mean): --  RR: 20 (08 Nov 2021 08:00) (16 - 27)  SpO2: 100% (08 Nov 2021 08:08) (97% - 100%)      ABG - ( 08 Nov 2021 03:23 )  pH, Arterial: 7.57  pH, Blood: x     /  pCO2: 35    /  pO2: 68    / HCO3: 32    / Base Excess: 9.6   /  SaO2: 97                    11-07 @ 07:01  -  11-08 @ 07:00  --------------------------------------------------------  IN: 284.8 mL / OUT: 1750 mL / NET: -1465.2 mL        Mode: AC/ CMV (Assist Control/ Continuous Mandatory Ventilation)  RR (machine): 20  TV (machine): 400  FiO2: 40  PEEP: 5  ITime: 1  MAP: 9  PIP: 23      PHYSICAL EXAM:    GENERAL: NAD, sedated and intubated to mechanical ventilator  HEAD:  Atraumatic, Normocephalic  EYES: EOMI, PERRLA, conjunctiva and sclera clear  ENMT: No tonsillar erythema, exudates, or enlargement; Moist mucous membranes, Good dentition, No lesions  NECK: Supple, normal appearance, No JVD; Normal thyroid; Trachea midline  NERVOUS SYSTEM:  Sedated and intubated. Opens eyes and moves lips spontaneously. Cannot fully assess  CHEST/LUNG: No chest deformity; Normal percussion bilaterally; No rales, rhonchi, wheezing; left chest tube   HEART: Regular rate and rhythm; No murmurs, rubs, or gallops  ABDOMEN: Soft, Nontender, Nondistended; Bowel sounds present  EXTREMITIES:  2+ Peripheral Pulses, No clubbing, cyanosis, or edema  LYMPH: No lymphadenopathy noted  SKIN: No rashes or lesions; Good capillary refill      LABS:  CBC Full  -  ( 08 Nov 2021 04:22 )  WBC Count : 15.50 K/uL  RBC Count : 2.96 M/uL  Hemoglobin : 8.6 g/dL  Hematocrit : 26.5 %  Platelet Count - Automated : 234 K/uL  Mean Cell Volume : 89.5 fl  Mean Cell Hemoglobin : 29.1 pg  Mean Cell Hemoglobin Concentration : 32.5 gm/dL  Auto Neutrophil # : 13.77 K/uL  Auto Lymphocyte # : 0.82 K/uL  Auto Monocyte # : 0.49 K/uL  Auto Eosinophil # : 0.13 K/uL  Auto Basophil # : 0.05 K/uL  Auto Neutrophil % : 88.9 %  Auto Lymphocyte % : 5.3 %  Auto Monocyte % : 3.2 %  Auto Eosinophil % : 0.8 %  Auto Basophil % : 0.3 %    11-08    138  |  102  |  32<H>  ----------------------------<  99  3.3<L>   |  31  |  0.88    Ca    8.2<L>      08 Nov 2021 04:22  Phos  3.1     11-08  Mg     2.1     11-08    TPro  5.9<L>  /  Alb  2.1<L>  /  TBili  1.0  /  DBili  x   /  AST  36  /  ALT  27  /  AlkPhos  155<H>  11-08            RADIOLOGY & ADDITIONAL STUDIES REVIEWED:  ***    GOALS OF CARE DISCUSSION WITH PATIENT/FAMILY/PROXY: FULL CODE    CRITICAL CARE TIME SPENT: 35 minutes

## 2021-11-08 NOTE — PROGRESS NOTE ADULT - ASSESSMENT
asiya nd examined vsstable afebrile on phenylepherine.  awake blinking eyes    not in nay distress  physical done unchanged  has chaest tube  foleys   labs noted  hgb 8.6  wbc 15.5,  k 3.3  d/w ICU team  will try extubation today or tmrw  on cefepime asiya nd examined vsstable afebrile on phenylepherine.  awake blinking eyes    not in nay distress  physical done unchanged  has chaest tube  foleys   labs noted  hgb 8.6  wbc 15.5,  k 3.3  d/w ICU team  will try extubation today or tmrw  on cefepime  supplement K

## 2021-11-08 NOTE — CHART NOTE - NSCHARTNOTEFT_GEN_A_CORE
Patient has been spiking fevers (100-101F). She is given Tylenol as needed. She is currently on Cefepime 1g IV day 3. LIJ central has been inserted since 11/1/2021. We positioned the patient properly, used proper aseptic technique, removed the dressing and suture, and applied pressure to the area. No bleeding was noted. She was then cleaned up and repositioned.

## 2021-11-08 NOTE — PROGRESS NOTE ADULT - ASSESSMENT
UTI  Bacteremia - E coli  Pneumonia  Septic Shock  Fevers  Leukocytosis    Plan:  ·	Continue Maxipime 1 gm IV q12hrs  ·	poor prognosis, patient is full code    Time spent - 32 mins   UTI  Bacteremia - E coli  Pneumonia  Septic Shock  Fevers  Leukocytosis    Plan:  ·	Continue Maxipime 1 gm IV q12hrs  ·	poor prognosis, patient is full code    Time spent - 32 mins    I agree with above

## 2021-11-08 NOTE — PROGRESS NOTE ADULT - ASSESSMENT
89 y/o F, nursing home resident with a significant medical history of CVA with R sided hemiparesis, aphasia, parkinson's, GERD, dementia, CKD, presents to the ED in respiratory distress, intubated while in ED. Patient is being admitted to medicine for sepsis and AHRF likely 2/2 ?PNA    # Acute hypoxic respiratory failure  # Sepsis 2/2 PNA, E. coli bacteremia  # Lactic acidosis  # Joby on CKD  # Hypernatremia    Neuro:   on fentanyl for pain    Pulmonary  Acute hypoxic respiratory failure  Patient presented with AHRF, intubated in ED  As per NH records, patient had cough and fever for past few days  CXR shows LLL infiltrate  off isolation, covid negative  s/p one dose vanc and zosyn  BCx - E. coli bacteremia; Sputum - Strep agalactiae  Repeat BCx - neg  ABG showed 7.44/37/83/25 this morning  Monitor resp status  was on rocephin 1mg and zithro,   spiking fevers, Will change to Vancomycin and Cefepime to cover for HAP  if continues to spike fever, will D/C line    Pneumothorax  CXR s/p LIJ insertion showed L pneumothorax  chest tube inserted (11/1/21)  has air leak but maintained at -20 pressure suction  Thoracic Surgery f/u    Cardiovascular  Sinus tachycardia  currently patient is not tachycardic  Likely 2/2 sepsis and dehydration  EKG showed sinus rhythm with ST changes in inferior leads (11/1/21)  repeat EKG - SR w/ new-onset PACs  Trop was mildly elevated but trended down    Gastrointestinal  NGT inserted  started on tube feeds (Jevity)  Nutrition consult    ID  Sepsis 2/2  Patient was tachycardic, had high temp 103.8  WBC inc. since admission  CXR shows LLL infiltrate on admission  repeat CXR showed progression of infiltrates, bilateral  s/p 2L bolus  Was given one dose of vanc and zosyn  Will start on rocephin and zithro   cx - No evidence of pneumothorax, no infiltrates or effusion  CX (s/p LIJ) - L pneumothorax > (s/p chest tube - 11/01/21)   Blood Cx - E. coli bacteremia; repeat BCx was neg (11/3/21)  Sputum Cx - Strep agalactiae  UCx was neg  lactate - 11.2>4.9>4.2>3.2>2.1>2.0 (trending down to normal)  Procal - 44.00>35.10>21.10>12.90  Keep output net negative  Start Lasix 40 mg IV BID and Albumin 25 mg q6 for 48 hours.  spiking fevers, Will change to Vancomycin and Cefepime to cover for HAP  if continues to spike fever, will D/C line  ID consult (Dr. Wong)    Renal  Patient has h/o CKD  Clinically patient is severely dehydrated  has hypernatremia, likely 2/2 volume deficit  Not producing any urine  s/p 2 L bolus  on standing IVF  Monitor I/O -789ml  Cr is elevated, unknown baseline (trending down to normal)  UA - pos. for UTI  UCx - neg  Monitor BMP    Endo  BS are elevated  No h/o DM  A1c - 5.8  Monitor BS    Prophylactic  On heparin for dvt prophylaxis  on PPI for GI prophylaxis    Palliative consult (Dr. Michele) - FULL CODE 89 y/o F, nursing home resident with a significant medical history of CVA with R sided hemiparesis, aphasia, parkinson's, GERD, dementia, CKD, presents to the ED in respiratory distress, intubated while in ED. Patient is being admitted to medicine for sepsis and AHRF likely 2/2 ?PNA    # Acute hypoxic respiratory failure  # Sepsis 2/2 PNA, E. coli bacteremia  # Lactic acidosis  # Joby on CKD  # Hypernatremia    Neuro:   on fentanyl for pain    Pulmonary  Acute hypoxic respiratory failure  Patient presented with AHRF, intubated in ED  As per NH records, patient had cough and fever for past few days  CXR shows LLL infiltrate  off isolation, covid negative  s/p one dose vanc and zosyn  BCx - E. coli bacteremia; Sputum - Strep agalactiae  Repeat BCx - neg  ABG showed 7.44/37/83/25 this morning  Monitor resp status  was on rocephin 1mg and zithro (discontinued)   spiking fevers, change to Vancomycin (discontinued) and Cefepime to cover for HAP  to complete Cefepime until 2 weeks from negative blood culture (until 11/17/21)  continues to spike fever, D/C line    Pneumothorax  CXR s/p LIJ insertion showed L pneumothorax  chest tube inserted (11/1/21)  has air leak but maintained at -10 pressure suction  Thoracic Surgery f/u    Cardiovascular  Sinus tachycardia  still has episodes of tachycardia (150s)  Likely 2/2 sepsis and dehydration  EKG showed sinus rhythm with ST changes in inferior leads (11/1/21)  repeat EKG - SR w/ new-onset PACs  Trop was mildly elevated but trended down  f/u ECHO    Gastrointestinal  started on tube feeds (Jevity)  Nutrition consult  NGT reinsertion today    ID  Sepsis 2/2  Patient was tachycardic, had high temp 103.8  WBC inc. since admission  CXR shows LLL infiltrate on admission  repeat CXR showed progression of infiltrates, bilateral  s/p 2L bolus  Was given one dose of vanc and zosyn  was on rocephin and zithro (d/c)  cx - No evidence of pneumothorax, no infiltrates or effusion  CX (s/p LIJ) - L pneumothorax > (s/p chest tube - 11/01/21)   Blood Cx - E. coli bacteremia; repeat BCx was neg (11/3/21)  Sputum Cx - Strep agalactiae  UCx was neg  lactate - 11.2>4.9>4.2>3.2>2.1>2.0 (trending down to normal)  Procal - 44.00>35.10>21.10>12.90  Keep output net negative  Start Lasix 40 mg IV BID and Albumin 25 mg q6 for 48 hours.  spiking fevers, Will change to Vancomycin and Cefepime to cover for HAP  Vancomycin was d/c  continue on cefepime for 2 weeks since negative culture (until 11/17/21)  continues to spike fever, D/C line  ID consult (Dr. Wong)    Renal  Patient has h/o CKD  Clinically patient is severely dehydrated  had hypernatremia, likely 2/2 volume deficit (currently normal)  Not producing any urine  s/p 2 L bolus  on standing IVF  Monitor I/O -789ml  Cr is elevated, unknown baseline (trending down to normal)  UA - pos. for UTI  UCx - neg  Monitor BMP    Endo  BS are elevated  No h/o DM  A1c - 5.8  Monitor BS    Prophylactic  On heparin for dvt prophylaxis  on PPI for GI prophylaxis    Palliative consult (Dr. Michele) - FULL CODE

## 2021-11-09 LAB
ALBUMIN SERPL ELPH-MCNC: 1.9 G/DL — LOW (ref 3.5–5)
ALP SERPL-CCNC: 164 U/L — HIGH (ref 40–120)
ALT FLD-CCNC: 30 U/L DA — SIGNIFICANT CHANGE UP (ref 10–60)
ANION GAP SERPL CALC-SCNC: 10 MMOL/L — SIGNIFICANT CHANGE UP (ref 5–17)
AST SERPL-CCNC: 50 U/L — HIGH (ref 10–40)
BASE EXCESS BLDA CALC-SCNC: 5.8 MMOL/L — HIGH (ref -2–3)
BASOPHILS # BLD AUTO: 0.08 K/UL — SIGNIFICANT CHANGE UP (ref 0–0.2)
BASOPHILS NFR BLD AUTO: 0.6 % — SIGNIFICANT CHANGE UP (ref 0–2)
BILIRUB SERPL-MCNC: 0.9 MG/DL — SIGNIFICANT CHANGE UP (ref 0.2–1.2)
BLOOD GAS COMMENTS ARTERIAL: SIGNIFICANT CHANGE UP
BUN SERPL-MCNC: 36 MG/DL — HIGH (ref 7–18)
CALCIUM SERPL-MCNC: 8.3 MG/DL — LOW (ref 8.4–10.5)
CHLORIDE SERPL-SCNC: 104 MMOL/L — SIGNIFICANT CHANGE UP (ref 96–108)
CO2 SERPL-SCNC: 23 MMOL/L — SIGNIFICANT CHANGE UP (ref 22–31)
CREAT SERPL-MCNC: 1.09 MG/DL — SIGNIFICANT CHANGE UP (ref 0.5–1.3)
EOSINOPHIL # BLD AUTO: 0.3 K/UL — SIGNIFICANT CHANGE UP (ref 0–0.5)
EOSINOPHIL NFR BLD AUTO: 2.1 % — SIGNIFICANT CHANGE UP (ref 0–6)
GLUCOSE SERPL-MCNC: 156 MG/DL — HIGH (ref 70–99)
HCO3 BLDA-SCNC: 28 MMOL/L — SIGNIFICANT CHANGE UP (ref 21–28)
HCT VFR BLD CALC: 31.5 % — LOW (ref 34.5–45)
HGB BLD-MCNC: 10 G/DL — LOW (ref 11.5–15.5)
HOROWITZ INDEX BLDA+IHG-RTO: 40 — SIGNIFICANT CHANGE UP
IMM GRANULOCYTES NFR BLD AUTO: 1.3 % — SIGNIFICANT CHANGE UP (ref 0–1.5)
LYMPHOCYTES # BLD AUTO: 1.01 K/UL — SIGNIFICANT CHANGE UP (ref 1–3.3)
LYMPHOCYTES # BLD AUTO: 7.1 % — LOW (ref 13–44)
MAGNESIUM SERPL-MCNC: 2.5 MG/DL — SIGNIFICANT CHANGE UP (ref 1.6–2.6)
MCHC RBC-ENTMCNC: 29.2 PG — SIGNIFICANT CHANGE UP (ref 27–34)
MCHC RBC-ENTMCNC: 31.7 GM/DL — LOW (ref 32–36)
MCV RBC AUTO: 91.8 FL — SIGNIFICANT CHANGE UP (ref 80–100)
MONOCYTES # BLD AUTO: 0.48 K/UL — SIGNIFICANT CHANGE UP (ref 0–0.9)
MONOCYTES NFR BLD AUTO: 3.4 % — SIGNIFICANT CHANGE UP (ref 2–14)
NEUTROPHILS # BLD AUTO: 12.17 K/UL — HIGH (ref 1.8–7.4)
NEUTROPHILS NFR BLD AUTO: 85.5 % — HIGH (ref 43–77)
NRBC # BLD: 0 /100 WBCS — SIGNIFICANT CHANGE UP (ref 0–0)
PCO2 BLDA: 34 MMHG — SIGNIFICANT CHANGE UP (ref 32–35)
PH BLDA: 7.53 — HIGH (ref 7.35–7.45)
PHOSPHATE SERPL-MCNC: 3.6 MG/DL — SIGNIFICANT CHANGE UP (ref 2.5–4.5)
PLATELET # BLD AUTO: 294 K/UL — SIGNIFICANT CHANGE UP (ref 150–400)
PO2 BLDA: 78 MMHG — LOW (ref 83–108)
POTASSIUM SERPL-MCNC: 4.1 MMOL/L — SIGNIFICANT CHANGE UP (ref 3.5–5.3)
POTASSIUM SERPL-SCNC: 4.1 MMOL/L — SIGNIFICANT CHANGE UP (ref 3.5–5.3)
PROCALCITONIN SERPL-MCNC: 2.9 NG/ML — HIGH (ref 0.02–0.1)
PROT SERPL-MCNC: 6.7 G/DL — SIGNIFICANT CHANGE UP (ref 6–8.3)
RBC # BLD: 3.43 M/UL — LOW (ref 3.8–5.2)
RBC # FLD: 14.6 % — HIGH (ref 10.3–14.5)
SAO2 % BLDA: 96 % — SIGNIFICANT CHANGE UP
SODIUM SERPL-SCNC: 137 MMOL/L — SIGNIFICANT CHANGE UP (ref 135–145)
WBC # BLD: 14.22 K/UL — HIGH (ref 3.8–10.5)
WBC # FLD AUTO: 14.22 K/UL — HIGH (ref 3.8–10.5)

## 2021-11-09 PROCEDURE — 99358 PROLONG SERVICE W/O CONTACT: CPT

## 2021-11-09 PROCEDURE — 99231 SBSQ HOSP IP/OBS SF/LOW 25: CPT

## 2021-11-09 PROCEDURE — 71045 X-RAY EXAM CHEST 1 VIEW: CPT | Mod: 26

## 2021-11-09 RX ORDER — ACETAMINOPHEN 500 MG
650 TABLET ORAL EVERY 4 HOURS
Refills: 0 | Status: DISCONTINUED | OUTPATIENT
Start: 2021-11-09 | End: 2021-11-09

## 2021-11-09 RX ORDER — ACETAMINOPHEN 500 MG
650 TABLET ORAL EVERY 6 HOURS
Refills: 0 | Status: DISCONTINUED | OUTPATIENT
Start: 2021-11-09 | End: 2021-11-15

## 2021-11-09 RX ORDER — PROPOFOL 10 MG/ML
10 INJECTION, EMULSION INTRAVENOUS
Qty: 1000 | Refills: 0 | Status: DISCONTINUED | OUTPATIENT
Start: 2021-11-09 | End: 2021-11-10

## 2021-11-09 RX ADMIN — CHLORHEXIDINE GLUCONATE 15 MILLILITER(S): 213 SOLUTION TOPICAL at 06:28

## 2021-11-09 RX ADMIN — HEPARIN SODIUM 5000 UNIT(S): 5000 INJECTION INTRAVENOUS; SUBCUTANEOUS at 06:28

## 2021-11-09 RX ADMIN — CHLORHEXIDINE GLUCONATE 15 MILLILITER(S): 213 SOLUTION TOPICAL at 17:26

## 2021-11-09 RX ADMIN — HEPARIN SODIUM 5000 UNIT(S): 5000 INJECTION INTRAVENOUS; SUBCUTANEOUS at 21:07

## 2021-11-09 RX ADMIN — HEPARIN SODIUM 5000 UNIT(S): 5000 INJECTION INTRAVENOUS; SUBCUTANEOUS at 13:31

## 2021-11-09 RX ADMIN — CHLORHEXIDINE GLUCONATE 1 APPLICATION(S): 213 SOLUTION TOPICAL at 06:28

## 2021-11-09 RX ADMIN — LACTULOSE 10 GRAM(S): 10 SOLUTION ORAL at 06:28

## 2021-11-09 RX ADMIN — CEFEPIME 100 MILLIGRAM(S): 1 INJECTION, POWDER, FOR SOLUTION INTRAMUSCULAR; INTRAVENOUS at 17:22

## 2021-11-09 RX ADMIN — PANTOPRAZOLE SODIUM 40 MILLIGRAM(S): 20 TABLET, DELAYED RELEASE ORAL at 11:17

## 2021-11-09 RX ADMIN — CEFEPIME 100 MILLIGRAM(S): 1 INJECTION, POWDER, FOR SOLUTION INTRAMUSCULAR; INTRAVENOUS at 06:27

## 2021-11-09 NOTE — PROGRESS NOTE ADULT - SUBJECTIVE AND OBJECTIVE BOX
HPI:  89 yo female from [facility] with medical h/o of HTN, Dementia, CVA with R sided hemiparesis, aphasia, parkinson's, GERD, CKD bedbound at baseline, dependant on assistance for ADL comes in with respiratory failure. Patient was found to have respiratory distress, saturating in 70s when EMS arrived, was placed on NRB on field. She was emergently intubated on arrival in ED. As per NH records patient was having fever and cough for past few days. Today she was found to have respiratory distress. She is vaccinated with moderna. Spoke to family son,  who stated the patient to remain full code. No other history could be obtained.  (01 Nov 2021 00:14)      Patient is a 89y old  Female who presents with a chief complaint of AHRF (09 Nov 2021 12:08)      INTERVAL HPI/OVERNIGHT EVENTS:  T(C): 37.9 (11-09-21 @ 11:00), Max: 38.2 (11-08-21 @ 19:00)  HR: 87 (11-09-21 @ 13:00) (69 - 107)  BP: 108/84 (11-09-21 @ 13:00) (88/42 - 181/72)  RR: 21 (11-09-21 @ 13:00) (20 - 33)  SpO2: 100% (11-09-21 @ 13:00) (93% - 100%)  Wt(kg): --  I&O's Summary    08 Nov 2021 07:01  -  09 Nov 2021 07:00  --------------------------------------------------------  IN: 881.3 mL / OUT: 825 mL / NET: 56.3 mL    09 Nov 2021 07:01  -  09 Nov 2021 13:49  --------------------------------------------------------  IN: 60 mL / OUT: 60 mL / NET: 0 mL        REVIEW OF SYSTEMS: denies fever, chills, SOB, palpitations, chest pain, abdominal pain, nausea, vomitting, diarrhea, constipation, dizziness    MEDICATIONS  (STANDING):  cefepime   IVPB 1000 milliGRAM(s) IV Intermittent every 12 hours  chlorhexidine 0.12% Liquid 15 milliLiter(s) Oral Mucosa every 12 hours  chlorhexidine 2% Cloths 1 Application(s) Topical <User Schedule>  heparin   Injectable 5000 Unit(s) SubCutaneous every 8 hours  lactulose Syrup 10 Gram(s) Oral every 8 hours  pantoprazole  Injectable 40 milliGRAM(s) IV Push daily  polyethylene glycol 3350 17 Gram(s) Oral daily  senna 2 Tablet(s) Oral at bedtime    MEDICATIONS  (PRN):  acetaminophen    Suspension .. 650 milliGRAM(s) Oral every 6 hours PRN Temp greater or equal to 38.5C (101.3F)  sodium chloride 0.9% lock flush 10 milliLiter(s) IV Push every 1 hour PRN Pre/post blood products, medications, blood draw, and to maintain line patency      PHYSICAL EXAM:  GENERAL: NAD, well-groomed, well-developed  HEAD:  Atraumatic, Normocephalic  EYES: EOMI, PERRLA, conjunctiva and sclera clear  ENMT: No tonsillar erythema, exudates, or enlargement; Moist mucous membranes, Good dentition, No lesions  NECK: Supple, No JVD, Normal thyroid  NERVOUS SYSTEM:  Alert & Oriented X3, Good concentration; Motor Strength 5/5 B/L upper and lower extremities; DTRs 2+ intact and symmetric  CHEST/LUNG: Clear to percussion bilaterally; No rales, rhonchi, wheezing, or rubs  HEART: Regular rate and rhythm; No murmurs, rubs, or gallops  ABDOMEN: Soft, Nontender, Nondistended; Bowel sounds present  EXTREMITIES:  2+ Peripheral Pulses, No clubbing, cyanosis, or edema  LYMPH: No lymphadenopathy noted  SKIN: No rashes or lesions  LABS:                        10.0   14.22 )-----------( 294      ( 09 Nov 2021 06:25 )             31.5     11-09    137  |  104  |  36<H>  ----------------------------<  156<H>  4.1   |  23  |  1.09    Ca    8.3<L>      09 Nov 2021 06:25  Phos  3.6     11-09  Mg     2.5     11-09    TPro  6.7  /  Alb  1.9<L>  /  TBili  0.9  /  DBili  x   /  AST  50<H>  /  ALT  30  /  AlkPhos  164<H>  11-09        CAPILLARY BLOOD GLUCOSE      POCT Blood Glucose.: 208 mg/dL (09 Nov 2021 10:50)  POCT Blood Glucose.: 113 mg/dL (08 Nov 2021 18:30)      ABG - ( 09 Nov 2021 03:43 )  pH, Arterial: 7.53  pH, Blood: x     /  pCO2: 34    /  pO2: 78    / HCO3: 28    / Base Excess: 5.8   /  SaO2: 96

## 2021-11-09 NOTE — PROGRESS NOTE ADULT - ASSESSMENT
UTI  Bacteremia - E coli  Pneumonia  Septic Shock  Fevers  Leukocytosis - improving    Plan:  ·	Continue Maxipime 1 gm IV q12hrs  ·	poor prognosis, patient is full code    Time spent - 31 mins       UTI  Bacteremia - E coli  Pneumonia  Septic Shock  Fevers  Leukocytosis - improving    Plan:  ·	Continue Maxipime 1 gm IV q12hrs D#7 for a total of 14 days  ·	poor prognosis, patient is full code    Time spent - 31 mins       UTI  Bacteremia - E coli  Pneumonia  Septic Shock  Fevers  Leukocytosis - improving    Plan:  ·	Continue Maxipime 1 gm IV q12hrs D#7 for a total of 14 days  ·	poor prognosis, patient is full code    Time spent - 31 mins    I agree with above

## 2021-11-09 NOTE — PROGRESS NOTE ADULT - ASSESSMENT
89 yo F with L ptx,  s/p L CT placement 11/1    -Daily CXRs   -Continue tube to suction -10, monitor output   -Care as per ICU team

## 2021-11-09 NOTE — PROGRESS NOTE ADULT - ASSESSMENT
seen and examined vsstable   orally intubated  not in any distress  eyes blinking not in any distress  lungs heart ok   labs noted  wbc 14  a/p resp failurein 89 year old who is bed bound dementic now orally intubated   wants full code  going for trach and peg  has poor prognosis

## 2021-11-09 NOTE — PROGRESS NOTE ADULT - ASSESSMENT
asiya nd examined vsstable afebrile on phenylepherine.  awake blinking eyes    not in nay distress  physical done unchanged  has chaest tube  foleys   labs noted  hgb 8.6  wbc 15.5,  k 3.3  d/w ICU team  will try extubation today or tmrw  on cefepime  supplement K 89 y/o F, nursing home resident with a significant medical history of CVA with R sided hemiparesis, aphasia, parkinson's, GERD, dementia, CKD, presents to the ED in respiratory distress, intubated while in ED. Patient is being admitted to medicine for sepsis and AHRF likely 2/2 ?PNA    # Acute hypoxic respiratory failure  # Sepsis 2/2 PNA, E. coli bacteremia  # Lactic acidosis  # Joby on CKD  # Hypernatremia    Neuro:   on fentanyl for pain    Pulmonary  Acute hypoxic respiratory failure  Patient presented with AHRF, intubated in ED  As per NH records, patient had cough and fever for past few days  CXR shows LLL infiltrate  off isolation, covid negative  s/p one dose vanc and zosyn  BCx - E. coli bacteremia; Sputum - Strep agalactiae  Repeat BCx - neg  ABG showed 7.44/37/83/25 this morning  Monitor resp status  was on rocephin 1mg and zithro (discontinued)   spiking fevers, change to Vancomycin (discontinued) and Cefepime to cover for HAP  to complete Cefepime until 2 weeks from negative blood culture (until 11/17/21)  continues to spike fever, D/C line, removed IFC    Pneumothorax  CXR s/p LIJ insertion showed L pneumothorax  chest tube inserted (11/1/21)  has air leak but maintained at -10 pressure suction  Thoracic Surgery f/u    Cardiovascular  Sinus tachycardia  still has episodes of tachycardia (150s)  Likely 2/2 sepsis and dehydration  EKG showed sinus rhythm with ST changes in inferior leads (11/1/21)  repeat EKG - SR w/ new-onset PACs  Trop was mildly elevated but trended down  f/u ECHO    Gastrointestinal  started on tube feeds (Jevity)  Nutrition consult  NGT reinsertion today    ID  Sepsis 2/2  Patient was tachycardic, had high temp 103.8  WBC inc. since admission  CXR shows LLL infiltrate on admission  repeat CXR showed progression of infiltrates, bilateral; right lower lobe pneumonia  s/p 2L bolus  Was given one dose of vanc and zosyn  was on rocephin and zithro (d/c)  cx - No evidence of pneumothorax, no infiltrates or effusion  CX (s/p LIJ) - L pneumothorax > (s/p chest tube - 11/01/21)   Blood Cx - E. coli bacteremia; repeat BCx was neg (11/3/21)  Sputum Cx - Strep agalactiae  UCx was neg  lactate - 11.2>4.9>4.2>3.2>2.1>2.0 (trending down to normal)  Procal - 44.00>35.10>21.10>12.90  Keep output net negative  Start Lasix 40 mg IV BID and Albumin 25 mg q6 for 48 hours.  spiking fevers, changed to Vancomycin and Cefepime to cover for HAP  Vancomycin was d/c  continue on cefepime for 2 weeks since negative culture (until 11/17/21)  continues to spike fever, D/C line, IFC removed  ID consult (Dr. Wong)    Renal  Patient has h/o CKD  Clinically patient is severely dehydrated  had hypernatremia, likely 2/2 volume deficit (currently normal)  Not producing any urine  s/p 2 L bolus  on standing IVF  Monitor I/O -789ml  Cr is elevated, unknown baseline (trending down to normal)  UA - pos. for UTI  UCx - neg  Monitor BMP    Endo  BS are elevated  No h/o DM  A1c - 5.8  Monitor BS    Prophylactic  On heparin for dvt prophylaxis  on PPI for GI prophylaxis    Palliative consult (Dr. Michele) - FULL CODE

## 2021-11-09 NOTE — PROGRESS NOTE ADULT - SUBJECTIVE AND OBJECTIVE BOX
INTERVAL HPI/OVERNIGHT EVENTS: ***    PRESSORS: [ ] YES [ ] NO  WHICH:    ANTIBIOTICS:                      Antimicrobial:  cefepime   IVPB 1000 milliGRAM(s) IV Intermittent every 12 hours    Cardiovascular:    Pulmonary:    Hematalogic:  heparin   Injectable 5000 Unit(s) SubCutaneous every 8 hours    Other:  acetaminophen    Suspension .. 650 milliGRAM(s) Oral every 6 hours PRN  chlorhexidine 0.12% Liquid 15 milliLiter(s) Oral Mucosa every 12 hours  chlorhexidine 2% Cloths 1 Application(s) Topical <User Schedule>  lactulose Syrup 10 Gram(s) Oral every 8 hours  pantoprazole  Injectable 40 milliGRAM(s) IV Push daily  polyethylene glycol 3350 17 Gram(s) Oral daily  senna 2 Tablet(s) Oral at bedtime  sodium chloride 0.9% lock flush 10 milliLiter(s) IV Push every 1 hour PRN    acetaminophen    Suspension .. 650 milliGRAM(s) Oral every 6 hours PRN  cefepime   IVPB 1000 milliGRAM(s) IV Intermittent every 12 hours  chlorhexidine 0.12% Liquid 15 milliLiter(s) Oral Mucosa every 12 hours  chlorhexidine 2% Cloths 1 Application(s) Topical <User Schedule>  heparin   Injectable 5000 Unit(s) SubCutaneous every 8 hours  lactulose Syrup 10 Gram(s) Oral every 8 hours  pantoprazole  Injectable 40 milliGRAM(s) IV Push daily  polyethylene glycol 3350 17 Gram(s) Oral daily  senna 2 Tablet(s) Oral at bedtime  sodium chloride 0.9% lock flush 10 milliLiter(s) IV Push every 1 hour PRN    Drug Dosing Weight  Height (cm): 167.6 (03 Nov 2021 16:02)  Weight (kg): 40.9 (01 Nov 2021 02:20)  BMI (kg/m2): 14.6 (03 Nov 2021 16:02)  BSA (m2): 1.43 (03 Nov 2021 16:02)    CENTRAL LINE: [ ] YES [ ] NO  LOCATION:   DATE INSERTED:  REMOVE: [ ] YES [ ] NO  EXPLAIN:    SAEED: [ ] YES [ ] NO    DATE INSERTED:  REMOVE:  [ ] YES [ ] NO  EXPLAIN:    A-LINE:  [ ] YES [ ] NO  LOCATION:   DATE INSERTED:  REMOVE:  [ ] YES [ ] NO  EXPLAIN:    PMH -reviewed admission note, no change since admission    ICU Vital Signs Last 24 Hrs  T(C): 37.9 (09 Nov 2021 11:00), Max: 38.2 (08 Nov 2021 19:00)  T(F): 100.2 (09 Nov 2021 11:00), Max: 100.8 (08 Nov 2021 19:00)  HR: 96 (09 Nov 2021 11:00) (69 - 107)  BP: 143/57 (09 Nov 2021 10:00) (88/42 - 181/72)  BP(mean): 78 (09 Nov 2021 10:00) (50 - 106)  ABP: --  ABP(mean): --  RR: 24 (09 Nov 2021 11:00) (20 - 33)  SpO2: 97% (09 Nov 2021 11:00) (93% - 100%)      ABG - ( 09 Nov 2021 03:43 )  pH, Arterial: 7.53  pH, Blood: x     /  pCO2: 34    /  pO2: 78    / HCO3: 28    / Base Excess: 5.8   /  SaO2: 96                    11-08 @ 07:01  -  11-09 @ 07:00  --------------------------------------------------------  IN: 881.3 mL / OUT: 825 mL / NET: 56.3 mL        Mode: AC/ CMV (Assist Control/ Continuous Mandatory Ventilation)  RR (machine): 20  TV (machine): 400  FiO2: 40  PEEP: 0  ITime: 0.9  MAP: 6  PIP: 20      PHYSICAL EXAM:    GENERAL: NAD, well-groomed, well-developed  HEAD:  Atraumatic, Normocephalic  EYES: EOMI, PERRLA, conjunctiva and sclera clear  ENMT: No tonsillar erythema, exudates, or enlargement; Moist mucous membranes, Good dentition, No lesions  NECK: Supple, normal appearance, No JVD; Normal thyroid; Trachea midline  NERVOUS SYSTEM:  Alert & Oriented X3, Good concentration; Motor Strength 5/5 B/L upper and lower extremities; DTRs 2+ intact and symmetric  CHEST/LUNG: No chest deformity; Normal percussion bilaterally; No rales, rhonchi, wheezing   HEART: Regular rate and rhythm; No murmurs, rubs, or gallops  ABDOMEN: Soft, Nontender, Nondistended; Bowel sounds present  EXTREMITIES:  2+ Peripheral Pulses, No clubbing, cyanosis, or edema  LYMPH: No lymphadenopathy noted  SKIN: No rashes or lesions; Good capillary refill      LABS:  CBC Full  -  ( 09 Nov 2021 06:25 )  WBC Count : 14.22 K/uL  RBC Count : 3.43 M/uL  Hemoglobin : 10.0 g/dL  Hematocrit : 31.5 %  Platelet Count - Automated : 294 K/uL  Mean Cell Volume : 91.8 fl  Mean Cell Hemoglobin : 29.2 pg  Mean Cell Hemoglobin Concentration : 31.7 gm/dL  Auto Neutrophil # : 12.17 K/uL  Auto Lymphocyte # : 1.01 K/uL  Auto Monocyte # : 0.48 K/uL  Auto Eosinophil # : 0.30 K/uL  Auto Basophil # : 0.08 K/uL  Auto Neutrophil % : 85.5 %  Auto Lymphocyte % : 7.1 %  Auto Monocyte % : 3.4 %  Auto Eosinophil % : 2.1 %  Auto Basophil % : 0.6 %    11-09    137  |  104  |  36<H>  ----------------------------<  156<H>  4.1   |  23  |  1.09    Ca    8.3<L>      09 Nov 2021 06:25  Phos  3.6     11-09  Mg     2.5     11-09    TPro  6.7  /  Alb  1.9<L>  /  TBili  0.9  /  DBili  x   /  AST  50<H>  /  ALT  30  /  AlkPhos  164<H>  11-09            RADIOLOGY & ADDITIONAL STUDIES REVIEWED:  ***    GOALS OF CARE DISCUSSION WITH PATIENT/FAMILY/PROXY:    CRITICAL CARE TIME SPENT: 35 minutes INTERVAL HPI/OVERNIGHT EVENTS: ***    Patient was examined at bedside, poor mental status and opens her eyes spontaneously, does not follow any directions. Her pupils are sluggish at 1-2mm. She has been having febrile episodes since last night (100.4-100.8F). She had a bowel movement yesterday. She is also intubated to mechanical ventilator. SBTs were done. Settings were changed to CPAP for possible extubation if tolerated. Chest Xray revealed bilateral infiltrates and right lower lobe effusion. Continue on Cefepime. Saeed catheter and EJ cath to be removed today. Palliative Care talked to the patient's  and son. They agreed to the spontaneous trials of breathing and possible tracheostomy/ PEG insertion. Patient still remains FULL CODE as per conversation.    PRESSORS: [ ] YES [X] NO  WHICH:    ANTIBIOTICS:                      Antimicrobial:  cefepime   IVPB 1000 milliGRAM(s) IV Intermittent every 12 hours    Cardiovascular:    Pulmonary:    Hematalogic:  heparin   Injectable 5000 Unit(s) SubCutaneous every 8 hours    Other:  acetaminophen    Suspension .. 650 milliGRAM(s) Oral every 6 hours PRN  chlorhexidine 0.12% Liquid 15 milliLiter(s) Oral Mucosa every 12 hours  chlorhexidine 2% Cloths 1 Application(s) Topical <User Schedule>  lactulose Syrup 10 Gram(s) Oral every 8 hours  pantoprazole  Injectable 40 milliGRAM(s) IV Push daily  polyethylene glycol 3350 17 Gram(s) Oral daily  senna 2 Tablet(s) Oral at bedtime  sodium chloride 0.9% lock flush 10 milliLiter(s) IV Push every 1 hour PRN    acetaminophen    Suspension .. 650 milliGRAM(s) Oral every 6 hours PRN  cefepime   IVPB 1000 milliGRAM(s) IV Intermittent every 12 hours  chlorhexidine 0.12% Liquid 15 milliLiter(s) Oral Mucosa every 12 hours  chlorhexidine 2% Cloths 1 Application(s) Topical <User Schedule>  heparin   Injectable 5000 Unit(s) SubCutaneous every 8 hours  lactulose Syrup 10 Gram(s) Oral every 8 hours  pantoprazole  Injectable 40 milliGRAM(s) IV Push daily  polyethylene glycol 3350 17 Gram(s) Oral daily  senna 2 Tablet(s) Oral at bedtime  sodium chloride 0.9% lock flush 10 milliLiter(s) IV Push every 1 hour PRN    Drug Dosing Weight  Height (cm): 167.6 (03 Nov 2021 16:02)  Weight (kg): 40.9 (01 Nov 2021 02:20)  BMI (kg/m2): 14.6 (03 Nov 2021 16:02)  BSA (m2): 1.43 (03 Nov 2021 16:02)    CENTRAL LINE: [ ] YES [ ] NO  LOCATION:   DATE INSERTED:  REMOVE: [X] YES [ ] NO  EXPLAIN: spiking fever    SAEED: [ ] YES [ ] NO    DATE INSERTED:  REMOVE:  [X] YES [ ] NO  EXPLAIN: spiking fever (changed to primafit)    A-LINE:  [ ] YES [X] NO  LOCATION:   DATE INSERTED:  REMOVE:  [ ] YES [ ] NO  EXPLAIN:    PMH -reviewed admission note, no change since admission    ICU Vital Signs Last 24 Hrs  T(C): 37.9 (09 Nov 2021 11:00), Max: 38.2 (08 Nov 2021 19:00)  T(F): 100.2 (09 Nov 2021 11:00), Max: 100.8 (08 Nov 2021 19:00)  HR: 96 (09 Nov 2021 11:00) (69 - 107)  BP: 143/57 (09 Nov 2021 10:00) (88/42 - 181/72)  BP(mean): 78 (09 Nov 2021 10:00) (50 - 106)  ABP: --  ABP(mean): --  RR: 24 (09 Nov 2021 11:00) (20 - 33)  SpO2: 97% (09 Nov 2021 11:00) (93% - 100%)      ABG - ( 09 Nov 2021 03:43 )  pH, Arterial: 7.53  pH, Blood: x     /  pCO2: 34    /  pO2: 78    / HCO3: 28    / Base Excess: 5.8   /  SaO2: 96                    11-08 @ 07:01  -  11-09 @ 07:00  --------------------------------------------------------  IN: 881.3 mL / OUT: 825 mL / NET: 56.3 mL        Mode: AC/ CMV (Assist Control/ Continuous Mandatory Ventilation)  RR (machine): 20  TV (machine): 400  FiO2: 40  PEEP: 0  ITime: 0.9  MAP: 6  PIP: 20      PHYSICAL EXAM:    GENERAL: NAD, poor mental status, intubated to mechanical ventilator  HEAD:  Atraumatic, Normocephalic  EYES: EOMI, PERRLA, conjunctiva and sclera clear  ENMT: No tonsillar erythema, exudates, or enlargement; Moist mucous membranes, Good dentition, No lesions  NECK: Supple, normal appearance, No JVD; Normal thyroid; Trachea midline  NERVOUS SYSTEM:  poor mental status, intubated to mechanical ventilator, moves eyes and lips spontaneously  CHEST/LUNG: No chest deformity; Normal percussion bilaterally; No rales, rhonchi, wheezing; left chest tube   HEART: Regular rate and rhythm; No murmurs, rubs, or gallops  ABDOMEN: Soft, Nontender, Nondistended; Bowel sounds present  EXTREMITIES:  2+ Peripheral Pulses, No clubbing, cyanosis, or edema  LYMPH: No lymphadenopathy noted  SKIN: No rashes or lesions; Good capillary refill      LABS:  CBC Full  -  ( 09 Nov 2021 06:25 )  WBC Count : 14.22 K/uL  RBC Count : 3.43 M/uL  Hemoglobin : 10.0 g/dL  Hematocrit : 31.5 %  Platelet Count - Automated : 294 K/uL  Mean Cell Volume : 91.8 fl  Mean Cell Hemoglobin : 29.2 pg  Mean Cell Hemoglobin Concentration : 31.7 gm/dL  Auto Neutrophil # : 12.17 K/uL  Auto Lymphocyte # : 1.01 K/uL  Auto Monocyte # : 0.48 K/uL  Auto Eosinophil # : 0.30 K/uL  Auto Basophil # : 0.08 K/uL  Auto Neutrophil % : 85.5 %  Auto Lymphocyte % : 7.1 %  Auto Monocyte % : 3.4 %  Auto Eosinophil % : 2.1 %  Auto Basophil % : 0.6 %    11-09    137  |  104  |  36<H>  ----------------------------<  156<H>  4.1   |  23  |  1.09    Ca    8.3<L>      09 Nov 2021 06:25  Phos  3.6     11-09  Mg     2.5     11-09    TPro  6.7  /  Alb  1.9<L>  /  TBili  0.9  /  DBili  x   /  AST  50<H>  /  ALT  30  /  AlkPhos  164<H>  11-09            RADIOLOGY & ADDITIONAL STUDIES REVIEWED:  ***    < from: Xray Chest 1 View- PORTABLE-Routine (Xray Chest 1 View- PORTABLE-Routine in AM.) (11.08.21 @ 09:12) >  IMPRESSION:   No significant change..    New LEFT basilar peripheral and LEFT apical pneumothorax approximating 10% LEFT lung volume.  Unchanged bilateral  multifocal and diffuse ill-defined airspace opacities and/or RIGHT effusion.  . Catheters and tubes remain in place.    FOLLOW-UP AP PORTABLE CHEST RADIOGRAPH 11/8/2021 AT 8:47 AM:  LEFT chest tube tip overlies LEFT apex.  Residual LEFT apical 5% pneumothorax.  Unchanged bilateral lung  multifocal and diffuse ill-defined airspace opacities and/or RIGHT pleural effusion obscuring diaphragm contour..  LEFT IJ catheter tip in SVC.  ET tube tip above tracheal bifurcation.  Heart size unchanged.    < end of copied text >      GOALS OF CARE DISCUSSION WITH PATIENT/FAMILY/PROXY: FULL CODE    CRITICAL CARE TIME SPENT: 35 minutes

## 2021-11-09 NOTE — GOALS OF CARE CONVERSATION - ADVANCED CARE PLANNING - CONVERSATION DETAILS
Prolonged Service: Non-Face-to-Face: Time start (   9:30 am         )Time ended (   10:05        ) Total Time: (       35       minutes)       Details of discussion related to patient's diagnosis:    Had extensive discussion with patient's , son and grandson by phone today regarding current condition, poor overall prognosis, further goals of care. ICU team to consider removal from ventilator today if does well w SBT, high risk of decompensation and need for reintubation given poor mental status, advanced dementia. Discussed options for no reintubation, comfort measures if patient does not do well off the ventilator vs reintubation, trach/PEG, They have been  for >70 yrs, so son acknowledges this is a very difficult decision for his father.  expressed that he remains hopeful that she will do ok off the ventilator but is not ready to lose her, so he wants the medical team to continue all efforts to keep her alive including CPR, reintubation and trach/PEG if necessary. He feels that is what she would want as well. Support provided. Remains full code at this time. ICU team made aware.

## 2021-11-09 NOTE — PROGRESS NOTE ADULT - SUBJECTIVE AND OBJECTIVE BOX
INTERVAL HPI/OVERNIGHT EVENTS:    Pt seen and examined at bedside. No acute events overnight.     Vital Signs Last 24 Hrs  T(C): 37 (09 Nov 2021 07:00), Max: 38.2 (08 Nov 2021 19:00)  T(F): 98.6 (09 Nov 2021 07:00), Max: 100.8 (08 Nov 2021 19:00)  HR: 87 (09 Nov 2021 09:00) (69 - 107)  BP: 143/59 (09 Nov 2021 09:00) (88/42 - 181/72)  BP(mean): 81 (09 Nov 2021 09:00) (50 - 106)  RR: 33 (09 Nov 2021 09:00) (20 - 33)  SpO2: 94% (09 Nov 2021 09:00) (93% - 100%)  I&O's Detail    08 Nov 2021 07:01  -  09 Nov 2021 07:00  --------------------------------------------------------  IN:    Enteral Tube Flush: 100 mL    FentaNYL: 2.1 mL    IV PiggyBack: 100 mL    IV PiggyBack: 300 mL    IV PiggyBack: 100 mL    Jevity 1.5: 270 mL    Phenylephrine: 9.2 mL  Total IN: 881.3 mL    OUT:    Chest Tube (mL): 60 mL    Indwelling Catheter - Urethral (mL): 765 mL  Total OUT: 825 mL    Total NET: 56.3 mL      09 Nov 2021 07:01  -  09 Nov 2021 10:03  --------------------------------------------------------  IN:    Jevity 1.5: 60 mL  Total IN: 60 mL    OUT:    Indwelling Catheter - Urethral (mL): 60 mL  Total OUT: 60 mL    Total NET: 0 mL        cefepime   IVPB 1000 milliGRAM(s) IV Intermittent every 12 hours  lactulose Syrup 10 Gram(s) Oral every 8 hours  pantoprazole  Injectable 40 milliGRAM(s) IV Push daily  polyethylene glycol 3350 17 Gram(s) Oral daily  senna 2 Tablet(s) Oral at bedtime      Physical Exam  General: Intubated, sedated   Chest: left CT in place, dressing c/d/i, CT to suction +AL, no crepitus palpable       Labs:                        10.0   14.22 )-----------( 294      ( 09 Nov 2021 06:25 )             31.5     11-09    137  |  104  |  36<H>  ----------------------------<  156<H>  4.1   |  23  |  1.09    Ca    8.3<L>      09 Nov 2021 06:25  Phos  3.6     11-09  Mg     2.5     11-09    TPro  6.7  /  Alb  1.9<L>  /  TBili  0.9  /  DBili  x   /  AST  50<H>  /  ALT  30  /  AlkPhos  164<H>  11-09

## 2021-11-09 NOTE — PROGRESS NOTE ADULT - SUBJECTIVE AND OBJECTIVE BOX
ICU visit:  89y Female is under our care for    REVIEW OF SYSTEMS:  [  ] Not able to elicit  General:	  Chest:	  GI:	  :  Skin:	  Musculoskeletal:	  Neuro:	    MEDS:  cefepime   IVPB 1000 milliGRAM(s) IV Intermittent every 12 hours    ALLERGIES: Allergies    No Known Allergies    Intolerances        VITALS:  ICU Vital Signs Last 24 Hrs  T(C): 37.9 (09 Nov 2021 11:00), Max: 38.2 (08 Nov 2021 19:00)  T(F): 100.2 (09 Nov 2021 11:00), Max: 100.8 (08 Nov 2021 19:00)  HR: 90 (09 Nov 2021 11:52) (69 - 107)  BP: 114/41 (09 Nov 2021 11:00) (88/42 - 181/72)  BP(mean): 59 (09 Nov 2021 11:00) (50 - 106)  ABP: --  ABP(mean): --  RR: 24 (09 Nov 2021 11:00) (20 - 33)  SpO2: 99% (09 Nov 2021 11:52) (93% - 100%)      PHYSICAL EXAM:  HEENT:  Neck:  Respiratory:  Cardiovascular:  Gastrointestinal:  Extremities:  Skin:  Ortho:  Neuro:    LABS/DIAGNOSTIC TESTS:                        10.0   14.22 )-----------( 294      ( 09 Nov 2021 06:25 )             31.5     WBC Count: 14.22 K/uL (11-09 @ 06:25)  WBC Count: 15.50 K/uL (11-08 @ 04:22)  WBC Count: 12.38 K/uL (11-07 @ 04:02)  WBC Count: 11.51 K/uL (11-06 @ 22:06)  WBC Count: 11.69 K/uL (11-06 @ 15:30)    11-09    137  |  104  |  36<H>  ----------------------------<  156<H>  4.1   |  23  |  1.09    Ca    8.3<L>      09 Nov 2021 06:25  Phos  3.6     11-09  Mg     2.5     11-09    TPro  6.7  /  Alb  1.9<L>  /  TBili  0.9  /  DBili  x   /  AST  50<H>  /  ALT  30  /  AlkPhos  164<H>  11-09        ABG - ( 09 Nov 2021 03:43 )  pH: 7.53  /  pCO2: 34    /  pO2: 78    / HCO3: 28    / Base Excess: 5.8   /  SaO2: 96                CULTURES:   .Blood Blood  11-03 @ 10:22   No Growth Final  --  --      Clean Catch Clean Catch (Midstream)  11-02 @ 21:01   No growth  --  --      .Sputum Sputum  11-01 @ 21:18   Moderate Streptococcus agalactiae (Group B) isolated  Group B streptococci are susceptible to ampicillin,  penicillin and cefazolin, but may be resistant to  erythromycin and clindamycin.  Recommendations for intrapartum prophylaxis for Group B  streptococci are penicillin or ampicillin.  Normal Respiratory [place] present  --    Rare polymorphonuclear leukocytes per low power field  No Squamous epithelial cells per low power field  Rare Yeast like cells seen per oil power field  Rare Gram Positive Rods seen per oil power field  Rare Gram positive cocci in pairs seen per oil power field      .Blood Blood-Peripheral  11-01 @ 03:56   No Growth Final  --  Blood Culture PCR  Escherichia coli        RADIOLOGY:  no new studies ICU visit:  89y Female is under our care for E. coli bacteremia and pneumonia.  Patient was seen in the ICU intubated with an FIO2 of 40% and an oxygen saturation of 99%.  She is currently on one pressor with a blood pressure of 115/48     REVIEW OF SYSTEMS:  [  ] Not able to elicit  General:	  Chest:	  GI:	  :  Skin:	  Musculoskeletal:	  Neuro:	    MEDS:  cefepime   IVPB 1000 milliGRAM(s) IV Intermittent every 12 hours    ALLERGIES: Allergies    No Known Allergies    Intolerances        VITALS:  ICU Vital Signs Last 24 Hrs  T(C): 37.9 (09 Nov 2021 11:00), Max: 38.2 (08 Nov 2021 19:00)  T(F): 100.2 (09 Nov 2021 11:00), Max: 100.8 (08 Nov 2021 19:00)  HR: 90 (09 Nov 2021 11:52) (69 - 107)  BP: 114/41 (09 Nov 2021 11:00) (88/42 - 181/72)  BP(mean): 59 (09 Nov 2021 11:00) (50 - 106)  ABP: --  ABP(mean): --  RR: 24 (09 Nov 2021 11:00) (20 - 33)  SpO2: 99% (09 Nov 2021 11:52) (93% - 100%)      PHYSICAL EXAM:  HEENT:  Neck:  Respiratory:  Cardiovascular:  Gastrointestinal:  Extremities:  Skin:  Ortho:  Neuro:    LABS/DIAGNOSTIC TESTS:                        10.0   14.22 )-----------( 294      ( 09 Nov 2021 06:25 )             31.5     WBC Count: 14.22 K/uL (11-09 @ 06:25)  WBC Count: 15.50 K/uL (11-08 @ 04:22)  WBC Count: 12.38 K/uL (11-07 @ 04:02)  WBC Count: 11.51 K/uL (11-06 @ 22:06)  WBC Count: 11.69 K/uL (11-06 @ 15:30)    11-09    137  |  104  |  36<H>  ----------------------------<  156<H>  4.1   |  23  |  1.09    Ca    8.3<L>      09 Nov 2021 06:25  Phos  3.6     11-09  Mg     2.5     11-09    TPro  6.7  /  Alb  1.9<L>  /  TBili  0.9  /  DBili  x   /  AST  50<H>  /  ALT  30  /  AlkPhos  164<H>  11-09        ABG - ( 09 Nov 2021 03:43 )  pH: 7.53  /  pCO2: 34    /  pO2: 78    / HCO3: 28    / Base Excess: 5.8   /  SaO2: 96                CULTURES:   .Blood Blood  11-03 @ 10:22   No Growth Final  --  --      Clean Catch Clean Catch (Midstream)  11-02 @ 21:01   No growth  --  --      .Sputum Sputum  11-01 @ 21:18   Moderate Streptococcus agalactiae (Group B) isolated  Group B streptococci are susceptible to ampicillin,  penicillin and cefazolin, but may be resistant to  erythromycin and clindamycin.  Recommendations for intrapartum prophylaxis for Group B  streptococci are penicillin or ampicillin.  Normal Respiratory [place] present  --    Rare polymorphonuclear leukocytes per low power field  No Squamous epithelial cells per low power field  Rare Yeast like cells seen per oil power field  Rare Gram Positive Rods seen per oil power field  Rare Gram positive cocci in pairs seen per oil power field      .Blood Blood-Peripheral  11-01 @ 03:56   No Growth Final  --  Blood Culture PCR  Escherichia coli        RADIOLOGY:  no new studies ICU visit:  89y Female is under our care for E. coli bacteremia and pneumonia.  Patient was seen in the ICU intubated with an FIO2 of 40% and an oxygen saturation of 100%.  She is currently not on any pressors with a blood pressure of 130/54.  Patient had a low grade fever of 100.2 this morning and WBC count has marginally decreased.     REVIEW OF SYSTEMS:  [ x ] Not able to elicit      MEDS:  cefepime   IVPB 1000 milliGRAM(s) IV Intermittent every 12 hours    ALLERGIES: Allergies    No Known Allergies    Intolerances        VITALS:  ICU Vital Signs Last 24 Hrs  T(C): 37.9 (09 Nov 2021 11:00), Max: 38.2 (08 Nov 2021 19:00)  T(F): 100.2 (09 Nov 2021 11:00), Max: 100.8 (08 Nov 2021 19:00)  HR: 90 (09 Nov 2021 11:52) (69 - 107)  BP: 114/41 (09 Nov 2021 11:00) (88/42 - 181/72)  BP(mean): 59 (09 Nov 2021 11:00) (50 - 106)  ABP: --  ABP(mean): --  RR: 24 (09 Nov 2021 11:00) (20 - 33)  SpO2: 99% (09 Nov 2021 11:52) (93% - 100%)      PHYSICAL EXAM:  HEENT: ETT  Neck: supple no LN's, left neck CVP, right neck EJ line  Respiratory: diminished breath sounds on left, left sided pigtail +  Cardiovascular: S1 S2 reg no murmurs  Gastrointestinal: +BS with soft, nondistended abdomen; nontender  Extremities: bilateral hand edema +2  Skin: no rashes  Ortho: n/a  Neuro: Awake and alert    LABS/DIAGNOSTIC TESTS:                        10.0   14.22 )-----------( 294      ( 09 Nov 2021 06:25 )             31.5     WBC Count: 14.22 K/uL (11-09 @ 06:25)  WBC Count: 15.50 K/uL (11-08 @ 04:22)  WBC Count: 12.38 K/uL (11-07 @ 04:02)  WBC Count: 11.51 K/uL (11-06 @ 22:06)  WBC Count: 11.69 K/uL (11-06 @ 15:30)    11-09    137  |  104  |  36<H>  ----------------------------<  156<H>  4.1   |  23  |  1.09    Ca    8.3<L>      09 Nov 2021 06:25  Phos  3.6     11-09  Mg     2.5     11-09    TPro  6.7  /  Alb  1.9<L>  /  TBili  0.9  /  DBili  x   /  AST  50<H>  /  ALT  30  /  AlkPhos  164<H>  11-09        ABG - ( 09 Nov 2021 03:43 )  pH: 7.53  /  pCO2: 34    /  pO2: 78    / HCO3: 28    / Base Excess: 5.8   /  SaO2: 96                CULTURES:   .Blood Blood  11-03 @ 10:22   No Growth Final  --  --      Clean Catch Clean Catch (Midstream)  11-02 @ 21:01   No growth  --  --      .Sputum Sputum  11-01 @ 21:18   Moderate Streptococcus agalactiae (Group B) isolated  Group B streptococci are susceptible to ampicillin,  penicillin and cefazolin, but may be resistant to  erythromycin and clindamycin.  Recommendations for intrapartum prophylaxis for Group B  streptococci are penicillin or ampicillin.  Normal Respiratory [place] present  --    Rare polymorphonuclear leukocytes per low power field  No Squamous epithelial cells per low power field  Rare Yeast like cells seen per oil power field  Rare Gram Positive Rods seen per oil power field  Rare Gram positive cocci in pairs seen per oil power field      .Blood Blood-Peripheral  11-01 @ 03:56   No Growth Final  --  Blood Culture PCR  Escherichia coli        RADIOLOGY:  no new studies

## 2021-11-10 ENCOUNTER — TRANSCRIPTION ENCOUNTER (OUTPATIENT)
Age: 86
End: 2021-11-10

## 2021-11-10 PROBLEM — F03.90 UNSPECIFIED DEMENTIA WITHOUT BEHAVIORAL DISTURBANCE: Chronic | Status: ACTIVE | Noted: 2021-11-01

## 2021-11-10 PROBLEM — I10 ESSENTIAL (PRIMARY) HYPERTENSION: Chronic | Status: ACTIVE | Noted: 2021-11-01

## 2021-11-10 LAB
ALBUMIN SERPL ELPH-MCNC: 2.4 G/DL — LOW (ref 3.5–5)
ALP SERPL-CCNC: 209 U/L — HIGH (ref 40–120)
ALT FLD-CCNC: 35 U/L DA — SIGNIFICANT CHANGE UP (ref 10–60)
ANION GAP SERPL CALC-SCNC: 6 MMOL/L — SIGNIFICANT CHANGE UP (ref 5–17)
AST SERPL-CCNC: 37 U/L — SIGNIFICANT CHANGE UP (ref 10–40)
BASOPHILS # BLD AUTO: 0.03 K/UL — SIGNIFICANT CHANGE UP (ref 0–0.2)
BASOPHILS NFR BLD AUTO: 0.2 % — SIGNIFICANT CHANGE UP (ref 0–2)
BILIRUB SERPL-MCNC: 0.5 MG/DL — SIGNIFICANT CHANGE UP (ref 0.2–1.2)
BUN SERPL-MCNC: 35 MG/DL — HIGH (ref 7–18)
CALCIUM SERPL-MCNC: 8.8 MG/DL — SIGNIFICANT CHANGE UP (ref 8.4–10.5)
CHLORIDE SERPL-SCNC: 105 MMOL/L — SIGNIFICANT CHANGE UP (ref 96–108)
CO2 SERPL-SCNC: 28 MMOL/L — SIGNIFICANT CHANGE UP (ref 22–31)
CREAT SERPL-MCNC: 0.91 MG/DL — SIGNIFICANT CHANGE UP (ref 0.5–1.3)
EOSINOPHIL # BLD AUTO: 0.31 K/UL — SIGNIFICANT CHANGE UP (ref 0–0.5)
EOSINOPHIL NFR BLD AUTO: 2.3 % — SIGNIFICANT CHANGE UP (ref 0–6)
GLUCOSE SERPL-MCNC: 153 MG/DL — HIGH (ref 70–99)
HCT VFR BLD CALC: 32.8 % — LOW (ref 34.5–45)
HGB BLD-MCNC: 10.4 G/DL — LOW (ref 11.5–15.5)
IMM GRANULOCYTES NFR BLD AUTO: 1.1 % — SIGNIFICANT CHANGE UP (ref 0–1.5)
LYMPHOCYTES # BLD AUTO: 0.58 K/UL — LOW (ref 1–3.3)
LYMPHOCYTES # BLD AUTO: 4.3 % — LOW (ref 13–44)
MAGNESIUM SERPL-MCNC: 2.7 MG/DL — HIGH (ref 1.6–2.6)
MCHC RBC-ENTMCNC: 29 PG — SIGNIFICANT CHANGE UP (ref 27–34)
MCHC RBC-ENTMCNC: 31.7 GM/DL — LOW (ref 32–36)
MCV RBC AUTO: 91.4 FL — SIGNIFICANT CHANGE UP (ref 80–100)
MONOCYTES # BLD AUTO: 0.62 K/UL — SIGNIFICANT CHANGE UP (ref 0–0.9)
MONOCYTES NFR BLD AUTO: 4.6 % — SIGNIFICANT CHANGE UP (ref 2–14)
NEUTROPHILS # BLD AUTO: 11.67 K/UL — HIGH (ref 1.8–7.4)
NEUTROPHILS NFR BLD AUTO: 87.5 % — HIGH (ref 43–77)
NRBC # BLD: 0 /100 WBCS — SIGNIFICANT CHANGE UP (ref 0–0)
PHOSPHATE SERPL-MCNC: 2.5 MG/DL — SIGNIFICANT CHANGE UP (ref 2.5–4.5)
PLATELET # BLD AUTO: 359 K/UL — SIGNIFICANT CHANGE UP (ref 150–400)
POTASSIUM SERPL-MCNC: 3.6 MMOL/L — SIGNIFICANT CHANGE UP (ref 3.5–5.3)
POTASSIUM SERPL-SCNC: 3.6 MMOL/L — SIGNIFICANT CHANGE UP (ref 3.5–5.3)
PROT SERPL-MCNC: 7.8 G/DL — SIGNIFICANT CHANGE UP (ref 6–8.3)
RBC # BLD: 3.59 M/UL — LOW (ref 3.8–5.2)
RBC # FLD: 14.6 % — HIGH (ref 10.3–14.5)
SARS-COV-2 RNA SPEC QL NAA+PROBE: SIGNIFICANT CHANGE UP
SODIUM SERPL-SCNC: 139 MMOL/L — SIGNIFICANT CHANGE UP (ref 135–145)
WBC # BLD: 13.36 K/UL — HIGH (ref 3.8–10.5)
WBC # FLD AUTO: 13.36 K/UL — HIGH (ref 3.8–10.5)

## 2021-11-10 PROCEDURE — 71045 X-RAY EXAM CHEST 1 VIEW: CPT | Mod: 26

## 2021-11-10 RX ORDER — FENTANYL CITRATE 50 UG/ML
25 INJECTION INTRAVENOUS EVERY 6 HOURS
Refills: 0 | Status: DISCONTINUED | OUTPATIENT
Start: 2021-11-10 | End: 2021-11-10

## 2021-11-10 RX ORDER — SODIUM CHLORIDE 9 MG/ML
1000 INJECTION INTRAMUSCULAR; INTRAVENOUS; SUBCUTANEOUS ONCE
Refills: 0 | Status: COMPLETED | OUTPATIENT
Start: 2021-11-10 | End: 2021-11-10

## 2021-11-10 RX ORDER — FENTANYL CITRATE 50 UG/ML
25 INJECTION INTRAVENOUS EVERY 4 HOURS
Refills: 0 | Status: DISCONTINUED | OUTPATIENT
Start: 2021-11-10 | End: 2021-11-15

## 2021-11-10 RX ORDER — METOPROLOL TARTRATE 50 MG
5 TABLET ORAL ONCE
Refills: 0 | Status: COMPLETED | OUTPATIENT
Start: 2021-11-10 | End: 2021-11-10

## 2021-11-10 RX ORDER — CEFEPIME 1 G/1
1000 INJECTION, POWDER, FOR SOLUTION INTRAMUSCULAR; INTRAVENOUS DAILY
Refills: 0 | Status: DISCONTINUED | OUTPATIENT
Start: 2021-11-10 | End: 2021-11-12

## 2021-11-10 RX ORDER — FENTANYL CITRATE 50 UG/ML
50 INJECTION INTRAVENOUS ONCE
Refills: 0 | Status: DISCONTINUED | OUTPATIENT
Start: 2021-11-10 | End: 2021-11-10

## 2021-11-10 RX ORDER — PANTOPRAZOLE SODIUM 20 MG/1
40 TABLET, DELAYED RELEASE ORAL DAILY
Refills: 0 | Status: DISCONTINUED | OUTPATIENT
Start: 2021-11-10 | End: 2021-12-01

## 2021-11-10 RX ADMIN — POLYETHYLENE GLYCOL 3350 17 GRAM(S): 17 POWDER, FOR SOLUTION ORAL at 11:40

## 2021-11-10 RX ADMIN — SODIUM CHLORIDE 1000 MILLILITER(S): 9 INJECTION INTRAMUSCULAR; INTRAVENOUS; SUBCUTANEOUS at 19:45

## 2021-11-10 RX ADMIN — CEFEPIME 100 MILLIGRAM(S): 1 INJECTION, POWDER, FOR SOLUTION INTRAMUSCULAR; INTRAVENOUS at 05:40

## 2021-11-10 RX ADMIN — PANTOPRAZOLE SODIUM 40 MILLIGRAM(S): 20 TABLET, DELAYED RELEASE ORAL at 11:40

## 2021-11-10 RX ADMIN — LACTULOSE 10 GRAM(S): 10 SOLUTION ORAL at 23:21

## 2021-11-10 RX ADMIN — CHLORHEXIDINE GLUCONATE 15 MILLILITER(S): 213 SOLUTION TOPICAL at 05:41

## 2021-11-10 RX ADMIN — CHLORHEXIDINE GLUCONATE 1 APPLICATION(S): 213 SOLUTION TOPICAL at 05:41

## 2021-11-10 RX ADMIN — FENTANYL CITRATE 50 MICROGRAM(S): 50 INJECTION INTRAVENOUS at 20:45

## 2021-11-10 RX ADMIN — Medication 5 MILLIGRAM(S): at 20:10

## 2021-11-10 RX ADMIN — HEPARIN SODIUM 5000 UNIT(S): 5000 INJECTION INTRAVENOUS; SUBCUTANEOUS at 15:34

## 2021-11-10 RX ADMIN — CHLORHEXIDINE GLUCONATE 15 MILLILITER(S): 213 SOLUTION TOPICAL at 17:52

## 2021-11-10 RX ADMIN — SENNA PLUS 2 TABLET(S): 8.6 TABLET ORAL at 23:21

## 2021-11-10 RX ADMIN — FENTANYL CITRATE 50 MICROGRAM(S): 50 INJECTION INTRAVENOUS at 20:15

## 2021-11-10 RX ADMIN — HEPARIN SODIUM 5000 UNIT(S): 5000 INJECTION INTRAVENOUS; SUBCUTANEOUS at 05:41

## 2021-11-10 RX ADMIN — LACTULOSE 10 GRAM(S): 10 SOLUTION ORAL at 15:35

## 2021-11-10 RX ADMIN — FENTANYL CITRATE 25 MICROGRAM(S): 50 INJECTION INTRAVENOUS at 16:57

## 2021-11-10 RX ADMIN — FENTANYL CITRATE 25 MICROGRAM(S): 50 INJECTION INTRAVENOUS at 18:46

## 2021-11-10 NOTE — PROGRESS NOTE ADULT - ASSESSMENT
89Fwith L ptx,  s/p L CT placement 11/1  Prolonged intubated    -Daily CXRs   -Continue tube to suction -10, monitor output   -Plan for trach/peg in AM 11/11  -Consent to be obtained  -Preop labs  -NPO after midnight  -DVT prophylaxis   -Remainder of care as per ICU team

## 2021-11-10 NOTE — PROGRESS NOTE ADULT - ASSESSMENT
Lacho enters for nurse BP check.  143/92  P 74.  Recheck 15 minutes later was 140/93 P 78.  Patient was questioning if his BP could be elevated due to the constant pain he is in.  He has back pain.  He has tried glucosamine/c, Advil and Aleve and not have been helpful. He states the only thing that has helped him in the past is the TENS unit.   [phone].   seen and examined vsstable afebrile physical done  orally intubated awake  not in any distress  resp faiure , pneumonia, cva, dementia, bacterimia  awaiting for tracheostomy

## 2021-11-10 NOTE — PROGRESS NOTE ADULT - SUBJECTIVE AND OBJECTIVE BOX
INTERVAL HPI/OVERNIGHT EVENTS:  Pt seen and examined in ICU  Intubated, sedated  No acute events overnight    MEDICATIONS  (STANDING):  cefepime   IVPB 1000 milliGRAM(s) IV Intermittent daily  chlorhexidine 0.12% Liquid 15 milliLiter(s) Oral Mucosa every 12 hours  chlorhexidine 2% Cloths 1 Application(s) Topical <User Schedule>  fentaNYL    Injectable 50 MICROGram(s) IV Push once  lactulose Syrup 10 Gram(s) Oral every 8 hours  metoprolol tartrate Injectable 5 milliGRAM(s) IV Push once  pantoprazole   Suspension 40 milliGRAM(s) Oral daily  polyethylene glycol 3350 17 Gram(s) Oral daily  senna 2 Tablet(s) Oral at bedtime  sodium chloride 0.9% Bolus 1000 milliLiter(s) IV Bolus once    MEDICATIONS  (PRN):  acetaminophen    Suspension .. 650 milliGRAM(s) Oral every 6 hours PRN Temp greater or equal to 38.5C (101.3F)  fentaNYL    Injectable 25 MICROGram(s) IV Push every 4 hours PRN agitation  sodium chloride 0.9% lock flush 10 milliLiter(s) IV Push every 1 hour PRN Pre/post blood products, medications, blood draw, and to maintain line patency      Vital Signs Last 24 Hrs  T(C): 36.2 (10 Nov 2021 20:00), Max: 37.6 (10 Nov 2021 02:00)  T(F): 97.2 (10 Nov 2021 20:00), Max: 99.7 (10 Nov 2021 02:00)  HR: 75 (10 Nov 2021 22:00) (75 - 168)  BP: 104/50 (10 Nov 2021 22:00) (98/74 - 158/54)  BP(mean): 71 (10 Nov 2021 22:00) (62 - 103)  RR: 22 (10 Nov 2021 22:00) (20 - 23)  SpO2: 99% (10 Nov 2021 22:00) (93% - 100%)    Physical:  General: intubated, sedated  Chest: Left CT with -AL, minimal serous drainage. No crepitus, dressing C/D/I    I&O's Detail    09 Nov 2021 07:01  -  10 Nov 2021 07:00  --------------------------------------------------------  IN:    IV PiggyBack: 100 mL    Jevity 1.5: 690 mL  Total IN: 790 mL    OUT:    Chest Tube (mL): 60 mL    Indwelling Catheter - Urethral (mL): 100 mL    Voided (mL): 200 mL  Total OUT: 360 mL    Total NET: 430 mL      10 Nov 2021 07:01  -  10 Nov 2021 22:51  --------------------------------------------------------  IN:    Enteral Tube Flush: 160 mL    Jevity 1.5: 360 mL    Sodium Chloride 0.9% Bolus: 1000 mL  Total IN: 1520 mL    OUT:    Chest Tube (mL): 0 mL    Voided (mL): 200 mL  Total OUT: 200 mL    Total NET: 1320 mL    LABS:                        10.4   13.36 )-----------( 359      ( 10 Nov 2021 05:18 )             32.8             11-10    139  |  105  |  35<H>  ----------------------------<  153<H>  3.6   |  28  |  0.91    Ca    8.8      10 Nov 2021 05:18  Phos  2.5     11-10  Mg     2.7     11-10    TPro  7.8  /  Alb  2.4<L>  /  TBili  0.5  /  DBili  x   /  AST  37  /  ALT  35  /  AlkPhos  209<H>  11-10

## 2021-11-10 NOTE — PROGRESS NOTE ADULT - ASSESSMENT
89 y/o F, nursing home resident with a significant medical history of CVA with R sided hemiparesis, aphasia, parkinson's, GERD, dementia, CKD, presents to the ED in respiratory distress, intubated while in ED. Patient is being admitted to medicine for sepsis and AHRF likely 2/2 ?PNA    # Acute hypoxic respiratory failure  # Sepsis 2/2 PNA, E. coli bacteremia  # Lactic acidosis  # Joby on CKD  # Hypernatremia    Neuro:   on fentanyl for pain  off sedation and waking up    Pulmonary  Cough and fever in NH for a few days  Acute hypoxic respiratory failure  Patient  intubated in ED  CXR shows LLL infiltrate  Initially isolated now off isolation, covid negative  s/p one dose vanc and zosyn  Also received 6 days of ceftriaxone and azithro  BCx - E. coli bacteremia; Sputum - Strep agalactiae  patient is now on cefepime day 5  Repeat BCx - neg  Fever spikes and runs of tachy (lines out, orona out)  ABG showed 7.44/37/83/25 this morning vent set AC 20/400/5/40%  Monitor resp status  c/w Cefepime to cover for HAP, and VAP  to complete Cefepime until 2 weeks from negative blood culture (until 11/17/21)      Pneumothorax  CXR s/p LIJ insertion showed L pneumothorax  chest tube inserted (11/1/21)  has air leak but maintained at -10 pressure suction  Thoracic Surgery onboard    Cardiovascular  Sinus tachycardia  still has episodes of tachycardia (150s)  Likely 2/2 sepsis and dehydration  EKG showed sinus rhythm with ST changes in inferior leads (11/1/21)  repeat EKG - SR w/ new-onset PACs  Trop was mildly elevated but trended down  ECHO unremarkable other than pleural effusion    Gastrointestinal  started on tube feeds (Jevity)  Nutrition consult  NGT maintain  Hold feeds for trache and peg    ID  Sepsis 2/2  Patient was tachycardic, had high temp 103.8  WBC inc. since admission  CXR shows LLL infiltrate on admission  repeat CXR showed progression of infiltrates, bilateral; right lower lobe pneumonia  s/p 2L bolus  Was given one dose of vanc and zosyn  was on rocephin and zithro (d/c)  latest CXR - No evidence of pneumothorax, no infiltrates or effusion  CX (s/p LIJ) - L pneumothorax > (s/p chest tube - 11/01/21) S/p Lasix 40 mg IV BID and Albumin 25 mg q6 for 48 hours.  Blood Cx - E. coli bacteremia; repeat BCx was neg (11/3/21)  Sputum Cx - Strep agalactiae  UCx was neg  lactate - 11.2>4.9>4.2>3.2>2.1>2.0 (trending down to normal)  Procal - 44.00>35.10>21.10>12.90    spiking fevers, changed to Cefepime to cover for HAP and VAP, orona and lines DC'd  continue on cefepime for 2 weeks since negative culture (until 11/17/21)  ID consult (Dr. Wong)    Renal  Patient has h/o CKD initially presented with Joby in the setting of sepsis- resolved  had hypernatremia, likely 2/2 volume deficit (currently normal)- dehydration  s/p 2 L bolus- resolved  Monitor I/O  - adequate for weight      Endo  BS are elevated  No h/o DM  A1c - 5.8  Monitor BS- so far controlled    Prophylactic  On heparin for dvt prophylaxis  on PPI for GI prophylaxis    Palliative consult (Dr. Michele) - FULL CODE 89 y/o F, nursing home resident with a significant medical history of CVA with R sided hemiparesis, aphasia, parkinson's, GERD, dementia, CKD, presents to the ED in respiratory distress, intubated while in ED. Patient is being admitted to medicine for sepsis and AHRF likely 2/2 ?PNA    # Acute hypoxic respiratory failure  # Sepsis 2/2 PNA, E. coli bacteremia  # Lactic acidosis  # Joby on CKD  # Hypernatremia    Neuro:   on fentanyl for pain  off sedation and waking up    Pulmonary  Cough and fever in NH for a few days  Acute hypoxic respiratory failure  Patient  intubated in ED  CXR shows LLL infiltrate  Initially isolated now off isolation, covid negative  s/p one dose vanc and zosyn  Also received 6 days of ceftriaxone and azithro  BCx - E. coli bacteremia; Sputum - Strep agalactiae  patient is now on cefepime day 5  Repeat BCx - neg  Fever spikes and runs of tachy (lines out, orona out)  ABG showed 7.44/37/83/25 this morning vent set AC 20/400/5/40%  Monitor resp status  c/w Cefepime to cover for HAP, and VAP  to complete Cefepime until 2 weeks from negative blood culture (until 11/17/21)      Pneumothorax  CXR s/p LIJ insertion showed L pneumothorax  chest tube inserted (11/1/21)  has air leak but maintained at -10 pressure suction  Thoracic Surgery onboard    Cardiovascular  Sinus tachycardia  still has episodes of tachycardia (150s)  Likely 2/2 sepsis and dehydration  EKG showed sinus rhythm with ST changes in inferior leads (11/1/21)  repeat EKG - SR w/ new-onset PACs  Trop was mildly elevated but trended down  ECHO unremarkable other than pleural effusion    Gastrointestinal  started on tube feeds (Jevity)  Nutrition consult  NGT maintain  Epigastric tenderness  Hold feeds for trache and peg    ID  Sepsis 2/2  Patient was tachycardic, had high temp 103.8  WBC inc. since admission  CXR shows LLL infiltrate on admission  repeat CXR showed progression of infiltrates, bilateral; right lower lobe pneumonia  s/p 2L bolus  Was given one dose of vanc and zosyn  was on rocephin and zithro (d/c)  latest CXR - No evidence of pneumothorax, no infiltrates or effusion  CX (s/p LIJ) - L pneumothorax > (s/p chest tube - 11/01/21) S/p Lasix 40 mg IV BID and Albumin 25 mg q6 for 48 hours.  Blood Cx - E. coli bacteremia; repeat BCx was neg (11/3/21)  Sputum Cx - Strep agalactiae  UCx was neg  lactate - 11.2>4.9>4.2>3.2>2.1>2.0 (trending down to normal)  Procal - 44.00>35.10>21.10>12.90    spiking fevers, changed to Cefepime to cover for HAP and VAP, orona and lines DC'd  continue on cefepime for 2 weeks since negative culture (until 11/17/21)  ID consult (Dr. Wong)    Renal  Patient has h/o CKD initially presented with Joby in the setting of sepsis- resolved  had hypernatremia, likely 2/2 volume deficit (currently normal)- dehydration  s/p 2 L bolus- resolved  Monitor I/O  - adequate for weight      Endo  BS are elevated  No h/o DM  A1c - 5.8  Monitor BS- so far controlled    Prophylactic  On heparin for dvt prophylaxis  on PPI for GI prophylaxis    Palliative consult (Dr. Michele) - FULL CODE

## 2021-11-10 NOTE — PROGRESS NOTE ADULT - NUTRITIONAL ASSESSMENT
This patient has been assessed with a concern for Malnutrition and has been determined to have a diagnosis/diagnoses of Severe protein-calorie malnutrition and Underweight (BMI < 19).    This patient is being managed with:   Diet NPO after Midnight-     NPO Start Date: 10-Nov-2021   NPO Start Time: 23:59  Entered: Nov 10 2021  7:19PM    Diet NPO-  Tube Feeding Modality: Nasogastric  Jevity 1.5 Bg  Total Volume for 24 Hours (mL): 720  Continuous  Starting Tube Feed Rate {mL per Hour}: 10  Increase Tube Feed Rate by (mL): 5     Every 4 hours  Until Goal Tube Feed Rate (mL per Hour): 30  Tube Feed Duration (in Hours): 24  Tube Feed Start Time: 17:00  Entered: Nov 2 2021 12:08PM

## 2021-11-10 NOTE — PROGRESS NOTE ADULT - SUBJECTIVE AND OBJECTIVE BOX
HPI:  89 yo female from [facility] with medical h/o of HTN, Dementia, CVA with R sided hemiparesis, aphasia, parkinson's, GERD, CKD bedbound at baseline, dependant on assistance for ADL comes in with respiratory failure. Patient was found to have respiratory distress, saturating in 70s when EMS arrived, was placed on NRB on field. She was emergently intubated on arrival in ED. As per NH records patient was having fever and cough for past few days. Today she was found to have respiratory distress. She is vaccinated with moderna. Spoke to family son,  who stated the patient to remain full code. No other history could be obtained.  (01 Nov 2021 00:14)      Patient is a 89y old  Female who presents with a chief complaint of AHRF (10 Nov 2021 11:13)      INTERVAL HPI/OVERNIGHT EVENTS:  T(C): 36.2 (11-10-21 @ 20:00), Max: 37.6 (11-10-21 @ 02:00)  HR: 75 (11-10-21 @ 22:00) (75 - 168)  BP: 104/50 (11-10-21 @ 22:00) (98/74 - 158/54)  RR: 22 (11-10-21 @ 22:00) (20 - 23)  SpO2: 99% (11-10-21 @ 22:00) (93% - 100%)  Wt(kg): --  I&O's Summary    09 Nov 2021 07:01  -  10 Nov 2021 07:00  --------------------------------------------------------  IN: 790 mL / OUT: 360 mL / NET: 430 mL    10 Nov 2021 07:01  -  10 Nov 2021 22:34  --------------------------------------------------------  IN: 1520 mL / OUT: 200 mL / NET: 1320 mL        REVIEW OF SYSTEMS: denies fever, chills, SOB, palpitations, chest pain, abdominal pain, nausea, vomitting, diarrhea, constipation, dizziness    MEDICATIONS  (STANDING):  cefepime   IVPB 1000 milliGRAM(s) IV Intermittent daily  chlorhexidine 0.12% Liquid 15 milliLiter(s) Oral Mucosa every 12 hours  chlorhexidine 2% Cloths 1 Application(s) Topical <User Schedule>  fentaNYL    Injectable 50 MICROGram(s) IV Push once  lactulose Syrup 10 Gram(s) Oral every 8 hours  metoprolol tartrate Injectable 5 milliGRAM(s) IV Push once  pantoprazole   Suspension 40 milliGRAM(s) Oral daily  polyethylene glycol 3350 17 Gram(s) Oral daily  senna 2 Tablet(s) Oral at bedtime  sodium chloride 0.9% Bolus 1000 milliLiter(s) IV Bolus once    MEDICATIONS  (PRN):  acetaminophen    Suspension .. 650 milliGRAM(s) Oral every 6 hours PRN Temp greater or equal to 38.5C (101.3F)  fentaNYL    Injectable 25 MICROGram(s) IV Push every 4 hours PRN agitation  sodium chloride 0.9% lock flush 10 milliLiter(s) IV Push every 1 hour PRN Pre/post blood products, medications, blood draw, and to maintain line patency      PHYSICAL EXAM:  GENERAL: NAD, well-groomed, well-developed  HEAD:  Atraumatic, Normocephalic  EYES: EOMI, PERRLA, conjunctiva and sclera clear  ENMT: No tonsillar erythema, exudates, or enlargement; Moist mucous membranes, Good dentition, No lesions  NECK: Supple, No JVD, Normal thyroid  NERVOUS SYSTEM:  Alert & Oriented X3, Good concentration; Motor Strength 5/5 B/L upper and lower extremities; DTRs 2+ intact and symmetric  CHEST/LUNG: Clear to percussion bilaterally; No rales, rhonchi, wheezing, or rubs  HEART: Regular rate and rhythm; No murmurs, rubs, or gallops  ABDOMEN: Soft, Nontender, Nondistended; Bowel sounds present  EXTREMITIES:  2+ Peripheral Pulses, No clubbing, cyanosis, or edema  LYMPH: No lymphadenopathy noted  SKIN: No rashes or lesions  LABS:                        10.4   13.36 )-----------( 359      ( 10 Nov 2021 05:18 )             32.8     11-10    139  |  105  |  35<H>  ----------------------------<  153<H>  3.6   |  28  |  0.91    Ca    8.8      10 Nov 2021 05:18  Phos  2.5     11-10  Mg     2.7     11-10    TPro  7.8  /  Alb  2.4<L>  /  TBili  0.5  /  DBili  x   /  AST  37  /  ALT  35  /  AlkPhos  209<H>  11-10        CAPILLARY BLOOD GLUCOSE      POCT Blood Glucose.: 191 mg/dL (10 Nov 2021 11:29)      ABG - ( 09 Nov 2021 03:43 )  pH, Arterial: 7.53  pH, Blood: x     /  pCO2: 34    /  pO2: 78    / HCO3: 28    / Base Excess: 5.8   /  SaO2: 96

## 2021-11-10 NOTE — PROGRESS NOTE ADULT - SUBJECTIVE AND OBJECTIVE BOX
BRIEF HOSPITAL COURSE    89 y/o F, nursing home resident with a significant medical history of CVA with R sided hemiparesis, aphasia, parkinson's, GERD, dementia, CKD, presented to the ED [place]  in respiratory distress following few day history of cough and fever, placed on NRB en route, for RR48 /min Sats 79 % HR 150s  intubated while in ED. Patient is being admitted to medicine for sepsis and AHRF likely 2/2 PNA SPUTUM CULTURE - STREP AGALACTIAE     She was given 1 dose of Vancomycin and Zosyn in the E.D  Now on Cefepime daily    Remained hypotensive despite boluses, underwent CVC placement, sustained L  PTX. She is s/p chest tube placement 11/1  She is no longer needing pressors  Still on the vent,no sedation this AM, is waking up      INTERVAL HPI/OVERNIGHT EVENTS:   no acute events  Fever spike, tachy non sustained    PRESSORS: [X ] NO  WHICH:    ANTIBIOTICS:       cefepime           DATE STARTED: 11/7/2021 day 4    Antimicrobial:  cefepime   IVPB 1000 milliGRAM(s) IV Intermittent every 12 hours    Cardiovascular:    Pulmonary:    Hematalogic:  heparin   Injectable 5000 Unit(s) SubCutaneous every 8 hours    Other:  acetaminophen    Suspension .. 650 milliGRAM(s) Oral every 6 hours PRN  chlorhexidine 0.12% Liquid 15 milliLiter(s) Oral Mucosa every 12 hours  chlorhexidine 2% Cloths 1 Application(s) Topical <User Schedule>  lactulose Syrup 10 Gram(s) Oral every 8 hours  pantoprazole  Injectable 40 milliGRAM(s) IV Push daily  polyethylene glycol 3350 17 Gram(s) Oral daily  senna 2 Tablet(s) Oral at bedtime  sodium chloride 0.9% lock flush 10 milliLiter(s) IV Push every 1 hour PRN      Drug Dosing Weight  Height (cm): 167.6 (03 Nov 2021 16:02)  Weight (kg): 40.9 (01 Nov 2021 02:20)  BMI (kg/m2): 14.6 (03 Nov 2021 16:02)  BSA (m2): 1.43 (03 Nov 2021 16:02)    CENTRAL LINE: [X ] NO        SAEED: no Patient is voiding through primafit      A-LINE:   [ X] NO     PMH/Social Hx/Fam Hx -reviewed admission note, no change since admission  PAST MEDICAL & SURGICAL HISTORY:  HTN (hypertension)    Dementia      Heart faliure: acute [ ] chronic [ ] acute or chronic [ ] diastolic [ ] systolic [ ] combied systolic and diastolic[ ]  CARO: ATN[ ] renal medullary necrosis [ ] CKD I [ ]CKDII [ ]CKD III [ ]CKD IV [ ]CKD V [ ]Other pathological lesions [ ]  Abdominal Nutrition Status: malnutrition [ ] cachexia [ ] morbid obesity/BMI=40 [ ] Supplement ordered [___________]     T(C): 37.4 (11-10-21 @ 07:00), Max: 38 (11-09-21 @ 19:00)  HR: 78 (11-10-21 @ 11:10)  BP: 137/59 (11-10-21 @ 10:00)  BP(mean): 78 (11-10-21 @ 10:00)  ABP: --  ABP(mean): --  RR: 20 (11-10-21 @ 10:00)  SpO2: 98% (11-10-21 @ 11:10)  Wt(kg): --    ABG - ( 09 Nov 2021 03:43 )  pH, Arterial: 7.53  pH, Blood: x     /  pCO2: 34    /  pO2: 78    / HCO3: 28    / Base Excess: 5.8   /  SaO2: 96                    11-09 @ 07:01  -  11-10 @ 07:00  --------------------------------------------------------  IN: 790 mL / OUT: 360 mL / NET: 430 mL        Mode: AC/ CMV (Assist Control/ Continuous Mandatory Ventilation)  RR (machine): 20  TV (machine): 400  FiO2: 40  PEEP: 5  ITime: 1  MAP: 11  PIP: 25      PHYSICAL EXAM:    GENERAL: [ ]NAD, [ ]well-groomed, [ ]well-developed  HEAD:  [ ]Atraumatic, [ ]Normocephalic  EYES: [ ]EOMI, [ ]PERRLA, [ ]conjunctiva and sclera clear  ENMT: [ ]No tonsillar erythema, exudates, or enlargement; [ ]Moist mucous membranes, [ ]Good dentition, [ ]No lesions  NECK: [ ]Supple, normal appearance, [ ]No JVD; [ ]Normal thyroid; [ ]Trachea midline  NERVOUS SYSTEM:  [ ]Alert & Oriented X3, [ ]Good concentration; [ ]Motor Strength 5/5 B/L upper and lower extremities; [ ]DTRs 2+ intact and symmetric  CHEST/LUNG: [ ]No chest deformity; [ ]Normal percussion bilaterally; [ ]No rales, rhonchi, wheezing; [ ]Crackles at bases  HEART: [ ]Regular rate and rhythm; [ ]No murmurs, rubs, or gallops  ABDOMEN: [ ]Soft, Nontender, Nondistended; [ ]Bowel sounds present  EXTREMITIES:  [ ]2+ Peripheral Pulses, [ ]No clubbing, cyanosis, or edema [ ]Bilat lower extremity edema  LYMPH: [ ]No lymphadenopathy noted  SKIN: [ ]No rashes or lesions; [ ]Good capillary refill      LABS:  CBC Full  -  ( 10 Nov 2021 05:18 )  WBC Count : 13.36 K/uL  RBC Count : 3.59 M/uL  Hemoglobin : 10.4 g/dL  Hematocrit : 32.8 %  Platelet Count - Automated : 359 K/uL  Mean Cell Volume : 91.4 fl  Mean Cell Hemoglobin : 29.0 pg  Mean Cell Hemoglobin Concentration : 31.7 gm/dL  Auto Neutrophil # : 11.67 K/uL  Auto Lymphocyte # : 0.58 K/uL  Auto Monocyte # : 0.62 K/uL  Auto Eosinophil # : 0.31 K/uL  Auto Basophil # : 0.03 K/uL  Auto Neutrophil % : 87.5 %  Auto Lymphocyte % : 4.3 %  Auto Monocyte % : 4.6 %  Auto Eosinophil % : 2.3 %  Auto Basophil % : 0.2 %    11-10    139  |  105  |  35<H>  ----------------------------<  153<H>  3.6   |  28  |  0.91    Ca    8.8      10 Nov 2021 05:18  Phos  2.5     11-10  Mg     2.7     11-10    TPro  7.8  /  Alb  2.4<L>  /  TBili  0.5  /  DBili  x   /  AST  37  /  ALT  35  /  AlkPhos  209<H>  11-10            RADIOLOGY & ADDITIONAL STUDIES REVIEWED:      [ ]GOALS OF CARE DISCUSSION WITH PATIENT/FAMILY/PROXY:    CRITICAL CARE TIME SPENT: 35 minutes BRIEF HOSPITAL COURSE    89 y/o F, nursing home resident with a significant medical history of CVA with R sided hemiparesis, aphasia, parkinson's, GERD, dementia, CKD, presented to the ED [place]  in respiratory distress following few day history of cough and fever, placed on NRB en route, for RR48 /min Sats 79 % HR 150s  intubated while in ED. Patient is being admitted to medicine for sepsis and AHRF likely 2/2 PNA SPUTUM CULTURE - STREP AGALACTIAE     She was given 1 dose of Vancomycin and Zosyn in the E.D  Now on Cefepime daily    Remained hypotensive despite boluses, underwent CVC placement, sustained L  PTX. She is s/p chest tube placement 11/1  She is no longer needing pressors  Still on the vent,no sedation this AM, is waking up      INTERVAL HPI/OVERNIGHT EVENTS:   no acute events  Fever spike, tachy non sustained    PRESSORS: [X ] NO  WHICH:    ANTIBIOTICS:       cefepime           DATE STARTED: 11/7/2021 day 4    Antimicrobial:  cefepime   IVPB 1000 milliGRAM(s) IV Intermittent every 12 hours    Cardiovascular:    Pulmonary:    Hematalogic:  heparin   Injectable 5000 Unit(s) SubCutaneous every 8 hours    Other:  acetaminophen    Suspension .. 650 milliGRAM(s) Oral every 6 hours PRN  chlorhexidine 0.12% Liquid 15 milliLiter(s) Oral Mucosa every 12 hours  chlorhexidine 2% Cloths 1 Application(s) Topical <User Schedule>  lactulose Syrup 10 Gram(s) Oral every 8 hours  pantoprazole  Injectable 40 milliGRAM(s) IV Push daily  polyethylene glycol 3350 17 Gram(s) Oral daily  senna 2 Tablet(s) Oral at bedtime  sodium chloride 0.9% lock flush 10 milliLiter(s) IV Push every 1 hour PRN      Drug Dosing Weight  Height (cm): 167.6 (03 Nov 2021 16:02)  Weight (kg): 40.9 (01 Nov 2021 02:20)  BMI (kg/m2): 14.6 (03 Nov 2021 16:02)  BSA (m2): 1.43 (03 Nov 2021 16:02)    CENTRAL LINE: [X ] NO        SAEED: no Patient is voiding through primafit      A-LINE:   [ X] NO     PMH/Social Hx/Fam Hx -reviewed admission note, no change since admission  PAST MEDICAL & SURGICAL HISTORY:  HTN (hypertension)    Dementia        T(C): 37.4 (11-10-21 @ 07:00), Max: 38 (11-09-21 @ 19:00)  HR: 78 (11-10-21 @ 11:10)  BP: 137/59 (11-10-21 @ 10:00)  BP(mean): 78 (11-10-21 @ 10:00)  ABP: --  ABP(mean): --  RR: 20 (11-10-21 @ 10:00)  SpO2: 98% (11-10-21 @ 11:10)  Wt(kg): --    ABG - ( 09 Nov 2021 03:43 )  pH, Arterial: 7.53  pH, Blood: x     /  pCO2: 34    /  pO2: 78    / HCO3: 28    / Base Excess: 5.8   /  SaO2: 96                    11-09 @ 07:01  -  11-10 @ 07:00  --------------------------------------------------------  IN: 790 mL / OUT: 360 mL / NET: 430 mL        Mode: AC/ CMV (Assist Control/ Continuous Mandatory Ventilation)  RR (machine): 20  TV (machine): 400  FiO2: 40  PEEP: 5  ITime: 1  MAP: 11  PIP: 25      PHYSICAL EXAM:    GENERAL: NAD, waking up  HEAD:  Atraumatic, Normocephalic  EYES: EOMI, PERRLA, conjunctiva and sclera clear  ENT: Moist mucous membranes  NECK: Supple, No JVD  CHEST/LUNG: Clear to auscultation bilaterally;  on vent  HEART: Regular rate and rhythm; No murmurs, rubs, or gallops  ABDOMEN: Bowel sounds present; Soft, tender epigastric Nondistended. No hepatomegally  EXTREMITIES:  2+ Peripheral Pulses, brisk capillary refill. No clubbing, cyanosis, or edema  NERVOUS SYSTEM:  sedated   SKIN: No rashes or lesions      LABS:  CBC Full  -  ( 10 Nov 2021 05:18 )  WBC Count : 13.36 K/uL  RBC Count : 3.59 M/uL  Hemoglobin : 10.4 g/dL  Hematocrit : 32.8 %  Platelet Count - Automated : 359 K/uL  Mean Cell Volume : 91.4 fl  Mean Cell Hemoglobin : 29.0 pg  Mean Cell Hemoglobin Concentration : 31.7 gm/dL  Auto Neutrophil # : 11.67 K/uL  Auto Lymphocyte # : 0.58 K/uL  Auto Monocyte # : 0.62 K/uL  Auto Eosinophil # : 0.31 K/uL  Auto Basophil # : 0.03 K/uL  Auto Neutrophil % : 87.5 %  Auto Lymphocyte % : 4.3 %  Auto Monocyte % : 4.6 %  Auto Eosinophil % : 2.3 %  Auto Basophil % : 0.2 %    11-10    139  |  105  |  35<H>  ----------------------------<  153<H>  3.6   |  28  |  0.91    Ca    8.8      10 Nov 2021 05:18  Phos  2.5     11-10  Mg     2.7     11-10    TPro  7.8  /  Alb  2.4<L>  /  TBili  0.5  /  DBili  x   /  AST  37  /  ALT  35  /  AlkPhos  209<H>  11-10            RADIOLOGY & ADDITIONAL STUDIES REVIEWED:      [ ]GOALS OF CARE DISCUSSION WITH PATIENT/FAMILY/PROXY:    CRITICAL CARE TIME SPENT: 35 minutes

## 2021-11-10 NOTE — PROGRESS NOTE ADULT - SUBJECTIVE AND OBJECTIVE BOX
ICU visit:  89y Female is under our care for    REVIEW OF SYSTEMS:  [  ] Not able to elicit  General:	  Chest:	  GI:	  :  Skin:	  Musculoskeletal:	  Neuro:	    MEDS:  cefepime   IVPB 1000 milliGRAM(s) IV Intermittent every 12 hours    ALLERGIES: Allergies    No Known Allergies    Intolerances        VITALS:  ICU Vital Signs Last 24 Hrs  T(C): 37.4 (10 Nov 2021 07:00), Max: 38 (09 Nov 2021 19:00)  T(F): 99.3 (10 Nov 2021 07:00), Max: 100.4 (09 Nov 2021 19:00)  HR: 89 (10 Nov 2021 10:00) (82 - 101)  BP: 137/59 (10 Nov 2021 10:00) (108/84 - 172/74)  BP(mean): 78 (10 Nov 2021 10:00) (59 - 103)  ABP: --  ABP(mean): --  RR: 20 (10 Nov 2021 10:00) (20 - 24)  SpO2: 100% (10 Nov 2021 10:00) (89% - 100%)      PHYSICAL EXAM:  HEENT:  Neck:  Respiratory:  Cardiovascular:  Gastrointestinal:  Extremities:  Skin:  Ortho:  Neuro:    LABS/DIAGNOSTIC TESTS:                        10.4   13.36 )-----------( 359      ( 10 Nov 2021 05:18 )             32.8     WBC Count: 13.36 K/uL (11-10 @ 05:18)  WBC Count: 14.22 K/uL (11-09 @ 06:25)  WBC Count: 15.50 K/uL (11-08 @ 04:22)  WBC Count: 12.38 K/uL (11-07 @ 04:02)  WBC Count: 11.51 K/uL (11-06 @ 22:06)    11-10    139  |  105  |  35<H>  ----------------------------<  153<H>  3.6   |  28  |  0.91    Ca    8.8      10 Nov 2021 05:18  Phos  2.5     11-10  Mg     2.7     11-10    TPro  7.8  /  Alb  2.4<L>  /  TBili  0.5  /  DBili  x   /  AST  37  /  ALT  35  /  AlkPhos  209<H>  11-10        ABG - ( 09 Nov 2021 03:43 )  pH: 7.53  /  pCO2: 34    /  pO2: 78    / HCO3: 28    / Base Excess: 5.8   /  SaO2: 96                CULTURES:   .Blood Blood  11-03 @ 10:22   No Growth Final  --  --      Clean Catch Clean Catch (Midstream)  11-02 @ 21:01   No growth  --  --      .Sputum Sputum  11-01 @ 21:18   Moderate Streptococcus agalactiae (Group B) isolated  Group B streptococci are susceptible to ampicillin,  penicillin and cefazolin, but may be resistant to  erythromycin and clindamycin.  Recommendations for intrapartum prophylaxis for Group B  streptococci are penicillin or ampicillin.  Normal Respiratory [place] present  --    Rare polymorphonuclear leukocytes per low power field  No Squamous epithelial cells per low power field  Rare Yeast like cells seen per oil power field  Rare Gram Positive Rods seen per oil power field  Rare Gram positive cocci in pairs seen per oil power field      .Blood Blood-Peripheral  11-01 @ 03:56   No Growth Final  --  Blood Culture PCR  Escherichia coli        RADIOLOGY:  no new studies ICU visit:  89y Female is under our care for E. coli bacteremia and pneumonia.  Patient was seen in the ICU intubated with an FIO2 of 40%.  She is currently not on any pressors with a blood pressure of 137/59.  Patient has been having low grade fevers and WBC count is downtrending.     REVIEW OF SYSTEMS:  [ x ] Not able to elicit      MEDS:  cefepime   IVPB 1000 milliGRAM(s) IV Intermittent every 12 hours    ALLERGIES: Allergies    No Known Allergies    Intolerances        VITALS:  ICU Vital Signs Last 24 Hrs  T(C): 37.4 (10 Nov 2021 07:00), Max: 38 (09 Nov 2021 19:00)  T(F): 99.3 (10 Nov 2021 07:00), Max: 100.4 (09 Nov 2021 19:00)  HR: 89 (10 Nov 2021 10:00) (82 - 101)  BP: 137/59 (10 Nov 2021 10:00) (108/84 - 172/74)  BP(mean): 78 (10 Nov 2021 10:00) (59 - 103)  ABP: --  ABP(mean): --  RR: 20 (10 Nov 2021 10:00) (20 - 24)  SpO2: 100% (10 Nov 2021 10:00) (89% - 100%)      PHYSICAL EXAM:  HEENT: ETT  Neck: supple no LN's, left neck CVP, right neck EJ line  Respiratory: diminished breath sounds on left, left sided pigtail +  Cardiovascular: S1 S2 reg no murmurs  Gastrointestinal: +BS with soft, nondistended abdomen; nontender  Extremities: bilateral hand edema +2  Skin: no rashes  Ortho: n/a  Neuro: Awake and alert    LABS/DIAGNOSTIC TESTS:                        10.4   13.36 )-----------( 359      ( 10 Nov 2021 05:18 )             32.8     WBC Count: 13.36 K/uL (11-10 @ 05:18)  WBC Count: 14.22 K/uL (11-09 @ 06:25)  WBC Count: 15.50 K/uL (11-08 @ 04:22)  WBC Count: 12.38 K/uL (11-07 @ 04:02)  WBC Count: 11.51 K/uL (11-06 @ 22:06)    11-10    139  |  105  |  35<H>  ----------------------------<  153<H>  3.6   |  28  |  0.91    Ca    8.8      10 Nov 2021 05:18  Phos  2.5     11-10  Mg     2.7     11-10    TPro  7.8  /  Alb  2.4<L>  /  TBili  0.5  /  DBili  x   /  AST  37  /  ALT  35  /  AlkPhos  209<H>  11-10        ABG - ( 09 Nov 2021 03:43 )  pH: 7.53  /  pCO2: 34    /  pO2: 78    / HCO3: 28    / Base Excess: 5.8   /  SaO2: 96                CULTURES:   .Blood Blood  11-03 @ 10:22   No Growth Final  --  --      Clean Catch Clean Catch (Midstream)  11-02 @ 21:01   No growth  --  --      .Sputum Sputum  11-01 @ 21:18   Moderate Streptococcus agalactiae (Group B) isolated  Group B streptococci are susceptible to ampicillin,  penicillin and cefazolin, but may be resistant to  erythromycin and clindamycin.  Recommendations for intrapartum prophylaxis for Group B  streptococci are penicillin or ampicillin.  Normal Respiratory [place] present  --    Rare polymorphonuclear leukocytes per low power field  No Squamous epithelial cells per low power field  Rare Yeast like cells seen per oil power field  Rare Gram Positive Rods seen per oil power field  Rare Gram positive cocci in pairs seen per oil power field      .Blood Blood-Peripheral  11-01 @ 03:56   No Growth Final  --  Blood Culture PCR  Escherichia coli        RADIOLOGY:  no new studies

## 2021-11-10 NOTE — PROGRESS NOTE ADULT - ASSESSMENT
UTI  Bacteremia - E coli  Pneumonia  Septic Shock  Fevers - low grade  Leukocytosis - improving    Plan:  ·	Continue Maxipime 1 gm IV q12hrs D#8 for a total of 14 days  ·	poor prognosis, patient is full code    Time spent - 32 mins           UTI  Bacteremia - E coli  Pneumonia  Septic Shock  Fevers - low grade  Leukocytosis - improving    Plan:  ·	Continue Maxipime 1 gm IV q12hrs D#8 for a total of 14 days  ·	poor prognosis, patient is full code    Time spent - 32 mins    I agree with above

## 2021-11-11 ENCOUNTER — APPOINTMENT (OUTPATIENT)
Dept: THORACIC SURGERY | Facility: HOSPITAL | Age: 86
End: 2021-11-11

## 2021-11-11 LAB
ALBUMIN SERPL ELPH-MCNC: 1.9 G/DL — LOW (ref 3.5–5)
ALP SERPL-CCNC: 147 U/L — HIGH (ref 40–120)
ALT FLD-CCNC: 40 U/L DA — SIGNIFICANT CHANGE UP (ref 10–60)
ANION GAP SERPL CALC-SCNC: 5 MMOL/L — SIGNIFICANT CHANGE UP (ref 5–17)
APTT BLD: 31.2 SEC — SIGNIFICANT CHANGE UP (ref 27.5–35.5)
AST SERPL-CCNC: 40 U/L — SIGNIFICANT CHANGE UP (ref 10–40)
BASOPHILS # BLD AUTO: 0.04 K/UL — SIGNIFICANT CHANGE UP (ref 0–0.2)
BASOPHILS NFR BLD AUTO: 0.4 % — SIGNIFICANT CHANGE UP (ref 0–2)
BILIRUB SERPL-MCNC: 0.6 MG/DL — SIGNIFICANT CHANGE UP (ref 0.2–1.2)
BLD GP AB SCN SERPL QL: SIGNIFICANT CHANGE UP
BUN SERPL-MCNC: 31 MG/DL — HIGH (ref 7–18)
CALCIUM SERPL-MCNC: 8.2 MG/DL — LOW (ref 8.4–10.5)
CHLORIDE SERPL-SCNC: 110 MMOL/L — HIGH (ref 96–108)
CO2 SERPL-SCNC: 28 MMOL/L — SIGNIFICANT CHANGE UP (ref 22–31)
CREAT SERPL-MCNC: 0.7 MG/DL — SIGNIFICANT CHANGE UP (ref 0.5–1.3)
EOSINOPHIL # BLD AUTO: 0.24 K/UL — SIGNIFICANT CHANGE UP (ref 0–0.5)
EOSINOPHIL NFR BLD AUTO: 2.3 % — SIGNIFICANT CHANGE UP (ref 0–6)
GLUCOSE SERPL-MCNC: 118 MG/DL — HIGH (ref 70–99)
HCT VFR BLD CALC: 27 % — LOW (ref 34.5–45)
HGB BLD-MCNC: 8.4 G/DL — LOW (ref 11.5–15.5)
IMM GRANULOCYTES NFR BLD AUTO: 1.4 % — SIGNIFICANT CHANGE UP (ref 0–1.5)
INR BLD: 1.12 RATIO — SIGNIFICANT CHANGE UP (ref 0.88–1.16)
LYMPHOCYTES # BLD AUTO: 0.66 K/UL — LOW (ref 1–3.3)
LYMPHOCYTES # BLD AUTO: 6.4 % — LOW (ref 13–44)
MAGNESIUM SERPL-MCNC: 2.6 MG/DL — SIGNIFICANT CHANGE UP (ref 1.6–2.6)
MCHC RBC-ENTMCNC: 29.2 PG — SIGNIFICANT CHANGE UP (ref 27–34)
MCHC RBC-ENTMCNC: 31.1 GM/DL — LOW (ref 32–36)
MCV RBC AUTO: 93.8 FL — SIGNIFICANT CHANGE UP (ref 80–100)
MONOCYTES # BLD AUTO: 0.73 K/UL — SIGNIFICANT CHANGE UP (ref 0–0.9)
MONOCYTES NFR BLD AUTO: 7.1 % — SIGNIFICANT CHANGE UP (ref 2–14)
NEUTROPHILS # BLD AUTO: 8.47 K/UL — HIGH (ref 1.8–7.4)
NEUTROPHILS NFR BLD AUTO: 82.4 % — HIGH (ref 43–77)
NRBC # BLD: 0 /100 WBCS — SIGNIFICANT CHANGE UP (ref 0–0)
PHOSPHATE SERPL-MCNC: 2 MG/DL — LOW (ref 2.5–4.5)
PLATELET # BLD AUTO: 398 K/UL — SIGNIFICANT CHANGE UP (ref 150–400)
POTASSIUM SERPL-MCNC: 3.3 MMOL/L — LOW (ref 3.5–5.3)
POTASSIUM SERPL-SCNC: 3.3 MMOL/L — LOW (ref 3.5–5.3)
PROT SERPL-MCNC: 6.3 G/DL — SIGNIFICANT CHANGE UP (ref 6–8.3)
PROTHROM AB SERPL-ACNC: 13.3 SEC — SIGNIFICANT CHANGE UP (ref 10.6–13.6)
RBC # BLD: 2.88 M/UL — LOW (ref 3.8–5.2)
RBC # FLD: 14.6 % — HIGH (ref 10.3–14.5)
SODIUM SERPL-SCNC: 143 MMOL/L — SIGNIFICANT CHANGE UP (ref 135–145)
WBC # BLD: 10.28 K/UL — SIGNIFICANT CHANGE UP (ref 3.8–10.5)
WBC # FLD AUTO: 10.28 K/UL — SIGNIFICANT CHANGE UP (ref 3.8–10.5)

## 2021-11-11 PROCEDURE — 99231 SBSQ HOSP IP/OBS SF/LOW 25: CPT

## 2021-11-11 PROCEDURE — 31615 TRCHEOBRNCHSC EST TRACHS INC: CPT

## 2021-11-11 PROCEDURE — 43246 EGD PLACE GASTROSTOMY TUBE: CPT

## 2021-11-11 PROCEDURE — 31645 BRNCHSC W/THER ASPIR 1ST: CPT | Mod: 59

## 2021-11-11 PROCEDURE — 71045 X-RAY EXAM CHEST 1 VIEW: CPT | Mod: 26

## 2021-11-11 PROCEDURE — 31600 PLANNED TRACHEOSTOMY: CPT

## 2021-11-11 PROCEDURE — 31624 DX BRONCHOSCOPE/LAVAGE: CPT | Mod: 59

## 2021-11-11 RX ORDER — MUPIROCIN 20 MG/G
1 OINTMENT TOPICAL
Refills: 0 | Status: COMPLETED | OUTPATIENT
Start: 2021-11-11 | End: 2021-11-16

## 2021-11-11 RX ORDER — POTASSIUM CHLORIDE 20 MEQ
10 PACKET (EA) ORAL
Refills: 0 | Status: COMPLETED | OUTPATIENT
Start: 2021-11-11 | End: 2021-11-11

## 2021-11-11 RX ORDER — POTASSIUM PHOSPHATE, MONOBASIC POTASSIUM PHOSPHATE, DIBASIC 236; 224 MG/ML; MG/ML
15 INJECTION, SOLUTION INTRAVENOUS ONCE
Refills: 0 | Status: COMPLETED | OUTPATIENT
Start: 2021-11-11 | End: 2021-11-11

## 2021-11-11 RX ADMIN — MUPIROCIN 1 APPLICATION(S): 20 OINTMENT TOPICAL at 17:10

## 2021-11-11 RX ADMIN — Medication 100 MILLIEQUIVALENT(S): at 07:59

## 2021-11-11 RX ADMIN — FENTANYL CITRATE 25 MICROGRAM(S): 50 INJECTION INTRAVENOUS at 19:44

## 2021-11-11 RX ADMIN — FENTANYL CITRATE 25 MICROGRAM(S): 50 INJECTION INTRAVENOUS at 07:00

## 2021-11-11 RX ADMIN — CHLORHEXIDINE GLUCONATE 15 MILLILITER(S): 213 SOLUTION TOPICAL at 17:10

## 2021-11-11 RX ADMIN — CEFEPIME 100 MILLIGRAM(S): 1 INJECTION, POWDER, FOR SOLUTION INTRAMUSCULAR; INTRAVENOUS at 12:27

## 2021-11-11 RX ADMIN — CHLORHEXIDINE GLUCONATE 15 MILLILITER(S): 213 SOLUTION TOPICAL at 06:26

## 2021-11-11 RX ADMIN — FENTANYL CITRATE 25 MICROGRAM(S): 50 INJECTION INTRAVENOUS at 03:00

## 2021-11-11 RX ADMIN — POTASSIUM PHOSPHATE, MONOBASIC POTASSIUM PHOSPHATE, DIBASIC 62.5 MILLIMOLE(S): 236; 224 INJECTION, SOLUTION INTRAVENOUS at 08:00

## 2021-11-11 RX ADMIN — Medication 100 MILLIEQUIVALENT(S): at 06:55

## 2021-11-11 RX ADMIN — Medication 100 MILLIEQUIVALENT(S): at 06:15

## 2021-11-11 RX ADMIN — FENTANYL CITRATE 25 MICROGRAM(S): 50 INJECTION INTRAVENOUS at 00:57

## 2021-11-11 RX ADMIN — CHLORHEXIDINE GLUCONATE 1 APPLICATION(S): 213 SOLUTION TOPICAL at 06:17

## 2021-11-11 RX ADMIN — FENTANYL CITRATE 25 MICROGRAM(S): 50 INJECTION INTRAVENOUS at 19:46

## 2021-11-11 RX ADMIN — FENTANYL CITRATE 25 MICROGRAM(S): 50 INJECTION INTRAVENOUS at 06:27

## 2021-11-11 NOTE — CHART NOTE - NSCHARTNOTEFT_GEN_A_CORE
Assessment:   89yFemalePatient is a 89y old  Female who presents with a chief complaint of AHRF (11 Nov 2021 10:42)      Factors impacting intake: [ ] none [ ] nausea  [ ] vomiting [ ] diarrhea [ ] constipation  [ ]chewing problems [ ] swallowing issues  [x ] other: patient is now NPO s/p PEG placement , suggest To start tube feeding with Jevity 1.5 at 20ml/hx24 and increase by 10ml/h every 8-12hrs To 30ml/hx24 ( 720ml, 1080kcal, 46g protein, 547ml free water). may add 1 packet Of prosource for an additional 15g protein/d. MD to monitor/assess fluid status as needed.  remains ventilator dependent.        Diet Presciption: Diet, NPO after Midnight:      NPO Start Date: 10-Nov-2021,   NPO Start Time: 23:59 (11-10-21 @ 19:21)    Intake: was meeting needs with tube feeding     Current Weight: 49.4kg(11/10)  % Weight change: patient with 16% wt gain in 2 days     Pertinent Medications: MEDICATIONS  (STANDING):  cefepime   IVPB 1000 milliGRAM(s) IV Intermittent daily  chlorhexidine 0.12% Liquid 15 milliLiter(s) Oral Mucosa every 12 hours  chlorhexidine 2% Cloths 1 Application(s) Topical <User Schedule>  mupirocin 2% Ointment 1 Application(s) Topical two times a day  pantoprazole   Suspension 40 milliGRAM(s) Oral daily    MEDICATIONS  (PRN):  acetaminophen    Suspension .. 650 milliGRAM(s) Oral every 6 hours PRN Temp greater or equal to 38.5C (101.3F)  fentaNYL    Injectable 25 MICROGram(s) IV Push every 4 hours PRN agitation  sodium chloride 0.9% lock flush 10 milliLiter(s) IV Push every 1 hour PRN Pre/post blood products, medications, blood draw, and to maintain line patency    Pertinent Labs: 11-11 Na143 mmol/L Glu 118 mg/dL<H> K+ 3.3 mmol/L<L> Cr  0.70 mg/dL BUN 31 mg/dL<H> 11-11 Phos 2.0 mg/dL<L> 11-11 Alb 1.9 g/dL<L> 11-07 Chol --    LDL --    HDL --    Trig 118 mg/dL     CAPILLARY BLOOD GLUCOSE      POCT Blood Glucose.: 142 mg/dL (11 Nov 2021 17:04)  POCT Blood Glucose.: 125 mg/dL (11 Nov 2021 13:48)    Skin: No pressure injury     Estimated Needs:   [x ] no change since previous assessment  [ ] recalculated:     Previous Nutrition Diagnosis:   [ ] Inadequate Energy Intake [ ]Inadequate Oral Intake [ ] Excessive Energy Intake   [ ] Underweight [ ] Increased Nutrient Needs [ ] Overweight/Obesity   [ ] Altered GI Function [ ] Unintended Weight Loss [ ] Food & Nutrition Related Knowledge Deficit [x ] Malnutrition , severe     Nutrition Diagnosis is [x ] ongoing  [ ] resolved [ ] not applicable     New Nutrition Diagnosis: [ ] not applicable       Interventions:   Recommend  [ ] Change Diet To:  [ ] Nutrition Supplement  [ ] Nutrition Support  [x ] Other:  suggest To start tube feeding with Jevity 1.5 at 20ml/hx24 and increase by 10ml/h every 8-12hrs To 30ml/hx24 ( 720ml, 1080kcal, 46g protein, 547ml free water). may add 1 packet Of prosource for an additional 15g protein/d. MD to monitor/assess fluid status as needed.    Monitoring and Evaluation:   [ ] PO intake [ x ] Tolerance to diet prescription [ x ] weights [ x ] labs[ x ] follow up per protocol  [ ] other:

## 2021-11-11 NOTE — PROGRESS NOTE ADULT - ASSESSMENT
89 y/o F, nursing home resident with a significant medical history of CVA with R sided hemiparesis, aphasia, parkinson's, GERD, dementia, CKD, presents to the ED in respiratory distress, intubated while in ED. Patient is being admitted to medicine for sepsis and AHRF likely 2/2 ?PNA    # Acute hypoxic respiratory failure  # Sepsis 2/2 PNA, E. coli bacteremia  # Lactic acidosis  # Joby on CKD  # Hypernatremia    Neuro:   no longer on any sedation  fentanyl 25mcg PRN q4h for agitation     Pulmonary  Cough and fever in NH for a few days  Acute hypoxic respiratory failure  Patient  intubated in ED  CXR shows LLL infiltrate  Initially isolated now off isolation, covid negative  s/p one dose vanc and zosyn  Also received 6 days of ceftriaxone and azithro  BCx - E. coli bacteremia; Sputum - Strep agalactiae  patient is now on cefepime day 6  Repeat BCx - neg  Fever spikes and runs of tachy (lines out, orona out)  vent set AC 20/400/5/40%  Monitor resp status  c/w Cefepime to cover for HAP, and VAP  to complete Cefepime until 2 weeks from negative blood culture (until 11/17/21)      Pneumothorax  CXR s/p LIJ insertion showed L pneumothorax  chest tube inserted (11/1/21)  has air leak but maintained at -10 pressure suction  Thoracic Surgery onboard    Cardiovascular  Sinus tachycardia  still has episodes of tachycardia (150s)  Likely 2/2 sepsis and dehydration  EKG showed sinus rhythm with ST changes in inferior leads (11/1/21)  repeat EKG - SR w/ new-onset PACs  Trop was mildly elevated but trended down  ECHO unremarkable other than pleural effusion    Gastrointestinal  started on tube feeds (Jevity)  Nutrition consult  NGT maintain  Epigastric tenderness  Hold feeds for trache and peg    ID  Sepsis 2/2  Patient was tachycardic, had high temp 103.8  WBC inc. since admission  CXR shows LLL infiltrate on admission  repeat CXR showed progression of infiltrates, bilateral; right lower lobe pneumonia  s/p 2L bolus  Was given one dose of vanc and zosyn  was on rocephin and zithro (d/c)  latest CXR - No evidence of pneumothorax, no infiltrates or effusion  CX (s/p LIJ) - L pneumothorax > (s/p chest tube - 11/01/21) S/p Lasix 40 mg IV BID and Albumin 25 mg q6 for 48 hours.  Blood Cx - E. coli bacteremia; repeat BCx was neg (11/3/21)  Sputum Cx - Strep agalactiae  UCx was neg  lactate - 11.2>4.9>4.2>3.2>2.1>2.0>1.5 (trending down to normal)  Procal - 44.00>35.10>21.10>12.90>5>3.38>2.9    spiking fevers, changed to Cefepime to cover for HAP and VAP, orona and lines DC'd  continue on cefepime for 2 weeks since negative culture (until 11/17/21)  ID consult (Dr. Wong)    Renal  Patient has h/o CKD initially presented with Jboy in the setting of sepsis- resolved  had hypernatremia, likely 2/2 volume deficit (currently normal)- dehydration  s/p 2 L bolus- resolved  Monitor I/O Net + 1550 over last 24 hours       Endo  BS are elevated  No h/o DM  A1c - 5.8  Monitor BS- so far controlled    Prophylactic  On heparin for dvt prophylaxis, on hold for trach   on PPI for GI prophylaxis    Palliative consult (Dr. Michele) - FULL CODE

## 2021-11-11 NOTE — PROGRESS NOTE ADULT - SUBJECTIVE AND OBJECTIVE BOX
INTERVAL HPI/OVERNIGHT EVENTS: ***    PRESSORS: [ ] YES [ ] NO  WHICH:    ANTIBIOTICS:                  DATE STARTED:    Antimicrobial:  cefepime   IVPB 1000 milliGRAM(s) IV Intermittent daily    Cardiovascular:    Pulmonary:    Hematalogic:    Other:  acetaminophen    Suspension .. 650 milliGRAM(s) Oral every 6 hours PRN  chlorhexidine 0.12% Liquid 15 milliLiter(s) Oral Mucosa every 12 hours  chlorhexidine 2% Cloths 1 Application(s) Topical <User Schedule>  fentaNYL    Injectable 25 MICROGram(s) IV Push every 4 hours PRN  lactulose Syrup 10 Gram(s) Oral every 8 hours  pantoprazole   Suspension 40 milliGRAM(s) Oral daily  polyethylene glycol 3350 17 Gram(s) Oral daily  potassium chloride  10 mEq/100 mL IVPB 10 milliEquivalent(s) IV Intermittent every 1 hour  potassium phosphate IVPB 15 milliMole(s) IV Intermittent once  senna 2 Tablet(s) Oral at bedtime  sodium chloride 0.9% lock flush 10 milliLiter(s) IV Push every 1 hour PRN    acetaminophen    Suspension .. 650 milliGRAM(s) Oral every 6 hours PRN  cefepime   IVPB 1000 milliGRAM(s) IV Intermittent daily  chlorhexidine 0.12% Liquid 15 milliLiter(s) Oral Mucosa every 12 hours  chlorhexidine 2% Cloths 1 Application(s) Topical <User Schedule>  fentaNYL    Injectable 25 MICROGram(s) IV Push every 4 hours PRN  lactulose Syrup 10 Gram(s) Oral every 8 hours  pantoprazole   Suspension 40 milliGRAM(s) Oral daily  polyethylene glycol 3350 17 Gram(s) Oral daily  potassium chloride  10 mEq/100 mL IVPB 10 milliEquivalent(s) IV Intermittent every 1 hour  potassium phosphate IVPB 15 milliMole(s) IV Intermittent once  senna 2 Tablet(s) Oral at bedtime  sodium chloride 0.9% lock flush 10 milliLiter(s) IV Push every 1 hour PRN    Drug Dosing Weight  Height (cm): 167.6 (03 Nov 2021 16:02)  Weight (kg): 40.9 (01 Nov 2021 02:20)  BMI (kg/m2): 14.6 (03 Nov 2021 16:02)  BSA (m2): 1.43 (03 Nov 2021 16:02)    CENTRAL LINE: [ ] YES [ ] NO  LOCATION:         SAEED: [ ] YES [ ] NO          A-LINE:  [ ] YES [ ] NO  LOCATION:         ICU Vital Signs Last 24 Hrs  T(C): 36.8 (11 Nov 2021 04:00), Max: 37.4 (10 Nov 2021 11:00)  T(F): 98.2 (11 Nov 2021 04:00), Max: 99.3 (10 Nov 2021 11:00)  HR: 88 (11 Nov 2021 07:00) (73 - 168)  BP: 106/91 (11 Nov 2021 07:00) (96/48 - 139/57)  BP(mean): 96 (11 Nov 2021 07:00) (63 - 103)  ABP: --  ABP(mean): --  RR: 18 (11 Nov 2021 07:00) (18 - 23)  SpO2: 99% (11 Nov 2021 07:00) (96% - 100%)            11-10 @ 07:01  -  11-11 @ 07:00  --------------------------------------------------------  IN: 1750 mL / OUT: 200 mL / NET: 1550 mL        Mode: AC/ CMV (Assist Control/ Continuous Mandatory Ventilation)  RR (machine): 20  TV (machine): 400  FiO2: 40  PEEP: 5  ITime: 0.9  MAP: 11  PIP: 27        PHYSICAL EXAM:    GENERAL: NAD, well-groomed, well-developed  HEAD: Nomocephalic, Atraumatic  EYES: EOMI, PERRLA,   NECK: Supple, No JVD; Normal thyroid; Trachea midline  NERVOUS SYSTEM:  Alert & Oriented X3,  Motor Strength 5/5 B/L upper and lower extremities; DTRs 2+ intact and symmetric  CHEST/LUNG: No rales, rhonchi, wheezing   HEART: Regular rate and rhythm; No murmurs,   ABDOMEN: Soft, Nontender, Nondistended; Bowel sounds present  EXTREMITIES:  2+ Peripheral Pulses, No clubbing, cyanosis, or edema  SKIN: No lesions      LABS:  CBC Full  -  ( 11 Nov 2021 03:26 )  WBC Count : 10.28 K/uL  RBC Count : 2.88 M/uL  Hemoglobin : 8.4 g/dL  Hematocrit : 27.0 %  Platelet Count - Automated : 398 K/uL  Mean Cell Volume : 93.8 fl  Mean Cell Hemoglobin : 29.2 pg  Mean Cell Hemoglobin Concentration : 31.1 gm/dL  Auto Neutrophil # : 8.47 K/uL  Auto Lymphocyte # : 0.66 K/uL  Auto Monocyte # : 0.73 K/uL  Auto Eosinophil # : 0.24 K/uL  Auto Basophil # : 0.04 K/uL  Auto Neutrophil % : 82.4 %  Auto Lymphocyte % : 6.4 %  Auto Monocyte % : 7.1 %  Auto Eosinophil % : 2.3 %  Auto Basophil % : 0.4 %    11-11    143  |  110<H>  |  31<H>  ----------------------------<  118<H>  3.3<L>   |  28  |  0.70    Ca    8.2<L>      11 Nov 2021 03:26  Phos  2.0     11-11  Mg     2.6     11-11    TPro  6.3  /  Alb  1.9<L>  /  TBili  0.6  /  DBili  x   /  AST  40  /  ALT  40  /  AlkPhos  147<H>  11-11    PT/INR - ( 11 Nov 2021 03:26 )   PT: 13.3 sec;   INR: 1.12 ratio         PTT - ( 11 Nov 2021 03:26 )  PTT:31.2 sec        RADIOLOGY & ADDITIONAL STUDIES REVIEWED:  ***    [ ]GOALS OF CARE DISCUSSION WITH PATIENT/FAMILY/PROXY:    CRITICAL CARE TIME SPENT: 35 minutes INTERVAL HPI: 89 y/o F, nursing home resident with a significant medical history of CVA with R sided hemiparesis, aphasia, parkinson's, GERD, dementia, CKD, presented to the ED [place] from [place]  in respiratory distress following few day history of cough and fever, placed on NRB en route, for RR of 48/min Sats 79 % HR 150s  intubated while in ED. Patient is being admitted to medicine for sepsis and AHRF likely 2/2 PNA SPUTUM CULTURE - STREP AGALACTIAE. She was given 1 dose of Vancomycin and Zosyn in the E.D and now on Cefepime daily. Patient Remained hypotensive despite boluses, underwent CVC placement, sustained L  PTX. She is s/p chest tube placement on 11/1. She is no longer needing pressors and still on the vent, no sedation this AM, is waking up and scheduled for trach today.     OVERNIGHT EVENTS: Patient developed sustained tachycardia into 170s overnight, EKG showed SVT. Was given metoprolol 2.5mg x2 doses, saline bouls with no change in HR. Given fentanyl pushes which lowered HR. Scheduled for tracheostomy today.     PRESSORS: [ ] YES [X ] NO  WHICH:    ANTIBIOTICS:                  DATE STARTED:    Antimicrobial:  cefepime   IVPB 1000 milliGRAM(s) IV Intermittent daily    Other:  acetaminophen    Suspension .. 650 milliGRAM(s) Oral every 6 hours PRN  chlorhexidine 0.12% Liquid 15 milliLiter(s) Oral Mucosa every 12 hours  chlorhexidine 2% Cloths 1 Application(s) Topical <User Schedule>  fentaNYL    Injectable 25 MICROGram(s) IV Push every 4 hours PRN  lactulose Syrup 10 Gram(s) Oral every 8 hours  pantoprazole   Suspension 40 milliGRAM(s) Oral daily  polyethylene glycol 3350 17 Gram(s) Oral daily  potassium chloride  10 mEq/100 mL IVPB 10 milliEquivalent(s) IV Intermittent every 1 hour  potassium phosphate IVPB 15 milliMole(s) IV Intermittent once  senna 2 Tablet(s) Oral at bedtime  sodium chloride 0.9% lock flush 10 milliLiter(s) IV Push every 1 hour PRN    acetaminophen    Suspension .. 650 milliGRAM(s) Oral every 6 hours PRN  cefepime   IVPB 1000 milliGRAM(s) IV Intermittent daily  chlorhexidine 0.12% Liquid 15 milliLiter(s) Oral Mucosa every 12 hours  chlorhexidine 2% Cloths 1 Application(s) Topical <User Schedule>  fentaNYL    Injectable 25 MICROGram(s) IV Push every 4 hours PRN  lactulose Syrup 10 Gram(s) Oral every 8 hours  pantoprazole   Suspension 40 milliGRAM(s) Oral daily  polyethylene glycol 3350 17 Gram(s) Oral daily  potassium chloride  10 mEq/100 mL IVPB 10 milliEquivalent(s) IV Intermittent every 1 hour  potassium phosphate IVPB 15 milliMole(s) IV Intermittent once  senna 2 Tablet(s) Oral at bedtime  sodium chloride 0.9% lock flush 10 milliLiter(s) IV Push every 1 hour PRN    Drug Dosing Weight  Height (cm): 167.6 (03 Nov 2021 16:02)  Weight (kg): 40.9 (01 Nov 2021 02:20)  BMI (kg/m2): 14.6 (03 Nov 2021 16:02)  BSA (m2): 1.43 (03 Nov 2021 16:02)    CENTRAL LINE: [ ] YES [X ] NO  LOCATION:         SAEED: [ ] YES [X ] NO          A-LINE:  [ ] YES [X] NO  LOCATION:         ICU Vital Signs Last 24 Hrs  T(C): 36.8 (11 Nov 2021 04:00), Max: 37.4 (10 Nov 2021 11:00)  T(F): 98.2 (11 Nov 2021 04:00), Max: 99.3 (10 Nov 2021 11:00)  HR: 88 (11 Nov 2021 07:00) (73 - 168)  BP: 106/91 (11 Nov 2021 07:00) (96/48 - 139/57)  BP(mean): 96 (11 Nov 2021 07:00) (63 - 103)  ABP: --  ABP(mean): --  RR: 18 (11 Nov 2021 07:00) (18 - 23)  SpO2: 99% (11 Nov 2021 07:00) (96% - 100%)            11-10 @ 07:01  -  11-11 @ 07:00  --------------------------------------------------------  IN: 1750 mL / OUT: 200 mL / NET: 1550 mL        Mode: AC/ CMV (Assist Control/ Continuous Mandatory Ventilation)  RR (machine): 20  TV (machine): 400  FiO2: 40  PEEP: 5  ITime: 0.9  MAP: 11  PIP: 27        PHYSICAL EXAM:    GENERAL: NAD, sleepy   HEAD: Nomocephalic, Atraumatic  EYES: EOMI, PERRLA,   NECK: Supple, No JVD; Normal thyroid; Trachea midline  NERVOUS SYSTEM:  sleepy   CHEST/LUNG: No rales, rhonchi, wheezing   HEART: Regular rate and rhythm; No murmurs,   ABDOMEN: Soft, Nontender, Nondistended; Bowel sounds present  EXTREMITIES:  2+ Peripheral Pulses, No clubbing, cyanosis, or edema  SKIN: No lesions      LABS:  CBC Full  -  ( 11 Nov 2021 03:26 )  WBC Count : 10.28 K/uL  RBC Count : 2.88 M/uL  Hemoglobin : 8.4 g/dL  Hematocrit : 27.0 %  Platelet Count - Automated : 398 K/uL  Mean Cell Volume : 93.8 fl  Mean Cell Hemoglobin : 29.2 pg  Mean Cell Hemoglobin Concentration : 31.1 gm/dL  Auto Neutrophil # : 8.47 K/uL  Auto Lymphocyte # : 0.66 K/uL  Auto Monocyte # : 0.73 K/uL  Auto Eosinophil # : 0.24 K/uL  Auto Basophil # : 0.04 K/uL  Auto Neutrophil % : 82.4 %  Auto Lymphocyte % : 6.4 %  Auto Monocyte % : 7.1 %  Auto Eosinophil % : 2.3 %  Auto Basophil % : 0.4 %    11-11    143  |  110<H>  |  31<H>  ----------------------------<  118<H>  3.3<L>   |  28  |  0.70    Ca    8.2<L>      11 Nov 2021 03:26  Phos  2.0     11-11  Mg     2.6     11-11    TPro  6.3  /  Alb  1.9<L>  /  TBili  0.6  /  DBili  x   /  AST  40  /  ALT  40  /  AlkPhos  147<H>  11-11    PT/INR - ( 11 Nov 2021 03:26 )   PT: 13.3 sec;   INR: 1.12 ratio         PTT - ( 11 Nov 2021 03:26 )  PTT:31.2 sec        RADIOLOGY & ADDITIONAL STUDIES REVIEWED:  ***    [ ]GOALS OF CARE DISCUSSION WITH PATIENT/FAMILY/PROXY:    CRITICAL CARE TIME SPENT: 35 minutes

## 2021-11-11 NOTE — PROGRESS NOTE ADULT - SUBJECTIVE AND OBJECTIVE BOX
HPI:  89 yo female from [facility] with medical h/o of HTN, Dementia, CVA with R sided hemiparesis, aphasia, parkinson's, GERD, CKD bedbound at baseline, dependant on assistance for ADL comes in with respiratory failure. Patient was found to have respiratory distress, saturating in 70s when EMS arrived, was placed on NRB on field. She was emergently intubated on arrival in ED. As per NH records patient was having fever and cough for past few days. Today she was found to have respiratory distress. She is vaccinated with moderna. Spoke to family son,  who stated the patient to remain full code. No other history could be obtained.  (01 Nov 2021 00:14)      Patient is a 89y old  Female who presents with a chief complaint of AHRF (11 Nov 2021 10:42)      INTERVAL HPI/OVERNIGHT EVENTS:  T(C): 36.6 (11-11-21 @ 15:00), Max: 37.2 (11-11-21 @ 07:45)  HR: 83 (11-11-21 @ 17:00) (73 - 168)  BP: 119/82 (11-11-21 @ 17:00) (96/48 - 130/75)  RR: 20 (11-11-21 @ 17:00) (18 - 23)  SpO2: 100% (11-11-21 @ 17:00) (96% - 100%)  Wt(kg): --  I&O's Summary    10 Nov 2021 07:01  -  11 Nov 2021 07:00  --------------------------------------------------------  IN: 1850 mL / OUT: 200 mL / NET: 1650 mL    11 Nov 2021 07:01  -  11 Nov 2021 18:41  --------------------------------------------------------  IN: 300 mL / OUT: 0 mL / NET: 300 mL        REVIEW OF SYSTEMS: denies fever, chills, SOB, palpitations, chest pain, abdominal pain, nausea, vomitting, diarrhea, constipation, dizziness    MEDICATIONS  (STANDING):  cefepime   IVPB 1000 milliGRAM(s) IV Intermittent daily  chlorhexidine 0.12% Liquid 15 milliLiter(s) Oral Mucosa every 12 hours  chlorhexidine 2% Cloths 1 Application(s) Topical <User Schedule>  mupirocin 2% Ointment 1 Application(s) Topical two times a day  pantoprazole   Suspension 40 milliGRAM(s) Oral daily    MEDICATIONS  (PRN):  acetaminophen    Suspension .. 650 milliGRAM(s) Oral every 6 hours PRN Temp greater or equal to 38.5C (101.3F)  fentaNYL    Injectable 25 MICROGram(s) IV Push every 4 hours PRN agitation  sodium chloride 0.9% lock flush 10 milliLiter(s) IV Push every 1 hour PRN Pre/post blood products, medications, blood draw, and to maintain line patency      PHYSICAL EXAM:  GENERAL: NAD, well-groomed, well-developed  HEAD:  Atraumatic, Normocephalic  EYES: EOMI, PERRLA, conjunctiva and sclera clear  ENMT: No tonsillar erythema, exudates, or enlargement; Moist mucous membranes, Good dentition, No lesions  NECK: Supple, No JVD, Normal thyroid  NERVOUS SYSTEM:  Alert & Oriented X3, Good concentration; Motor Strength 5/5 B/L upper and lower extremities; DTRs 2+ intact and symmetric  CHEST/LUNG: Clear to percussion bilaterally; No rales, rhonchi, wheezing, or rubs  HEART: Regular rate and rhythm; No murmurs, rubs, or gallops  ABDOMEN: Soft, Nontender, Nondistended; Bowel sounds present  EXTREMITIES:  2+ Peripheral Pulses, No clubbing, cyanosis, or edema  LYMPH: No lymphadenopathy noted  SKIN: No rashes or lesions  LABS:                        8.4    10.28 )-----------( 398      ( 11 Nov 2021 03:26 )             27.0     11-11    143  |  110<H>  |  31<H>  ----------------------------<  118<H>  3.3<L>   |  28  |  0.70    Ca    8.2<L>      11 Nov 2021 03:26  Phos  2.0     11-11  Mg     2.6     11-11    TPro  6.3  /  Alb  1.9<L>  /  TBili  0.6  /  DBili  x   /  AST  40  /  ALT  40  /  AlkPhos  147<H>  11-11    PT/INR - ( 11 Nov 2021 03:26 )   PT: 13.3 sec;   INR: 1.12 ratio         PTT - ( 11 Nov 2021 03:26 )  PTT:31.2 sec    CAPILLARY BLOOD GLUCOSE      POCT Blood Glucose.: 142 mg/dL (11 Nov 2021 17:04)  POCT Blood Glucose.: 125 mg/dL (11 Nov 2021 13:48)

## 2021-11-11 NOTE — PROGRESS NOTE ADULT - SUBJECTIVE AND OBJECTIVE BOX
for trach today, discussed with  and grandson (Dr. Pathak) over phone, consent obtained for trach/peg regarding procedure including risks of surgery not limited to bleeding , infection, scarring, injury to surrounding structures. they was agreeable to proceed. minimal vent settings. covid negative

## 2021-11-11 NOTE — CHART NOTE - NSCHARTNOTEFT_GEN_A_CORE
POST PROCEDURE NOTE    SUBJECTIVE: Patient seen and examined bedside.     cefepime   IVPB 1000 milliGRAM(s) IV Intermittent daily    Vital Signs Last 24 Hrs  T(C): 36.6 (11 Nov 2021 15:00), Max: 37.2 (11 Nov 2021 07:45)  T(F): 97.8 (11 Nov 2021 15:00), Max: 98.9 (11 Nov 2021 07:45)  HR: 83 (11 Nov 2021 18:00) (73 - 168)  BP: 117/96 (11 Nov 2021 18:00) (96/48 - 130/75)  BP(mean): 102 (11 Nov 2021 18:00) (64 - 109)  RR: 21 (11 Nov 2021 18:00) (18 - 23)  SpO2: 98% (11 Nov 2021 18:00) (96% - 100%)  I&O's Detail    10 Nov 2021 07:01  -  11 Nov 2021 07:00  --------------------------------------------------------  IN:    Enteral Tube Flush: 160 mL    IV PiggyBack: 300 mL    Jevity 1.5: 390 mL    Sodium Chloride 0.9% Bolus: 1000 mL  Total IN: 1850 mL    OUT:    Chest Tube (mL): 0 mL    Voided (mL): 200 mL  Total OUT: 200 mL    Total NET: 1650 mL      11 Nov 2021 07:01  -  11 Nov 2021 19:02  --------------------------------------------------------  IN:    IV PiggyBack: 250 mL    IV PiggyBack: 50 mL  Total IN: 300 mL    OUT:    Jevity 1.5: 0 mL  Total OUT: 0 mL    Total NET: 300 mL          PHYSICAL EXAMINATION     PULM: nonlabored breathing, no respiratory distress, good tidal volume, tracheostomy site without hematoma or erythema. Surgicell dry  ABD: soft, nondistended, PEG tube site clean and dry, no spillage of gastric content          LABS:                        8.4    10.28 )-----------( 398      ( 11 Nov 2021 03:26 )             27.0     11-11    143  |  110<H>  |  31<H>  ----------------------------<  118<H>  3.3<L>   |  28  |  0.70    Ca    8.2<L>      11 Nov 2021 03:26  Phos  2.0     11-11  Mg     2.6     11-11    TPro  6.3  /  Alb  1.9<L>  /  TBili  0.6  /  DBili  x   /  AST  40  /  ALT  40  /  AlkPhos  147<H>  11-11    PT/INR - ( 11 Nov 2021 03:26 )   PT: 13.3 sec;   INR: 1.12 ratio         PTT - ( 11 Nov 2021 03:26 )  PTT:31.2 sec         89 years old female, s/p Trach and PEG  PO stable      Plan:  1- No DVT ppx for 24 hours. Save to start 11/12  after 11 AM  2- Please keep PEG on gravity for 24 hours (PEG is attached to a orona bag  for venting)  3- Surgery team will remove surgicel tomorrow  4- Stitches will stay for 10 days  5- Can start diet via PEG tube after 24 hours  6 - Twenty-four hours from the procedure, patient can be started on continuous tube feeding - rate: 20 cc/hour until the goal is reached.   7- Must be flushed after use and the medications needs to be completely crushed. Attention; some medications cannot be crushed. Double check with pharmacy before crushing, or liquid form can be used as alternative.   8- Attention to bleeding, erythema, feeds spillage, abdominal distention, tympanism, sings of peritonitis). If patient vomits, the PEG can be used to decompress the stomach.   9- Care per primary team.

## 2021-11-11 NOTE — BRIEF OPERATIVE NOTE - OPERATION/FINDINGS
Performed open tracheostomy with insertion of Portex D.I.C. Tracheostomy tube flexible, cuffed, 7.0mm    Performed PEG tube using Pull through Technique using PEG kit Endovive pull size 20 F.

## 2021-11-11 NOTE — PROGRESS NOTE ADULT - ASSESSMENT
UTI  Bacteremia - E coli  Pneumonia  Septic Shock  Fevers - improving  Leukocytosis - improving    Plan:  ·	Continue Maxipime 1 gm IV q12hrs D#9 for a total of 14 days  ·	poor prognosis, patient is full code    Time spent - 33 mins         UTI  Bacteremia - E coli  Pneumonia  Septic Shock  Fevers - improving  Leukocytosis - improving    Plan:  ·	Continue Maxipime 1 gm IV qd D#9 for a total of 14 days  ·	poor prognosis, patient is full code    Time spent - 33 mins         UTI  Bacteremia - E coli  Pneumonia  Septic Shock  Fevers - improving  Leukocytosis - improving    Plan:  ·	Continue Maxipime 1 gm IV qd D#9 for a total of 14 days  ·	poor prognosis, patient is full code    Time spent - 33 mins    I agree with above

## 2021-11-11 NOTE — PROGRESS NOTE ADULT - SUBJECTIVE AND OBJECTIVE BOX
89y Female is under our care for     REVIEW OF SYSTEMS:  [  ] Not able to elicit  General:	  Chest:	  GI:	  :  Skin:	  Musculoskeletal:	  Neuro:	    MEDS:  cefepime   IVPB 1000 milliGRAM(s) IV Intermittent daily    ALLERGIES: Allergies    No Known Allergies    Intolerances        VITALS:  Vital Signs Last 24 Hrs  T(C): 37.2 (11 Nov 2021 07:45), Max: 37.4 (10 Nov 2021 11:00)  T(F): 98.9 (11 Nov 2021 07:45), Max: 99.3 (10 Nov 2021 11:00)  HR: 85 (11 Nov 2021 09:00) (73 - 168)  BP: 128/95 (11 Nov 2021 09:00) (96/48 - 139/57)  BP(mean): 109 (11 Nov 2021 09:00) (63 - 109)  RR: 20 (11 Nov 2021 09:00) (18 - 23)  SpO2: 100% (11 Nov 2021 09:00) (96% - 100%)      PHYSICAL EXAM:  HEENT:  Neck:  Respiratory:  Cardiovascular:  Gastrointestinal:  Extremities:  Skin:  Ortho:  Neuro:    LABS/DIAGNOSTIC TESTS:                        8.4    10.28 )-----------( 398      ( 11 Nov 2021 03:26 )             27.0     WBC Count: 10.28 K/uL (11-11 @ 03:26)  WBC Count: 13.36 K/uL (11-10 @ 05:18)  WBC Count: 14.22 K/uL (11-09 @ 06:25)  WBC Count: 15.50 K/uL (11-08 @ 04:22)  WBC Count: 12.38 K/uL (11-07 @ 04:02)    11-11    143  |  110<H>  |  31<H>  ----------------------------<  118<H>  3.3<L>   |  28  |  0.70    Ca    8.2<L>      11 Nov 2021 03:26  Phos  2.0     11-11  Mg     2.6     11-11    TPro  6.3  /  Alb  1.9<L>  /  TBili  0.6  /  DBili  x   /  AST  40  /  ALT  40  /  AlkPhos  147<H>  11-11      CULTURES:   .Blood Blood  11-03 @ 10:22   No Growth Final  --  --      Clean Catch Clean Catch (Midstream)  11-02 @ 21:01   No growth  --  --      .Sputum Sputum  11-01 @ 21:18   Moderate Streptococcus agalactiae (Group B) isolated  Group B streptococci are susceptible to ampicillin,  penicillin and cefazolin, but may be resistant to  erythromycin and clindamycin.  Recommendations for intrapartum prophylaxis for Group B  streptococci are penicillin or ampicillin.  Normal Respiratory [place] present  --    Rare polymorphonuclear leukocytes per low power field  No Squamous epithelial cells per low power field  Rare Yeast like cells seen per oil power field  Rare Gram Positive Rods seen per oil power field  Rare Gram positive cocci in pairs seen per oil power field      .Blood Blood-Peripheral  11-01 @ 03:56   No Growth Final  --  Blood Culture PCR  Escherichia coli        RADIOLOGY:  no new studies 89y Female is under our care for E. coli bacteremia and pneumonia.  Patient was seen laying comfortably in bed with no acute distress.  She is s/p trach and peg earlier this morning, remains afebrile and WBC count has normalized.    REVIEW OF SYSTEMS:  [ x ] Not able to elicit      MEDS:  cefepime   IVPB 1000 milliGRAM(s) IV Intermittent daily    ALLERGIES: Allergies    No Known Allergies    Intolerances        VITALS:  Vital Signs Last 24 Hrs  T(C): 37.2 (11 Nov 2021 07:45), Max: 37.4 (10 Nov 2021 11:00)  T(F): 98.9 (11 Nov 2021 07:45), Max: 99.3 (10 Nov 2021 11:00)  HR: 85 (11 Nov 2021 09:00) (73 - 168)  BP: 128/95 (11 Nov 2021 09:00) (96/48 - 139/57)  BP(mean): 109 (11 Nov 2021 09:00) (63 - 109)  RR: 20 (11 Nov 2021 09:00) (18 - 23)  SpO2: 100% (11 Nov 2021 09:00) (96% - 100%)      PHYSICAL EXAM:  HEENT: n/a  Neck: supple, trach +  Respiratory: diminished breath sounds on left, left sided pigtail +  Cardiovascular: S1 S2 reg no murmurs  Gastrointestinal: +BS with soft, nondistended abdomen; nontender, peg tube in place  Extremities: bilateral hand edema +2  Skin: no rashes  Ortho: n/a  Neuro: Awake and alert    LABS/DIAGNOSTIC TESTS:                        8.4    10.28 )-----------( 398      ( 11 Nov 2021 03:26 )             27.0     WBC Count: 10.28 K/uL (11-11 @ 03:26)  WBC Count: 13.36 K/uL (11-10 @ 05:18)  WBC Count: 14.22 K/uL (11-09 @ 06:25)  WBC Count: 15.50 K/uL (11-08 @ 04:22)  WBC Count: 12.38 K/uL (11-07 @ 04:02)    11-11    143  |  110<H>  |  31<H>  ----------------------------<  118<H>  3.3<L>   |  28  |  0.70    Ca    8.2<L>      11 Nov 2021 03:26  Phos  2.0     11-11  Mg     2.6     11-11    TPro  6.3  /  Alb  1.9<L>  /  TBili  0.6  /  DBili  x   /  AST  40  /  ALT  40  /  AlkPhos  147<H>  11-11      CULTURES:   .Blood Blood  11-03 @ 10:22   No Growth Final  --  --      Clean Catch Clean Catch (Midstream)  11-02 @ 21:01   No growth  --  --      .Sputum Sputum  11-01 @ 21:18   Moderate Streptococcus agalactiae (Group B) isolated  Group B streptococci are susceptible to ampicillin,  penicillin and cefazolin, but may be resistant to  erythromycin and clindamycin.  Recommendations for intrapartum prophylaxis for Group B  streptococci are penicillin or ampicillin.  Normal Respiratory [place] present  --    Rare polymorphonuclear leukocytes per low power field  No Squamous epithelial cells per low power field  Rare Yeast like cells seen per oil power field  Rare Gram Positive Rods seen per oil power field  Rare Gram positive cocci in pairs seen per oil power field      .Blood Blood-Peripheral  11-01 @ 03:56   No Growth Final  --  Blood Culture PCR  Escherichia coli        RADIOLOGY:  no new studies

## 2021-11-11 NOTE — PROGRESS NOTE ADULT - SUBJECTIVE AND OBJECTIVE BOX
INTERVAL HPI/OVERNIGHT EVENTS:  Pt seen and examined at bedside.   Remains intubated  OR today for trach/PEG       MEDICATIONS  (STANDING):  cefepime   IVPB 1000 milliGRAM(s) IV Intermittent daily  chlorhexidine 0.12% Liquid 15 milliLiter(s) Oral Mucosa every 12 hours  chlorhexidine 2% Cloths 1 Application(s) Topical <User Schedule>  lactulose Syrup 10 Gram(s) Oral every 8 hours  pantoprazole   Suspension 40 milliGRAM(s) Oral daily  polyethylene glycol 3350 17 Gram(s) Oral daily  potassium phosphate IVPB 15 milliMole(s) IV Intermittent once  senna 2 Tablet(s) Oral at bedtime    MEDICATIONS  (PRN):  acetaminophen    Suspension .. 650 milliGRAM(s) Oral every 6 hours PRN Temp greater or equal to 38.5C (101.3F)  fentaNYL    Injectable 25 MICROGram(s) IV Push every 4 hours PRN agitation  sodium chloride 0.9% lock flush 10 milliLiter(s) IV Push every 1 hour PRN Pre/post blood products, medications, blood draw, and to maintain line patency      Vital Signs Last 24 Hrs  T(C): 37.2 (11 Nov 2021 07:45), Max: 37.4 (10 Nov 2021 11:00)  T(F): 98.9 (11 Nov 2021 07:45), Max: 99.3 (10 Nov 2021 11:00)  HR: 87 (11 Nov 2021 07:45) (73 - 168)  BP: 111/73 (11 Nov 2021 07:45) (96/48 - 139/57)  BP(mean): 79 (11 Nov 2021 07:45) (63 - 103)  RR: 20 (11 Nov 2021 07:45) (18 - 23)  SpO2: 99% (11 Nov 2021 07:45) (96% - 100%)    Physical:  General: Sedated   Respirations: Intubated  Chest: Left pigtail in place, +AL, on suction -20, serous output noted, dressings C/D/I     I&O's Detail    10 Nov 2021 07:01  -  11 Nov 2021 07:00  --------------------------------------------------------  IN:    Enteral Tube Flush: 160 mL    IV PiggyBack: 300 mL    Jevity 1.5: 390 mL    Sodium Chloride 0.9% Bolus: 1000 mL  Total IN: 1850 mL    OUT:    Chest Tube (mL): 0 mL    Voided (mL): 200 mL  Total OUT: 200 mL    Total NET: 1650 mL      11 Nov 2021 07:01  -  11 Nov 2021 08:43  --------------------------------------------------------  IN:  Total IN: 0 mL    OUT:    Jevity 1.5: 0 mL  Total OUT: 0 mL    Total NET: 0 mL          LABS:                        8.4    10.28 )-----------( 398      ( 11 Nov 2021 03:26 )             27.0             11-11    143  |  110<H>  |  31<H>  ----------------------------<  118<H>  3.3<L>   |  28  |  0.70    Ca    8.2<L>      11 Nov 2021 03:26  Phos  2.0     11-11  Mg     2.6     11-11    TPro  6.3  /  Alb  1.9<L>  /  TBili  0.6  /  DBili  x   /  AST  40  /  ALT  40  /  AlkPhos  147<H>  11-11

## 2021-11-11 NOTE — PROGRESS NOTE ADULT - ASSESSMENT
89Fwith L ptx,  s/p L CT placement 11/1  Prolonged intubated    -Daily CXRs   -Continue tube to suction -10, monitor output   -OR today for trach/peg  -Preop labs  -NPO   -Remainder of care as per ICU team  -Discussed with Dr. Ruiz

## 2021-11-11 NOTE — CHART NOTE - NSCHARTNOTEFT_GEN_A_CORE
Performed open tracheostomy with insertion of Portex D.I.C. Tracheostomy tube flexible, cuffed, 7.0mm    Performed PEG tube using Pull through Technique using PEG kit Endovive pull size 20 F.    Plan:    *** Ordered STAT chest xray - Please  follow-up    1- No DVT ppx for 24 hours. Save to start 11/12  after 11 AM  2- Please keep PEG on gravity for 24 hours (PEG is attached to a orona bag  for venting)  3- Surgery team will remove surgicel tomorrow  4- Stitches will stay for 10 days  5- Can start diet via PEG tube after 24 hours  6 - Twenty-four hours from the procedure, patient can be started on continuous tube feeding - rate: 20 cc/hour until the goal is reached.   7- Must be flushed after use and the medications needs to be completely crushed. Attention; some medications cannot be crushed. Double check with pharmacy before crushing, or liquid form can be used as alternative.   8- Attention to bleeding, erythema, feeds spillage, abdominal distention, tympanism, sings of peritonitis). If patient vomits, the PEG can be used to decompress the stomach.   9- Care per primary team

## 2021-11-11 NOTE — BRIEF OPERATIVE NOTE - NSICDXBRIEFPREOP_GEN_ALL_CORE_FT
PRE-OP DIAGNOSIS:  AMS (altered mental status) 11-Nov-2021 11:18:05  Jennifer Zee  Respiratory failure 11-Nov-2021 11:18:16  Jennifer Zee

## 2021-11-11 NOTE — PROGRESS NOTE ADULT - ASSESSMENT
pt was seen and examined about 1;30 pm  already had Tracheostomy and peg tube   pt is comfortable on bed not in any distress    awake not in any distress  vsstable afebrile    lungs clear  heart s1 s2 nml,   abd soft peg in place  labs noted  hgb 8.4,   k 3.3  a/p resp failure  sec to pneumonia  , bacterimia   cont same  poor prognosis

## 2021-11-11 NOTE — PROGRESS NOTE ADULT - NUTRITIONAL ASSESSMENT
This patient has been assessed with a concern for Malnutrition and has been determined to have a diagnosis/diagnoses of Severe protein-calorie malnutrition and Underweight (BMI < 19).    This patient is being managed with:   Diet NPO after Midnight-     NPO Start Date: 10-Nov-2021   NPO Start Time: 23:59  Entered: Nov 10 2021  7:19PM

## 2021-11-12 LAB
ALBUMIN SERPL ELPH-MCNC: 2.1 G/DL — LOW (ref 3.5–5)
ALP SERPL-CCNC: 147 U/L — HIGH (ref 40–120)
ALT FLD-CCNC: 32 U/L DA — SIGNIFICANT CHANGE UP (ref 10–60)
ANION GAP SERPL CALC-SCNC: 7 MMOL/L — SIGNIFICANT CHANGE UP (ref 5–17)
AST SERPL-CCNC: 26 U/L — SIGNIFICANT CHANGE UP (ref 10–40)
BILIRUB SERPL-MCNC: 0.9 MG/DL — SIGNIFICANT CHANGE UP (ref 0.2–1.2)
BUN SERPL-MCNC: 29 MG/DL — HIGH (ref 7–18)
CALCIUM SERPL-MCNC: 8.4 MG/DL — SIGNIFICANT CHANGE UP (ref 8.4–10.5)
CHLORIDE SERPL-SCNC: 111 MMOL/L — HIGH (ref 96–108)
CO2 SERPL-SCNC: 23 MMOL/L — SIGNIFICANT CHANGE UP (ref 22–31)
CREAT SERPL-MCNC: 0.7 MG/DL — SIGNIFICANT CHANGE UP (ref 0.5–1.3)
GLUCOSE SERPL-MCNC: 113 MG/DL — HIGH (ref 70–99)
HCT VFR BLD CALC: 29.3 % — LOW (ref 34.5–45)
HGB BLD-MCNC: 9.1 G/DL — LOW (ref 11.5–15.5)
MAGNESIUM SERPL-MCNC: 2.7 MG/DL — HIGH (ref 1.6–2.6)
MCHC RBC-ENTMCNC: 28.9 PG — SIGNIFICANT CHANGE UP (ref 27–34)
MCHC RBC-ENTMCNC: 31.1 GM/DL — LOW (ref 32–36)
MCV RBC AUTO: 93 FL — SIGNIFICANT CHANGE UP (ref 80–100)
NRBC # BLD: 0 /100 WBCS — SIGNIFICANT CHANGE UP (ref 0–0)
PHOSPHATE SERPL-MCNC: 2.8 MG/DL — SIGNIFICANT CHANGE UP (ref 2.5–4.5)
PLATELET # BLD AUTO: 356 K/UL — SIGNIFICANT CHANGE UP (ref 150–400)
POTASSIUM SERPL-MCNC: 4.2 MMOL/L — SIGNIFICANT CHANGE UP (ref 3.5–5.3)
POTASSIUM SERPL-SCNC: 4.2 MMOL/L — SIGNIFICANT CHANGE UP (ref 3.5–5.3)
PROT SERPL-MCNC: 7 G/DL — SIGNIFICANT CHANGE UP (ref 6–8.3)
RBC # BLD: 3.15 M/UL — LOW (ref 3.8–5.2)
RBC # FLD: 14.8 % — HIGH (ref 10.3–14.5)
SODIUM SERPL-SCNC: 141 MMOL/L — SIGNIFICANT CHANGE UP (ref 135–145)
WBC # BLD: 12.01 K/UL — HIGH (ref 3.8–10.5)
WBC # FLD AUTO: 12.01 K/UL — HIGH (ref 3.8–10.5)

## 2021-11-12 PROCEDURE — 71045 X-RAY EXAM CHEST 1 VIEW: CPT | Mod: 26

## 2021-11-12 PROCEDURE — 99232 SBSQ HOSP IP/OBS MODERATE 35: CPT | Mod: 57

## 2021-11-12 RX ORDER — ENOXAPARIN SODIUM 100 MG/ML
40 INJECTION SUBCUTANEOUS DAILY
Refills: 0 | Status: DISCONTINUED | OUTPATIENT
Start: 2021-11-12 | End: 2021-12-01

## 2021-11-12 RX ORDER — CEFEPIME 1 G/1
1000 INJECTION, POWDER, FOR SOLUTION INTRAMUSCULAR; INTRAVENOUS
Refills: 0 | Status: COMPLETED | OUTPATIENT
Start: 2021-11-12 | End: 2021-11-16

## 2021-11-12 RX ORDER — SODIUM CHLORIDE 9 MG/ML
1000 INJECTION INTRAMUSCULAR; INTRAVENOUS; SUBCUTANEOUS ONCE
Refills: 0 | Status: COMPLETED | OUTPATIENT
Start: 2021-11-12 | End: 2021-11-12

## 2021-11-12 RX ADMIN — Medication 650 MILLIGRAM(S): at 11:05

## 2021-11-12 RX ADMIN — CHLORHEXIDINE GLUCONATE 1 APPLICATION(S): 213 SOLUTION TOPICAL at 05:11

## 2021-11-12 RX ADMIN — CEFEPIME 100 MILLIGRAM(S): 1 INJECTION, POWDER, FOR SOLUTION INTRAMUSCULAR; INTRAVENOUS at 17:03

## 2021-11-12 RX ADMIN — MUPIROCIN 1 APPLICATION(S): 20 OINTMENT TOPICAL at 05:11

## 2021-11-12 RX ADMIN — ENOXAPARIN SODIUM 40 MILLIGRAM(S): 100 INJECTION SUBCUTANEOUS at 16:40

## 2021-11-12 RX ADMIN — SODIUM CHLORIDE 1000 MILLILITER(S): 9 INJECTION INTRAMUSCULAR; INTRAVENOUS; SUBCUTANEOUS at 09:04

## 2021-11-12 RX ADMIN — CHLORHEXIDINE GLUCONATE 15 MILLILITER(S): 213 SOLUTION TOPICAL at 05:11

## 2021-11-12 RX ADMIN — Medication 650 MILLIGRAM(S): at 11:06

## 2021-11-12 RX ADMIN — PANTOPRAZOLE SODIUM 40 MILLIGRAM(S): 20 TABLET, DELAYED RELEASE ORAL at 11:25

## 2021-11-12 RX ADMIN — CHLORHEXIDINE GLUCONATE 15 MILLILITER(S): 213 SOLUTION TOPICAL at 17:04

## 2021-11-12 RX ADMIN — MUPIROCIN 1 APPLICATION(S): 20 OINTMENT TOPICAL at 17:05

## 2021-11-12 NOTE — PROGRESS NOTE ADULT - SUBJECTIVE AND OBJECTIVE BOX
HPI:  89 yo female from [facility] with medical h/o of HTN, Dementia, CVA with R sided hemiparesis, aphasia, parkinson's, GERD, CKD bedbound at baseline, dependant on assistance for ADL comes in with respiratory failure. Patient was found to have respiratory distress, saturating in 70s when EMS arrived, was placed on NRB on field. She was emergently intubated on arrival in ED. As per NH records patient was having fever and cough for past few days. Today she was found to have respiratory distress. She is vaccinated with moderna. Spoke to family son,  who stated the patient to remain full code. No other history could be obtained.  (01 Nov 2021 00:14)      Patient is a 89y old  Female who presents with a chief complaint of AHRF (12 Nov 2021 10:45)      INTERVAL HPI/OVERNIGHT EVENTS:  T(C): 36.7 (11-12-21 @ 15:00), Max: 37.4 (11-11-21 @ 23:00)  HR: 86 (11-12-21 @ 18:00) (76 - 90)  BP: 132/54 (11-12-21 @ 18:00) (110/49 - 168/80)  RR: 20 (11-12-21 @ 18:00) (17 - 24)  SpO2: 97% (11-12-21 @ 18:00) (96% - 100%)  Wt(kg): --  I&O's Summary    11 Nov 2021 07:01  -  12 Nov 2021 07:00  --------------------------------------------------------  IN: 1300 mL / OUT: 0 mL / NET: 1300 mL    12 Nov 2021 07:01  -  12 Nov 2021 18:13  --------------------------------------------------------  IN: 240 mL / OUT: 0 mL / NET: 240 mL        REVIEW OF SYSTEMS: denies fever, chills, SOB, palpitations, chest pain, abdominal pain, nausea, vomitting, diarrhea, constipation, dizziness    MEDICATIONS  (STANDING):  cefepime   IVPB 1000 milliGRAM(s) IV Intermittent two times a day  chlorhexidine 0.12% Liquid 15 milliLiter(s) Oral Mucosa every 12 hours  chlorhexidine 2% Cloths 1 Application(s) Topical <User Schedule>  enoxaparin Injectable 40 milliGRAM(s) SubCutaneous daily  mupirocin 2% Ointment 1 Application(s) Topical two times a day  pantoprazole   Suspension 40 milliGRAM(s) Oral daily    MEDICATIONS  (PRN):  acetaminophen    Suspension .. 650 milliGRAM(s) Oral every 6 hours PRN Temp greater or equal to 38.5C (101.3F)  fentaNYL    Injectable 25 MICROGram(s) IV Push every 4 hours PRN agitation  sodium chloride 0.9% lock flush 10 milliLiter(s) IV Push every 1 hour PRN Pre/post blood products, medications, blood draw, and to maintain line patency      PHYSICAL EXAM:  GENERAL: NAD, well-groomed, well-developed  HEAD:  Atraumatic, Normocephalic  EYES: EOMI, PERRLA, conjunctiva and sclera clear  ENMT: No tonsillar erythema, exudates, or enlargement; Moist mucous membranes, Good dentition, No lesions  NECK: Supple, No JVD, Normal thyroid  NERVOUS SYSTEM:  Alert & Oriented X3, Good concentration; Motor Strength 5/5 B/L upper and lower extremities; DTRs 2+ intact and symmetric  CHEST/LUNG: Clear to percussion bilaterally; No rales, rhonchi, wheezing, or rubs  HEART: Regular rate and rhythm; No murmurs, rubs, or gallops  ABDOMEN: Soft, Nontender, Nondistended; Bowel sounds present  EXTREMITIES:  2+ Peripheral Pulses, No clubbing, cyanosis, or edema  LYMPH: No lymphadenopathy noted  SKIN: No rashes or lesions  LABS:                        9.1    12.01 )-----------( 356      ( 12 Nov 2021 04:19 )             29.3     11-12    141  |  111<H>  |  29<H>  ----------------------------<  113<H>  4.2   |  23  |  0.70    Ca    8.4      12 Nov 2021 04:19  Phos  2.8     11-12  Mg     2.7     11-12    TPro  7.0  /  Alb  2.1<L>  /  TBili  0.9  /  DBili  x   /  AST  26  /  ALT  32  /  AlkPhos  147<H>  11-12    PT/INR - ( 11 Nov 2021 03:26 )   PT: 13.3 sec;   INR: 1.12 ratio         PTT - ( 11 Nov 2021 03:26 )  PTT:31.2 sec    CAPILLARY BLOOD GLUCOSE      POCT Blood Glucose.: 142 mg/dL (12 Nov 2021 16:37)  POCT Blood Glucose.: 128 mg/dL (12 Nov 2021 09:50)  POCT Blood Glucose.: 122 mg/dL (12 Nov 2021 05:39)

## 2021-11-12 NOTE — PROGRESS NOTE ADULT - ASSESSMENT
UTI  Bacteremia - E coli  Pneumonia  Septic Shock  Fevers - improving  Leukocytosis - mild    Plan:  ·	Continue Maxipime 1 gm IV qd D#10 for a total of 14 days  ·	poor prognosis, patient is full code    Time spent - 32 mins         UTI  Bacteremia - E coli  Pneumonia  Septic Shock  Fevers - improving  Leukocytosis - mild    Plan:  ·	Continue Maxipime 1 gm IV qd D#10 for a total of 14 days  ·	poor prognosis, patient is full code    Time spent - 32 mins    I agree with above

## 2021-11-12 NOTE — PROGRESS NOTE ADULT - SUBJECTIVE AND OBJECTIVE BOX
ICU visit:  89y Female is under our care for    REVIEW OF SYSTEMS:  [  ] Not able to elicit  General:	  Chest:	  GI:	  :  Skin:	  Musculoskeletal:	  Neuro:	    MEDS:  cefepime   IVPB 1000 milliGRAM(s) IV Intermittent two times a day    ALLERGIES: Allergies    No Known Allergies    Intolerances        VITALS:  ICU Vital Signs Last 24 Hrs  T(C): 36.8 (12 Nov 2021 11:00), Max: 37.4 (11 Nov 2021 23:00)  T(F): 98.3 (12 Nov 2021 11:00), Max: 99.3 (11 Nov 2021 23:00)  HR: 90 (12 Nov 2021 10:34) (74 - 98)  BP: 166/94 (12 Nov 2021 10:34) (110/49 - 167/59)  BP(mean): 103 (12 Nov 2021 10:34) (73 - 109)  ABP: --  ABP(mean): --  RR: 24 (12 Nov 2021 10:34) (20 - 24)  SpO2: 98% (12 Nov 2021 10:34) (96% - 100%)      PHYSICAL EXAM:  HEENT:  Neck:  Respiratory:  Cardiovascular:  Gastrointestinal:  Extremities:  Skin:  Ortho:  Neuro:    LABS/DIAGNOSTIC TESTS:                        9.1    12.01 )-----------( 356      ( 12 Nov 2021 04:19 )             29.3     WBC Count: 12.01 K/uL (11-12 @ 04:19)  WBC Count: 10.28 K/uL (11-11 @ 03:26)  WBC Count: 13.36 K/uL (11-10 @ 05:18)  WBC Count: 14.22 K/uL (11-09 @ 06:25)  WBC Count: 15.50 K/uL (11-08 @ 04:22)    11-12    141  |  111<H>  |  29<H>  ----------------------------<  113<H>  4.2   |  23  |  0.70    Ca    8.4      12 Nov 2021 04:19  Phos  2.8     11-12  Mg     2.7     11-12    TPro  7.0  /  Alb  2.1<L>  /  TBili  0.9  /  DBili  x   /  AST  26  /  ALT  32  /  AlkPhos  147<H>  11-12          CULTURES:   .Blood Blood  11-03 @ 10:22   No Growth Final  --  --      Clean Catch Clean Catch (Midstream)  11-02 @ 21:01   No growth  --  --      .Sputum Sputum  11-01 @ 21:18   Moderate Streptococcus agalactiae (Group B) isolated  Group B streptococci are susceptible to ampicillin,  penicillin and cefazolin, but may be resistant to  erythromycin and clindamycin.  Recommendations for intrapartum prophylaxis for Group B  streptococci are penicillin or ampicillin.  Normal Respiratory [place] present  --    Rare polymorphonuclear leukocytes per low power field  No Squamous epithelial cells per low power field  Rare Yeast like cells seen per oil power field  Rare Gram Positive Rods seen per oil power field  Rare Gram positive cocci in pairs seen per oil power field      .Blood Blood-Peripheral  11-01 @ 03:56   No Growth Final  --  Blood Culture PCR  Escherichia coli        RADIOLOGY:  no new studies ICU visit:  89y Female is under our care for e coli bacteremia and pneumonia.  Patient was seen more awake and alert today in the ICU laying comfortably in bed with no acute distress.  She is s/p trach and peg placement yesterday.  Patient remains afebrile with mildly elevated WBC count.    REVIEW OF SYSTEMS:  [ x ] Not able to elicit      MEDS:  cefepime   IVPB 1000 milliGRAM(s) IV Intermittent two times a day    ALLERGIES: Allergies    No Known Allergies    Intolerances        VITALS:  ICU Vital Signs Last 24 Hrs  T(C): 36.8 (12 Nov 2021 11:00), Max: 37.4 (11 Nov 2021 23:00)  T(F): 98.3 (12 Nov 2021 11:00), Max: 99.3 (11 Nov 2021 23:00)  HR: 90 (12 Nov 2021 10:34) (74 - 98)  BP: 166/94 (12 Nov 2021 10:34) (110/49 - 167/59)  BP(mean): 103 (12 Nov 2021 10:34) (73 - 109)  ABP: --  ABP(mean): --  RR: 24 (12 Nov 2021 10:34) (20 - 24)  SpO2: 98% (12 Nov 2021 10:34) (96% - 100%)      PHYSICAL EXAM:  HEENT: n/a  Neck: supple, trach +  Respiratory: diminished breath sounds on left, left sided pigtail +  Cardiovascular: S1 S2 reg no murmurs  Gastrointestinal: +BS with soft, nondistended abdomen; nontender, peg tube in place  Extremities: bilateral hand edema +2  Skin: no rashes  Ortho: n/a  Neuro: Awake and alert    LABS/DIAGNOSTIC TESTS:                        9.1    12.01 )-----------( 356      ( 12 Nov 2021 04:19 )             29.3     WBC Count: 12.01 K/uL (11-12 @ 04:19)  WBC Count: 10.28 K/uL (11-11 @ 03:26)  WBC Count: 13.36 K/uL (11-10 @ 05:18)  WBC Count: 14.22 K/uL (11-09 @ 06:25)  WBC Count: 15.50 K/uL (11-08 @ 04:22)    11-12    141  |  111<H>  |  29<H>  ----------------------------<  113<H>  4.2   |  23  |  0.70    Ca    8.4      12 Nov 2021 04:19  Phos  2.8     11-12  Mg     2.7     11-12    TPro  7.0  /  Alb  2.1<L>  /  TBili  0.9  /  DBili  x   /  AST  26  /  ALT  32  /  AlkPhos  147<H>  11-12          CULTURES:   .Blood Blood  11-03 @ 10:22   No Growth Final  --  --      Clean Catch Clean Catch (Midstream)  11-02 @ 21:01   No growth  --  --      .Sputum Sputum  11-01 @ 21:18   Moderate Streptococcus agalactiae (Group B) isolated  Group B streptococci are susceptible to ampicillin,  penicillin and cefazolin, but may be resistant to  erythromycin and clindamycin.  Recommendations for intrapartum prophylaxis for Group B  streptococci are penicillin or ampicillin.  Normal Respiratory [place] present  --    Rare polymorphonuclear leukocytes per low power field  No Squamous epithelial cells per low power field  Rare Yeast like cells seen per oil power field  Rare Gram Positive Rods seen per oil power field  Rare Gram positive cocci in pairs seen per oil power field      .Blood Blood-Peripheral  11-01 @ 03:56   No Growth Final  --  Blood Culture PCR  Escherichia coli        RADIOLOGY:  no new studies

## 2021-11-12 NOTE — PROGRESS NOTE ADULT - SUBJECTIVE AND OBJECTIVE BOX
SUBJECTIVE: Patient seen and examined bedside. No events overnight related to the tracheostomy or PEG site    cefepime   IVPB 1000 milliGRAM(s) IV Intermittent daily      Vital Signs Last 24 Hrs  T(C): 36.6 (12 Nov 2021 05:00), Max: 37.4 (11 Nov 2021 23:00)  T(F): 97.9 (12 Nov 2021 05:00), Max: 99.3 (11 Nov 2021 23:00)  HR: 85 (12 Nov 2021 06:00) (74 - 98)  BP: 152/72 (12 Nov 2021 06:00) (106/91 - 167/59)  BP(mean): 84 (12 Nov 2021 06:00) (73 - 109)  RR: 20 (12 Nov 2021 06:00) (18 - 21)  SpO2: 100% (12 Nov 2021 06:00) (96% - 100%)  I&O's Detail    10 Nov 2021 07:01  -  11 Nov 2021 07:00  --------------------------------------------------------  IN:    Enteral Tube Flush: 160 mL    IV PiggyBack: 300 mL    Jevity 1.5: 390 mL    Sodium Chloride 0.9% Bolus: 1000 mL  Total IN: 1850 mL    OUT:    Chest Tube (mL): 0 mL    Voided (mL): 200 mL  Total OUT: 200 mL    Total NET: 1650 mL      11 Nov 2021 07:01  -  12 Nov 2021 06:53  --------------------------------------------------------  IN:    IV PiggyBack: 250 mL    IV PiggyBack: 50 mL  Total IN: 300 mL    OUT:    Jevity 1.5: 0 mL  Total OUT: 0 mL    Total NET: 300 mL          PHYSICAL EXAMINATION   PULM: nonlabored breathing, no respiratory distress, good tidal volume, tracheostomy site without hematoma or erythema. Surgicell dry  ABD: soft, nondistended, PEG tube site clean and dry, no spillage of gastric content                          9.1    12.01 )-----------( 356      ( 12 Nov 2021 04:19 )             29.3     11-12    141  |  111<H>  |  29<H>  ----------------------------<  113<H>  4.2   |  23  |  0.70    Ca    8.4      12 Nov 2021 04:19  Phos  2.8     11-12  Mg     2.7     11-12    TPro  7.0  /  Alb  2.1<L>  /  TBili  0.9  /  DBili  x   /  AST  26  /  ALT  32  /  AlkPhos  147<H>  11-12    PT/INR - ( 11 Nov 2021 03:26 )   PT: 13.3 sec;   INR: 1.12 ratio         PTT - ( 11 Nov 2021 03:26 )  PTT:31.2 sec

## 2021-11-12 NOTE — PROGRESS NOTE ADULT - ASSESSMENT
89 y/o F, nursing home resident with a significant medical history of CVA with R sided hemiparesis, aphasia, parkinson's, GERD, dementia, CKD, presents to the ED in respiratory distress, intubated while in ED. Patient is being admitted to medicine for sepsis and AHRF likely 2/2 ?PNA    # Acute hypoxic respiratory failure  # Sepsis 2/2 PNA, E. coli bacteremia  # Lactic acidosis  # Joby on CKD  # Hypernatremia    Neuro:   no longer on any sedation  fentanyl 25mcg PRN q4h for agitation     Pulmonary  Cough and fever in NH for a few days  Acute hypoxic respiratory failure  Patient  intubated in ED  CXR shows LLL infiltrate  Initially isolated now off isolation, covid negative  s/p one dose vanc and zosyn  Also received 6 days of ceftriaxone and azithro  BCx - E. coli bacteremia; Sputum - Strep agalactiae  patient is now on cefepime day 6  Repeat BCx - neg  Fever spikes and runs of tachy (lines out, orona out)  vent set AC 20/400/5/40%  Monitor resp status  c/w Cefepime to cover for HAP, and VAP  to complete Cefepime until 2 weeks from negative blood culture (until 11/17/21)      Pneumothorax  CXR s/p LIJ insertion showed L pneumothorax  chest tube inserted (11/1/21)  has air leak but maintained at -10 pressure suction  Thoracic Surgery onboard    Cardiovascular  Sinus tachycardia  still has episodes of tachycardia (150s)  Likely 2/2 sepsis and dehydration  EKG showed sinus rhythm with ST changes in inferior leads (11/1/21)  repeat EKG - SR w/ new-onset PACs  Trop was mildly elevated but trended down  ECHO unremarkable other than pleural effusion    Gastrointestinal  started on tube feeds (Jevity)  Nutrition consult  NGT maintain  Epigastric tenderness  Hold feeds for trache and peg    ID  Sepsis 2/2  Patient was tachycardic, had high temp 103.8  WBC inc. since admission  CXR shows LLL infiltrate on admission  repeat CXR showed progression of infiltrates, bilateral; right lower lobe pneumonia  s/p 2L bolus  Was given one dose of vanc and zosyn  was on rocephin and zithro (d/c)  latest CXR - No evidence of pneumothorax, no infiltrates or effusion  CX (s/p LIJ) - L pneumothorax > (s/p chest tube - 11/01/21) S/p Lasix 40 mg IV BID and Albumin 25 mg q6 for 48 hours.  Blood Cx - E. coli bacteremia; repeat BCx was neg (11/3/21)  Sputum Cx - Strep agalactiae  UCx was neg  lactate - 11.2>4.9>4.2>3.2>2.1>2.0>1.5 (trending down to normal)  Procal - 44.00>35.10>21.10>12.90>5>3.38>2.9    spiking fevers, changed to Cefepime to cover for HAP and VAP, orona and lines DC'd  continue on cefepime for 2 weeks since negative culture (until 11/17/21)  ID consult (Dr. Wong)    Renal  Patient has h/o CKD initially presented with Joby in the setting of sepsis- resolved  had hypernatremia, likely 2/2 volume deficit (currently normal)- dehydration  s/p 2 L bolus- resolved  Monitor I/O Net + 1550 over last 24 hours       Endo  BS are elevated  No h/o DM  A1c - 5.8  Monitor BS- so far controlled    Prophylactic  On heparin for dvt prophylaxis, on hold for trach   on PPI for GI prophylaxis    Palliative consult (Dr. Michele) - FULL CODE 89 y/o F, nursing home resident with a significant medical history of CVA with R sided hemiparesis, aphasia, parkinson's, GERD, dementia, CKD, presents to the ED in respiratory distress, intubated while in ED. Patient is being admitted to medicine for sepsis and AHRF likely 2/2 ?PNA    # Acute hypoxic respiratory failure  # Sepsis 2/2 PNA, E. coli bacteremia  # Lactic acidosis  # Joby on CKD  # Hypernatremia    Neuro:  no longer on any sedation  more awake and alert but does not follow directions    Pulmonary  Cough and fever in NH for a few days  Acute hypoxic respiratory failure  Patient  intubated in ED  CXR shows LLL infiltrate  Initially isolated now off isolation, covid negative  s/p one dose vanc and zosyn  Also received 6 days of ceftriaxone and azithro  BCx - E. coli bacteremia; Sputum - Strep agalactiae  patient is now on cefepime day 6  Repeat BCx - neg  Fever spikes and runs of tachy (lines out, orona out)  vent set AC 20/400/5/40%  Monitor resp status  c/w Cefepime 1000 mg q12 to cover for HAP, and VAP  to complete Cefepime until 2 weeks from negative blood culture (until 11/16/21)  s/p tracheostomy (11/11/21)      Pneumothorax  CXR s/p LIJ insertion showed L pneumothorax  chest tube inserted (11/1/21)  has air leak but maintained at -10 pressure suction  Thoracic Surgery onboard  continue chest tube indefinitely  daily CXR to check for placement    Cardiovascular  Sinus tachycardia  still has episodes of tachycardia (150s)  Likely 2/2 sepsis and dehydration  EKG showed sinus rhythm with ST changes in inferior leads (11/1/21)  repeat EKG - SR w/ new-onset PACs  Trop was mildly elevated but trended down  ECHO unremarkable other than pleural effusion    Gastrointestinal  started on tube feeds (Jevity)  Nutrition consult  Epigastric tenderness  PEG (11/11/21) - start on Jevity 1.5    ID  Sepsis 2/2  Patient was tachycardic, had high temp 103.8  WBC inc. since admission  CXR shows LLL infiltrate on admission  repeat CXR showed progression of infiltrates, bilateral; right lower lobe pneumonia  s/p 2L bolus  Was given one dose of vanc and zosyn  was on rocephin and zithro (d/c)  latest CXR - No evidence of pneumothorax, no infiltrates or effusion  CX (s/p LIJ) - L pneumothorax > (s/p chest tube - 11/01/21) S/p Lasix 40 mg IV BID and Albumin 25 mg q6 for 48 hours.  Blood Cx - E. coli bacteremia; repeat BCx was neg (11/3/21)  Sputum Cx - Strep agalactiae  UCx was neg  lactate - 11.2>4.9>4.2>3.2>2.1>2.0>1.5 (trending down to normal)  Procal - 44.00>35.10>21.10>12.90>5>3.38>2.9    spiking fevers, changed to Cefepime to cover for HAP and VAP, adwoa and lines DC'd  continue on cefepime for 2 weeks since negative culture (until 11/17/21)  ID consult (Dr. Wong)    Renal  Patient has h/o CKD initially presented with JOBY in the setting of sepsis- resolved  had hypernatremia, likely 2/2 volume deficit (currently normal)- dehydration  s/p 2 L bolus- resolved  Monitor I/O Net + 1550 over last 24 hours       Endo  BS are elevated  No h/o DM  A1c - 5.8  Monitor BS- so far controlled    Prophylactic  on PPI for GI prophylaxis  start lovenox for DVT prophylaxis as kidney function is normal    Palliative consult (Dr. Michele) - FULL CODE (signed-off)

## 2021-11-12 NOTE — PROGRESS NOTE ADULT - SUBJECTIVE AND OBJECTIVE BOX
INTERVAL HPI/OVERNIGHT EVENTS: ***    PRESSORS: [ ] YES [ ] NO  WHICH:    ANTIBIOTICS:                      Antimicrobial:  cefepime   IVPB 1000 milliGRAM(s) IV Intermittent two times a day    Cardiovascular:    Pulmonary:    Hematalogic:    Other:  acetaminophen    Suspension .. 650 milliGRAM(s) Oral every 6 hours PRN  chlorhexidine 0.12% Liquid 15 milliLiter(s) Oral Mucosa every 12 hours  chlorhexidine 2% Cloths 1 Application(s) Topical <User Schedule>  fentaNYL    Injectable 25 MICROGram(s) IV Push every 4 hours PRN  mupirocin 2% Ointment 1 Application(s) Topical two times a day  pantoprazole   Suspension 40 milliGRAM(s) Oral daily  sodium chloride 0.9% lock flush 10 milliLiter(s) IV Push every 1 hour PRN    acetaminophen    Suspension .. 650 milliGRAM(s) Oral every 6 hours PRN  cefepime   IVPB 1000 milliGRAM(s) IV Intermittent two times a day  chlorhexidine 0.12% Liquid 15 milliLiter(s) Oral Mucosa every 12 hours  chlorhexidine 2% Cloths 1 Application(s) Topical <User Schedule>  fentaNYL    Injectable 25 MICROGram(s) IV Push every 4 hours PRN  mupirocin 2% Ointment 1 Application(s) Topical two times a day  pantoprazole   Suspension 40 milliGRAM(s) Oral daily  sodium chloride 0.9% lock flush 10 milliLiter(s) IV Push every 1 hour PRN    Drug Dosing Weight  Height (cm): 167.6 (03 Nov 2021 16:02)  Weight (kg): 40.9 (01 Nov 2021 02:20)  BMI (kg/m2): 14.6 (03 Nov 2021 16:02)  BSA (m2): 1.43 (03 Nov 2021 16:02)    CENTRAL LINE: [ ] YES [ ] NO  LOCATION:   DATE INSERTED:  REMOVE: [ ] YES [ ] NO  EXPLAIN:    SAEED: [ ] YES [ ] NO    DATE INSERTED:  REMOVE:  [ ] YES [ ] NO  EXPLAIN:    A-LINE:  [ ] YES [ ] NO  LOCATION:   DATE INSERTED:  REMOVE:  [ ] YES [ ] NO  EXPLAIN:    PMH -reviewed admission note, no change since admission    ICU Vital Signs Last 24 Hrs  T(C): 36.8 (12 Nov 2021 11:00), Max: 37.4 (11 Nov 2021 23:00)  T(F): 98.3 (12 Nov 2021 11:00), Max: 99.3 (11 Nov 2021 23:00)  HR: 90 (12 Nov 2021 10:34) (74 - 98)  BP: 166/94 (12 Nov 2021 10:34) (110/49 - 167/59)  BP(mean): 103 (12 Nov 2021 10:34) (73 - 109)  ABP: --  ABP(mean): --  RR: 24 (12 Nov 2021 10:34) (20 - 24)  SpO2: 98% (12 Nov 2021 10:34) (96% - 100%)            11-11 @ 07:01  -  11-12 @ 07:00  --------------------------------------------------------  IN: 1300 mL / OUT: 0 mL / NET: 1300 mL        Mode: AC/ CMV (Assist Control/ Continuous Mandatory Ventilation)  RR (machine): 20  TV (machine): 400  FiO2: 50  PEEP: 5  ITime: 1  MAP: 5  PIP: 24      PHYSICAL EXAM:    GENERAL: NAD, well-groomed, well-developed  HEAD:  Atraumatic, Normocephalic  EYES: EOMI, PERRLA, conjunctiva and sclera clear  ENMT: No tonsillar erythema, exudates, or enlargement; Moist mucous membranes, Good dentition, No lesions  NECK: Supple, normal appearance, No JVD; Normal thyroid; Trachea midline  NERVOUS SYSTEM:  Alert & Oriented X3, Good concentration; Motor Strength 5/5 B/L upper and lower extremities; DTRs 2+ intact and symmetric  CHEST/LUNG: No chest deformity; Normal percussion bilaterally; No rales, rhonchi, wheezing   HEART: Regular rate and rhythm; No murmurs, rubs, or gallops  ABDOMEN: Soft, Nontender, Nondistended; Bowel sounds present  EXTREMITIES:  2+ Peripheral Pulses, No clubbing, cyanosis, or edema  LYMPH: No lymphadenopathy noted  SKIN: No rashes or lesions; Good capillary refill      LABS:  CBC Full  -  ( 12 Nov 2021 04:19 )  WBC Count : 12.01 K/uL  RBC Count : 3.15 M/uL  Hemoglobin : 9.1 g/dL  Hematocrit : 29.3 %  Platelet Count - Automated : 356 K/uL  Mean Cell Volume : 93.0 fl  Mean Cell Hemoglobin : 28.9 pg  Mean Cell Hemoglobin Concentration : 31.1 gm/dL  Auto Neutrophil # : x  Auto Lymphocyte # : x  Auto Monocyte # : x  Auto Eosinophil # : x  Auto Basophil # : x  Auto Neutrophil % : x  Auto Lymphocyte % : x  Auto Monocyte % : x  Auto Eosinophil % : x  Auto Basophil % : x    11-12    141  |  111<H>  |  29<H>  ----------------------------<  113<H>  4.2   |  23  |  0.70    Ca    8.4      12 Nov 2021 04:19  Phos  2.8     11-12  Mg     2.7     11-12    TPro  7.0  /  Alb  2.1<L>  /  TBili  0.9  /  DBili  x   /  AST  26  /  ALT  32  /  AlkPhos  147<H>  11-12    PT/INR - ( 11 Nov 2021 03:26 )   PT: 13.3 sec;   INR: 1.12 ratio         PTT - ( 11 Nov 2021 03:26 )  PTT:31.2 sec        RADIOLOGY & ADDITIONAL STUDIES REVIEWED:  ***    GOALS OF CARE DISCUSSION WITH PATIENT/FAMILY/PROXY:    CRITICAL CARE TIME SPENT: 35 minutes INTERVAL HPI/OVERNIGHT EVENTS: ***    Patient examined at bedside. Awake, more alert and responsive but does not follow directions. S/p Tracheostomy and PEG insertion. Restarted on DVT prophylaxis (Lovenox) and Tube feeding via PEG (Jevity 1.5). Spontaneous Breathing Trials done today. Chest tube in place indefinitely until further thoracic surgery recommendations. Currently on Day 10/14 of Cefepime. No other significant events overnight. For downgrade to A.I. once bed is available.    PRESSORS: [ ] YES [X] NO  WHICH:    ANTIBIOTICS:                      Antimicrobial:  cefepime   IVPB 1000 milliGRAM(s) IV Intermittent two times a day    Cardiovascular:    Pulmonary:    Hematalogic:    Other:  acetaminophen    Suspension .. 650 milliGRAM(s) Oral every 6 hours PRN  chlorhexidine 0.12% Liquid 15 milliLiter(s) Oral Mucosa every 12 hours  chlorhexidine 2% Cloths 1 Application(s) Topical <User Schedule>  fentaNYL    Injectable 25 MICROGram(s) IV Push every 4 hours PRN  mupirocin 2% Ointment 1 Application(s) Topical two times a day  pantoprazole   Suspension 40 milliGRAM(s) Oral daily  sodium chloride 0.9% lock flush 10 milliLiter(s) IV Push every 1 hour PRN    acetaminophen    Suspension .. 650 milliGRAM(s) Oral every 6 hours PRN  cefepime   IVPB 1000 milliGRAM(s) IV Intermittent two times a day  chlorhexidine 0.12% Liquid 15 milliLiter(s) Oral Mucosa every 12 hours  chlorhexidine 2% Cloths 1 Application(s) Topical <User Schedule>  fentaNYL    Injectable 25 MICROGram(s) IV Push every 4 hours PRN  mupirocin 2% Ointment 1 Application(s) Topical two times a day  pantoprazole   Suspension 40 milliGRAM(s) Oral daily  sodium chloride 0.9% lock flush 10 milliLiter(s) IV Push every 1 hour PRN    Drug Dosing Weight  Height (cm): 167.6 (03 Nov 2021 16:02)  Weight (kg): 40.9 (01 Nov 2021 02:20)  BMI (kg/m2): 14.6 (03 Nov 2021 16:02)  BSA (m2): 1.43 (03 Nov 2021 16:02)    CENTRAL LINE: [ ] YES [X] NO  LOCATION:   DATE INSERTED:  REMOVE: [ ] YES [ ] NO  EXPLAIN:    SAEED: [ ] YES [X] NO    DATE INSERTED:  REMOVE:  [ ] YES [ ] NO  EXPLAIN:    A-LINE:  [ ] YES [X] NO  LOCATION:   DATE INSERTED:  REMOVE:  [ ] YES [ ] NO  EXPLAIN:    PMH -reviewed admission note, no change since admission    ICU Vital Signs Last 24 Hrs  T(C): 36.8 (12 Nov 2021 11:00), Max: 37.4 (11 Nov 2021 23:00)  T(F): 98.3 (12 Nov 2021 11:00), Max: 99.3 (11 Nov 2021 23:00)  HR: 90 (12 Nov 2021 10:34) (74 - 98)  BP: 166/94 (12 Nov 2021 10:34) (110/49 - 167/59)  BP(mean): 103 (12 Nov 2021 10:34) (73 - 109)  ABP: --  ABP(mean): --  RR: 24 (12 Nov 2021 10:34) (20 - 24)  SpO2: 98% (12 Nov 2021 10:34) (96% - 100%)            11-11 @ 07:01  -  11-12 @ 07:00  --------------------------------------------------------  IN: 1300 mL / OUT: 0 mL / NET: 1300 mL        Mode: AC/ CMV (Assist Control/ Continuous Mandatory Ventilation)  RR (machine): 20  TV (machine): 400  FiO2: 50  PEEP: 5  ITime: 1  MAP: 5  PIP: 24      PHYSICAL EXAM:    GENERAL: NAD, awake and more alert  HEAD:  Atraumatic, Normocephalic  EYES: EOMI, PERRLA, conjunctiva and sclera clear  ENMT: No tonsillar erythema, exudates, or enlargement; Moist mucous membranes, Good dentition, No lesions  NECK: Supple, normal appearance, No JVD; Normal thyroid; Trachea midline; tracheostomy in place connected to ventilator  NERVOUS SYSTEM:  awake, more alert but does not follow directions  CHEST/LUNG: No chest deformity; Normal percussion bilaterally; (+) bilateral crackles. No rales, rhonchi, wheezing; left chest tube  HEART: Regular rate and rhythm; No murmurs, rubs, or gallops  ABDOMEN: Soft, Nontender, Nondistended; Bowel sounds present  EXTREMITIES:  2+ Peripheral Pulses, No clubbing, cyanosis, or edema  LYMPH: No lymphadenopathy noted  SKIN: No rashes or lesions; Good capillary refill      LABS:  CBC Full  -  ( 12 Nov 2021 04:19 )  WBC Count : 12.01 K/uL  RBC Count : 3.15 M/uL  Hemoglobin : 9.1 g/dL  Hematocrit : 29.3 %  Platelet Count - Automated : 356 K/uL  Mean Cell Volume : 93.0 fl  Mean Cell Hemoglobin : 28.9 pg  Mean Cell Hemoglobin Concentration : 31.1 gm/dL  Auto Neutrophil # : x  Auto Lymphocyte # : x  Auto Monocyte # : x  Auto Eosinophil # : x  Auto Basophil # : x  Auto Neutrophil % : x  Auto Lymphocyte % : x  Auto Monocyte % : x  Auto Eosinophil % : x  Auto Basophil % : x    11-12    141  |  111<H>  |  29<H>  ----------------------------<  113<H>  4.2   |  23  |  0.70    Ca    8.4      12 Nov 2021 04:19  Phos  2.8     11-12  Mg     2.7     11-12    TPro  7.0  /  Alb  2.1<L>  /  TBili  0.9  /  DBili  x   /  AST  26  /  ALT  32  /  AlkPhos  147<H>  11-12    PT/INR - ( 11 Nov 2021 03:26 )   PT: 13.3 sec;   INR: 1.12 ratio         PTT - ( 11 Nov 2021 03:26 )  PTT:31.2 sec        RADIOLOGY & ADDITIONAL STUDIES REVIEWED:  ***    GOALS OF CARE DISCUSSION WITH PATIENT/FAMILY/PROXY: FULL CODE    CRITICAL CARE TIME SPENT: 35 minutes

## 2021-11-12 NOTE — CHART NOTE - NSCHARTNOTEFT_GEN_A_CORE
<Hospital Course>    Patient is a 89 y/o F from [facility] w/ Hx of HTN, Dementia, CVA w/ R sided hemiparesis, aphasia, Parkinson's Disease, GERD, CKD. She is bedbound at baseline and needs assistance for ADLs. She was brought to the E.D. due to episodes of desaturation at 70s and respiratory distress. She was also having cough and fever in the nursing home. She was initially on NRB placed by EMS but was eventually intubated in the E.D. She is vaccinated with Moderna. COVID test was negative on admission. Chest Xray showed left lower lobe infiltrate (pneumonia). Blood cultures were sent. She was given 1 dose of Vancomycin and Zosyn in the E.D. and was subsequently admitted to ICU.    In the ICU, she was placed on mechanical ventilator. Her blood pressure was low despite receiving 2L bolus of LR. NGT and Left IJ central line was placed. Levophed was started. CXR revealed Left pneumothorax. Thoracic Surgery was consulted and left chest tube was placed. Patient was also tachycardic and troponin was mildly elevated. EKG showed SR w/ ST changes in the inferior leads. Troponin was monitored and has since trended down. Heart rate has been stable. Lactate was also elevated at 11.2 and has also since trended down. Patient also presented with hypernatremia and elevated creatinine probably secondary to volume deficit. She was also given free water. She was on Heparin for DVT prophylaxis and Protonix for GI prophylaxis. Palliative was consulted regarding the [facility]. Blood culture results showed E. coli bacteremia. She was started on Rocephin IV for 10 days. Azithromycin was continued awaiting Legionella results. Infectious Disease was consulted. Urine culture was ordered. Patient spiked a fever and is tachycardic. Blood cultures were repeated. She was tachycardic however his EKG showed SR w/ new PACs. There was mild air leak in the chest tube drainage but it was fixed by Cardiothoracic Surgery. Urine culture and repeat blood culture was negative. She had 2 febrile episodes overnight at 100.4-100.5F. She was tachypneic at rate of 30. Her repeat Chest Xray showed worsening of bilateral infiltrates. Her albumin is still low at 1.0. Her urine output is net negative. She was given Lasix and Albumin to maintain net negative urine output and decrease congestion. Vancomycin was discontinued and Cefepime decreased from 2g to 1g to complete 14 days since negative culture (11/16/21). Echocardiogram was ordered. Central line was removed. Lactulose was ordered for bowel movement. On November 11, 2021, patient had her tracheostomy and PEG inserted.     Patient is stable and was subsequently transferred to  [facility] under the service of ICU Attending (Rm ____). Covering NP was informed.    <Things to follow>  - daily VS  - daily CBC, BMP  - continue Cefepime to complete 14 days  - f/u chest tube plan w/ Thoracic Surgery <Hospital Course>    Patient is a 89 y/o F from [facility] w/ Hx of HTN, Dementia, CVA w/ R sided hemiparesis, aphasia, Parkinson's Disease, GERD, CKD. She is bedbound at baseline and needs assistance for ADLs. She was brought to the E.D. due to episodes of desaturation at 70s and respiratory distress. She was also having cough and fever in the nursing home. She was initially on NRB placed by EMS but was eventually intubated in the E.D. She is vaccinated with Moderna. COVID test was negative on admission. Chest Xray showed left lower lobe infiltrate (pneumonia). Blood cultures were sent. She was given 1 dose of Vancomycin and Zosyn in the E.D. and was subsequently admitted to ICU.    In the ICU, she was placed on mechanical ventilator. Her blood pressure was low despite receiving 2L bolus of LR. NGT and Left IJ central line was placed. Levophed was started. CXR revealed Left pneumothorax. Thoracic Surgery was consulted and left chest tube was placed. Patient was also tachycardic and troponin was mildly elevated. EKG showed SR w/ ST changes in the inferior leads. Troponin was monitored and has since trended down. Heart rate has been stable. Lactate was also elevated at 11.2 and has also since trended down. Patient also presented with hypernatremia and elevated creatinine probably secondary to volume deficit. She was also given free water. She was on Heparin for DVT prophylaxis and Protonix for GI prophylaxis. Palliative was consulted regarding the [facility]. Blood culture results showed E. coli bacteremia. She was started on Rocephin IV for 10 days. Azithromycin was continued awaiting Legionella results. Infectious Disease was consulted. Urine culture was ordered. Patient spiked a fever and is tachycardic. Blood cultures were repeated. She was tachycardic however his EKG showed SR w/ new PACs. There was mild air leak in the chest tube drainage but it was fixed by Cardiothoracic Surgery. Urine culture and repeat blood culture was negative. She had 2 febrile episodes overnight at 100.4-100.5F. She was tachypneic at rate of 30. Her repeat Chest Xray showed worsening of bilateral infiltrates. Her albumin is still low at 1.0. Her urine output is net negative. She was given Lasix and Albumin to maintain net negative urine output and decrease congestion. Vancomycin was discontinued and Cefepime decreased from 2g to 1g to complete 14 days since negative culture (11/16/21). Echocardiogram was ordered. Central line was removed. Lactulose was ordered for bowel movement. On November 11, 2021, patient had her tracheostomy and PEG inserted.     Patient is stable and was subsequently transferred to  [facility] under the service of ICU Attending (Rm ____). Covering NP was informed.    <Things to follow>  - daily VS  - daily CBC, BMP  - daily tracheostomy  - daily PEG care  - continue Cefepime to complete 14 days  - f/u chest tube plan w/ Thoracic Surgery <Hospital Course>    Patient is a 89 y/o F from [facility] w/ Hx of HTN, Dementia, CVA w/ R sided hemiparesis, aphasia, Parkinson's Disease, GERD, CKD. She is bedbound at baseline and needs assistance for ADLs. She was brought to the E.D. due to episodes of desaturation at 70s and respiratory distress. She was also having cough and fever in the nursing home. She was initially on NRB placed by EMS but was eventually intubated in the E.D. She is vaccinated with Moderna. COVID test was negative on admission. Chest Xray showed left lower lobe infiltrate (pneumonia). Blood cultures were sent. She was given 1 dose of Vancomycin and Zosyn in the E.D. and was subsequently admitted to ICU.    In the ICU, she was placed on mechanical ventilator. Her blood pressure was low despite receiving 2L bolus of LR. NGT and Left IJ central line was placed. Levophed was started. CXR revealed Left pneumothorax. Thoracic Surgery was consulted and left chest tube was placed. Patient was also tachycardic and troponin was mildly elevated. EKG showed SR w/ ST changes in the inferior leads. Troponin was monitored and has since trended down. Heart rate has been stable. Lactate was also elevated at 11.2 and has also since trended down. Patient also presented with hypernatremia and elevated creatinine probably secondary to volume deficit. She was also given free water. She was on Heparin for DVT prophylaxis and Protonix for GI prophylaxis. Palliative was consulted regarding the [facility]. Blood culture results showed E. coli bacteremia. She was started on Rocephin IV for 10 days. Azithromycin was continued awaiting Legionella results. Infectious Disease was consulted. Urine culture was ordered. Patient spiked a fever and is tachycardic. Blood cultures were repeated. She was tachycardic however his EKG showed SR w/ new PACs. There was mild air leak in the chest tube drainage but it was fixed by Cardiothoracic Surgery. Urine culture and repeat blood culture was negative. She had 2 febrile episodes overnight at 100.4-100.5F. She was tachypneic at rate of 30. Her repeat Chest Xray showed worsening of bilateral infiltrates. Her albumin is still low at 1.0. Her urine output is net negative. She was given Lasix and Albumin to maintain net negative urine output and decrease congestion. Vancomycin was discontinued and Cefepime decreased from 2g to 1g to complete 14 days since negative culture (11/16/21). Echocardiogram was ordered. Central line was removed. Lactulose was ordered for bowel movement. On November 11, 2021, patient had her tracheostomy and PEG inserted.     Patient is stable and was subsequently transferred to  [facility] under the service of ICU Attending (Rm ____). Covering NP was informed.    <Things to follow>  - daily VS  - daily CBC, BMP  - daily tracheostomy  - daily PEG care  - continue Cefepime to complete 14 days  - f/u chest tube plan w/ Thoracic Surgery  - maintain chest tube at water seal setting  - if patient is in distress change back to suction <Hospital Course>    Patient is a 89 y/o F from [facility] w/ Hx of HTN, Dementia, CVA w/ R sided hemiparesis, aphasia, Parkinson's Disease, GERD, CKD. She is bedbound at baseline and needs assistance for ADLs. She was brought to the E.D. due to episodes of desaturation at 70s and respiratory distress. She was also having cough and fever in the nursing home. She was initially on NRB placed by EMS but was eventually intubated in the E.D. She is vaccinated with Moderna. COVID test was negative on admission. Chest Xray showed left lower lobe infiltrate (pneumonia). Blood cultures were sent. She was given 1 dose of Vancomycin and Zosyn in the E.D. and was subsequently admitted to ICU.    In the ICU, she was placed on mechanical ventilator. Her blood pressure was low despite receiving 2L bolus of LR. NGT and Left IJ central line was placed. Levophed was started. CXR revealed Left pneumothorax. Thoracic Surgery was consulted and left chest tube was placed. Patient was also tachycardic and troponin was mildly elevated. EKG showed SR w/ ST changes in the inferior leads. Troponin was monitored and has since trended down. Heart rate has been stable. Lactate was also elevated at 11.2 and has also since trended down. Patient also presented with hypernatremia and elevated creatinine probably secondary to volume deficit. She was also given free water. She was on Heparin for DVT prophylaxis and Protonix for GI prophylaxis. Palliative was consulted regarding the [facility]. Blood culture results showed E. coli bacteremia. She was started on Rocephin IV for 10 days. Azithromycin was continued awaiting Legionella results. Infectious Disease was consulted. Urine culture was ordered. Patient spiked a fever and is tachycardic. Blood cultures were repeated. She was tachycardic however his EKG showed SR w/ new PACs. There was mild air leak in the chest tube drainage but it was fixed by Cardiothoracic Surgery. Urine culture and repeat blood culture was negative. She had 2 febrile episodes overnight at 100.4-100.5F. She was tachypneic at rate of 30. Her repeat Chest Xray showed worsening of bilateral infiltrates. Her albumin is still low at 1.0. Her urine output is net negative. She was given Lasix and Albumin to maintain net negative urine output and decrease congestion. Vancomycin was discontinued and Cefepime decreased from 2g to 1g to complete 14 days since negative culture (11/16/21). Echocardiogram was ordered. Central line was removed. Lactulose was ordered for bowel movement. On November 11, 2021, patient had her tracheostomy and PEG inserted. Patient ashly Lawton was called and informed .     Patient is stable and was subsequently transferred to  [facility] under the service of ICU Attending . Covering NP Norma was informed.    <Things to follow>  - daily VS  - daily CBC, BMP  - daily tracheostomy  - daily PEG care  - continue Cefepime to complete 14 days  - f/u chest tube plan w/ Thoracic Surgery  - maintain chest tube at water seal setting  - if patient is in distress change back to suction

## 2021-11-12 NOTE — PROGRESS NOTE ADULT - NUTRITIONAL ASSESSMENT
This patient has been assessed with a concern for Malnutrition and has been determined to have a diagnosis/diagnoses of Severe protein-calorie malnutrition and Underweight (BMI < 19).    This patient is being managed with:   Diet NPO-  Tube Feeding Modality: Nasogastric  Jevity 1.5 Bg  Total Volume for 24 Hours (mL): 720  Continuous  Starting Tube Feed Rate {mL per Hour}: 20  Increase Tube Feed Rate by (mL): 10     Every 8 hours  Until Goal Tube Feed Rate (mL per Hour): 30  Tube Feed Duration (in Hours): 24  Tube Feed Start Time: 17:00  Entered: Nov 12 2021 11:10AM    Diet NPO after Midnight-     NPO Start Date: 10-Nov-2021   NPO Start Time: 23:59  Entered: Nov 10 2021  7:19PM

## 2021-11-12 NOTE — PROGRESS NOTE ADULT - ASSESSMENT
seen and examined vsstable afebrile physical done ok  unchanged  awake on trach vent  peg    comfortable  not in any distress  labs noted   hgb 9.1,   a/p resp failure  now trach vent  cva, dementia  poor prognosis  full code

## 2021-11-12 NOTE — PHARMACOTHERAPY INTERVENTION NOTE - COMMENTS
Recommended changing frequency of cefepime from Q12hrs to Q24hrs due to patient's CrCl of 24.4mL/min.
Recommended changing frequency of cefepime from Q24hrs to Q12hrs due to patient's CrCl of 31.7mL/min.
Recommended to obtain triglyceride levels during the use of propofol.
Recommended adding the missing pantoprazole IV order as per note.
Recommended switching pantoprazole from IV to Suspension because patient can take medication via NG tube.

## 2021-11-12 NOTE — PROGRESS NOTE ADULT - ASSESSMENT
89 years old female, s/p Trach and PEG    Surgicell removed today    Plan:  1- No DVT ppx for 24 hours. Save to start today  after 11 AM  2- Please keep PEG on gravity for 24 hours. Save to start today  after 11 AM  3-Stitches will stay for 10 days

## 2021-11-13 DIAGNOSIS — A41.9 SEPSIS, UNSPECIFIED ORGANISM: ICD-10-CM

## 2021-11-13 DIAGNOSIS — Z29.9 ENCOUNTER FOR PROPHYLACTIC MEASURES, UNSPECIFIED: ICD-10-CM

## 2021-11-13 DIAGNOSIS — E87.3 ALKALOSIS: ICD-10-CM

## 2021-11-13 DIAGNOSIS — R78.81 BACTEREMIA: ICD-10-CM

## 2021-11-13 DIAGNOSIS — J93.9 PNEUMOTHORAX, UNSPECIFIED: ICD-10-CM

## 2021-11-13 LAB
ALBUMIN SERPL ELPH-MCNC: 1.8 G/DL — LOW (ref 3.5–5)
ALP SERPL-CCNC: 146 U/L — HIGH (ref 40–120)
ALT FLD-CCNC: 33 U/L DA — SIGNIFICANT CHANGE UP (ref 10–60)
ANION GAP SERPL CALC-SCNC: 5 MMOL/L — SIGNIFICANT CHANGE UP (ref 5–17)
AST SERPL-CCNC: 36 U/L — SIGNIFICANT CHANGE UP (ref 10–40)
BASE EXCESS BLDA CALC-SCNC: -0.4 MMOL/L — SIGNIFICANT CHANGE UP (ref -2–3)
BASE EXCESS BLDA CALC-SCNC: -0.9 MMOL/L — SIGNIFICANT CHANGE UP (ref -2–3)
BASOPHILS # BLD AUTO: 0.05 K/UL — SIGNIFICANT CHANGE UP (ref 0–0.2)
BASOPHILS NFR BLD AUTO: 0.4 % — SIGNIFICANT CHANGE UP (ref 0–2)
BILIRUB SERPL-MCNC: 0.5 MG/DL — SIGNIFICANT CHANGE UP (ref 0.2–1.2)
BLOOD GAS COMMENTS ARTERIAL: SIGNIFICANT CHANGE UP
BLOOD GAS COMMENTS ARTERIAL: SIGNIFICANT CHANGE UP
BUN SERPL-MCNC: 24 MG/DL — HIGH (ref 7–18)
CALCIUM SERPL-MCNC: 8.3 MG/DL — LOW (ref 8.4–10.5)
CHLORIDE SERPL-SCNC: 114 MMOL/L — HIGH (ref 96–108)
CO2 SERPL-SCNC: 24 MMOL/L — SIGNIFICANT CHANGE UP (ref 22–31)
CREAT SERPL-MCNC: 0.78 MG/DL — SIGNIFICANT CHANGE UP (ref 0.5–1.3)
EOSINOPHIL # BLD AUTO: 0.26 K/UL — SIGNIFICANT CHANGE UP (ref 0–0.5)
EOSINOPHIL NFR BLD AUTO: 1.8 % — SIGNIFICANT CHANGE UP (ref 0–6)
GLUCOSE SERPL-MCNC: 173 MG/DL — HIGH (ref 70–99)
HCO3 BLDA-SCNC: 21 MMOL/L — SIGNIFICANT CHANGE UP (ref 21–28)
HCO3 BLDA-SCNC: 22 MMOL/L — SIGNIFICANT CHANGE UP (ref 21–28)
HCT VFR BLD CALC: 28 % — LOW (ref 34.5–45)
HGB BLD-MCNC: 8.4 G/DL — LOW (ref 11.5–15.5)
HOROWITZ INDEX BLDA+IHG-RTO: 40 — SIGNIFICANT CHANGE UP
HOROWITZ INDEX BLDA+IHG-RTO: 40 — SIGNIFICANT CHANGE UP
IMM GRANULOCYTES NFR BLD AUTO: 1.9 % — HIGH (ref 0–1.5)
LYMPHOCYTES # BLD AUTO: 0.72 K/UL — LOW (ref 1–3.3)
LYMPHOCYTES # BLD AUTO: 5.1 % — LOW (ref 13–44)
MAGNESIUM SERPL-MCNC: 2.6 MG/DL — SIGNIFICANT CHANGE UP (ref 1.6–2.6)
MCHC RBC-ENTMCNC: 28.4 PG — SIGNIFICANT CHANGE UP (ref 27–34)
MCHC RBC-ENTMCNC: 30 GM/DL — LOW (ref 32–36)
MCV RBC AUTO: 94.6 FL — SIGNIFICANT CHANGE UP (ref 80–100)
MONOCYTES # BLD AUTO: 0.88 K/UL — SIGNIFICANT CHANGE UP (ref 0–0.9)
MONOCYTES NFR BLD AUTO: 6.2 % — SIGNIFICANT CHANGE UP (ref 2–14)
NEUTROPHILS # BLD AUTO: 11.96 K/UL — HIGH (ref 1.8–7.4)
NEUTROPHILS NFR BLD AUTO: 84.6 % — HIGH (ref 43–77)
NRBC # BLD: 0 /100 WBCS — SIGNIFICANT CHANGE UP (ref 0–0)
PCO2 BLDA: 27 MMHG — LOW (ref 32–35)
PCO2 BLDA: 28 MMHG — LOW (ref 32–35)
PH BLDA: 7.49 — HIGH (ref 7.35–7.45)
PH BLDA: 7.52 — HIGH (ref 7.35–7.45)
PHOSPHATE SERPL-MCNC: 2.1 MG/DL — LOW (ref 2.5–4.5)
PLATELET # BLD AUTO: 565 K/UL — HIGH (ref 150–400)
PO2 BLDA: 119 MMHG — HIGH (ref 83–108)
PO2 BLDA: 83 MMHG — SIGNIFICANT CHANGE UP (ref 83–108)
POTASSIUM SERPL-MCNC: 3.6 MMOL/L — SIGNIFICANT CHANGE UP (ref 3.5–5.3)
POTASSIUM SERPL-SCNC: 3.6 MMOL/L — SIGNIFICANT CHANGE UP (ref 3.5–5.3)
PROT SERPL-MCNC: 6.3 G/DL — SIGNIFICANT CHANGE UP (ref 6–8.3)
RBC # BLD: 2.96 M/UL — LOW (ref 3.8–5.2)
RBC # FLD: 14.9 % — HIGH (ref 10.3–14.5)
SAO2 % BLDA: 99 % — SIGNIFICANT CHANGE UP
SAO2 % BLDA: 99 % — SIGNIFICANT CHANGE UP
SODIUM SERPL-SCNC: 143 MMOL/L — SIGNIFICANT CHANGE UP (ref 135–145)
WBC # BLD: 14.14 K/UL — HIGH (ref 3.8–10.5)
WBC # FLD AUTO: 14.14 K/UL — HIGH (ref 3.8–10.5)

## 2021-11-13 PROCEDURE — 71045 X-RAY EXAM CHEST 1 VIEW: CPT | Mod: 26

## 2021-11-13 RX ORDER — CHLORHEXIDINE GLUCONATE 213 G/1000ML
15 SOLUTION TOPICAL EVERY 12 HOURS
Refills: 0 | Status: DISCONTINUED | OUTPATIENT
Start: 2021-11-13 | End: 2021-12-01

## 2021-11-13 RX ORDER — SODIUM,POTASSIUM PHOSPHATES 278-250MG
1 POWDER IN PACKET (EA) ORAL EVERY 4 HOURS
Refills: 0 | Status: COMPLETED | OUTPATIENT
Start: 2021-11-13 | End: 2021-11-14

## 2021-11-13 RX ORDER — POTASSIUM CHLORIDE 20 MEQ
20 PACKET (EA) ORAL ONCE
Refills: 0 | Status: COMPLETED | OUTPATIENT
Start: 2021-11-13 | End: 2021-11-13

## 2021-11-13 RX ADMIN — CHLORHEXIDINE GLUCONATE 15 MILLILITER(S): 213 SOLUTION TOPICAL at 17:28

## 2021-11-13 RX ADMIN — MUPIROCIN 1 APPLICATION(S): 20 OINTMENT TOPICAL at 05:53

## 2021-11-13 RX ADMIN — CEFEPIME 100 MILLIGRAM(S): 1 INJECTION, POWDER, FOR SOLUTION INTRAMUSCULAR; INTRAVENOUS at 05:52

## 2021-11-13 RX ADMIN — Medication 1 PACKET(S): at 12:36

## 2021-11-13 RX ADMIN — CHLORHEXIDINE GLUCONATE 15 MILLILITER(S): 213 SOLUTION TOPICAL at 05:54

## 2021-11-13 RX ADMIN — Medication 1 PACKET(S): at 22:42

## 2021-11-13 RX ADMIN — Medication 20 MILLIEQUIVALENT(S): at 16:21

## 2021-11-13 RX ADMIN — CEFEPIME 100 MILLIGRAM(S): 1 INJECTION, POWDER, FOR SOLUTION INTRAMUSCULAR; INTRAVENOUS at 17:26

## 2021-11-13 RX ADMIN — MUPIROCIN 1 APPLICATION(S): 20 OINTMENT TOPICAL at 18:43

## 2021-11-13 RX ADMIN — ENOXAPARIN SODIUM 40 MILLIGRAM(S): 100 INJECTION SUBCUTANEOUS at 13:25

## 2021-11-13 RX ADMIN — Medication 1 PACKET(S): at 17:28

## 2021-11-13 RX ADMIN — PANTOPRAZOLE SODIUM 40 MILLIGRAM(S): 20 TABLET, DELAYED RELEASE ORAL at 13:25

## 2021-11-13 NOTE — PROGRESS NOTE ADULT - SUBJECTIVE AND OBJECTIVE BOX
HPI:  89 yo female from [facility] with medical h/o of HTN, Dementia, CVA with R sided hemiparesis, aphasia, parkinson's, GERD, CKD bedbound at baseline, dependant on assistance for ADL comes in with respiratory failure. Patient was found to have respiratory distress, saturating in 70s when EMS arrived, was placed on NRB on field. She was emergently intubated on arrival in ED. As per NH records patient was having fever and cough for past few days. Today she was found to have respiratory distress. She is vaccinated with moderna. Spoke to family son,  who stated the patient to remain full code. No other history could be obtained.  (01 Nov 2021 00:14)      Patient is a 89y old  Female who presents with a chief complaint of AHRF (12 Nov 2021 18:13)      INTERVAL HPI/OVERNIGHT EVENTS:  T(C): 36.6 (11-13-21 @ 05:00), Max: 37.1 (11-13-21 @ 00:18)  HR: 91 (11-13-21 @ 08:46) (82 - 109)  BP: 134/63 (11-13-21 @ 05:47) (93/55 - 159/70)  RR: 19 (11-13-21 @ 05:47) (17 - 20)  SpO2: 100% (11-13-21 @ 08:46) (97% - 100%)  Wt(kg): --  I&O's Summary    12 Nov 2021 07:01  -  13 Nov 2021 07:00  --------------------------------------------------------  IN: 240 mL / OUT: 195 mL / NET: 45 mL        REVIEW OF SYSTEMS: denies fever, chills, SOB, palpitations, chest pain, abdominal pain, nausea, vomitting, diarrhea, constipation, dizziness    MEDICATIONS  (STANDING):  cefepime   IVPB 1000 milliGRAM(s) IV Intermittent two times a day  chlorhexidine 0.12% Liquid 15 milliLiter(s) Oral Mucosa every 12 hours  enoxaparin Injectable 40 milliGRAM(s) SubCutaneous daily  mupirocin 2% Ointment 1 Application(s) Topical two times a day  pantoprazole   Suspension 40 milliGRAM(s) Oral daily  potassium chloride   Powder 20 milliEquivalent(s) Oral once  potassium phosphate / sodium phosphate Powder (PHOS-NaK) 1 Packet(s) Oral every 4 hours    MEDICATIONS  (PRN):  acetaminophen    Suspension .. 650 milliGRAM(s) Oral every 6 hours PRN Temp greater or equal to 38.5C (101.3F)  fentaNYL    Injectable 25 MICROGram(s) IV Push every 4 hours PRN agitation  sodium chloride 0.9% lock flush 10 milliLiter(s) IV Push every 1 hour PRN Pre/post blood products, medications, blood draw, and to maintain line patency      PHYSICAL EXAM:  GENERAL: NAD, well-groomed, well-developed  HEAD:  Atraumatic, Normocephalic  EYES: EOMI, PERRLA, conjunctiva and sclera clear  ENMT: No tonsillar erythema, exudates, or enlargement; Moist mucous membranes, Good dentition, No lesions  NECK: Supple, No JVD, Normal thyroid  NERVOUS SYSTEM:  Alert & Oriented X3, Good concentration; Motor Strength 5/5 B/L upper and lower extremities; DTRs 2+ intact and symmetric  CHEST/LUNG: Clear to percussion bilaterally; No rales, rhonchi, wheezing, or rubs  HEART: Regular rate and rhythm; No murmurs, rubs, or gallops  ABDOMEN: Soft, Nontender, Nondistended; Bowel sounds present  EXTREMITIES:  2+ Peripheral Pulses, No clubbing, cyanosis, or edema  LYMPH: No lymphadenopathy noted  SKIN: No rashes or lesions  LABS:                        8.4    14.14 )-----------( 565      ( 13 Nov 2021 07:41 )             28.0     11-13    143  |  114<H>  |  24<H>  ----------------------------<  173<H>  3.6   |  24  |  0.78    Ca    8.3<L>      13 Nov 2021 07:41  Phos  2.1     11-13  Mg     2.6     11-13    TPro  6.3  /  Alb  1.8<L>  /  TBili  0.5  /  DBili  x   /  AST  36  /  ALT  33  /  AlkPhos  146<H>  11-13        CAPILLARY BLOOD GLUCOSE      POCT Blood Glucose.: 142 mg/dL (12 Nov 2021 16:37)      ABG - ( 13 Nov 2021 02:47 )  pH, Arterial: 7.52  pH, Blood: x     /  pCO2: 27    /  pO2: 83    / HCO3: 22    / Base Excess: -0.4  /  SaO2: 99

## 2021-11-13 NOTE — PROGRESS NOTE ADULT - ASSESSMENT
seen and examined vsstable afebrile physical done  on trach vent comforatble  eyes open  trying to look at   labs noted hgb 8.4  wbc 14   a/p resp failure   now trach vent  peg   wbc jumpd but no fever  watch   poor prognosis

## 2021-11-13 NOTE — PROGRESS NOTE ADULT - PROBLEM SELECTOR PLAN 2
BC from 11/1 grew E. coli  Repeat BC NGTD  afebrile > 4 days  Leukocytosis uptrending likely due to recent procedure (trach/PEG)  Continue Cefepime , Day 11/14  ID following

## 2021-11-13 NOTE — CHART NOTE - NSCHARTNOTEFT_GEN_A_CORE
Returned call to pt's grandson Dr. Tommie Pathak, and updated on pt's clinical condition and plan of care.  All questions and concerns addressed.    Alysha Mcdonough NP Medicine /[facility]  [phone]

## 2021-11-13 NOTE — PROGRESS NOTE ADULT - PROBLEM SELECTOR PLAN 1
CXR as above  Lactate elevated, now normalized.   Afebrile >4 days  Leukocytosis uptrending, likely due to recent procedure (trach & PEG)  Continue Cefepime Day 11/14  Continue mechanical ventilation   Monitor oxygenation

## 2021-11-13 NOTE — PROGRESS NOTE ADULT - ASSESSMENT
89 y/o F from [facility] w/ Hx of HTN, Dementia, CVA w/ R sided hemiparesis, aphasia, Parkinson's Disease, GERD, CKD. She is bedbound at baseline and needs assistance for ADLs. She was brought to the E.D. due to episodes of desaturation at 70s and respiratory distress. She was also having cough and fever in the nursing home. She was initially on NRB placed by EMS but was eventually intubated in the E.D. She is vaccinated with Moderna. COVID test was negative on admission. Chest Xray showed left lower lobe infiltrate (pneumonia). Blood cultures were sent. She was given 1 dose of Vancomycin and Zosyn in the E.D. and was subsequently admitted to ICU.    In the ICU, she was placed on mechanical ventilator. Her blood pressure was low despite receiving 2L bolus of LR. NGT and Left IJ central line was placed. Levophed was started. CXR revealed Left pneumothorax. Thoracic Surgery was consulted and left chest tube was placed. Patient was also tachycardic and troponin was mildly elevated. EKG showed SR w/ ST changes in the inferior leads. Troponin was monitored and has since trended down. Heart rate has been stable. Lactate was also elevated at 11.2 and has also since trended down. Patient also presented with hypernatremia and elevated creatinine probably secondary to volume deficit. She was also given free water. She was on Heparin for DVT prophylaxis and Protonix for GI prophylaxis. Palliative was consulted regarding the [facility]. Blood culture results showed E. coli bacteremia. She was started on Rocephin IV for 10 days. Azithromycin was continued awaiting Legionella results. Infectious Disease was consulted. Urine culture was ordered. Patient spiked a fever and is tachycardic. Blood cultures were repeated. She was tachycardic however his EKG showed SR w/ new PACs. There was mild air leak in the chest tube drainage but it was fixed by Cardiothoracic Surgery. Urine culture and repeat blood culture was negative. She had 2 febrile episodes overnight at 100.4-100.5F. She was tachypneic at rate of 30. Her repeat Chest Xray showed worsening of bilateral infiltrates. Her albumin is still low at 1.0. Her urine output is net negative. She was given Lasix and Albumin to maintain net negative urine output and decrease congestion. Vancomycin was discontinued and Cefepime decreased from 2g to 1g to complete 14 days since negative culture (11/16/21). Echocardiogram was ordered. Central line was removed. Lactulose was ordered for bowel movement. On November 11, 2021, patient had her tracheostomy and PEG inserted. Patient son Jered was called and informed .

## 2021-11-13 NOTE — PROGRESS NOTE ADULT - SUBJECTIVE AND OBJECTIVE BOX
AKASH CHACKO    SCU progress note    INTERVAL HPI/OVERNIGHT EVENTS: ***    DNR [ ]   DNI  [  ]    Covid - 19 PCR: COVID-19 PCR: NotDetec (10 Nov 2021 20:31)  COVID-19 PCR: NotDetec (31 Oct 2021 23:22)      The 4Ms    What Matters Most: see GOC  Age appropriate Medications/Screen for High Risk Medication: Yes  Mentation: see CAM below  Mobility: defer to physical exam    The Confusion Assessment Method (CAM) Diagnostic Algorithm     1: Acute Onset or Fluctuating Course  - Is there evidence of an acute change in mental status from the patient’s baseline? Did the (abnormal) behavior  fluctuate during the day, that is, tend to come and go, or increase and decrease in severity?  [ ] YES [ ] NO     2: Inattention  - Did the patient have difficulty focusing attention, being easily distractible, or having difficulty keeping track of what was being said?  [ ] YES [ ] NO     3: Disorganized thinking  -Was the patient’s thinking disorganized or incoherent, such as rambling or irrelevant conversation, unclear or illogical flow of ideas, or unpredictable switching from subject to subject?  [ ] YES [ ] NO    4: Altered Level of consciousness?  [ ] YES [ ] NO    The diagnosis of delirium by CAM requires the presence of features 1 and 2 and either 3 or 4.    PRESSORS: [ ] YES [ ] NO  cefepime   IVPB 1000 milliGRAM(s) IV Intermittent two times a day    Cardiovascular:  Heart Failure  Acute   Acute on Chronic  Chronic         Pulmonary:    Hematalogic:  enoxaparin Injectable 40 milliGRAM(s) SubCutaneous daily    Other:  acetaminophen    Suspension .. 650 milliGRAM(s) Oral every 6 hours PRN  chlorhexidine 0.12% Liquid 15 milliLiter(s) Oral Mucosa every 12 hours  fentaNYL    Injectable 25 MICROGram(s) IV Push every 4 hours PRN  mupirocin 2% Ointment 1 Application(s) Topical two times a day  pantoprazole   Suspension 40 milliGRAM(s) Oral daily  potassium chloride   Powder 20 milliEquivalent(s) Oral once  potassium phosphate / sodium phosphate Powder (PHOS-NaK) 1 Packet(s) Oral every 4 hours  sodium chloride 0.9% lock flush 10 milliLiter(s) IV Push every 1 hour PRN    acetaminophen    Suspension .. 650 milliGRAM(s) Oral every 6 hours PRN  cefepime   IVPB 1000 milliGRAM(s) IV Intermittent two times a day  chlorhexidine 0.12% Liquid 15 milliLiter(s) Oral Mucosa every 12 hours  enoxaparin Injectable 40 milliGRAM(s) SubCutaneous daily  fentaNYL    Injectable 25 MICROGram(s) IV Push every 4 hours PRN  mupirocin 2% Ointment 1 Application(s) Topical two times a day  pantoprazole   Suspension 40 milliGRAM(s) Oral daily  potassium chloride   Powder 20 milliEquivalent(s) Oral once  potassium phosphate / sodium phosphate Powder (PHOS-NaK) 1 Packet(s) Oral every 4 hours  sodium chloride 0.9% lock flush 10 milliLiter(s) IV Push every 1 hour PRN    Drug Dosing Weight  Height (cm): 167.6 (03 Nov 2021 16:02)  Weight (kg): 40.9 (01 Nov 2021 02:20)  BMI (kg/m2): 14.6 (03 Nov 2021 16:02)  BSA (m2): 1.43 (03 Nov 2021 16:02)    CENTRAL LINE: [ ] YES [ ] NO  LOCATION:   DATE INSERTED:  REMOVE: [ ] YES [ ] NO  EXPLAIN:    SAEED: [ ] YES [ ] NO    DATE INSERTED:  REMOVE:  [ ] YES [ ] NO  EXPLAIN:    PAST MEDICAL & SURGICAL HISTORY:  HTN (hypertension)    Dementia          ABG - ( 13 Nov 2021 02:47 )  pH, Arterial: 7.52  pH, Blood: x     /  pCO2: 27    /  pO2: 83    / HCO3: 22    / Base Excess: -0.4  /  SaO2: 99                    11-12 @ 07:01  -  11-13 @ 07:00  --------------------------------------------------------  IN: 240 mL / OUT: 195 mL / NET: 45 mL        Mode: AC/ CMV (Assist Control/ Continuous Mandatory Ventilation)  RR (machine): 20  TV (machine): 400  FiO2: 40  PEEP: 5  ITime: 0.9  MAP: 5  PIP: 33      PHYSICAL EXAM:    GENERAL: NAD  HEAD: atraumatic  Eyes: EOMI, PERRLA, conjunctiva and sclera clear  ENMT: No tonsillar erythema, exudates, or enlargement;   NECK: Trach tube intact. Supple, No JVD  NERVOUS SYSTEM: Awake/ Alert. Does not follow commands.  Motor Strength 0/5 B/L upper and lower extremities  CHEST/LUNG:Trach to vent.  Clear upper airway, decreased breath sounds at bases.  No rales, rhonchi, wheezing, or rubs. Left chest tube to water seal. (+) leak  HEART: Regular rate and rhythm; No murmurs, rubs, or gallops  ABDOMEN: Soft, Nontender, Nondistended; Bowel sounds present.PEG tube intact.   EXTREMITIES:  2+ Peripheral Pulses, No edema, no calf tenderness bilaterally.   LYMPH: No lymphadenopathy noted  SKIN:       LABS:  CBC Full  -  ( 13 Nov 2021 07:41 )  WBC Count : 14.14 K/uL  RBC Count : 2.96 M/uL  Hemoglobin : 8.4 g/dL  Hematocrit : 28.0 %  Platelet Count - Automated : 565 K/uL  Mean Cell Volume : 94.6 fl  Mean Cell Hemoglobin : 28.4 pg  Mean Cell Hemoglobin Concentration : 30.0 gm/dL  Auto Neutrophil # : 11.96 K/uL  Auto Lymphocyte # : 0.72 K/uL  Auto Monocyte # : 0.88 K/uL  Auto Eosinophil # : 0.26 K/uL  Auto Basophil # : 0.05 K/uL  Auto Neutrophil % : 84.6 %  Auto Lymphocyte % : 5.1 %  Auto Monocyte % : 6.2 %  Auto Eosinophil % : 1.8 %  Auto Basophil % : 0.4 %    11-13    143  |  114<H>  |  24<H>  ----------------------------<  173<H>  3.6   |  24  |  0.78    Ca    8.3<L>      13 Nov 2021 07:41  Phos  2.1     11-13  Mg     2.6     11-13    TPro  6.3  /  Alb  1.8<L>  /  TBili  0.5  /  DBili  x   /  AST  36  /  ALT  33  /  AlkPhos  146<H>  11-13              [  ]  DVT Prophylaxis  [  ]   Abnormal Nutritional Status -  Malnutrition   Cachexia        Diet, NPO:   Tube Feeding Modality: Nasogastric  Jevity 1.5 Bg  Total Volume for 24 Hours (mL): 720  Continuous  Starting Tube Feed Rate mL per Hour: 20  Increase Tube Feed Rate by (mL): 10     Every 8 hours  Until Goal Tube Feed Rate (mL per Hour): 30  Tube Feed Duration (in Hours): 24  Tube Feed Start Time: 17:00 (11-12-21 @ 11:11) [Active]  Diet, NPO after Midnight:      NPO Start Date: 10-Nov-2021,   NPO Start Time: 23:59 (11-10-21 @ 19:21) [Active]          RADIOLOGY & ADDITIONAL STUDIES:  ***    Goals of Care Discussion with Family/Proxy/Other   - see note from AKASH CHACKO    SCU progress note    INTERVAL HPI/OVERNIGHT EVENTS: ***Downgraded from ICU to AI, transferred overnight. No acute events overnight. ABG this reflects respiratory alkalosis on AC 20//FiO2   30%/PEEP +5. Rate decreased to 16, . Repeat ABG's improved.     DNR [ ]   DNI  [  ] Full code.     Covid - 19 PCR: COVID-19 PCR: NotDetec (10 Nov 2021 20:31)  COVID-19 PCR: NotDetec (31 Oct 2021 23:22)      The 4Ms    What Matters Most: see GOC  Age appropriate Medications/Screen for High Risk Medication: Yes  Mentation: see CAM below  Mobility: defer to physical exam    The Confusion Assessment Method (CAM) Diagnostic Algorithm     1: Acute Onset or Fluctuating Course  - Is there evidence of an acute change in mental status from the patient’s baseline? Did the (abnormal) behavior  fluctuate during the day, that is, tend to come and go, or increase and decrease in severity?  [ ] YES [x ] NO     2: Inattention  - Did the patient have difficulty focusing attention, being easily distractible, or having difficulty keeping track of what was being said?  [ ] YES [ ] NO unable to assess (x)     3: Disorganized thinking  -Was the patient’s thinking disorganized or incoherent, such as rambling or irrelevant conversation, unclear or illogical flow of ideas, or unpredictable switching from subject to subject?  [ ] YES [ ] NO  unable to assess (x)    4: Altered Level of consciousness?  [ ] YES [x ] NO    The diagnosis of delirium by CAM requires the presence of features 1 and 2 and either 3 or 4.    PRESSORS: [ ] YES [ x] NO  cefepime   IVPB 1000 milliGRAM(s) IV Intermittent two times a day    Cardiovascular:    Pulmonary:    Hematalogic:  enoxaparin Injectable 40 milliGRAM(s) SubCutaneous daily    Other:  acetaminophen    Suspension .. 650 milliGRAM(s) Oral every 6 hours PRN  chlorhexidine 0.12% Liquid 15 milliLiter(s) Oral Mucosa every 12 hours  fentaNYL    Injectable 25 MICROGram(s) IV Push every 4 hours PRN  mupirocin 2% Ointment 1 Application(s) Topical two times a day  pantoprazole   Suspension 40 milliGRAM(s) Oral daily  potassium chloride   Powder 20 milliEquivalent(s) Oral once  potassium phosphate / sodium phosphate Powder (PHOS-NaK) 1 Packet(s) Oral every 4 hours  sodium chloride 0.9% lock flush 10 milliLiter(s) IV Push every 1 hour PRN    acetaminophen    Suspension .. 650 milliGRAM(s) Oral every 6 hours PRN  cefepime   IVPB 1000 milliGRAM(s) IV Intermittent two times a day  chlorhexidine 0.12% Liquid 15 milliLiter(s) Oral Mucosa every 12 hours  enoxaparin Injectable 40 milliGRAM(s) SubCutaneous daily  fentaNYL    Injectable 25 MICROGram(s) IV Push every 4 hours PRN  mupirocin 2% Ointment 1 Application(s) Topical two times a day  pantoprazole   Suspension 40 milliGRAM(s) Oral daily  potassium chloride   Powder 20 milliEquivalent(s) Oral once  potassium phosphate / sodium phosphate Powder (PHOS-NaK) 1 Packet(s) Oral every 4 hours  sodium chloride 0.9% lock flush 10 milliLiter(s) IV Push every 1 hour PRN    Drug Dosing Weight  Height (cm): 167.6 (03 Nov 2021 16:02)  Weight (kg): 40.9 (01 Nov 2021 02:20)  BMI (kg/m2): 14.6 (03 Nov 2021 16:02)  BSA (m2): 1.43 (03 Nov 2021 16:02)    CENTRAL LINE: [ ] YES x[ ] NO  LOCATION:   DATE INSERTED:  REMOVE: [ ] YES [ ] NO  EXPLAIN:    SAEED: [ ] YES [ x] NO    DATE INSERTED:  REMOVE:  [ ] YES [ ] NO  EXPLAIN:    PAST MEDICAL & SURGICAL HISTORY:  HTN (hypertension)    Dementia      ABG - ( 13 Nov 2021 02:47 )  pH, Arterial: 7.52  pH, Blood: x     /  pCO2: 27    /  pO2: 83    / HCO3: 22    / Base Excess: -0.4  /  SaO2: 99        Blood Gas Profile - Arterial (11.13.21 @ 12:06)   pH, Arterial: 7.49   pCO2, Arterial: 28 mmHg   pO2, Arterial: 119 mmHg   HCO3, Arterial: 21 mmol/L   Base Excess, Arterial: -0.9 mmol/L   Oxygen Saturation, Arterial: 99 %   FIO2, Arterial: 40.0   Blood Gas Comments Arterial: ac/16/350/40%/+5/ 11-12 @ 07:01  -  11-13 @ 07:00  --------------------------------------------------------  IN: 240 mL / OUT: 195 mL / NET: 45 mL        Mode: AC/ CMV (Assist Control/ Continuous Mandatory Ventilation)  RR (machine): 20--->350  TV (machine): 400--->350  FiO2: 40  PEEP: 5  ITime: 0.9  MAP: 5  PIP: 33      PHYSICAL EXAM:    GENERAL: NAD  HEAD: atraumatic   Eyes: EOMI, PERRLA, conjunctiva and sclera clear  ENMT: No tonsillar erythema, exudates, or enlargement;   NECK: Trach tube intact. Supple, No JVD  NERVOUS SYSTEM: Awake/ Alert. Does not follow commands.  Motor Strength 0/5 B/L upper and lower extremities  CHEST/LUNG:Trach to vent.  Clear upper airway, decreased breath sounds at bases.  No rales, rhonchi, wheezing, or rubs. Left chest tube to water seal. (+) leak  HEART: Regular rate and rhythm; No murmurs, rubs, or gallops  ABDOMEN: Soft, Nontender, Nondistended; Bowel sounds present.PEG tube intact.   EXTREMITIES:  2+ Peripheral Pulses, No edema, no calf tenderness bilaterally.   LYMPH: No lymphadenopathy noted  SKIN:       LABS:  CBC Full  -  ( 13 Nov 2021 07:41 )  WBC Count : 14.14 K/uL  RBC Count : 2.96 M/uL  Hemoglobin : 8.4 g/dL  Hematocrit : 28.0 %  Platelet Count - Automated : 565 K/uL  Mean Cell Volume : 94.6 fl  Mean Cell Hemoglobin : 28.4 pg  Mean Cell Hemoglobin Concentration : 30.0 gm/dL  Auto Neutrophil # : 11.96 K/uL  Auto Lymphocyte # : 0.72 K/uL  Auto Monocyte # : 0.88 K/uL  Auto Eosinophil # : 0.26 K/uL  Auto Basophil # : 0.05 K/uL  Auto Neutrophil % : 84.6 %  Auto Lymphocyte % : 5.1 %  Auto Monocyte % : 6.2 %  Auto Eosinophil % : 1.8 %  Auto Basophil % : 0.4 %    11-13    143  |  114<H>  |  24<H>  ----------------------------<  173<H>  3.6   |  24  |  0.78    Ca    8.3<L>      13 Nov 2021 07:41  Phos  2.1     11-13  Mg     2.6     11-13    TPro  6.3  /  Alb  1.8<L>  /  TBili  0.5  /  DBili  x   /  AST  36  /  ALT  33  /  AlkPhos  146<H>  11-13              [  ]  DVT Prophylaxis  [  ]   Abnormal Nutritional Status -  Malnutrition   Cachexia        Diet, NPO:   Tube Feeding Modality: Nasogastric  Jevity 1.5 Bg  Total Volume for 24 Hours (mL): 720  Continuous  Starting Tube Feed Rate mL per Hour: 20  Increase Tube Feed Rate by (mL): 10     Every 8 hours  Until Goal Tube Feed Rate (mL per Hour): 30  Tube Feed Duration (in Hours): 24  Tube Feed Start Time: 17:00 (11-12-21 @ 11:11) [Active]  Diet, NPO after Midnight:      NPO Start Date: 10-Nov-2021,   NPO Start Time: 23:59 (11-10-21 @ 19:21) [Active]      RADIOLOGY & ADDITIONAL STUDIES:  ***  < from: Xray Chest 1 View- PORTABLE-Routine (Xray Chest 1 View- PORTABLE-Routine in AM.) (11.08.21 @ 09:12) >  IMPRESSION:   No significant change..    New LEFT basilar peripheral and LEFT apical pneumothorax approximating 10% LEFT lung volume.  Unchanged bilateral  multifocal and diffuse ill-defined airspace opacities and/or RIGHT effusion.  . Catheters and tubes remain in place.    FOLLOW-UP AP PORTABLE CHEST RADIOGRAPH 11/8/2021 AT 8:47 AM:  LEFT chest tube tip overlies LEFT apex.  Residual LEFT apical 5% pneumothorax.  Unchanged bilateral lung  multifocal and diffuse ill-defined airspace opacities and/or RIGHT pleural effusion obscuring diaphragm contour..  LEFT IJ catheter tip in SVC.  ET tube tip above tracheal bifurcation.  Heart size unchanged.    < end of copied text >    < from: Xray Chest 1 View-PORTABLE IMMEDIATE (10.31.21 @ 23:17) >  IMPRESSION: ETT. Left mid and lower lung field airspace opacities are demonstrated.    < end of copied text >      Goals of Care Discussion with Family/Proxy/Other   - see note from  11/08

## 2021-11-14 LAB
ALBUMIN SERPL ELPH-MCNC: 1.7 G/DL — LOW (ref 3.5–5)
ALP SERPL-CCNC: 129 U/L — HIGH (ref 40–120)
ALT FLD-CCNC: 32 U/L DA — SIGNIFICANT CHANGE UP (ref 10–60)
ANION GAP SERPL CALC-SCNC: 7 MMOL/L — SIGNIFICANT CHANGE UP (ref 5–17)
AST SERPL-CCNC: 27 U/L — SIGNIFICANT CHANGE UP (ref 10–40)
BASOPHILS # BLD AUTO: 0.04 K/UL — SIGNIFICANT CHANGE UP (ref 0–0.2)
BASOPHILS NFR BLD AUTO: 0.3 % — SIGNIFICANT CHANGE UP (ref 0–2)
BILIRUB SERPL-MCNC: 0.4 MG/DL — SIGNIFICANT CHANGE UP (ref 0.2–1.2)
BUN SERPL-MCNC: 26 MG/DL — HIGH (ref 7–18)
CALCIUM SERPL-MCNC: 8 MG/DL — LOW (ref 8.4–10.5)
CHLORIDE SERPL-SCNC: 114 MMOL/L — HIGH (ref 96–108)
CO2 SERPL-SCNC: 22 MMOL/L — SIGNIFICANT CHANGE UP (ref 22–31)
CREAT SERPL-MCNC: 0.68 MG/DL — SIGNIFICANT CHANGE UP (ref 0.5–1.3)
EOSINOPHIL # BLD AUTO: 0.26 K/UL — SIGNIFICANT CHANGE UP (ref 0–0.5)
EOSINOPHIL NFR BLD AUTO: 2 % — SIGNIFICANT CHANGE UP (ref 0–6)
GLUCOSE SERPL-MCNC: 156 MG/DL — HIGH (ref 70–99)
HCT VFR BLD CALC: 24.5 % — LOW (ref 34.5–45)
HGB BLD-MCNC: 7.7 G/DL — LOW (ref 11.5–15.5)
IMM GRANULOCYTES NFR BLD AUTO: 1.9 % — HIGH (ref 0–1.5)
LYMPHOCYTES # BLD AUTO: 0.61 K/UL — LOW (ref 1–3.3)
LYMPHOCYTES # BLD AUTO: 4.6 % — LOW (ref 13–44)
MAGNESIUM SERPL-MCNC: 2.5 MG/DL — SIGNIFICANT CHANGE UP (ref 1.6–2.6)
MCHC RBC-ENTMCNC: 28.7 PG — SIGNIFICANT CHANGE UP (ref 27–34)
MCHC RBC-ENTMCNC: 31.4 GM/DL — LOW (ref 32–36)
MCV RBC AUTO: 91.4 FL — SIGNIFICANT CHANGE UP (ref 80–100)
MONOCYTES # BLD AUTO: 0.93 K/UL — HIGH (ref 0–0.9)
MONOCYTES NFR BLD AUTO: 7.1 % — SIGNIFICANT CHANGE UP (ref 2–14)
NEUTROPHILS # BLD AUTO: 11.04 K/UL — HIGH (ref 1.8–7.4)
NEUTROPHILS NFR BLD AUTO: 84.1 % — HIGH (ref 43–77)
NRBC # BLD: 0 /100 WBCS — SIGNIFICANT CHANGE UP (ref 0–0)
PHOSPHATE SERPL-MCNC: 2.5 MG/DL — SIGNIFICANT CHANGE UP (ref 2.5–4.5)
PLATELET # BLD AUTO: 557 K/UL — HIGH (ref 150–400)
POTASSIUM SERPL-MCNC: 4.3 MMOL/L — SIGNIFICANT CHANGE UP (ref 3.5–5.3)
POTASSIUM SERPL-SCNC: 4.3 MMOL/L — SIGNIFICANT CHANGE UP (ref 3.5–5.3)
PROT SERPL-MCNC: 6.3 G/DL — SIGNIFICANT CHANGE UP (ref 6–8.3)
RBC # BLD: 2.68 M/UL — LOW (ref 3.8–5.2)
RBC # FLD: 15.1 % — HIGH (ref 10.3–14.5)
SODIUM SERPL-SCNC: 143 MMOL/L — SIGNIFICANT CHANGE UP (ref 135–145)
WBC # BLD: 13.13 K/UL — HIGH (ref 3.8–10.5)
WBC # FLD AUTO: 13.13 K/UL — HIGH (ref 3.8–10.5)

## 2021-11-14 PROCEDURE — 71045 X-RAY EXAM CHEST 1 VIEW: CPT | Mod: 26

## 2021-11-14 RX ADMIN — MUPIROCIN 1 APPLICATION(S): 20 OINTMENT TOPICAL at 17:35

## 2021-11-14 RX ADMIN — ENOXAPARIN SODIUM 40 MILLIGRAM(S): 100 INJECTION SUBCUTANEOUS at 11:58

## 2021-11-14 RX ADMIN — MUPIROCIN 1 APPLICATION(S): 20 OINTMENT TOPICAL at 05:51

## 2021-11-14 RX ADMIN — PANTOPRAZOLE SODIUM 40 MILLIGRAM(S): 20 TABLET, DELAYED RELEASE ORAL at 11:58

## 2021-11-14 RX ADMIN — CHLORHEXIDINE GLUCONATE 15 MILLILITER(S): 213 SOLUTION TOPICAL at 17:35

## 2021-11-14 RX ADMIN — CEFEPIME 100 MILLIGRAM(S): 1 INJECTION, POWDER, FOR SOLUTION INTRAMUSCULAR; INTRAVENOUS at 05:49

## 2021-11-14 RX ADMIN — CHLORHEXIDINE GLUCONATE 15 MILLILITER(S): 213 SOLUTION TOPICAL at 05:50

## 2021-11-14 RX ADMIN — CEFEPIME 100 MILLIGRAM(S): 1 INJECTION, POWDER, FOR SOLUTION INTRAMUSCULAR; INTRAVENOUS at 17:35

## 2021-11-14 NOTE — PROGRESS NOTE ADULT - PROBLEM SELECTOR PLAN 3
with air leak  Serial CXRs  Thoracic Surgery following Chest tube to water seal with air leak   CXR shows no pneumothorax  F/u with Thoracic regarding chest tube removal .   Serial CXRs

## 2021-11-14 NOTE — CHART NOTE - NSCHARTNOTEFT_GEN_A_CORE
Contacted pt's grandson Dr. Tommie Pathak and updated on pt's clinical status and plan of care.   All questions and concerns addressed.    Alysha Mcdonough NP Medicine/[facility]  [phone]

## 2021-11-14 NOTE — PROGRESS NOTE ADULT - PROBLEM SELECTOR PLAN 1
CXR as above  Lactate elevated, now normalized.   Afebrile >4 days  Leukocytosis uptrending, likely due to recent procedure (trach & PEG)  Continue Cefepime Day 11/14  Continue mechanical ventilation   Monitor oxygenation CXR as above  Lactate elevated, now normalized.   Afebrile >4 days  Leukocytosis downtrending, likely due to recent procedure (trach & PEG)  Continue Cefepime Day 12/14  Continue mechanical ventilation   Monitor oxygenation

## 2021-11-14 NOTE — PROGRESS NOTE ADULT - SUBJECTIVE AND OBJECTIVE BOX
89y Female is under our care for     REVIEW OF SYSTEMS:  [  ] Not able to elicit  General:	  Chest:	  GI:	  :  Skin:	  Musculoskeletal:	  Neuro:	    MEDS:  cefepime   IVPB 1000 milliGRAM(s) IV Intermittent two times a day    ALLERGIES: Allergies    No Known Allergies    Intolerances        VITALS:  Vital Signs Last 24 Hrs  T(C): 36.5 (14 Nov 2021 05:04), Max: 36.9 (13 Nov 2021 12:44)  T(F): 97.7 (14 Nov 2021 05:04), Max: 98.5 (13 Nov 2021 12:44)  HR: 99 (14 Nov 2021 05:04) (99 - 102)  BP: 147/74 (14 Nov 2021 05:04) (112/60 - 147/74)  BP(mean): --  RR: 16 (14 Nov 2021 05:04) (16 - 28)  SpO2: 99% (14 Nov 2021 05:04) (99% - 100%)      PHYSICAL EXAM:  HEENT:  Neck:  Respiratory:  Cardiovascular:  Gastrointestinal:  Extremities:  Skin:  Ortho:  Neuro:    LABS/DIAGNOSTIC TESTS:                        7.7    13.13 )-----------( 557      ( 14 Nov 2021 07:09 )             24.5     WBC Count: 13.13 K/uL (11-14 @ 07:09)  WBC Count: 14.14 K/uL (11-13 @ 07:41)  WBC Count: 12.01 K/uL (11-12 @ 04:19)  WBC Count: 10.28 K/uL (11-11 @ 03:26)  WBC Count: 13.36 K/uL (11-10 @ 05:18)    11-14    143  |  114<H>  |  26<H>  ----------------------------<  156<H>  4.3   |  22  |  0.68    Ca    8.0<L>      14 Nov 2021 07:09  Phos  2.5     11-14  Mg     2.5     11-14    TPro  6.3  /  Alb  1.7<L>  /  TBili  0.4  /  DBili  x   /  AST  27  /  ALT  32  /  AlkPhos  129<H>  11-14      CULTURES:   .Blood Blood  11-03 @ 10:22   No Growth Final  --  --      Clean Catch Clean Catch (Midstream)  11-02 @ 21:01   No growth  --  --      .Sputum Sputum  11-01 @ 21:18   Moderate Streptococcus agalactiae (Group B) isolated  Group B streptococci are susceptible to ampicillin,  penicillin and cefazolin, but may be resistant to  erythromycin and clindamycin.  Recommendations for intrapartum prophylaxis for Group B  streptococci are penicillin or ampicillin.  Normal Respiratory [place] present  --    Rare polymorphonuclear leukocytes per low power field  No Squamous epithelial cells per low power field  Rare Yeast like cells seen per oil power field  Rare Gram Positive Rods seen per oil power field  Rare Gram positive cocci in pairs seen per oil power field      .Blood Blood-Peripheral  11-01 @ 03:56   No Growth Final  --  Blood Culture PCR  Escherichia coli        RADIOLOGY:  no new studies 89y Female is under our care for E. coli bacteremia and pneumonia.  Patient was downgraded to AI on 11/2 and was seen laying comfortably in bed with no acute distress.  Patient remains afebrile and WBC count is mildly elevated.    REVIEW OF SYSTEMS:  [ x ] Not able to elicit    MEDS:  cefepime   IVPB 1000 milliGRAM(s) IV Intermittent two times a day    ALLERGIES: Allergies    No Known Allergies    Intolerances        VITALS:  Vital Signs Last 24 Hrs  T(C): 36.5 (14 Nov 2021 05:04), Max: 36.9 (13 Nov 2021 12:44)  T(F): 97.7 (14 Nov 2021 05:04), Max: 98.5 (13 Nov 2021 12:44)  HR: 99 (14 Nov 2021 05:04) (99 - 102)  BP: 147/74 (14 Nov 2021 05:04) (112/60 - 147/74)  BP(mean): --  RR: 16 (14 Nov 2021 05:04) (16 - 28)  SpO2: 99% (14 Nov 2021 05:04) (99% - 100%)      PHYSICAL EXAM:  HEENT: n/a  Neck: supple, trach +  Respiratory: diminished breath sounds on left, left sided pigtail +  Cardiovascular: S1 S2 reg no murmurs  Gastrointestinal: +BS with soft, nondistended abdomen; nontender, peg tube in place  Extremities: bilateral hand edema +2  Skin: no rashes  Ortho: n/a  Neuro: Awake and alert    LABS/DIAGNOSTIC TESTS:                        7.7    13.13 )-----------( 557      ( 14 Nov 2021 07:09 )             24.5     WBC Count: 13.13 K/uL (11-14 @ 07:09)  WBC Count: 14.14 K/uL (11-13 @ 07:41)  WBC Count: 12.01 K/uL (11-12 @ 04:19)  WBC Count: 10.28 K/uL (11-11 @ 03:26)  WBC Count: 13.36 K/uL (11-10 @ 05:18)    11-14    143  |  114<H>  |  26<H>  ----------------------------<  156<H>  4.3   |  22  |  0.68    Ca    8.0<L>      14 Nov 2021 07:09  Phos  2.5     11-14  Mg     2.5     11-14    TPro  6.3  /  Alb  1.7<L>  /  TBili  0.4  /  DBili  x   /  AST  27  /  ALT  32  /  AlkPhos  129<H>  11-14      CULTURES:   .Blood Blood  11-03 @ 10:22   No Growth Final  --  --      Clean Catch Clean Catch (Midstream)  11-02 @ 21:01   No growth  --  --      .Sputum Sputum  11-01 @ 21:18   Moderate Streptococcus agalactiae (Group B) isolated  Group B streptococci are susceptible to ampicillin,  penicillin and cefazolin, but may be resistant to  erythromycin and clindamycin.  Recommendations for intrapartum prophylaxis for Group B  streptococci are penicillin or ampicillin.  Normal Respiratory [place] present  --    Rare polymorphonuclear leukocytes per low power field  No Squamous epithelial cells per low power field  Rare Yeast like cells seen per oil power field  Rare Gram Positive Rods seen per oil power field  Rare Gram positive cocci in pairs seen per oil power field      .Blood Blood-Peripheral  11-01 @ 03:56   No Growth Final  --  Blood Culture PCR  Escherichia coli        RADIOLOGY:  no new studies

## 2021-11-14 NOTE — PROGRESS NOTE ADULT - ASSESSMENT
UTI  Bacteremia - E coli  Pneumonia  Septic Shock  Fevers - improving  Leukocytosis - mild    Plan:  ·	Continue Maxipime 1 gm IV qd D#12 for a total of 14 days  ·	poor prognosis, patient is full code             UTI  Bacteremia - E coli  Pneumonia  Septic Shock  Fevers - improving  Leukocytosis - mild    Plan:  ·	Continue Maxipime 1 gm IV qd D#12 for a total of 14 days  ·	poor prognosis, patient is full code    I agree with above

## 2021-11-14 NOTE — PROGRESS NOTE ADULT - SUBJECTIVE AND OBJECTIVE BOX
AKASH CHACKO    SCU progress note    INTERVAL HPI/OVERNIGHT EVENTS: ***    DNR [ ]   DNI  [  ]    Covid - 19 PCR: COVID-19 PCR: NotDetec (10 Nov 2021 20:31)  COVID-19 PCR: NotDetec (31 Oct 2021 23:22)      The 4Ms    What Matters Most: see GOC  Age appropriate Medications/Screen for High Risk Medication: Yes  Mentation: see CAM below  Mobility: defer to physical exam    The Confusion Assessment Method (CAM) Diagnostic Algorithm     1: Acute Onset or Fluctuating Course  - Is there evidence of an acute change in mental status from the patient’s baseline? Did the (abnormal) behavior  fluctuate during the day, that is, tend to come and go, or increase and decrease in severity?  [ ] YES [ ] NO     2: Inattention  - Did the patient have difficulty focusing attention, being easily distractible, or having difficulty keeping track of what was being said?  [ ] YES [ ] NO     3: Disorganized thinking  -Was the patient’s thinking disorganized or incoherent, such as rambling or irrelevant conversation, unclear or illogical flow of ideas, or unpredictable switching from subject to subject?  [ ] YES [ ] NO    4: Altered Level of consciousness?  [ ] YES [ ] NO    The diagnosis of delirium by CAM requires the presence of features 1 and 2 and either 3 or 4.    PRESSORS: [ ] YES [ ] NO  cefepime   IVPB 1000 milliGRAM(s) IV Intermittent two times a day    Cardiovascular:  Heart Failure  Acute   Acute on Chronic  Chronic         Pulmonary:    Hematalogic:  enoxaparin Injectable 40 milliGRAM(s) SubCutaneous daily    Other:  acetaminophen    Suspension .. 650 milliGRAM(s) Oral every 6 hours PRN  chlorhexidine 0.12% Liquid 15 milliLiter(s) Oral Mucosa every 12 hours  fentaNYL    Injectable 25 MICROGram(s) IV Push every 4 hours PRN  mupirocin 2% Ointment 1 Application(s) Topical two times a day  pantoprazole   Suspension 40 milliGRAM(s) Oral daily  sodium chloride 0.9% lock flush 10 milliLiter(s) IV Push every 1 hour PRN    acetaminophen    Suspension .. 650 milliGRAM(s) Oral every 6 hours PRN  cefepime   IVPB 1000 milliGRAM(s) IV Intermittent two times a day  chlorhexidine 0.12% Liquid 15 milliLiter(s) Oral Mucosa every 12 hours  enoxaparin Injectable 40 milliGRAM(s) SubCutaneous daily  fentaNYL    Injectable 25 MICROGram(s) IV Push every 4 hours PRN  mupirocin 2% Ointment 1 Application(s) Topical two times a day  pantoprazole   Suspension 40 milliGRAM(s) Oral daily  sodium chloride 0.9% lock flush 10 milliLiter(s) IV Push every 1 hour PRN    Drug Dosing Weight  Height (cm): 167.6 (03 Nov 2021 16:02)  Weight (kg): 40.9 (01 Nov 2021 02:20)  BMI (kg/m2): 14.6 (03 Nov 2021 16:02)  BSA (m2): 1.43 (03 Nov 2021 16:02)    CENTRAL LINE: [ ] YES [ ] NO  LOCATION:   DATE INSERTED:  REMOVE: [ ] YES [ ] NO  EXPLAIN:    SAEED: [ ] YES [ ] NO    DATE INSERTED:  REMOVE:  [ ] YES [ ] NO  EXPLAIN:    PAST MEDICAL & SURGICAL HISTORY:  HTN (hypertension)    Dementia          ABG - ( 13 Nov 2021 12:06 )  pH, Arterial: 7.49  pH, Blood: x     /  pCO2: 28    /  pO2: 119   / HCO3: 21    / Base Excess: -0.9  /  SaO2: 99                      Mode: AC/ CMV (Assist Control/ Continuous Mandatory Ventilation)  RR (machine): 16  TV (machine): 350  FiO2: 40  PEEP: 5  ITime: 1  MAP: 13  PIP: 35      PHYSICAL EXAM:    GENERAL: NAD  HEAD:  Atraumatic, Normocephalic  EYES: EOMI, PERRLA, conjunctiva and sclera clear  ENMT: No tonsillar erythema, exudates, or enlargement;   NECK: Trach tube intact. Supple, No JVD  NERVOUS SYSTEM:Awake/  Alert. does not follow commands.  Motor Strength 0/5 all extremities.   CHEST/LUNG: Trach to vent .Breath sounds clear upper airway. Decreased at bases.  No rales, rhonchi, wheezing, or rubs. Left chest tube to water seal.   HEART: Regular rate and rhythm; No murmurs, rubs, or gallops  ABDOMEN: Soft, Nontender, Nondistended; Bowel sounds present. PEG tube intact.   EXTREMITIES:  2+ Peripheral Pulses, No clubbing, cyanosis, or edema  LYMPH: No lymphadenopathy noted  SKIN: no rash. No lesion.       LABS:  CBC Full  -  ( 14 Nov 2021 07:09 )  WBC Count : 13.13 K/uL  RBC Count : 2.68 M/uL  Hemoglobin : 7.7 g/dL  Hematocrit : 24.5 %  Platelet Count - Automated : 557 K/uL  Mean Cell Volume : 91.4 fl  Mean Cell Hemoglobin : 28.7 pg  Mean Cell Hemoglobin Concentration : 31.4 gm/dL  Auto Neutrophil # : 11.04 K/uL  Auto Lymphocyte # : 0.61 K/uL  Auto Monocyte # : 0.93 K/uL  Auto Eosinophil # : 0.26 K/uL  Auto Basophil # : 0.04 K/uL  Auto Neutrophil % : 84.1 %  Auto Lymphocyte % : 4.6 %  Auto Monocyte % : 7.1 %  Auto Eosinophil % : 2.0 %  Auto Basophil % : 0.3 %    11-14    143  |  114<H>  |  26<H>  ----------------------------<  156<H>  4.3   |  22  |  0.68    Ca    8.0<L>      14 Nov 2021 07:09  Phos  2.5     11-14  Mg     2.5     11-14    TPro  6.3  /  Alb  1.7<L>  /  TBili  0.4  /  DBili  x   /  AST  27  /  ALT  32  /  AlkPhos  129<H>  11-14              [  ]  DVT Prophylaxis  [  ]   Abnormal Nutritional Status -  Malnutrition   Cachexia        Diet, NPO:   Tube Feeding Modality: Nasogastric  Jevity 1.5 Bg  Total Volume for 24 Hours (mL): 720  Continuous  Starting Tube Feed Rate mL per Hour: 20  Increase Tube Feed Rate by (mL): 10     Every 8 hours  Until Goal Tube Feed Rate (mL per Hour): 30  Tube Feed Duration (in Hours): 24  Tube Feed Start Time: 17:00 (11-12-21 @ 11:11) [Active]  Diet, NPO after Midnight:      NPO Start Date: 10-Nov-2021,   NPO Start Time: 23:59 (11-10-21 @ 19:21) [Active]          RADIOLOGY & ADDITIONAL STUDIES:  ***    Goals of Care Discussion with Family/Proxy/Other   - see note from/family meeting set up for...     AKASH CHACKO    SCU progress note    INTERVAL HPI/OVERNIGHT EVENTS: ***No acute events overnight.     DNR [ ]   DNI  [  ] Full code    Covid - 19 PCR: COVID-19 PCR: NotDetec (10 Nov 2021 20:31)  COVID-19 PCR: NotDetec (31 Oct 2021 23:22)      The 4Ms    What Matters Most: see GOC  Age appropriate Medications/Screen for High Risk Medication: Yes  Mentation: see CAM below  Mobility: defer to physical exam    The Confusion Assessment Method (CAM) Diagnostic Algorithm     1: Acute Onset or Fluctuating Course  - Is there evidence of an acute change in mental status from the patient’s baseline? Did the (abnormal) behavior  fluctuate during the day, that is, tend to come and go, or increase and decrease in severity?  [ ] YES [x ] NO     2: Inattention  - Did the patient have difficulty focusing attention, being easily distractible, or having difficulty keeping track of what was being said?  [ ] YES [ ] NO   (x) Unable to assess       3: Disorganized thinking  -Was the patient’s thinking disorganized or incoherent, such as rambling or irrelevant conversation, unclear or illogical flow of ideas, or unpredictable switching from subject to subject?  [ ] YES [ ] NO   (x) Unable to assess      4: Altered Level of consciousness?  [ ] YES [x] NO    The diagnosis of delirium by CAM requires the presence of features 1 and 2 and either 3 or 4.    PRESSORS: [ ] YES [x ] NO  cefepime   IVPB 1000 milliGRAM(s) IV Intermittent two times a day    Cardiovascular:  Pulmonary:    Hematalogic:  enoxaparin Injectable 40 milliGRAM(s) SubCutaneous daily    Other:  acetaminophen    Suspension .. 650 milliGRAM(s) Oral every 6 hours PRN  chlorhexidine 0.12% Liquid 15 milliLiter(s) Oral Mucosa every 12 hours  fentaNYL    Injectable 25 MICROGram(s) IV Push every 4 hours PRN  mupirocin 2% Ointment 1 Application(s) Topical two times a day  pantoprazole   Suspension 40 milliGRAM(s) Oral daily  sodium chloride 0.9% lock flush 10 milliLiter(s) IV Push every 1 hour PRN    acetaminophen    Suspension .. 650 milliGRAM(s) Oral every 6 hours PRN  cefepime   IVPB 1000 milliGRAM(s) IV Intermittent two times a day  chlorhexidine 0.12% Liquid 15 milliLiter(s) Oral Mucosa every 12 hours  enoxaparin Injectable 40 milliGRAM(s) SubCutaneous daily  fentaNYL    Injectable 25 MICROGram(s) IV Push every 4 hours PRN  mupirocin 2% Ointment 1 Application(s) Topical two times a day  pantoprazole   Suspension 40 milliGRAM(s) Oral daily  sodium chloride 0.9% lock flush 10 milliLiter(s) IV Push every 1 hour PRN    Drug Dosing Weight  Height (cm): 167.6 (03 Nov 2021 16:02)  Weight (kg): 40.9 (01 Nov 2021 02:20)  BMI (kg/m2): 14.6 (03 Nov 2021 16:02)  BSA (m2): 1.43 (03 Nov 2021 16:02)    CENTRAL LINE: [ ] YES [x ] NO  LOCATION:   DATE INSERTED:  REMOVE: [ ] YES [ ] NO  EXPLAIN:    SAEED: [ ] YES [ x] NO    DATE INSERTED:  REMOVE:  [ ] YES [ ] NO  EXPLAIN:    PAST MEDICAL & SURGICAL HISTORY:  HTN (hypertension)    Dementia      ABG - ( 13 Nov 2021 12:06 )  pH, Arterial: 7.49  pH, Blood: x     /  pCO2: 28    /  pO2: 119   / HCO3: 21    / Base Excess: -0.9  /  SaO2: 99                      Mode: AC/ CMV (Assist Control/ Continuous Mandatory Ventilation)  RR (machine): 16  TV (machine): 350  FiO2: 40  PEEP: 5  ITime: 1  MAP: 13  PIP: 35      PHYSICAL EXAM:    GENERAL: NAD  HEAD:  Atraumatic, Normocephalic  EYES: EOMI, PERRLA, conjunctiva and sclera clear  ENMT: No tonsillar erythema, exudates, or enlargement;   NECK: Trach tube intact. Supple, No JVD  NERVOUS SYSTEM:Awake/  Alert. does not follow commands.  Motor Strength 0/5 all extremities.   CHEST/LUNG: Trach to vent .Breath sounds clear upper airway. Decreased at bases.  No rales, rhonchi, wheezing, or rubs. Left chest tube to water seal.   HEART: Regular rate and rhythm; No murmurs, rubs, or gallops  ABDOMEN: Soft, Nontender, Nondistended; Bowel sounds present. PEG tube intact.   EXTREMITIES:  2+ Peripheral Pulses, No clubbing, cyanosis, or edema  LYMPH: No lymphadenopathy noted  SKIN: no rash. No lesion.       LABS:  CBC Full  -  ( 14 Nov 2021 07:09 )  WBC Count : 13.13 K/uL  RBC Count : 2.68 M/uL  Hemoglobin : 7.7 g/dL  Hematocrit : 24.5 %  Platelet Count - Automated : 557 K/uL  Mean Cell Volume : 91.4 fl  Mean Cell Hemoglobin : 28.7 pg  Mean Cell Hemoglobin Concentration : 31.4 gm/dL  Auto Neutrophil # : 11.04 K/uL  Auto Lymphocyte # : 0.61 K/uL  Auto Monocyte # : 0.93 K/uL  Auto Eosinophil # : 0.26 K/uL  Auto Basophil # : 0.04 K/uL  Auto Neutrophil % : 84.1 %  Auto Lymphocyte % : 4.6 %  Auto Monocyte % : 7.1 %  Auto Eosinophil % : 2.0 %  Auto Basophil % : 0.3 %    11-14    143  |  114<H>  |  26<H>  ----------------------------<  156<H>  4.3   |  22  |  0.68    Ca    8.0<L>      14 Nov 2021 07:09  Phos  2.5     11-14  Mg     2.5     11-14    TPro  6.3  /  Alb  1.7<L>  /  TBili  0.4  /  DBili  x   /  AST  27  /  ALT  32  /  AlkPhos  129<H>  11-14              [  ]  DVT Prophylaxis  [  ]   Abnormal Nutritional Status -  Malnutrition   Cachexia        Diet, NPO:   Tube Feeding Modality: Nasogastric  Jevity 1.5 Bg  Total Volume for 24 Hours (mL): 720  Continuous  Starting Tube Feed Rate mL per Hour: 20  Increase Tube Feed Rate by (mL): 10     Every 8 hours  Until Goal Tube Feed Rate (mL per Hour): 30  Tube Feed Duration (in Hours): 24  Tube Feed Start Time: 17:00 (11-12-21 @ 11:11) [Active]  Diet, NPO after Midnight:      NPO Start Date: 10-Nov-2021,   NPO Start Time: 23:59 (11-10-21 @ 19:21) [Active]          RADIOLOGY & ADDITIONAL STUDIES:  ***  < from: Xray Chest 1 View- PORTABLE-Routine (Xray Chest 1 View- PORTABLE-Routine in AM.) (11.13.21 @ 13:07) >  FOLLOW-UP AP PORTABLE CHEST RADIOGRAPH 11/11/2021 AT 11:38 AM:  LEFT chest tube tip remains overlying LEFT apex.  Increasing LEFT  40-50% pneumothorax. Bilateral diffuse airspace disease unchanged from prior exam.  Tracheostomy tube in place.    FOLLOW-UP AP PORTABLE CHEST RADIOGRAPH 11/12/2021 AT 9:05 AM:  Tracheostomy tube and LEFT chest tube unchanged in position.  Decreased LEFT pneumothorax now approximating 30% LEFT lung volume.  Unchanged bilateral  multifocal and diffuse ill-defined    FOLLOW-UP AP PORTABLE CHEST RADIOGRAPH 11/12/2021 AT 5:00 PM  Tracheostomy tube in place.  LEFT chest tube overlies LEFT apex.  Decreased LEFT pneumothorax now approximating 10% LEFT lung volume. No midline shift.  Increased airspace consolidation RIGHT upper lobe.  Unchanged bilateral  multifocal and diffuse ill-defined airspace opacities.    FOLLOW-UP AP PORTABLE CHEST RADIOGRAPH 11/13/2021 AT 12:35 PM:    No pneumothorax.  LEFT chest tube in place.  Tracheostomy tube unchanged.  Bilateral multifocal and diffuse ill-defined airspace opacities.  Mild cardiomegaly    --- End of Report ---    < end of copied text >        Goals of Care Discussion with Family/Proxy/Other   - see note from 11/08

## 2021-11-14 NOTE — PROGRESS NOTE ADULT - ASSESSMENT
89 y/o F from [facility] w/ Hx of HTN, Dementia, CVA w/ R sided hemiparesis, aphasia, Parkinson's Disease, GERD, CKD. She is bedbound at baseline and needs assistance for ADLs. She was brought to the E.D. due to episodes of desaturation at 70s and respiratory distress. She was also having cough and fever in the nursing home. She was initially on NRB placed by EMS but was eventually intubated in the E.D. She is vaccinated with Moderna. COVID test was negative on admission. Chest Xray showed left lower lobe infiltrate (pneumonia). Blood cultures were sent. She was given 1 dose of Vancomycin and Zosyn in the E.D. and was subsequently admitted to ICU.    In the ICU, she was placed on mechanical ventilator. Her blood pressure was low despite receiving 2L bolus of LR. NGT and Left IJ central line was placed. Levophed was started. CXR revealed Left pneumothorax. Thoracic Surgery was consulted and left chest tube was placed. Patient was also tachycardic and troponin was mildly elevated. EKG showed SR w/ ST changes in the inferior leads. Troponin was monitored and has since trended down. Heart rate has been stable. Lactate was also elevated at 11.2 and has also since trended down. Patient also presented with hypernatremia and elevated creatinine probably secondary to volume deficit. She was also given free water. She was on Heparin for DVT prophylaxis and Protonix for GI prophylaxis. Palliative was consulted regarding the [facility]. Blood culture results showed E. coli bacteremia. She was started on Rocephin IV for 10 days. Azithromycin was continued awaiting Legionella results. Infectious Disease was consulted. Urine culture was ordered. Patient spiked a fever and is tachycardic. Blood cultures were repeated. She was tachycardic however his EKG showed SR w/ new PACs. There was mild air leak in the chest tube drainage but it was fixed by Cardiothoracic Surgery. Urine culture and repeat blood culture was negative. She had 2 febrile episodes overnight at 100.4-100.5F. She was tachypneic at rate of 30. Her repeat Chest Xray showed worsening of bilateral infiltrates. Her albumin is still low at 1.0. Her urine output is net negative. She was given Lasix and Albumin to maintain net negative urine output and decrease congestion. Vancomycin was discontinued and Cefepime decreased from 2g to 1g to complete 14 days since negative culture (11/16/21). Echocardiogram was ordered. Central line was removed. Lactulose was ordered for bowel movement. On November 11, 2021, patient had her tracheostomy and PEG inserted. Patient son Jered was called and informed .    Yes

## 2021-11-14 NOTE — PROGRESS NOTE ADULT - PROBLEM SELECTOR PLAN 2
BC from 11/1 grew E. coli  Repeat BC NGTD  afebrile > 4 days  Leukocytosis uptrending likely due to recent procedure (trach/PEG)  Continue Cefepime , Day 11/14  ID following BC from 11/1 grew E. coli  Repeat BC NGTD  afebrile > 4 days  Leukocytosis downtrending  Continue Cefepime , Day 12/14  ID following

## 2021-11-15 DIAGNOSIS — Z71.89 OTHER SPECIFIED COUNSELING: ICD-10-CM

## 2021-11-15 DIAGNOSIS — D63.8 ANEMIA IN OTHER CHRONIC DISEASES CLASSIFIED ELSEWHERE: ICD-10-CM

## 2021-11-15 DIAGNOSIS — J96.01 ACUTE RESPIRATORY FAILURE WITH HYPOXIA: ICD-10-CM

## 2021-11-15 LAB
ALBUMIN SERPL ELPH-MCNC: 1.7 G/DL — LOW (ref 3.5–5)
ALP SERPL-CCNC: 123 U/L — HIGH (ref 40–120)
ALT FLD-CCNC: 35 U/L DA — SIGNIFICANT CHANGE UP (ref 10–60)
ANION GAP SERPL CALC-SCNC: 6 MMOL/L — SIGNIFICANT CHANGE UP (ref 5–17)
AST SERPL-CCNC: 36 U/L — SIGNIFICANT CHANGE UP (ref 10–40)
BASOPHILS # BLD AUTO: 0.05 K/UL — SIGNIFICANT CHANGE UP (ref 0–0.2)
BASOPHILS NFR BLD AUTO: 0.4 % — SIGNIFICANT CHANGE UP (ref 0–2)
BILIRUB SERPL-MCNC: 0.4 MG/DL — SIGNIFICANT CHANGE UP (ref 0.2–1.2)
BUN SERPL-MCNC: 23 MG/DL — HIGH (ref 7–18)
CALCIUM SERPL-MCNC: 8.5 MG/DL — SIGNIFICANT CHANGE UP (ref 8.4–10.5)
CHLORIDE SERPL-SCNC: 111 MMOL/L — HIGH (ref 96–108)
CO2 SERPL-SCNC: 23 MMOL/L — SIGNIFICANT CHANGE UP (ref 22–31)
CREAT SERPL-MCNC: 0.62 MG/DL — SIGNIFICANT CHANGE UP (ref 0.5–1.3)
EOSINOPHIL # BLD AUTO: 0.26 K/UL — SIGNIFICANT CHANGE UP (ref 0–0.5)
EOSINOPHIL NFR BLD AUTO: 2.1 % — SIGNIFICANT CHANGE UP (ref 0–6)
GLUCOSE SERPL-MCNC: 166 MG/DL — HIGH (ref 70–99)
HCT VFR BLD CALC: 29.4 % — LOW (ref 34.5–45)
HGB BLD-MCNC: 8.9 G/DL — LOW (ref 11.5–15.5)
IMM GRANULOCYTES NFR BLD AUTO: 1.6 % — HIGH (ref 0–1.5)
LYMPHOCYTES # BLD AUTO: 0.65 K/UL — LOW (ref 1–3.3)
LYMPHOCYTES # BLD AUTO: 5.3 % — LOW (ref 13–44)
MAGNESIUM SERPL-MCNC: 2.5 MG/DL — SIGNIFICANT CHANGE UP (ref 1.6–2.6)
MCHC RBC-ENTMCNC: 28.9 PG — SIGNIFICANT CHANGE UP (ref 27–34)
MCHC RBC-ENTMCNC: 30.3 GM/DL — LOW (ref 32–36)
MCV RBC AUTO: 95.5 FL — SIGNIFICANT CHANGE UP (ref 80–100)
MONOCYTES # BLD AUTO: 0.98 K/UL — HIGH (ref 0–0.9)
MONOCYTES NFR BLD AUTO: 8 % — SIGNIFICANT CHANGE UP (ref 2–14)
NEUTROPHILS # BLD AUTO: 10.14 K/UL — HIGH (ref 1.8–7.4)
NEUTROPHILS NFR BLD AUTO: 82.6 % — HIGH (ref 43–77)
NRBC # BLD: 0 /100 WBCS — SIGNIFICANT CHANGE UP (ref 0–0)
PHOSPHATE SERPL-MCNC: 2.3 MG/DL — LOW (ref 2.5–4.5)
PLATELET # BLD AUTO: 571 K/UL — HIGH (ref 150–400)
POTASSIUM SERPL-MCNC: 4.4 MMOL/L — SIGNIFICANT CHANGE UP (ref 3.5–5.3)
POTASSIUM SERPL-SCNC: 4.4 MMOL/L — SIGNIFICANT CHANGE UP (ref 3.5–5.3)
PROT SERPL-MCNC: 6.7 G/DL — SIGNIFICANT CHANGE UP (ref 6–8.3)
RBC # BLD: 3.08 M/UL — LOW (ref 3.8–5.2)
RBC # FLD: 15 % — HIGH (ref 10.3–14.5)
SODIUM SERPL-SCNC: 140 MMOL/L — SIGNIFICANT CHANGE UP (ref 135–145)
WBC # BLD: 12.28 K/UL — HIGH (ref 3.8–10.5)
WBC # FLD AUTO: 12.28 K/UL — HIGH (ref 3.8–10.5)

## 2021-11-15 PROCEDURE — 71045 X-RAY EXAM CHEST 1 VIEW: CPT | Mod: 26

## 2021-11-15 RX ORDER — SODIUM,POTASSIUM PHOSPHATES 278-250MG
1 POWDER IN PACKET (EA) ORAL ONCE
Refills: 0 | Status: COMPLETED | OUTPATIENT
Start: 2021-11-15 | End: 2021-11-15

## 2021-11-15 RX ORDER — ACETAMINOPHEN 500 MG
650 TABLET ORAL EVERY 6 HOURS
Refills: 0 | Status: DISCONTINUED | OUTPATIENT
Start: 2021-11-15 | End: 2021-12-01

## 2021-11-15 RX ADMIN — MUPIROCIN 1 APPLICATION(S): 20 OINTMENT TOPICAL at 17:27

## 2021-11-15 RX ADMIN — ENOXAPARIN SODIUM 40 MILLIGRAM(S): 100 INJECTION SUBCUTANEOUS at 11:50

## 2021-11-15 RX ADMIN — Medication 650 MILLIGRAM(S): at 11:48

## 2021-11-15 RX ADMIN — CEFEPIME 100 MILLIGRAM(S): 1 INJECTION, POWDER, FOR SOLUTION INTRAMUSCULAR; INTRAVENOUS at 17:28

## 2021-11-15 RX ADMIN — Medication 1 PACKET(S): at 11:50

## 2021-11-15 RX ADMIN — MUPIROCIN 1 APPLICATION(S): 20 OINTMENT TOPICAL at 05:42

## 2021-11-15 RX ADMIN — PANTOPRAZOLE SODIUM 40 MILLIGRAM(S): 20 TABLET, DELAYED RELEASE ORAL at 11:49

## 2021-11-15 RX ADMIN — CHLORHEXIDINE GLUCONATE 15 MILLILITER(S): 213 SOLUTION TOPICAL at 17:28

## 2021-11-15 RX ADMIN — Medication 650 MILLIGRAM(S): at 13:38

## 2021-11-15 RX ADMIN — CEFEPIME 100 MILLIGRAM(S): 1 INJECTION, POWDER, FOR SOLUTION INTRAMUSCULAR; INTRAVENOUS at 05:42

## 2021-11-15 RX ADMIN — CHLORHEXIDINE GLUCONATE 15 MILLILITER(S): 213 SOLUTION TOPICAL at 05:42

## 2021-11-15 NOTE — PROGRESS NOTE ADULT - SUBJECTIVE AND OBJECTIVE BOX
89y Female is under our care for     REVIEW OF SYSTEMS:  [  ] Not able to elicit  General:	  Chest:	  GI:	  :  Skin:	  Musculoskeletal:	  Neuro:	    MEDS:  cefepime   IVPB 1000 milliGRAM(s) IV Intermittent two times a day    ALLERGIES: Allergies    No Known Allergies    Intolerances        VITALS:  Vital Signs Last 24 Hrs  T(C): 37.8 (15 Nov 2021 11:44), Max: 37.8 (15 Nov 2021 11:44)  T(F): 100.1 (15 Nov 2021 11:44), Max: 100.1 (15 Nov 2021 11:44)  HR: 103 (15 Nov 2021 07:56) (93 - 105)  BP: 117/69 (15 Nov 2021 05:45) (117/69 - 157/70)  BP(mean): --  RR: 22 (15 Nov 2021 05:45) (22 - 28)  SpO2: 100% (15 Nov 2021 07:56) (100% - 100%)      PHYSICAL EXAM:  HEENT:  Neck:  Respiratory:  Cardiovascular:  Gastrointestinal:  Extremities:  Skin:  Ortho:  Neuro:    LABS/DIAGNOSTIC TESTS:                        8.9    12.28 )-----------( 571      ( 15 Nov 2021 07:21 )             29.4     WBC Count: 12.28 K/uL (11-15 @ 07:21)  WBC Count: 13.13 K/uL (11-14 @ 07:09)  WBC Count: 14.14 K/uL (11-13 @ 07:41)  WBC Count: 12.01 K/uL (11-12 @ 04:19)  WBC Count: 10.28 K/uL (11-11 @ 03:26)    11-15    140  |  111<H>  |  23<H>  ----------------------------<  166<H>  4.4   |  23  |  0.62    Ca    8.5      15 Nov 2021 07:21  Phos  2.3     11-15  Mg     2.5     11-15    TPro  6.7  /  Alb  1.7<L>  /  TBili  0.4  /  DBili  x   /  AST  36  /  ALT  35  /  AlkPhos  123<H>  11-15      CULTURES:   .Blood Blood  11-03 @ 10:22   No Growth Final  --  --      Clean Catch Clean Catch (Midstream)  11-02 @ 21:01   No growth  --  --      .Sputum Sputum  11-01 @ 21:18   Moderate Streptococcus agalactiae (Group B) isolated  Group B streptococci are susceptible to ampicillin,  penicillin and cefazolin, but may be resistant to  erythromycin and clindamycin.  Recommendations for intrapartum prophylaxis for Group B  streptococci are penicillin or ampicillin.  Normal Respiratory [place] present  --    Rare polymorphonuclear leukocytes per low power field  No Squamous epithelial cells per low power field  Rare Yeast like cells seen per oil power field  Rare Gram Positive Rods seen per oil power field  Rare Gram positive cocci in pairs seen per oil power field      .Blood Blood-Peripheral  11-01 @ 03:56   No Growth Final  --  Blood Culture PCR  Escherichia coli        RADIOLOGY:   < from: Xray Chest 1 View- PORTABLE-Routine (Xray Chest 1 View- PORTABLE-Routine in AM.) (11.14.21 @ 10:15) >    EXAM:  XR CHEST PORTABLE ROUTINE 1V                            PROCEDURE DATE:  11/14/2021          INTERPRETATION:  CLINICAL STATEMENT: Follow-up chest pain.    TECHNIQUE: AP view of the chest.    COMPARISON: 11/13/2021    FINDINGS/  IMPRESSION:  Tracheostomy tube, left chest tube again noted. Small left apical pneumothorax unchanged. Opacities left lung worsening. Opacities right lung improving. Small right pleural effusion    Heart size cannot be accurately assessed in this projection.    --- End of Report ---    < end of copied text >   89y Female is under our care for E. coli bacteremia and pneumonia.  Patient was seen laying comfortably in bed with no acute distress.  She had a low grade fever of 100.1 earlier today and WBC count is downtrending.    REVIEW OF SYSTEMS:  [ x ] Not able to elicit      MEDS:  cefepime   IVPB 1000 milliGRAM(s) IV Intermittent two times a day    ALLERGIES: Allergies    No Known Allergies    Intolerances        VITALS:  Vital Signs Last 24 Hrs  T(C): 37.8 (15 Nov 2021 11:44), Max: 37.8 (15 Nov 2021 11:44)  T(F): 100.1 (15 Nov 2021 11:44), Max: 100.1 (15 Nov 2021 11:44)  HR: 103 (15 Nov 2021 07:56) (93 - 105)  BP: 117/69 (15 Nov 2021 05:45) (117/69 - 157/70)  BP(mean): --  RR: 22 (15 Nov 2021 05:45) (22 - 28)  SpO2: 100% (15 Nov 2021 07:56) (100% - 100%)      PHYSICAL EXAM:  HEENT: n/a  Neck: supple, trach +  Respiratory: diminished breath sounds on left, left sided pigtail +  Cardiovascular: S1 S2 reg no murmurs  Gastrointestinal: +BS with soft, nondistended abdomen; nontender, peg tube in place  Extremities: bilateral hand edema +2  Skin: no rashes  Ortho: n/a  Neuro: Awake and alert    LABS/DIAGNOSTIC TESTS:                        8.9    12.28 )-----------( 571      ( 15 Nov 2021 07:21 )             29.4     WBC Count: 12.28 K/uL (11-15 @ 07:21)  WBC Count: 13.13 K/uL (11-14 @ 07:09)  WBC Count: 14.14 K/uL (11-13 @ 07:41)  WBC Count: 12.01 K/uL (11-12 @ 04:19)  WBC Count: 10.28 K/uL (11-11 @ 03:26)    11-15    140  |  111<H>  |  23<H>  ----------------------------<  166<H>  4.4   |  23  |  0.62    Ca    8.5      15 Nov 2021 07:21  Phos  2.3     11-15  Mg     2.5     11-15    TPro  6.7  /  Alb  1.7<L>  /  TBili  0.4  /  DBili  x   /  AST  36  /  ALT  35  /  AlkPhos  123<H>  11-15      CULTURES:   .Blood Blood  11-03 @ 10:22   No Growth Final  --  --      Clean Catch Clean Catch (Midstream)  11-02 @ 21:01   No growth  --  --      .Sputum Sputum  11-01 @ 21:18   Moderate Streptococcus agalactiae (Group B) isolated  Group B streptococci are susceptible to ampicillin,  penicillin and cefazolin, but may be resistant to  erythromycin and clindamycin.  Recommendations for intrapartum prophylaxis for Group B  streptococci are penicillin or ampicillin.  Normal Respiratory [place] present  --    Rare polymorphonuclear leukocytes per low power field  No Squamous epithelial cells per low power field  Rare Yeast like cells seen per oil power field  Rare Gram Positive Rods seen per oil power field  Rare Gram positive cocci in pairs seen per oil power field      .Blood Blood-Peripheral  11-01 @ 03:56   No Growth Final  --  Blood Culture PCR  Escherichia coli        RADIOLOGY:   < from: Xray Chest 1 View- PORTABLE-Routine (Xray Chest 1 View- PORTABLE-Routine in AM.) (11.14.21 @ 10:15) >    EXAM:  XR CHEST PORTABLE ROUTINE 1V                            PROCEDURE DATE:  11/14/2021          INTERPRETATION:  CLINICAL STATEMENT: Follow-up chest pain.    TECHNIQUE: AP view of the chest.    COMPARISON: 11/13/2021    FINDINGS/  IMPRESSION:  Tracheostomy tube, left chest tube again noted. Small left apical pneumothorax unchanged. Opacities left lung worsening. Opacities right lung improving. Small right pleural effusion    Heart size cannot be accurately assessed in this projection.    --- End of Report ---    < end of copied text >

## 2021-11-15 NOTE — PROGRESS NOTE ADULT - NUTRITIONAL ASSESSMENT
This patient has been assessed with a concern for Malnutrition and has been determined to have a diagnosis/diagnoses of Severe protein-calorie malnutrition and Underweight (BMI < 19).    This patient is being managed with:   Diet NPO-  Tube Feeding Modality: Nasogastric  Jevity 1.5 Bg  Total Volume for 24 Hours (mL): 720  Continuous  Starting Tube Feed Rate {mL per Hour}: 20  Increase Tube Feed Rate by (mL): 10     Every 8 hours  Until Goal Tube Feed Rate (mL per Hour): 30  Tube Feed Duration (in Hours): 24  Tube Feed Start Time: 17:00  Entered: Nov 12 2021 11:10AM

## 2021-11-15 NOTE — PROGRESS NOTE ADULT - ASSESSMENT
UTI  Bacteremia - E coli  Pneumonia  Septic Shock  Fevers - improving  Leukocytosis - improving    Plan:  ·	Continue Maxipime 1 gm IV qd D#12 for a total of 14 days  ·	poor prognosis, patient is full code                 UTI  Bacteremia - E coli  Pneumonia  Septic Shock  Fevers - improving  Leukocytosis - improving    Plan:  ·	Continue Maxipime 1 gm IV qd for a total of 14 days  ·	poor prognosis, patient is full code                 UTI  Bacteremia - E coli  Pneumonia  Septic Shock  Fevers - improving  Leukocytosis - improving    Plan:  ·	Continue Maxipime 1 gm IV qd for 1 more day to complete a total of 14 days  ·	poor prognosis, patient is full code                 UTI  Bacteremia - E coli  Pneumonia  Septic Shock  Fevers - improving  Leukocytosis - improving    Plan:  ·	Continue Maxipime 1 gm IV qd for 1 more day to complete a total of 14 days  ·	poor prognosis, patient is full code    I agree with above

## 2021-11-15 NOTE — PROGRESS NOTE ADULT - ASSESSMENT
seen and examined vstable afebrile physical unchanged  no sob  on trach vent   labs noted  8.9,   alb 1.9   cxr clearing oneumo  a/p resp failure on trach vent  peg   pneumo better  pulm   poor prognosis

## 2021-11-15 NOTE — PROGRESS NOTE ADULT - ASSESSMENT
89 y/o F from [facility] w/ Hx of HTN, Dementia, CVA w/ R sided hemiparesis, aphasia, Parkinson's Disease, GERD, CKD. She is bedbound at baseline and needs assistance for ADLs. She was brought to the E.D. due to episodes of desaturation at 70s and respiratory distress. She was also having cough and fever in the nursing home. She was initially on NRB placed by EMS but was eventually intubated in the E.D. She is vaccinated with Moderna. COVID test was negative on admission. Chest Xray showed left lower lobe infiltrate (pneumonia). Blood cultures were sent. She was given 1 dose of Vancomycin and Zosyn in the E.D. and was subsequently admitted to ICU.    In the ICU, she was placed on mechanical ventilator. Her blood pressure was low despite receiving 2L bolus of LR. NGT and Left IJ central line was placed. Levophed was started. CXR revealed Left pneumothorax. Thoracic Surgery was consulted and left chest tube was placed. Patient was also tachycardic and troponin was mildly elevated. EKG showed SR w/ ST changes in the inferior leads. Troponin was monitored and has since trended down. Heart rate has been stable. Lactate was also elevated at 11.2 and has also since trended down. Patient also presented with hypernatremia and elevated creatinine probably secondary to volume deficit. She was also given free water. She was on Heparin for DVT prophylaxis and Protonix for GI prophylaxis. Palliative was consulted regarding the [facility]. Blood culture results showed E. coli bacteremia. She was started on Rocephin IV for 10 days. Azithromycin was continued awaiting Legionella results. Infectious Disease was consulted. Urine culture was ordered. Patient spiked a fever and is tachycardic. Blood cultures were repeated. She was tachycardic however his EKG showed SR w/ new PACs. There was mild air leak in the chest tube drainage but it was fixed by Cardiothoracic Surgery. Urine culture and repeat blood culture was negative. She had 2 febrile episodes overnight at 100.4-100.5F. She was tachypneic at rate of 30. Her repeat Chest Xray showed worsening of bilateral infiltrates. Her albumin is still low at 1.0. Her urine output is net negative. She was given Lasix and Albumin to maintain net negative urine output and decrease congestion. Vancomycin was discontinued and Cefepime decreased from 2g to 1g to complete 14 days since negative culture (11/16/21). Echocardiogram was ordered. Central line was removed. Lactulose was ordered for bowel movement. On November 11, 2021, patient had her tracheostomy and PEG inserted. Patient son Jered was called and informed .

## 2021-11-15 NOTE — PROGRESS NOTE ADULT - PROBLEM SELECTOR PLAN 2
Continue mechanical ventilation.   Not a candidate for SBT at this time.  Monitor oxygen saturation.  Serial CXRs

## 2021-11-15 NOTE — PROGRESS NOTE ADULT - SUBJECTIVE AND OBJECTIVE BOX
AKASH CHACKO    SCU progress note    INTERVAL HPI/OVERNIGHT EVENTS: ***No overnight events. Tmax 100.1    DNR [ ]   DNI  [  ]  FULL CODE    Covid - 19 PCR: Negative 11/10    The 4Ms    What Matters Most: see [facility]  Age appropriate Medications/Screen for High Risk Medication: Yes  Mentation: see CAM below  Mobility: defer to physical exam    The Confusion Assessment Method (CAM) Diagnostic Algorithm     1: Acute Onset or Fluctuating Course  - Is there evidence of an acute change in mental status from the patient’s baseline? Did the (abnormal) behavior  fluctuate during the day, that is, tend to come and go, or increase and decrease in severity?  [ ] YES [x ] NO     2: Inattention  - Did the patient have difficulty focusing attention, being easily distractible, or having difficulty keeping track of what was being said?  [ ] YES [ ] NO   Unable to access     3: Disorganized thinking  -Was the patient’s thinking disorganized or incoherent, such as rambling or irrelevant conversation, unclear or illogical flow of ideas, or unpredictable switching from subject to subject?  [ ] YES [ ] NO   Unable to access    4: Altered Level of consciousness?  [ ] YES [ ] NO    The diagnosis of delirium by CAM requires the presence of features 1 and 2 and either 3 or 4.    PRESSORS: [ ] YES [x ] NO  cefepime   IVPB 1000 milliGRAM(s) IV Intermittent two times a day    Cardiovascular:  Heart Failure  Acute   Acute on Chronic  Chronic         Pulmonary:    Hematalogic:  enoxaparin Injectable 40 milliGRAM(s) SubCutaneous daily    Other:  acetaminophen    Suspension .. 650 milliGRAM(s) Oral every 6 hours PRN  chlorhexidine 0.12% Liquid 15 milliLiter(s) Oral Mucosa every 12 hours  mupirocin 2% Ointment 1 Application(s) Topical two times a day  pantoprazole   Suspension 40 milliGRAM(s) Oral daily  sodium chloride 0.9% lock flush 10 milliLiter(s) IV Push every 1 hour PRN    acetaminophen    Suspension .. 650 milliGRAM(s) Oral every 6 hours PRN  cefepime   IVPB 1000 milliGRAM(s) IV Intermittent two times a day  chlorhexidine 0.12% Liquid 15 milliLiter(s) Oral Mucosa every 12 hours  enoxaparin Injectable 40 milliGRAM(s) SubCutaneous daily  mupirocin 2% Ointment 1 Application(s) Topical two times a day  pantoprazole   Suspension 40 milliGRAM(s) Oral daily  sodium chloride 0.9% lock flush 10 milliLiter(s) IV Push every 1 hour PRN    Drug Dosing Weight  Height (cm): 167.6 (03 Nov 2021 16:02)  Weight (kg): 40.9 (01 Nov 2021 02:20)  BMI (kg/m2): 14.6 (03 Nov 2021 16:02)  BSA (m2): 1.43 (03 Nov 2021 16:02)    CENTRAL LINE: [ ] YES [x ] NO  LOCATION:   DATE INSERTED:  REMOVE: [ ] YES [ ] NO  EXPLAIN:    SAEED: [x ] YES [ ] NO    DATE INSERTED:  REMOVE:  [ ] YES [x ] NO  EXPLAIN:  Critically Ill    PAST MEDICAL & SURGICAL HISTORY:  HTN (hypertension)    Dementia          ABG - ( 13 Nov 2021 12:06 )  pH, Arterial: 7.49  pH, Blood: x     /  pCO2: 28    /  pO2: 119   / HCO3: 21    / Base Excess: -0.9  /  SaO2: 99                    11-14 @ 07:01  -  11-15 @ 07:00  --------------------------------------------------------  IN: 0 mL / OUT: 150 mL / NET: -150 mL        Mode: AC/ CMV (Assist Control/ Continuous Mandatory Ventilation)  RR (machine): 16  TV (machine): 350  FiO2: 40  PEEP: 5  ITime: 0.9  MAP: 12  PIP: 24      PHYSICAL EXAM:    GENERAL: NAD, cachectic with severe temporal waisting.   HEAD:  Atraumatic, Normocephalic  EYES: EOMI, PERRLA, conjunctiva and sclera clear  ENMT: No tonsillar erythema, exudates  NECK: Supple, No JVD, tracheostomy intact  NERVOUS SYSTEM:  Awake. Nonverbal at baseline. Does not follow commands.  CHEST/LUNG: Diminished breath sounds bilateral bases Left chest tube intact  HEART: Regular rate and rhythm; No murmurs, rubs, or gallops  ABDOMEN: Soft, Nontender, Nondistended; Bowel sounds present; Peg intact  EXTREMITIES:  +Muscle waisting. 2+ Peripheral Pulses, No clubbing, cyanosis, or edema  LYMPH: No lymphadenopathy noted  SKIN: Skin tear left shoulder      LABS:  CBC Full  -  ( 15 Nov 2021 07:21 )  WBC Count : 12.28 K/uL  RBC Count : 3.08 M/uL  Hemoglobin : 8.9 g/dL  Hematocrit : 29.4 %  Platelet Count - Automated : 571 K/uL  Mean Cell Volume : 95.5 fl  Mean Cell Hemoglobin : 28.9 pg  Mean Cell Hemoglobin Concentration : 30.3 gm/dL  Auto Neutrophil # : 10.14 K/uL  Auto Lymphocyte # : 0.65 K/uL  Auto Monocyte # : 0.98 K/uL  Auto Eosinophil # : 0.26 K/uL  Auto Basophil # : 0.05 K/uL  Auto Neutrophil % : 82.6 %  Auto Lymphocyte % : 5.3 %  Auto Monocyte % : 8.0 %  Auto Eosinophil % : 2.1 %  Auto Basophil % : 0.4 %    11-15    140  |  111<H>  |  23<H>  ----------------------------<  166<H>  4.4   |  23  |  0.62    Ca    8.5      15 Nov 2021 07:21  Phos  2.3     11-15  Mg     2.5     11-15    TPro  6.7  /  Alb  1.7<L>  /  TBili  0.4  /  DBili  x   /  AST  36  /  ALT  35  /  AlkPhos  123<H>  11-15              [  ]  DVT Prophylaxis  [  ]  Nutrition, Brand, Rate         Goal Rate        Abnormal Nutritional Status -  Malnutrition   Cachexia          RADIOLOGY & ADDITIONAL STUDIES:  ***  < from: Xray Chest 1 View- PORTABLE-Routine (Xray Chest 1 View- PORTABLE-Routine in AM.) (11.14.21 @ 10:15) >  Tracheostomy tube, left chest tube again noted. Small left apical pneumothorax unchanged. Opacities left lung worsening. Opacities right lung improving. Small right pleural effusion    Heart size cannot be accurately assessed in this projection.    < end of copied text >    Goals of Care Discussion with Family/Proxy/Other   - see note from 11/09

## 2021-11-15 NOTE — PROGRESS NOTE ADULT - NUTRITIONAL ASSESSMENT
This patient has been assessed with a concern for Malnutrition and has been determined to have a diagnosis/diagnoses of Severe protein-calorie malnutrition and Underweight (BMI < 19).  +Severe temporal waisting  +Muscle waisting  Protein 6.7    Albumin 1.7    This patient is being managed with:   Diet NPO-  Tube Feeding Modality: Nasogastric  Jevity 1.5 Bg  Total Volume for 24 Hours (mL): 720  Continuous  Starting Tube Feed Rate {mL per Hour}: 20  Increase Tube Feed Rate by (mL): 10     Every 8 hours  Until Goal Tube Feed Rate (mL per Hour): 30  Tube Feed Duration (in Hours): 24  Tube Feed Start Time: 17:00  Entered: Nov 12 2021 11:10AM

## 2021-11-15 NOTE — CHART NOTE - NSCHARTNOTEFT_GEN_A_CORE
Assessment:   89yFemalePatient is a 89y old  Female who presents with a chief complaint of AHRF (15 Nov 2021 11:59)      Factors impacting intake: [ ] none [ ] nausea  [ ] vomiting [ ] diarrhea [ ] constipation  [ ]chewing problems [ ] swallowing issues  [x ] other: consult requested To see patient , receiving tube feeding of Jevity 1.5 at 30ml/hx24 and Tolerating per RN. Tube feeding provides: ( 720ml, 1080kcal, 46g protein, 547ml free water) which meets needs suggest To add 1 packet Of prosource for an additional 15g protein/d. MD to monitor/assess fluid status as needed.  remains ventilator dependent.          Diet Presciption: Diet, NPO:   Tube Feeding Modality: Nasogastric  Jevity 1.5 Bg  Total Volume for 24 Hours (mL): 720  Continuous  Starting Tube Feed Rate {mL per Hour}: 20  Increase Tube Feed Rate by (mL): 10     Every 8 hours  Until Goal Tube Feed Rate (mL per Hour): 30  Tube Feed Duration (in Hours): 24  Tube Feed Start Time: 17:00 (11-12-21 @ 11:11)    Intake: meeting needs with tube feeding     Current Weight: 49.8kg   % Weight Change    Pertinent Medications: MEDICATIONS  (STANDING):  cefepime   IVPB 1000 milliGRAM(s) IV Intermittent two times a day  chlorhexidine 0.12% Liquid 15 milliLiter(s) Oral Mucosa every 12 hours  enoxaparin Injectable 40 milliGRAM(s) SubCutaneous daily  mupirocin 2% Ointment 1 Application(s) Topical two times a day  pantoprazole   Suspension 40 milliGRAM(s) Oral daily    MEDICATIONS  (PRN):  acetaminophen    Suspension .. 650 milliGRAM(s) Oral every 6 hours PRN Temp greater or equal to 38C (100.4F), Mild Pain (1 - 3), Moderate Pain (4 - 6)  sodium chloride 0.9% lock flush 10 milliLiter(s) IV Push every 1 hour PRN Pre/post blood products, medications, blood draw, and to maintain line patency    Pertinent Labs: 11-15 Na140 mmol/L Glu 166 mg/dL<H> K+ 4.4 mmol/L Cr  0.62 mg/dL BUN 23 mg/dL<H> 11-15 Phos 2.3 mg/dL<L> 11-15 Alb 1.7 g/dL<L> 11-07 Chol --    LDL --    HDL --    Trig 118 mg/dL     CAPILLARY BLOOD GLUCOSE      POCT Blood Glucose.: 182 mg/dL (15 Nov 2021 11:30)  POCT Blood Glucose.: 162 mg/dL (15 Nov 2021 06:48)  POCT Blood Glucose.: 179 mg/dL (15 Nov 2021 00:01)  POCT Blood Glucose.: 136 mg/dL (14 Nov 2021 16:56)    Skin: No pressure injury     Estimated Needs:   [x ] no change since previous assessment  [ ] recalculated:     Previous Nutrition Diagnosis:   [ ] Inadequate Energy Intake [ ]Inadequate Oral Intake [ ] Excessive Energy Intake   [ ] Underweight [ ] Increased Nutrient Needs [ ] Overweight/Obesity   [ ] Altered GI Function [ ] Unintended Weight Loss [ ] Food & Nutrition Related Knowledge Deficit [x ] Malnutrition , severe     Nutrition Diagnosis is [x ] ongoing  [ ] resolved [ ] not applicable     New Nutrition Diagnosis: [ ] not applicable       Interventions:   Recommend  [ ] Change Diet To:  [ ] Nutrition Supplement  [ ] Nutrition Support  [ x] Other:     Monitoring and Evaluation:   [ ] PO intake [ x ] Tolerance to diet prescription [ x ] weights [ x ] labs[ x ] follow up per protocol  [ ] other: Assessment:   89yFemalePatient is a 89y old  Female who presents with a chief complaint of AHRF (15 Nov 2021 11:59)      Factors impacting intake: [ ] none [ ] nausea  [ ] vomiting [ ] diarrhea [ ] constipation  [ ]chewing problems [ ] swallowing issues  [x ] other: consult requested To see patient , receiving tube feeding of Jevity 1.5 at 30ml/hx24 and Tolerating per RN. Tube feeding provides: ( 720ml, 1080kcal, 46g protein, 547ml free water) which meets needs suggest To add 1 packet Of prosource for an additional 15g protein/d. MD to monitor/assess fluid status as needed.  remains ventilator dependent.          Diet Presciption: Diet, NPO:   Tube Feeding Modality: Nasogastric  Jevity 1.5 Bg  Total Volume for 24 Hours (mL): 720  Continuous  Starting Tube Feed Rate {mL per Hour}: 20  Increase Tube Feed Rate by (mL): 10     Every 8 hours  Until Goal Tube Feed Rate (mL per Hour): 30  Tube Feed Duration (in Hours): 24  Tube Feed Start Time: 17:00 (11-12-21 @ 11:11)    Intake: meeting needs with tube feeding     Current Weight: 49.8kg   % Weight Change    Pertinent Medications: MEDICATIONS  (STANDING):  cefepime   IVPB 1000 milliGRAM(s) IV Intermittent two times a day  chlorhexidine 0.12% Liquid 15 milliLiter(s) Oral Mucosa every 12 hours  enoxaparin Injectable 40 milliGRAM(s) SubCutaneous daily  mupirocin 2% Ointment 1 Application(s) Topical two times a day  pantoprazole   Suspension 40 milliGRAM(s) Oral daily    MEDICATIONS  (PRN):  acetaminophen    Suspension .. 650 milliGRAM(s) Oral every 6 hours PRN Temp greater or equal to 38C (100.4F), Mild Pain (1 - 3), Moderate Pain (4 - 6)  sodium chloride 0.9% lock flush 10 milliLiter(s) IV Push every 1 hour PRN Pre/post blood products, medications, blood draw, and to maintain line patency    Pertinent Labs: 11-15 Na140 mmol/L Glu 166 mg/dL<H> K+ 4.4 mmol/L Cr  0.62 mg/dL BUN 23 mg/dL<H> 11-15 Phos 2.3 mg/dL<L> 11-15 Alb 1.7 g/dL<L> 11-07 Chol --    LDL --    HDL --    Trig 118 mg/dL     CAPILLARY BLOOD GLUCOSE      POCT Blood Glucose.: 182 mg/dL (15 Nov 2021 11:30)  POCT Blood Glucose.: 162 mg/dL (15 Nov 2021 06:48)  POCT Blood Glucose.: 179 mg/dL (15 Nov 2021 00:01)  POCT Blood Glucose.: 136 mg/dL (14 Nov 2021 16:56)    Skin: No pressure injury     Estimated Needs:   [x ] no change since previous assessment  [ ] recalculated:     Previous Nutrition Diagnosis:   [ ] Inadequate Energy Intake [ ]Inadequate Oral Intake [ ] Excessive Energy Intake   [ ] Underweight [ ] Increased Nutrient Needs [ ] Overweight/Obesity   [ ] Altered GI Function [ ] Unintended Weight Loss [ ] Food & Nutrition Related Knowledge Deficit [x ] Malnutrition , severe     Nutrition Diagnosis is [x ] ongoing  [ ] resolved [ ] not applicable     New Nutrition Diagnosis: [ ] not applicable       Interventions:   Recommend  [ ] Change Diet To:  [ ] Nutrition Supplement  [ ] Nutrition Support  [ x] Other: PRovide tube feeding of Jevity 1.5 at 30ml/hx24 ( 720ml, 1080kcal, 46g protein, 547ml free water). may add 1 packet Of prosource for an additional 15g protein/d. MD to monitor/assess fluid status as needed.       Monitoring and Evaluation:   [ ] PO intake [ x ] Tolerance to diet prescription [ x ] weights [ x ] labs[ x ] follow up per protocol  [ ] other:

## 2021-11-16 LAB
ALBUMIN SERPL ELPH-MCNC: 1.5 G/DL — LOW (ref 3.5–5)
ALP SERPL-CCNC: 104 U/L — SIGNIFICANT CHANGE UP (ref 40–120)
ALT FLD-CCNC: 35 U/L DA — SIGNIFICANT CHANGE UP (ref 10–60)
ANION GAP SERPL CALC-SCNC: 7 MMOL/L — SIGNIFICANT CHANGE UP (ref 5–17)
APPEARANCE UR: ABNORMAL
AST SERPL-CCNC: 34 U/L — SIGNIFICANT CHANGE UP (ref 10–40)
BACTERIA # UR AUTO: ABNORMAL /HPF
BILIRUB SERPL-MCNC: 0.3 MG/DL — SIGNIFICANT CHANGE UP (ref 0.2–1.2)
BILIRUB UR-MCNC: NEGATIVE — SIGNIFICANT CHANGE UP
BUN SERPL-MCNC: 26 MG/DL — HIGH (ref 7–18)
CALCIUM SERPL-MCNC: 8.3 MG/DL — LOW (ref 8.4–10.5)
CHLORIDE SERPL-SCNC: 109 MMOL/L — HIGH (ref 96–108)
CO2 SERPL-SCNC: 25 MMOL/L — SIGNIFICANT CHANGE UP (ref 22–31)
COLOR SPEC: YELLOW — SIGNIFICANT CHANGE UP
COMMENT - URINE: SIGNIFICANT CHANGE UP
CREAT SERPL-MCNC: 0.59 MG/DL — SIGNIFICANT CHANGE UP (ref 0.5–1.3)
DIFF PNL FLD: ABNORMAL
EPI CELLS # UR: ABNORMAL /HPF
GLUCOSE SERPL-MCNC: 145 MG/DL — HIGH (ref 70–99)
GLUCOSE UR QL: NEGATIVE — SIGNIFICANT CHANGE UP
HCT VFR BLD CALC: 25.9 % — LOW (ref 34.5–45)
HGB BLD-MCNC: 7.9 G/DL — LOW (ref 11.5–15.5)
KETONES UR-MCNC: ABNORMAL
LEUKOCYTE ESTERASE UR-ACNC: ABNORMAL
MAGNESIUM SERPL-MCNC: 2.6 MG/DL — SIGNIFICANT CHANGE UP (ref 1.6–2.6)
MCHC RBC-ENTMCNC: 28.8 PG — SIGNIFICANT CHANGE UP (ref 27–34)
MCHC RBC-ENTMCNC: 30.5 GM/DL — LOW (ref 32–36)
MCV RBC AUTO: 94.5 FL — SIGNIFICANT CHANGE UP (ref 80–100)
NITRITE UR-MCNC: NEGATIVE — SIGNIFICANT CHANGE UP
NRBC # BLD: 0 /100 WBCS — SIGNIFICANT CHANGE UP (ref 0–0)
PH UR: 5 — SIGNIFICANT CHANGE UP (ref 5–8)
PHOSPHATE SERPL-MCNC: 2.4 MG/DL — LOW (ref 2.5–4.5)
PLATELET # BLD AUTO: 591 K/UL — HIGH (ref 150–400)
POTASSIUM SERPL-MCNC: 4.2 MMOL/L — SIGNIFICANT CHANGE UP (ref 3.5–5.3)
POTASSIUM SERPL-SCNC: 4.2 MMOL/L — SIGNIFICANT CHANGE UP (ref 3.5–5.3)
PROT SERPL-MCNC: 5.7 G/DL — LOW (ref 6–8.3)
PROT UR-MCNC: 100
RBC # BLD: 2.74 M/UL — LOW (ref 3.8–5.2)
RBC # FLD: 14.7 % — HIGH (ref 10.3–14.5)
RBC CASTS # UR COMP ASSIST: ABNORMAL /HPF (ref 0–2)
SODIUM SERPL-SCNC: 141 MMOL/L — SIGNIFICANT CHANGE UP (ref 135–145)
SP GR SPEC: 1.02 — SIGNIFICANT CHANGE UP (ref 1.01–1.02)
UROBILINOGEN FLD QL: NEGATIVE — SIGNIFICANT CHANGE UP
WBC # BLD: 9.82 K/UL — SIGNIFICANT CHANGE UP (ref 3.8–10.5)
WBC # FLD AUTO: 9.82 K/UL — SIGNIFICANT CHANGE UP (ref 3.8–10.5)
WBC UR QL: ABNORMAL /HPF (ref 0–5)

## 2021-11-16 PROCEDURE — 99232 SBSQ HOSP IP/OBS MODERATE 35: CPT | Mod: 57

## 2021-11-16 PROCEDURE — 71045 X-RAY EXAM CHEST 1 VIEW: CPT | Mod: 26

## 2021-11-16 RX ORDER — SODIUM,POTASSIUM PHOSPHATES 278-250MG
1 POWDER IN PACKET (EA) ORAL ONCE
Refills: 0 | Status: COMPLETED | OUTPATIENT
Start: 2021-11-16 | End: 2021-11-16

## 2021-11-16 RX ADMIN — Medication 1 PACKET(S): at 11:42

## 2021-11-16 RX ADMIN — CEFEPIME 100 MILLIGRAM(S): 1 INJECTION, POWDER, FOR SOLUTION INTRAMUSCULAR; INTRAVENOUS at 05:06

## 2021-11-16 RX ADMIN — CHLORHEXIDINE GLUCONATE 15 MILLILITER(S): 213 SOLUTION TOPICAL at 05:06

## 2021-11-16 RX ADMIN — MUPIROCIN 1 APPLICATION(S): 20 OINTMENT TOPICAL at 05:06

## 2021-11-16 RX ADMIN — CHLORHEXIDINE GLUCONATE 15 MILLILITER(S): 213 SOLUTION TOPICAL at 17:57

## 2021-11-16 RX ADMIN — Medication 650 MILLIGRAM(S): at 12:40

## 2021-11-16 RX ADMIN — PANTOPRAZOLE SODIUM 40 MILLIGRAM(S): 20 TABLET, DELAYED RELEASE ORAL at 11:42

## 2021-11-16 RX ADMIN — Medication 650 MILLIGRAM(S): at 11:42

## 2021-11-16 RX ADMIN — ENOXAPARIN SODIUM 40 MILLIGRAM(S): 100 INJECTION SUBCUTANEOUS at 11:42

## 2021-11-16 NOTE — PROGRESS NOTE ADULT - SUBJECTIVE AND OBJECTIVE BOX
89 Y F s/p left sided chest tube placement, tracheostomy and PEG tube placement. Primary team requesting removal of chest tube as output has been minimal, chest tube has been on water seal,  x-ray demonstrates small apical pneumo unchanged from previous day.     Vital Signs Last 24 Hrs  T(C): 36.4 (16 Nov 2021 04:45), Max: 37.8 (15 Nov 2021 11:44)  T(F): 97.6 (16 Nov 2021 04:45), Max: 100.1 (15 Nov 2021 11:44)  HR: 93 (16 Nov 2021 04:45) (85 - 94)  BP: 140/64 (16 Nov 2021 04:45) (109/53 - 140/64)  BP(mean): --  RR: 19 (16 Nov 2021 04:45) (19 - 24)  SpO2: 99% (16 Nov 2021 04:45) (99% - 100%)    No acute distress, AAOX3   Tracheostomy in place, on AC ventilating well   Left sided chest tube in place, dressing dry and intact with minimal serous output

## 2021-11-16 NOTE — PROGRESS NOTE ADULT - SUBJECTIVE AND OBJECTIVE BOX
89y Female is under our care for     REVIEW OF SYSTEMS:  [  ] Not able to elicit  General:	  Chest:	  GI:	  :  Skin:	  Musculoskeletal:	  Neuro:	    MEDS:    ALLERGIES: Allergies    No Known Allergies    Intolerances        VITALS:  Vital Signs Last 24 Hrs  T(C): 38.2 (16 Nov 2021 11:40), Max: 38.2 (16 Nov 2021 11:40)  T(F): 100.7 (16 Nov 2021 11:40), Max: 100.7 (16 Nov 2021 11:40)  HR: 91 (16 Nov 2021 08:05) (85 - 94)  BP: 140/64 (16 Nov 2021 04:45) (109/53 - 140/64)  BP(mean): --  RR: 19 (16 Nov 2021 04:45) (19 - 24)  SpO2: 97% (16 Nov 2021 08:05) (97% - 100%)      PHYSICAL EXAM:  HEENT:  Neck:  Respiratory:  Cardiovascular:  Gastrointestinal:  Extremities:  Skin:  Ortho:  Neuro:    LABS/DIAGNOSTIC TESTS:                        7.9    9.82  )-----------( 591      ( 16 Nov 2021 07:11 )             25.9     WBC Count: 9.82 K/uL (11-16 @ 07:11)  WBC Count: 12.28 K/uL (11-15 @ 07:21)  WBC Count: 13.13 K/uL (11-14 @ 07:09)  WBC Count: 14.14 K/uL (11-13 @ 07:41)  WBC Count: 12.01 K/uL (11-12 @ 04:19)    11-16    141  |  109<H>  |  26<H>  ----------------------------<  145<H>  4.2   |  25  |  0.59    Ca    8.3<L>      16 Nov 2021 07:11  Phos  2.4     11-16  Mg     2.6     11-16    TPro  5.7<L>  /  Alb  1.5<L>  /  TBili  0.3  /  DBili  x   /  AST  34  /  ALT  35  /  AlkPhos  104  11-16      CULTURES:   .Blood Blood  11-03 @ 10:22   No Growth Final  --  --      Clean Catch Clean Catch (Midstream)  11-02 @ 21:01   No growth  --  --      .Sputum Sputum  11-01 @ 21:18   Moderate Streptococcus agalactiae (Group B) isolated  Group B streptococci are susceptible to ampicillin,  penicillin and cefazolin, but may be resistant to  erythromycin and clindamycin.  Recommendations for intrapartum prophylaxis for Group B  streptococci are penicillin or ampicillin.  Normal Respiratory [place] present  --    Rare polymorphonuclear leukocytes per low power field  No Squamous epithelial cells per low power field  Rare Yeast like cells seen per oil power field  Rare Gram Positive Rods seen per oil power field  Rare Gram positive cocci in pairs seen per oil power field      .Blood Blood-Peripheral  11-01 @ 03:56   No Growth Final  --  Blood Culture PCR  Escherichia coli        RADIOLOGY:   < from: Xray Chest 1 View- PORTABLE-Routine (Xray Chest 1 View- PORTABLE-Routine in AM.) (11.15.21 @ 09:25) >    EXAM:  XR CHEST PORTABLE ROUTINE 1V                            PROCEDURE DATE:  11/15/2021          INTERPRETATION:  Chest one view    HISTORY: Chest tube follow-up    COMPARISON STUDY: 11/14/2021    Frontal expiratory view of the chest shows the heart to be similar in size. Tracheostomy tube and left chest tube are again noted.    The lungs show reduction of left infiltrate with small left pneumothorax. Right pleural effusion is similar and there is no evidence of left pleural effusion.    IMPRESSION:  Partial clearing, left lung. Small left pneumothorax.      Thank you for the courtesy of this referral.    --- End of Report ---    < end of copied text >   89y Female is under our care for E. coli bacteremia and pneumonia.  Patient was seen laying in bed with no acute distress.  X-ray chest shows partial clearing of left lung, patient had a fever of 100.7 this morning and WBC count is downtrending.    REVIEW OF SYSTEMS:  [ x ] Not able to elicit      MEDS:    ALLERGIES: Allergies    No Known Allergies    Intolerances        VITALS:  Vital Signs Last 24 Hrs  T(C): 38.2 (16 Nov 2021 11:40), Max: 38.2 (16 Nov 2021 11:40)  T(F): 100.7 (16 Nov 2021 11:40), Max: 100.7 (16 Nov 2021 11:40)  HR: 91 (16 Nov 2021 08:05) (85 - 94)  BP: 140/64 (16 Nov 2021 04:45) (109/53 - 140/64)  BP(mean): --  RR: 19 (16 Nov 2021 04:45) (19 - 24)  SpO2: 97% (16 Nov 2021 08:05) (97% - 100%)      PHYSICAL EXAM:  HEENT: n/a  Neck: supple, trach +  Respiratory: diminished breath sounds on left, left sided pigtail +  Cardiovascular: S1 S2 reg no murmurs  Gastrointestinal: +BS with soft, nondistended abdomen; nontender, peg tube in place  Extremities: bilateral hand edema +2  Skin: no rashes  Ortho: n/a  Neuro: Awake and alert    LABS/DIAGNOSTIC TESTS:                        7.9    9.82  )-----------( 591      ( 16 Nov 2021 07:11 )             25.9     WBC Count: 9.82 K/uL (11-16 @ 07:11)  WBC Count: 12.28 K/uL (11-15 @ 07:21)  WBC Count: 13.13 K/uL (11-14 @ 07:09)  WBC Count: 14.14 K/uL (11-13 @ 07:41)  WBC Count: 12.01 K/uL (11-12 @ 04:19)    11-16    141  |  109<H>  |  26<H>  ----------------------------<  145<H>  4.2   |  25  |  0.59    Ca    8.3<L>      16 Nov 2021 07:11  Phos  2.4     11-16  Mg     2.6     11-16    TPro  5.7<L>  /  Alb  1.5<L>  /  TBili  0.3  /  DBili  x   /  AST  34  /  ALT  35  /  AlkPhos  104  11-16      CULTURES:   .Blood Blood  11-03 @ 10:22   No Growth Final  --  --      Clean Catch Clean Catch (Midstream)  11-02 @ 21:01   No growth  --  --      .Sputum Sputum  11-01 @ 21:18   Moderate Streptococcus agalactiae (Group B) isolated  Group B streptococci are susceptible to ampicillin,  penicillin and cefazolin, but may be resistant to  erythromycin and clindamycin.  Recommendations for intrapartum prophylaxis for Group B  streptococci are penicillin or ampicillin.  Normal Respiratory [place] present  --    Rare polymorphonuclear leukocytes per low power field  No Squamous epithelial cells per low power field  Rare Yeast like cells seen per oil power field  Rare Gram Positive Rods seen per oil power field  Rare Gram positive cocci in pairs seen per oil power field      .Blood Blood-Peripheral  11-01 @ 03:56   No Growth Final  --  Blood Culture PCR  Escherichia coli        RADIOLOGY:   < from: Xray Chest 1 View- PORTABLE-Routine (Xray Chest 1 View- PORTABLE-Routine in AM.) (11.15.21 @ 09:25) >    EXAM:  XR CHEST PORTABLE ROUTINE 1V                            PROCEDURE DATE:  11/15/2021          INTERPRETATION:  Chest one view    HISTORY: Chest tube follow-up    COMPARISON STUDY: 11/14/2021    Frontal expiratory view of the chest shows the heart to be similar in size. Tracheostomy tube and left chest tube are again noted.    The lungs show reduction of left infiltrate with small left pneumothorax. Right pleural effusion is similar and there is no evidence of left pleural effusion.    IMPRESSION:  Partial clearing, left lung. Small left pneumothorax.      Thank you for the courtesy of this referral.    --- End of Report ---    < end of copied text >

## 2021-11-16 NOTE — PROGRESS NOTE ADULT - ASSESSMENT
89 Y F with left sided chest tube placement s/p trach and PEG   -chest tube was clamped this morning (6:30 AM) plan for chest x ray 10:30, depending on imaging  will consider removal of tube.

## 2021-11-16 NOTE — CHART NOTE - NSCHARTNOTEFT_GEN_A_CORE
Koko updated on patient's status.  Informed patient did not tolerate chest tube clamping. Ptx larger after clamping.  Will continue chest tube to waterseal. Serial CXR.  All questions answered.

## 2021-11-16 NOTE — PROGRESS NOTE ADULT - ASSESSMENT
seen and examined vsstable afebrile physical done  ok   awake not in any distress  trachvent  peg    hgb 7.9

## 2021-11-16 NOTE — PROGRESS NOTE ADULT - ASSESSMENT
UTI  Bacteremia - E coli  Pneumonia  Septic Shock  Fevers   Leukocytosis - improving    Plan:  ·	Continue Maxipime 1 gm IV qd for 1 more day to complete a total of 14 days  ·	poor prognosis, patient is full code                     UTI  Bacteremia - E coli  Pneumonia  Septic Shock  Fevers   Leukocytosis - improving    Plan:  ·	Continue Maxipime 1 gm IV qd for now until repeat blood culture resulted  ·	Awaiting culture results  ·	poor prognosis, patient is full code                     UTI  Bacteremia - E coli  Pneumonia  Septic Shock  Fevers   Leukocytosis - improving    Plan:  ·	Continue Maxipime 1 gm IV qd for now until repeat blood culture resulted  ·	Awaiting culture results  ·	poor prognosis, patient is full code    I agree with above

## 2021-11-16 NOTE — PROGRESS NOTE ADULT - NUTRITIONAL ASSESSMENT
This patient has been assessed with a concern for Malnutrition and has been determined to have a diagnosis/diagnoses of Severe protein-calorie malnutrition and Underweight (BMI < 19).  +Severe temporal waisting  +Muscle waisting  Protein 5.7    Albumin 1.5      This patient is being managed with:   Diet NPO-  Tube Feeding Modality: Nasogastric  Jevity 1.5 Bg  Total Volume for 24 Hours (mL): 720  Continuous  Starting Tube Feed Rate {mL per Hour}: 20  Increase Tube Feed Rate by (mL): 10     Every 8 hours  Until Goal Tube Feed Rate (mL per Hour): 30  Tube Feed Duration (in Hours): 24  Tube Feed Start Time: 17:00  Entered: Nov 12 2021 11:10AM

## 2021-11-16 NOTE — PROGRESS NOTE ADULT - SUBJECTIVE AND OBJECTIVE BOX
HPI:  89 yo female from [place] [facility] with medical h/o of HTN, Dementia, CVA with R sided hemiparesis, aphasia, parkinson's, GERD, CKD bedbound at baseline, dependant on assistance for ADL comes in with respiratory failure. Patient was found to have respiratory distress, saturating in 70s when EMS arrived, was placed on NRB on field. She was emergently intubated on arrival in ED. As per NH records patient was having fever and cough for past few days. Today she was found to have respiratory distress. She is vaccinated with moderna. Spoke to family son,  who stated the patient to remain full code. No other history could be obtained.  (01 Nov 2021 00:14)      Patient is a 89y old  Female who presents with a chief complaint of AHRF (16 Nov 2021 11:46)      INTERVAL HPI/OVERNIGHT EVENTS:  T(C): 37.8 (11-16-21 @ 14:25), Max: 38.2 (11-16-21 @ 11:40)  HR: 110 (11-16-21 @ 14:25) (85 - 110)  BP: 136/65 (11-16-21 @ 14:25) (134/57 - 140/64)  RR: 27 (11-16-21 @ 14:25) (19 - 27)  SpO2: 100% (11-16-21 @ 14:25) (97% - 100%)  Wt(kg): --  I&O's Summary      REVIEW OF SYSTEMS: denies fever, chills, SOB, palpitations, chest pain, abdominal pain, nausea, vomitting, diarrhea, constipation, dizziness    MEDICATIONS  (STANDING):  chlorhexidine 0.12% Liquid 15 milliLiter(s) Oral Mucosa every 12 hours  enoxaparin Injectable 40 milliGRAM(s) SubCutaneous daily  pantoprazole   Suspension 40 milliGRAM(s) Oral daily    MEDICATIONS  (PRN):  acetaminophen    Suspension .. 650 milliGRAM(s) Oral every 6 hours PRN Temp greater or equal to 38C (100.4F), Mild Pain (1 - 3), Moderate Pain (4 - 6)  sodium chloride 0.9% lock flush 10 milliLiter(s) IV Push every 1 hour PRN Pre/post blood products, medications, blood draw, and to maintain line patency      PHYSICAL EXAM:  GENERAL: NAD, well-groomed, well-developed  HEAD:  Atraumatic, Normocephalic  EYES: EOMI, PERRLA, conjunctiva and sclera clear  ENMT: No tonsillar erythema, exudates, or enlargement; Moist mucous membranes, Good dentition, No lesions  NECK: Supple, No JVD, Normal thyroid  NERVOUS SYSTEM:  Alert & Oriented X3, Good concentration; Motor Strength 5/5 B/L upper and lower extremities; DTRs 2+ intact and symmetric  CHEST/LUNG: Clear to percussion bilaterally; No rales, rhonchi, wheezing, or rubs  HEART: Regular rate and rhythm; No murmurs, rubs, or gallops  ABDOMEN: Soft, Nontender, Nondistended; Bowel sounds present  EXTREMITIES:  2+ Peripheral Pulses, No clubbing, cyanosis, or edema  LYMPH: No lymphadenopathy noted  SKIN: No rashes or lesions  LABS:                        7.9    9.82  )-----------( 591      ( 16 Nov 2021 07:11 )             25.9     11-16    141  |  109<H>  |  26<H>  ----------------------------<  145<H>  4.2   |  25  |  0.59    Ca    8.3<L>      16 Nov 2021 07:11  Phos  2.4     11-16  Mg     2.6     11-16    TPro  5.7<L>  /  Alb  1.5<L>  /  TBili  0.3  /  DBili  x   /  AST  34  /  ALT  35  /  AlkPhos  104  11-16        CAPILLARY BLOOD GLUCOSE      POCT Blood Glucose.: 200 mg/dL (16 Nov 2021 11:40)  POCT Blood Glucose.: 160 mg/dL (16 Nov 2021 05:02)  POCT Blood Glucose.: 166 mg/dL (15 Nov 2021 23:37)  POCT Blood Glucose.: 142 mg/dL (15 Nov 2021 17:01)

## 2021-11-16 NOTE — PROGRESS NOTE ADULT - PROBLEM SELECTOR PLAN 2
Continue mechanical ventilation. With daily SBT  Tolerated 1 hour of SBT with PS 10  Monitor oxygen saturation.  Serial CXRs.

## 2021-11-16 NOTE — PROGRESS NOTE ADULT - SUBJECTIVE AND OBJECTIVE BOX
AKASH CHACKO    SCU progress note    INTERVAL HPI/OVERNIGHT EVENTS: ***Tmax 100.1 yesterday. Tolerated a little over a hour SBT with PS 10    DNR [ ]   DNI  [  ]  FULLCODE    Covid - 19 PCR: Negative 11/10    The 4Ms    What Matters Most: see [facility]  Age appropriate Medications/Screen for High Risk Medication: Yes  Mentation: see CAM below  Mobility: defer to physical exam    The Confusion Assessment Method (CAM) Diagnostic Algorithm     1: Acute Onset or Fluctuating Course  - Is there evidence of an acute change in mental status from the patient’s baseline? Did the (abnormal) behavior  fluctuate during the day, that is, tend to come and go, or increase and decrease in severity?  [ ] YES [x ] NO     2: Inattention  - Did the patient have difficulty focusing attention, being easily distractible, or having difficulty keeping track of what was being said?  [ ] YES [ ] NO   unable to access     3: Disorganized thinking  -Was the patient’s thinking disorganized or incoherent, such as rambling or irrelevant conversation, unclear or illogical flow of ideas, or unpredictable switching from subject to subject?  [ ] YES [ ] NO   Unable to access    4: Altered Level of consciousness?  [ ] YES [ ] NO    The diagnosis of delirium by CAM requires the presence of features 1 and 2 and either 3 or 4.    PRESSORS: [ ] YES [x ] NO    Cardiovascular:  Heart Failure  Acute   Acute on Chronic  Chronic         Pulmonary:    Hematalogic:  enoxaparin Injectable 40 milliGRAM(s) SubCutaneous daily    Other:  acetaminophen    Suspension .. 650 milliGRAM(s) Oral every 6 hours PRN  chlorhexidine 0.12% Liquid 15 milliLiter(s) Oral Mucosa every 12 hours  pantoprazole   Suspension 40 milliGRAM(s) Oral daily  potassium phosphate / sodium phosphate Powder (PHOS-NaK) 1 Packet(s) Oral once  sodium chloride 0.9% lock flush 10 milliLiter(s) IV Push every 1 hour PRN    acetaminophen    Suspension .. 650 milliGRAM(s) Oral every 6 hours PRN  chlorhexidine 0.12% Liquid 15 milliLiter(s) Oral Mucosa every 12 hours  enoxaparin Injectable 40 milliGRAM(s) SubCutaneous daily  pantoprazole   Suspension 40 milliGRAM(s) Oral daily  potassium phosphate / sodium phosphate Powder (PHOS-NaK) 1 Packet(s) Oral once  sodium chloride 0.9% lock flush 10 milliLiter(s) IV Push every 1 hour PRN    Drug Dosing Weight  Height (cm): 167.6 (03 Nov 2021 16:02)  Weight (kg): 40.9 (01 Nov 2021 02:20)  BMI (kg/m2): 14.6 (03 Nov 2021 16:02)  BSA (m2): 1.43 (03 Nov 2021 16:02)    CENTRAL LINE: [ ] YES [x ] NO  LOCATION:   DATE INSERTED:  REMOVE: [ ] YES [ ] NO  EXPLAIN:    SAEED: [ ] YES [x ] NO    DATE INSERTED:  REMOVE:  [ ] YES [ ] NO  EXPLAIN:    PAST MEDICAL & SURGICAL HISTORY:  HTN (hypertension)    Dementia                  Mode: CPAP with PS  FiO2: 40  PEEP: 5  PS: 10      PHYSICAL EXAM:    GENERAL: NAD, cachectic with severe temporal waisting  HEAD:  Atraumatic, Normocephalic  EYES: EOMI, PERRLA, conjunctiva and sclera clear  ENMT: No tonsillar erythema, exudates  NECK: Supple, No JVD, tracheostomy intact  NERVOUS SYSTEM:  Awake and alert. Nonverbal at baseline. Does not follow commands.  CHEST/LUNG: Diminished breath sounds bilateral bases L>R. Left chest tube clamped at 0700 by surgery.  HEART: Regular rate and rhythm; No murmurs, rubs, or gallops  ABDOMEN: Soft, Nontender, Nondistended; Bowel sounds present; Peg intact  EXTREMITIES: +Muscle waisting.  2+ Peripheral Pulses, No clubbing, cyanosis, or edema  LYMPH: No lymphadenopathy noted  SKIN: Skin tear left shoulder      LABS:  CBC Full  -  ( 16 Nov 2021 07:11 )  WBC Count : 9.82 K/uL  RBC Count : 2.74 M/uL  Hemoglobin : 7.9 g/dL  Hematocrit : 25.9 %  Platelet Count - Automated : 591 K/uL  Mean Cell Volume : 94.5 fl  Mean Cell Hemoglobin : 28.8 pg  Mean Cell Hemoglobin Concentration : 30.5 gm/dL  Auto Neutrophil # : x  Auto Lymphocyte # : x  Auto Monocyte # : x  Auto Eosinophil # : x  Auto Basophil # : x  Auto Neutrophil % : x  Auto Lymphocyte % : x  Auto Monocyte % : x  Auto Eosinophil % : x  Auto Basophil % : x    11-16    141  |  109<H>  |  26<H>  ----------------------------<  145<H>  4.2   |  25  |  0.59    Ca    8.3<L>      16 Nov 2021 07:11  Phos  2.4     11-16  Mg     2.6     11-16    TPro  5.7<L>  /  Alb  1.5<L>  /  TBili  0.3  /  DBili  x   /  AST  34  /  ALT  35  /  AlkPhos  104  11-16              [  ]  DVT Prophylaxis  [  ]  Nutrition, Brand, Rate         Goal Rate        Abnormal Nutritional Status -  Malnutrition   Cachexia        RADIOLOGY & ADDITIONAL STUDIES:  ***  < from: Xray Chest 1 View- PORTABLE-Routine (Xray Chest 1 View- PORTABLE-Routine in AM.) (11.15.21 @ 09:25) >  Frontal expiratory view of the chest shows the heart to be similar in size. Tracheostomy tube and left chest tube are again noted.    The lungs show reduction of left infiltrate with small left pneumothorax. Right pleural effusion is similar and there is no evidence of left pleural effusion.    IMPRESSION:  Partial clearing, left lung. Small left pneumothorax.    < end of copied text >    Goals of Care Discussion with Family/Proxy/Other   - see note from 11/09

## 2021-11-17 DIAGNOSIS — D75.839 THROMBOCYTOSIS, UNSPECIFIED: ICD-10-CM

## 2021-11-17 LAB
ALBUMIN SERPL ELPH-MCNC: 1.6 G/DL — LOW (ref 3.5–5)
ALP SERPL-CCNC: 121 U/L — HIGH (ref 40–120)
ALT FLD-CCNC: 37 U/L DA — SIGNIFICANT CHANGE UP (ref 10–60)
ANION GAP SERPL CALC-SCNC: 2 MMOL/L — LOW (ref 5–17)
AST SERPL-CCNC: 37 U/L — SIGNIFICANT CHANGE UP (ref 10–40)
BILIRUB SERPL-MCNC: 0.3 MG/DL — SIGNIFICANT CHANGE UP (ref 0.2–1.2)
BUN SERPL-MCNC: 24 MG/DL — HIGH (ref 7–18)
CALCIUM SERPL-MCNC: 8.3 MG/DL — LOW (ref 8.4–10.5)
CHLORIDE SERPL-SCNC: 107 MMOL/L — SIGNIFICANT CHANGE UP (ref 96–108)
CO2 SERPL-SCNC: 28 MMOL/L — SIGNIFICANT CHANGE UP (ref 22–31)
CREAT SERPL-MCNC: 0.69 MG/DL — SIGNIFICANT CHANGE UP (ref 0.5–1.3)
CULTURE RESULTS: NO GROWTH — SIGNIFICANT CHANGE UP
GLUCOSE SERPL-MCNC: 143 MG/DL — HIGH (ref 70–99)
HCT VFR BLD CALC: 28.9 % — LOW (ref 34.5–45)
HGB BLD-MCNC: 8.9 G/DL — LOW (ref 11.5–15.5)
MAGNESIUM SERPL-MCNC: 2.6 MG/DL — SIGNIFICANT CHANGE UP (ref 1.6–2.6)
MCHC RBC-ENTMCNC: 29.2 PG — SIGNIFICANT CHANGE UP (ref 27–34)
MCHC RBC-ENTMCNC: 30.8 GM/DL — LOW (ref 32–36)
MCV RBC AUTO: 94.8 FL — SIGNIFICANT CHANGE UP (ref 80–100)
NRBC # BLD: 0 /100 WBCS — SIGNIFICANT CHANGE UP (ref 0–0)
PHOSPHATE SERPL-MCNC: 2.7 MG/DL — SIGNIFICANT CHANGE UP (ref 2.5–4.5)
PLATELET # BLD AUTO: 689 K/UL — HIGH (ref 150–400)
POTASSIUM SERPL-MCNC: 4.7 MMOL/L — SIGNIFICANT CHANGE UP (ref 3.5–5.3)
POTASSIUM SERPL-SCNC: 4.7 MMOL/L — SIGNIFICANT CHANGE UP (ref 3.5–5.3)
PROT SERPL-MCNC: 6.4 G/DL — SIGNIFICANT CHANGE UP (ref 6–8.3)
RBC # BLD: 3.05 M/UL — LOW (ref 3.8–5.2)
RBC # FLD: 15 % — HIGH (ref 10.3–14.5)
SODIUM SERPL-SCNC: 137 MMOL/L — SIGNIFICANT CHANGE UP (ref 135–145)
SPECIMEN SOURCE: SIGNIFICANT CHANGE UP
WBC # BLD: 12.17 K/UL — HIGH (ref 3.8–10.5)
WBC # FLD AUTO: 12.17 K/UL — HIGH (ref 3.8–10.5)

## 2021-11-17 PROCEDURE — 71045 X-RAY EXAM CHEST 1 VIEW: CPT | Mod: 26

## 2021-11-17 RX ORDER — VANCOMYCIN HCL 1 G
1000 VIAL (EA) INTRAVENOUS EVERY 12 HOURS
Refills: 0 | Status: DISCONTINUED | OUTPATIENT
Start: 2021-11-17 | End: 2021-11-17

## 2021-11-17 RX ORDER — VANCOMYCIN HCL 1 G
1000 VIAL (EA) INTRAVENOUS ONCE
Refills: 0 | Status: COMPLETED | OUTPATIENT
Start: 2021-11-17 | End: 2021-11-17

## 2021-11-17 RX ORDER — VANCOMYCIN HCL 1 G
1000 VIAL (EA) INTRAVENOUS DAILY
Refills: 0 | Status: DISCONTINUED | OUTPATIENT
Start: 2021-11-18 | End: 2021-11-21

## 2021-11-17 RX ORDER — VANCOMYCIN HCL 1 G
VIAL (EA) INTRAVENOUS
Refills: 0 | Status: DISCONTINUED | OUTPATIENT
Start: 2021-11-17 | End: 2021-11-17

## 2021-11-17 RX ADMIN — Medication 650 MILLIGRAM(S): at 12:37

## 2021-11-17 RX ADMIN — Medication 250 MILLIGRAM(S): at 12:06

## 2021-11-17 RX ADMIN — CHLORHEXIDINE GLUCONATE 15 MILLILITER(S): 213 SOLUTION TOPICAL at 17:43

## 2021-11-17 RX ADMIN — ENOXAPARIN SODIUM 40 MILLIGRAM(S): 100 INJECTION SUBCUTANEOUS at 11:12

## 2021-11-17 RX ADMIN — PANTOPRAZOLE SODIUM 40 MILLIGRAM(S): 20 TABLET, DELAYED RELEASE ORAL at 11:13

## 2021-11-17 RX ADMIN — CHLORHEXIDINE GLUCONATE 15 MILLILITER(S): 213 SOLUTION TOPICAL at 05:06

## 2021-11-17 RX ADMIN — Medication 650 MILLIGRAM(S): at 11:13

## 2021-11-17 NOTE — PROGRESS NOTE ADULT - PROBLEM SELECTOR PLAN 2
Continue mechanical ventilation.  Did not tolerate SBT today.  Monitor oxygen saturation  Serial CXRs

## 2021-11-17 NOTE — PROGRESS NOTE ADULT - ASSESSMENT
UTI  Bacteremia - E coli  Pneumonia  Septic Shock  Fevers   Leukocytosis - mild    Plan:  ·	Continue Maxipime 1 gm IV qd for now until repeat blood culture resulted  ·	Awaiting culture results  ·	poor prognosis, patient is full code                           UTI  Bacteremia - E coli  Pneumonia  Septic Shock  Fevers   Leukocytosis - mild    Plan:  ·	Continue Vancomycin 1g iv q12 for now  ·	Awaiting culture results  ·	poor prognosis, patient is full code                           UTI  Bacteremia - E coli  Pneumonia  Septic Shock  Fevers   Leukocytosis - mild    Plan:  ·	Continue Vancomycin 1g iv qd for now  ·	Awaiting culture results  ·	poor prognosis, patient is full code                           UTI  Bacteremia - E coli  Pneumonia  Septic Shock  Fevers   Leukocytosis - mild    Plan:  ·	Start Vancomycin 1g iv qd for now  ·	Awaiting culture results  ·	poor prognosis, patient is full code                           UTI  Bacteremia - E coli  Pneumonia  Septic Shock  Fevers   Leukocytosis - mild    Plan:  ·	Start Vancomycin 1g iv qd for now ( empirically)  ·	Awaiting culture results  ·	poor prognosis, patient is full code    I agree with above

## 2021-11-17 NOTE — PROGRESS NOTE ADULT - PROBLEM SELECTOR PLAN 10
DVT and GI prophylaxis.  Continue mechanical ventilation.  Serial CXRs  Thoracic surgery f/u  Overall prognosis is extremely poor.  Remains FULL CODE as per family wishes.  SW consult for long term vent placement.

## 2021-11-17 NOTE — PROGRESS NOTE ADULT - ASSESSMENT
89 y/o F from [facility] w/ Hx of HTN, Dementia, CVA w/ R sided hemiparesis, aphasia, Parkinson's Disease, GERD, CKD. She is bedbound at baseline and needs assistance for ADLs. She was brought to the E.D. due to episodes of desaturation at 70s and respiratory distress. She was also having cough and fever in the nursing home. She was initially on NRB placed by EMS but was eventually intubated in the E.D. She is vaccinated with Moderna. COVID test was negative on admission. Chest Xray showed left lower lobe infiltrate (pneumonia). Blood cultures were sent. She was given 1 dose of Vancomycin and Zosyn in the E.D. and was subsequently admitted to ICU.    In the ICU, she was placed on mechanical ventilator. Her blood pressure was low despite receiving 2L bolus of LR. NGT and Left IJ central line was placed. Levophed was started. CXR revealed Left pneumothorax. Thoracic Surgery was consulted and left chest tube was placed. Patient was also tachycardic and troponin was mildly elevated. EKG showed SR w/ ST changes in the inferior leads. Troponin was monitored and has since trended down. Heart rate has been stable. Lactate was also elevated at 11.2 and has also since trended down. Patient also presented with hypernatremia and elevated creatinine probably secondary to volume deficit. She was also given free water. She was on Heparin for DVT prophylaxis and Protonix for GI prophylaxis. Palliative was consulted regarding the [facility]. Blood culture results showed E. coli bacteremia. She was started on Rocephin IV for 10 days. Azithromycin was continued awaiting Legionella results. Infectious Disease was consulted. Urine culture was ordered. Patient spiked a fever and is tachycardic. Blood cultures were repeated. She was tachycardic however his EKG showed SR w/ new PACs. There was mild air leak in the chest tube drainage but it was fixed by Cardiothoracic Surgery. Urine culture and repeat blood culture was negative. She had 2 febrile episodes overnight at 100.4-100.5F. She was tachypneic at rate of 30. Her repeat Chest Xray showed worsening of bilateral infiltrates. Her albumin is still low at 1.0. Her urine output is net negative. She was given Lasix and Albumin to maintain net negative urine output and decrease congestion. Vancomycin was discontinued and Cefepime decreased from 2g to 1g to complete 14 days since negative culture (11/16/21). Echocardiogram was ordered. Central line was removed. Lactulose was ordered for bowel movement. On November 11, 2021, patient had her tracheostomy and PEG inserted. Patient son Jered was called and informed .

## 2021-11-17 NOTE — PROGRESS NOTE ADULT - SUBJECTIVE AND OBJECTIVE BOX
89y Female is under our care for     REVIEW OF SYSTEMS:  [  ] Not able to elicit  General:	  Chest:	  GI:	  :  Skin:	  Musculoskeletal:	  Neuro:	    MEDS:  vancomycin  IVPB 1000 milliGRAM(s) IV Intermittent once  vancomycin  IVPB 1000 milliGRAM(s) IV Intermittent every 12 hours  vancomycin  IVPB        ALLERGIES: Allergies    No Known Allergies    Intolerances        VITALS:  Vital Signs Last 24 Hrs  T(C): 38.5 (17 Nov 2021 11:10), Max: 38.5 (17 Nov 2021 11:10)  T(F): 101.3 (17 Nov 2021 11:10), Max: 101.3 (17 Nov 2021 11:10)  HR: 100 (17 Nov 2021 09:05) (91 - 110)  BP: 143/58 (17 Nov 2021 05:00) (136/65 - 151/65)  BP(mean): --  RR: 19 (17 Nov 2021 05:00) (19 - 30)  SpO2: 98% (17 Nov 2021 09:05) (98% - 100%)      PHYSICAL EXAM:  HEENT:  Neck:  Respiratory:  Cardiovascular:  Gastrointestinal:  Extremities:  Skin:  Ortho:  Neuro:    LABS/DIAGNOSTIC TESTS:                        8.9    12.17 )-----------( 689      ( 17 Nov 2021 08:14 )             28.9     WBC Count: 12.17 K/uL (11-17 @ 08:14)  WBC Count: 9.82 K/uL (11-16 @ 07:11)  WBC Count: 12.28 K/uL (11-15 @ 07:21)  WBC Count: 13.13 K/uL (11-14 @ 07:09)  WBC Count: 14.14 K/uL (11-13 @ 07:41)    11-17    137  |  107  |  24<H>  ----------------------------<  143<H>  4.7   |  28  |  0.69    Ca    8.3<L>      17 Nov 2021 08:14  Phos  2.7     11-17  Mg     2.6     11-17    TPro  6.4  /  Alb  1.6<L>  /  TBili  0.3  /  DBili  x   /  AST  37  /  ALT  37  /  AlkPhos  121<H>  11-17      CULTURES:   .Blood Blood  11-03 @ 10:22   No Growth Final  --  --      Clean Catch Clean Catch (Midstream)  11-02 @ 21:01   No growth  --  --      .Sputum Sputum  11-01 @ 21:18   Moderate Streptococcus agalactiae (Group B) isolated  Group B streptococci are susceptible to ampicillin,  penicillin and cefazolin, but may be resistant to  erythromycin and clindamycin.  Recommendations for intrapartum prophylaxis for Group B  streptococci are penicillin or ampicillin.  Normal Respiratory [place] present  --    Rare polymorphonuclear leukocytes per low power field  No Squamous epithelial cells per low power field  Rare Yeast like cells seen per oil power field  Rare Gram Positive Rods seen per oil power field  Rare Gram positive cocci in pairs seen per oil power field      .Blood Blood-Peripheral  11-01 @ 03:56   No Growth Final  --  Blood Culture PCR  Escherichia coli        RADIOLOGY:    < from: Xray Chest 1 View- PORTABLE-Routine (Xray Chest 1 View- PORTABLE-Routine .) (11.16.21 @ 09:58) >    EXAM:  XR CHEST PORTABLE ROUTINE 1V                            PROCEDURE DATE:  11/16/2021          INTERPRETATION:  Portable chest radiograph    CLINICAL INFORMATION: LEFT chest tube placement.    TECHNIQUE:  Portable  AP view of the chest.    COMPARISON: 11/15/2021 available for review.    FINDINGS:  LEFT chest tube tip overlies LEFT apex. Recurrent LEFT lung of 40% pneumothorax with minimal right-sided shift.  Bilateral diffuse airspace disease.    The  heart is mildly enlarged in transversediameter. No hilar mass.    Visualized osseous structures are intact.    IMPRESSION:   LEFT chest tube in place. Recurrent LEFT lung 20% pneumothorax.  Diffuse airspace disease.    --- End of Report ---        < end of copied text >   89y Female is under our care for E coli bacteremia, pneumonia and fever.  Patient was seen laying in bed with no acute distress.  She had a fever of 101.3 this morning with slightly elevated WBC count, awaiting results of blood and urine cultures.    REVIEW OF SYSTEMS:  [ x ] Not able to elicit      MEDS:  vancomycin  IVPB 1000 milliGRAM(s) IV Intermittent once  vancomycin  IVPB 1000 milliGRAM(s) IV Intermittent every 12 hours  vancomycin  IVPB        ALLERGIES: Allergies    No Known Allergies    Intolerances        VITALS:  Vital Signs Last 24 Hrs  T(C): 38.5 (17 Nov 2021 11:10), Max: 38.5 (17 Nov 2021 11:10)  T(F): 101.3 (17 Nov 2021 11:10), Max: 101.3 (17 Nov 2021 11:10)  HR: 100 (17 Nov 2021 09:05) (91 - 110)  BP: 143/58 (17 Nov 2021 05:00) (136/65 - 151/65)  BP(mean): --  RR: 19 (17 Nov 2021 05:00) (19 - 30)  SpO2: 98% (17 Nov 2021 09:05) (98% - 100%)      PHYSICAL EXAM:  HEENT: n/a  Neck: supple, trach +  Respiratory: diminished breath sounds on left, left sided pigtail +  Cardiovascular: S1 S2 reg no murmurs  Gastrointestinal: +BS with soft, nondistended abdomen; nontender, peg tube in place  Extremities: bilateral hand edema +2  Skin: no rashes  Ortho: n/a  Neuro: Awake and alert    LABS/DIAGNOSTIC TESTS:                        8.9    12.17 )-----------( 689      ( 17 Nov 2021 08:14 )             28.9     WBC Count: 12.17 K/uL (11-17 @ 08:14)  WBC Count: 9.82 K/uL (11-16 @ 07:11)  WBC Count: 12.28 K/uL (11-15 @ 07:21)  WBC Count: 13.13 K/uL (11-14 @ 07:09)  WBC Count: 14.14 K/uL (11-13 @ 07:41)    11-17    137  |  107  |  24<H>  ----------------------------<  143<H>  4.7   |  28  |  0.69    Ca    8.3<L>      17 Nov 2021 08:14  Phos  2.7     11-17  Mg     2.6     11-17    TPro  6.4  /  Alb  1.6<L>  /  TBili  0.3  /  DBili  x   /  AST  37  /  ALT  37  /  AlkPhos  121<H>  11-17      CULTURES:   .Blood Blood  11-03 @ 10:22   No Growth Final  --  --      Clean Catch Clean Catch (Midstream)  11-02 @ 21:01   No growth  --  --      .Sputum Sputum  11-01 @ 21:18   Moderate Streptococcus agalactiae (Group B) isolated  Group B streptococci are susceptible to ampicillin,  penicillin and cefazolin, but may be resistant to  erythromycin and clindamycin.  Recommendations for intrapartum prophylaxis for Group B  streptococci are penicillin or ampicillin.  Normal Respiratory [place] present  --    Rare polymorphonuclear leukocytes per low power field  No Squamous epithelial cells per low power field  Rare Yeast like cells seen per oil power field  Rare Gram Positive Rods seen per oil power field  Rare Gram positive cocci in pairs seen per oil power field      .Blood Blood-Peripheral  11-01 @ 03:56   No Growth Final  --  Blood Culture PCR  Escherichia coli        RADIOLOGY:    < from: Xray Chest 1 View- PORTABLE-Routine (Xray Chest 1 View- PORTABLE-Routine .) (11.16.21 @ 09:58) >    EXAM:  XR CHEST PORTABLE ROUTINE 1V                            PROCEDURE DATE:  11/16/2021          INTERPRETATION:  Portable chest radiograph    CLINICAL INFORMATION: LEFT chest tube placement.    TECHNIQUE:  Portable  AP view of the chest.    COMPARISON: 11/15/2021 available for review.    FINDINGS:  LEFT chest tube tip overlies LEFT apex. Recurrent LEFT lung of 40% pneumothorax with minimal right-sided shift.  Bilateral diffuse airspace disease.    The  heart is mildly enlarged in transversediameter. No hilar mass.    Visualized osseous structures are intact.    IMPRESSION:   LEFT chest tube in place. Recurrent LEFT lung 20% pneumothorax.  Diffuse airspace disease.    --- End of Report ---        < end of copied text >

## 2021-11-17 NOTE — PROGRESS NOTE ADULT - SUBJECTIVE AND OBJECTIVE BOX
HPI:  89 yo female from [facility] with medical h/o of HTN, Dementia, CVA with R sided hemiparesis, aphasia, parkinson's, GERD, CKD bedbound at baseline, dependant on assistance for ADL comes in with respiratory failure. Patient was found to have respiratory distress, saturating in 70s when EMS arrived, was placed on NRB on field. She was emergently intubated on arrival in ED. As per NH records patient was having fever and cough for past few days. Today she was found to have respiratory distress. She is vaccinated with moderna. Spoke to family son,  who stated the patient to remain full code. No other history could be obtained.  (2021 00:14)      Patient is a 89y old  Female who presents with a chief complaint of AHRF (2021 13:11)      INTERVAL HPI/OVERNIGHT EVENTS:  T(C): 37.9 (21 @ 14:40), Max: 38.5 (21 @ 11:10)  HR: 103 (21 @ 14:40) (91 - 103)  BP: 148/60 (21 @ 14:40) (143/58 - 153/65)  RR: 26 (21 @ 14:40) (19 - 31)  SpO2: 100% (21 @ 14:40) (98% - 100%)  Wt(kg): --  I&O's Summary    2021 07:01  -  2021 07:00  --------------------------------------------------------  IN: 0 mL / OUT: 0 mL / NET: 0 mL        REVIEW OF SYSTEMS: denies fever, chills, SOB, palpitations, chest pain, abdominal pain, nausea, vomitting, diarrhea, constipation, dizziness    MEDICATIONS  (STANDING):  chlorhexidine 0.12% Liquid 15 milliLiter(s) Oral Mucosa every 12 hours  enoxaparin Injectable 40 milliGRAM(s) SubCutaneous daily  pantoprazole   Suspension 40 milliGRAM(s) Oral daily    MEDICATIONS  (PRN):  acetaminophen    Suspension .. 650 milliGRAM(s) Oral every 6 hours PRN Temp greater or equal to 38C (100.4F), Mild Pain (1 - 3), Moderate Pain (4 - 6)  sodium chloride 0.9% lock flush 10 milliLiter(s) IV Push every 1 hour PRN Pre/post blood products, medications, blood draw, and to maintain line patency      PHYSICAL EXAM:  GENERAL: NAD, well-groomed, well-developed  HEAD:  Atraumatic, Normocephalic  EYES: EOMI, PERRLA, conjunctiva and sclera clear  ENMT: No tonsillar erythema, exudates, or enlargement; Moist mucous membranes, Good dentition, No lesions  NECK: Supple, No JVD, Normal thyroid  NERVOUS SYSTEM:  Alert & Oriented X3, Good concentration; Motor Strength 5/5 B/L upper and lower extremities; DTRs 2+ intact and symmetric  CHEST/LUNG: Clear to percussion bilaterally; No rales, rhonchi, wheezing, or rubs  HEART: Regular rate and rhythm; No murmurs, rubs, or gallops  ABDOMEN: Soft, Nontender, Nondistended; Bowel sounds present  EXTREMITIES:  2+ Peripheral Pulses, No clubbing, cyanosis, or edema  LYMPH: No lymphadenopathy noted  SKIN: No rashes or lesions  LABS:                        8.9    12.17 )-----------( 689      ( 2021 08:14 )             28.9         137  |  107  |  24<H>  ----------------------------<  143<H>  4.7   |  28  |  0.69    Ca    8.3<L>      2021 08:14  Phos  2.7       Mg     2.6         TPro  6.4  /  Alb  1.6<L>  /  TBili  0.3  /  DBili  x   /  AST  37  /  ALT  37  /  AlkPhos  121<H>        Urinalysis Basic - ( 2021 14:22 )    Color: Yellow / Appearance: Slightly Turbid / S.020 / pH: x  Gluc: x / Ketone: Trace  / Bili: Negative / Urobili: Negative   Blood: x / Protein: 100 / Nitrite: Negative   Leuk Esterase: Trace / RBC: 5-10 /HPF / WBC 11-25 /HPF   Sq Epi: x / Non Sq Epi: Many /HPF / Bacteria: Many /HPF      CAPILLARY BLOOD GLUCOSE      POCT Blood Glucose.: 190 mg/dL (2021 11:31)  POCT Blood Glucose.: 153 mg/dL (2021 05:22)  POCT Blood Glucose.: 151 mg/dL (2021 23:43)  POCT Blood Glucose.: 143 mg/dL (2021 17:17)        Urinalysis Basic - ( 2021 14:22 )    Color: Yellow / Appearance: Slightly Turbid / S.020 / pH: x  Gluc: x / Ketone: Trace  / Bili: Negative / Urobili: Negative   Blood: x / Protein: 100 / Nitrite: Negative   Leuk Esterase: Trace / RBC: 5-10 /HPF / WBC 11-25 /HPF   Sq Epi: x / Non Sq Epi: Many /HPF / Bacteria: Many /HPF

## 2021-11-17 NOTE — PROGRESS NOTE ADULT - ASSESSMENT
89 Y F with left sided chest tube placement s/p trach and PEG   -continue chest tube to water seal  -obtain chest x-ray this morning to examine reexpansion of lung

## 2021-11-17 NOTE — PROGRESS NOTE ADULT - PROBLEM SELECTOR PLAN 3
Completed 14 days of Cefepime yesterday.  New fevers. Blood cultures sent. F/U results.  Vanco started by ID

## 2021-11-17 NOTE — PROGRESS NOTE ADULT - SUBJECTIVE AND OBJECTIVE BOX
89 Y F s/p left sided chest tube placement, tracheostomy and PEG tube placement. Primary team requesting removal of chest tube as output has been minimal. Chest tube was clamped yesterday and repeat x-ray was performed, x-ray showed worsening pneumo, chest tube was unclamped and put back to water seal.     Vital Signs Last 24 Hrs  T(C): 37.6 (17 Nov 2021 05:00), Max: 38.2 (16 Nov 2021 11:40)  T(F): 99.6 (17 Nov 2021 05:00), Max: 100.7 (16 Nov 2021 11:40)  HR: 91 (17 Nov 2021 05:00) (91 - 110)  BP: 143/58 (17 Nov 2021 05:00) (136/65 - 151/65)  BP(mean): --  RR: 19 (17 Nov 2021 05:00) (19 - 30)  SpO2: 100% (17 Nov 2021 05:00) (97% - 100%)    No acute distress, AAOX1   non labored breathing, trached, on minimal vent settings, left chest tube output minimal since being unclamped   abd is soft, non tender, non distended, PEG tube at 2 1/2 cm at skin                          7.9    9.82  )-----------( 591      ( 16 Nov 2021 07:11 )             25.9   11-16    141  |  109<H>  |  26<H>  ----------------------------<  145<H>  4.2   |  25  |  0.59    Ca    8.3<L>      16 Nov 2021 07:11  Phos  2.4     11-16  Mg     2.6     11-16    TPro  5.7<L>  /  Alb  1.5<L>  /  TBili  0.3  /  DBili  x   /  AST  34  /  ALT  35  /  AlkPhos  104  11-16

## 2021-11-17 NOTE — PROGRESS NOTE ADULT - SUBJECTIVE AND OBJECTIVE BOX
AKASH CHACKO    SCU progress note    INTERVAL HPI/OVERNIGHT EVENTS: ***Remains febrile. Tmax 101.3. Blood cultures sent. Vanco started as per ID.    DNR [ ]   DNI  [  ] FULL CODE    Covid - 19 PCR: negative 11/10    The 4Ms    What Matters Most: see GOC  Age appropriate Medications/Screen for High Risk Medication: Yes  Mentation: see CAM below  Mobility: defer to physical exam    The Confusion Assessment Method (CAM) Diagnostic Algorithm     1: Acute Onset or Fluctuating Course  - Is there evidence of an acute change in mental status from the patient’s baseline? Did the (abnormal) behavior  fluctuate during the day, that is, tend to come and go, or increase and decrease in severity?  [ ] YES [x ] NO     2: Inattention  - Did the patient have difficulty focusing attention, being easily distractible, or having difficulty keeping track of what was being said?  [ ] YES [x ] NO     3: Disorganized thinking  -Was the patient’s thinking disorganized or incoherent, such as rambling or irrelevant conversation, unclear or illogical flow of ideas, or unpredictable switching from subject to subject?  [ ] YES [x ] NO    4: Altered Level of consciousness?  [ ] YES [x ] NO    The diagnosis of delirium by CAM requires the presence of features 1 and 2 and either 3 or 4.    PRESSORS: [ ] YES [x ] NO    Cardiovascular:  Heart Failure  Acute   Acute on Chronic  Chronic         Pulmonary:    Hematalogic:  enoxaparin Injectable 40 milliGRAM(s) SubCutaneous daily    Other:  acetaminophen    Suspension .. 650 milliGRAM(s) Oral every 6 hours PRN  chlorhexidine 0.12% Liquid 15 milliLiter(s) Oral Mucosa every 12 hours  pantoprazole   Suspension 40 milliGRAM(s) Oral daily  sodium chloride 0.9% lock flush 10 milliLiter(s) IV Push every 1 hour PRN    acetaminophen    Suspension .. 650 milliGRAM(s) Oral every 6 hours PRN  chlorhexidine 0.12% Liquid 15 milliLiter(s) Oral Mucosa every 12 hours  enoxaparin Injectable 40 milliGRAM(s) SubCutaneous daily  pantoprazole   Suspension 40 milliGRAM(s) Oral daily  sodium chloride 0.9% lock flush 10 milliLiter(s) IV Push every 1 hour PRN    Drug Dosing Weight  Height (cm): 167.6 (2021 16:02)  Weight (kg): 40.9 (2021 02:20)  BMI (kg/m2): 14.6 (2021 16:02)  BSA (m2): 1.43 (2021 16:02)    CENTRAL LINE: [ ] YES [x ] NO  LOCATION:   DATE INSERTED:  REMOVE: [ ] YES [ ] NO  EXPLAIN:    SAEED: [ ] YES [x ] NO    DATE INSERTED:  REMOVE:  [ ] YES [ ] NO  EXPLAIN:    PAST MEDICAL & SURGICAL HISTORY:  HTN (hypertension)    Dementia                 @ 07:01  -   @ 07:00  --------------------------------------------------------  IN: 0 mL / OUT: 0 mL / NET: 0 mL        Mode: AC/ CMV (Assist Control/ Continuous Mandatory Ventilation)  RR (machine): 16  TV (machine): 350  FiO2: 40  PEEP: 5  ITime: 1  MAP: 12  PIP: 25      PHYSICAL EXAM:    GENERAL: NAD. Severe temporal waisting  HEAD:  Atraumatic, Normocephalic  EYES: EOMI, PERRLA, conjunctiva and sclera clear  ENMT: No tonsillar erythema, exudates  NECK: Supple, No JVD, tracheostomy intact  NERVOUS SYSTEM:  Awake. Nonverbal at baseline. Does not follow commands  CHEST/LUNG: Diminished breath sounds bilaterally. Left chest tube to waterseal.  HEART: Regular rate and rhythm; No murmurs, rubs, or gallops  ABDOMEN: Soft, Nontender, Nondistended; Bowel sounds present; Peg intact  EXTREMITIES:  +Muscle waisting, 2+ Peripheral Pulses, No clubbing, cyanosis, or edema  LYMPH: No lymphadenopathy noted  SKIN: Skin tear left shoulder.      LABS:  CBC Full  -  ( 2021 08:14 )  WBC Count : 12.17 K/uL  RBC Count : 3.05 M/uL  Hemoglobin : 8.9 g/dL  Hematocrit : 28.9 %  Platelet Count - Automated : 689 K/uL  Mean Cell Volume : 94.8 fl  Mean Cell Hemoglobin : 29.2 pg  Mean Cell Hemoglobin Concentration : 30.8 gm/dL  Auto Neutrophil # : x  Auto Lymphocyte # : x  Auto Monocyte # : x  Auto Eosinophil # : x  Auto Basophil # : x  Auto Neutrophil % : x  Auto Lymphocyte % : x  Auto Monocyte % : x  Auto Eosinophil % : x  Auto Basophil % : x        137  |  107  |  24<H>  ----------------------------<  143<H>  4.7   |  28  |  0.69    Ca    8.3<L>      2021 08:14  Phos  2.7       Mg     2.6         TPro  6.4  /  Alb  1.6<L>  /  TBili  0.3  /  DBili  x   /  AST  37  /  ALT  37  /  AlkPhos  121<H>        Urinalysis Basic - ( 2021 14:22 )    Color: Yellow / Appearance: Slightly Turbid / S.020 / pH: x  Gluc: x / Ketone: Trace  / Bili: Negative / Urobili: Negative   Blood: x / Protein: 100 / Nitrite: Negative   Leuk Esterase: Trace / RBC: 5-10 /HPF / WBC 11-25 /HPF   Sq Epi: x / Non Sq Epi: Many /HPF / Bacteria: Many /HPF            [  ]  DVT Prophylaxis  [  ]  Nutrition, Brand, Rate         Goal Rate        Abnormal Nutritional Status -  Malnutrition   Cachexia          RADIOLOGY & ADDITIONAL STUDIES:  ***  < from: Xray Chest 1 View- PORTABLE-Routine (Xray Chest 1 View- PORTABLE-Routine .) (21 @ 09:58) >  LEFT chest tube tip overlies LEFT apex. Recurrent LEFT lung of 40% pneumothorax with minimal right-sided shift.  Bilateral diffuse airspace disease.    The  heart is mildly enlarged in transversediameter. No hilar mass.    Visualized osseous structures are intact.    IMPRESSION:   LEFT chest tube in place. Recurrent LEFT lung 20% pneumothorax.  Diffuse airspace disease.    < end of copied text >    Goals of Care Discussion with Family/Proxy/Other   - see note from

## 2021-11-17 NOTE — PROGRESS NOTE ADULT - ASSESSMENT
seen and examined  vsstable febrile physical done  awake with trach vent  not in nay distress    labs noted  hgb 8.9  wbc 12.17  a/p cont icu monitoring trial for trach collor  ( tapering o2)  dementia, cva,  poor prognosis  ua pos  urine cxs seen and examined  vsstable had fever earlier 101.3   cultures sent results pending   physical done  awake with trach vent  not in nay distress    labs noted  hgb 8.9  wbc 12.17  a/p cont icu monitoring trial for trach collor  ( tapering o2)  dementia, cva,  poor prognosis  ua pos  urine cxs Repeat

## 2021-11-17 NOTE — PROGRESS NOTE ADULT - NUTRITIONAL ASSESSMENT
71 This patient has been assessed with a concern for Malnutrition and has been determined to have a diagnosis/diagnoses of Severe protein-calorie malnutrition and Underweight (BMI < 19).  +Severe temporal waisting  +Muscle waisting  Protein 6.4    Albumin 1.6    This patient is being managed with:   Diet NPO-  Tube Feeding Modality: Nasogastric  Jevity 1.5 Bg  Total Volume for 24 Hours (mL): 720  Continuous  Starting Tube Feed Rate {mL per Hour}: 20  Increase Tube Feed Rate by (mL): 10     Every 8 hours  Until Goal Tube Feed Rate (mL per Hour): 30  Tube Feed Duration (in Hours): 24  Tube Feed Start Time: 17:00  Entered: Nov 12 2021 11:10AM

## 2021-11-17 NOTE — PROGRESS NOTE ADULT - PROBLEM SELECTOR PLAN 1
Secondary to bacteremia  BC from 11/1 grew E coli. Completed Cefepime yesterday  Febrile. Blood cultures sent 11/17  Vanco started. ID Dr Wong following.

## 2021-11-17 NOTE — CHART NOTE - NSCHARTNOTEFT_GEN_A_CORE
Koko updated on patient's status.  Informed CXR worsening and patient febrile.  All questions answered.

## 2021-11-18 LAB
ALBUMIN SERPL ELPH-MCNC: 1.4 G/DL — LOW (ref 3.5–5)
ALP SERPL-CCNC: 98 U/L — SIGNIFICANT CHANGE UP (ref 40–120)
ALT FLD-CCNC: 34 U/L DA — SIGNIFICANT CHANGE UP (ref 10–60)
ANION GAP SERPL CALC-SCNC: 8 MMOL/L — SIGNIFICANT CHANGE UP (ref 5–17)
AST SERPL-CCNC: 31 U/L — SIGNIFICANT CHANGE UP (ref 10–40)
BILIRUB SERPL-MCNC: 0.3 MG/DL — SIGNIFICANT CHANGE UP (ref 0.2–1.2)
BUN SERPL-MCNC: 22 MG/DL — HIGH (ref 7–18)
CALCIUM SERPL-MCNC: 7.9 MG/DL — LOW (ref 8.4–10.5)
CHLORIDE SERPL-SCNC: 104 MMOL/L — SIGNIFICANT CHANGE UP (ref 96–108)
CO2 SERPL-SCNC: 25 MMOL/L — SIGNIFICANT CHANGE UP (ref 22–31)
CREAT SERPL-MCNC: 0.58 MG/DL — SIGNIFICANT CHANGE UP (ref 0.5–1.3)
GLUCOSE SERPL-MCNC: 140 MG/DL — HIGH (ref 70–99)
HCT VFR BLD CALC: 24.7 % — LOW (ref 34.5–45)
HGB BLD-MCNC: 7.7 G/DL — LOW (ref 11.5–15.5)
MAGNESIUM SERPL-MCNC: 2.3 MG/DL — SIGNIFICANT CHANGE UP (ref 1.6–2.6)
MCHC RBC-ENTMCNC: 29.1 PG — SIGNIFICANT CHANGE UP (ref 27–34)
MCHC RBC-ENTMCNC: 31.2 GM/DL — LOW (ref 32–36)
MCV RBC AUTO: 93.2 FL — SIGNIFICANT CHANGE UP (ref 80–100)
NRBC # BLD: 0 /100 WBCS — SIGNIFICANT CHANGE UP (ref 0–0)
PHOSPHATE SERPL-MCNC: 2.8 MG/DL — SIGNIFICANT CHANGE UP (ref 2.5–4.5)
PLATELET # BLD AUTO: 653 K/UL — HIGH (ref 150–400)
POTASSIUM SERPL-MCNC: 4.3 MMOL/L — SIGNIFICANT CHANGE UP (ref 3.5–5.3)
POTASSIUM SERPL-SCNC: 4.3 MMOL/L — SIGNIFICANT CHANGE UP (ref 3.5–5.3)
PROT SERPL-MCNC: 5.9 G/DL — LOW (ref 6–8.3)
RBC # BLD: 2.65 M/UL — LOW (ref 3.8–5.2)
RBC # FLD: 15 % — HIGH (ref 10.3–14.5)
SODIUM SERPL-SCNC: 137 MMOL/L — SIGNIFICANT CHANGE UP (ref 135–145)
WBC # BLD: 10.03 K/UL — SIGNIFICANT CHANGE UP (ref 3.8–10.5)
WBC # FLD AUTO: 10.03 K/UL — SIGNIFICANT CHANGE UP (ref 3.8–10.5)

## 2021-11-18 PROCEDURE — 71045 X-RAY EXAM CHEST 1 VIEW: CPT | Mod: 26

## 2021-11-18 RX ORDER — MORPHINE SULFATE 50 MG/1
1 CAPSULE, EXTENDED RELEASE ORAL EVERY 4 HOURS
Refills: 0 | Status: DISCONTINUED | OUTPATIENT
Start: 2021-11-18 | End: 2021-11-25

## 2021-11-18 RX ADMIN — ENOXAPARIN SODIUM 40 MILLIGRAM(S): 100 INJECTION SUBCUTANEOUS at 12:14

## 2021-11-18 RX ADMIN — Medication 250 MILLIGRAM(S): at 12:14

## 2021-11-18 RX ADMIN — PANTOPRAZOLE SODIUM 40 MILLIGRAM(S): 20 TABLET, DELAYED RELEASE ORAL at 12:14

## 2021-11-18 RX ADMIN — MORPHINE SULFATE 1 MILLIGRAM(S): 50 CAPSULE, EXTENDED RELEASE ORAL at 15:50

## 2021-11-18 RX ADMIN — MORPHINE SULFATE 1 MILLIGRAM(S): 50 CAPSULE, EXTENDED RELEASE ORAL at 12:14

## 2021-11-18 RX ADMIN — Medication 650 MILLIGRAM(S): at 15:49

## 2021-11-18 RX ADMIN — CHLORHEXIDINE GLUCONATE 15 MILLILITER(S): 213 SOLUTION TOPICAL at 05:22

## 2021-11-18 RX ADMIN — Medication 650 MILLIGRAM(S): at 12:23

## 2021-11-18 RX ADMIN — CHLORHEXIDINE GLUCONATE 15 MILLILITER(S): 213 SOLUTION TOPICAL at 18:03

## 2021-11-18 NOTE — PROGRESS NOTE ADULT - SUBJECTIVE AND OBJECTIVE BOX
89 Y F s/p left sided chest tube placement, tracheostomy and PEG tube placement. Primary team requesting removal of chest tube as output has been minimal. Chest tube was clamped yesterday and repeat x-ray two days prior showed, worsening pneumo, chest tube was unclamped and put back to water seal, has remained on water-seal draining minimal serous output.       Vital Signs Last 24 Hrs  T(C): 36.1 (18 Nov 2021 04:54), Max: 38.5 (17 Nov 2021 11:10)  T(F): 97 (18 Nov 2021 04:54), Max: 101.3 (17 Nov 2021 11:10)  HR: 95 (18 Nov 2021 04:54) (95 - 103)  BP: 151/61 (18 Nov 2021 04:54) (134/86 - 153/65)  BP(mean): --  RR: 19 (18 Nov 2021 04:54) (19 - 31)  SpO2: 100% (18 Nov 2021 04:54) (98% - 100%)    No acute distress, AAOX1   non labored breathing, trached, on minimal vent settings, left chest tube output minimal since being unclamped   abd is soft, non tender, non distended, PEG tube at 2 1/2 cm at skin                          7.7    10.03 )-----------( 653      ( 18 Nov 2021 06:39 )             24.7   11-18    137  |  104  |  22<H>  ----------------------------<  140<H>  4.3   |  25  |  0.58    Ca    7.9<L>      18 Nov 2021 06:39  Phos  2.8     11-18  Mg     2.3     11-18    TPro  5.9<L>  /  Alb  1.4<L>  /  TBili  0.3  /  DBili  x   /  AST  31  /  ALT  34  /  AlkPhos  98  11-18

## 2021-11-18 NOTE — PROGRESS NOTE ADULT - SUBJECTIVE AND OBJECTIVE BOX
89y Female is under our care for     REVIEW OF SYSTEMS:  [  ] Not able to elicit  General:	  Chest:	  GI:	  :  Skin:	  Musculoskeletal:	  Neuro:	    MEDS:  vancomycin  IVPB 1000 milliGRAM(s) IV Intermittent daily    ALLERGIES: Allergies    No Known Allergies    Intolerances        VITALS:  Vital Signs Last 24 Hrs  T(C): 36.1 (18 Nov 2021 04:54), Max: 37.9 (17 Nov 2021 14:40)  T(F): 97 (18 Nov 2021 04:54), Max: 100.3 (17 Nov 2021 14:40)  HR: 94 (18 Nov 2021 08:43) (94 - 103)  BP: 151/61 (18 Nov 2021 04:54) (134/86 - 153/65)  BP(mean): --  RR: 19 (18 Nov 2021 04:54) (19 - 31)  SpO2: 100% (18 Nov 2021 08:43) (99% - 100%)      PHYSICAL EXAM:  HEENT:  Neck:  Respiratory:  Cardiovascular:  Gastrointestinal:  Extremities:  Skin:  Ortho:  Neuro:    LABS/DIAGNOSTIC TESTS:                        7.7    10.03 )-----------( 653      ( 18 Nov 2021 06:39 )             24.7     WBC Count: 10.03 K/uL (11-18 @ 06:39)  WBC Count: 12.17 K/uL (11-17 @ 08:14)  WBC Count: 9.82 K/uL (11-16 @ 07:11)  WBC Count: 12.28 K/uL (11-15 @ 07:21)  WBC Count: 13.13 K/uL (11-14 @ 07:09)    11-18    137  |  104  |  22<H>  ----------------------------<  140<H>  4.3   |  25  |  0.58    Ca    7.9<L>      18 Nov 2021 06:39  Phos  2.8     11-18  Mg     2.3     11-18    TPro  5.9<L>  /  Alb  1.4<L>  /  TBili  0.3  /  DBili  x   /  AST  31  /  ALT  34  /  AlkPhos  98  11-18      CULTURES:   Catheterized Catheterized  11-16 @ 18:43   No growth  --  --      .Blood Blood  11-03 @ 10:22   No Growth Final  --  --      Clean Catch Clean Catch (Midstream)  11-02 @ 21:01   No growth  --  --      .Sputum Sputum  11-01 @ 21:18   Moderate Streptococcus agalactiae (Group B) isolated  Group B streptococci are susceptible to ampicillin,  penicillin and cefazolin, but may be resistant to  erythromycin and clindamycin.  Recommendations for intrapartum prophylaxis for Group B  streptococci are penicillin or ampicillin.  Normal Respiratory [place] present  --    Rare polymorphonuclear leukocytes per low power field  No Squamous epithelial cells per low power field  Rare Yeast like cells seen per oil power field  Rare Gram Positive Rods seen per oil power field  Rare Gram positive cocci in pairs seen per oil power field      .Blood Blood-Peripheral  11-01 @ 03:56   No Growth Final  --  Blood Culture PCR  Escherichia coli        RADIOLOGY:  no new studies 89y Female is under our care for  E coli bacteremia, pneumonia and fever.  Patient was seen laying in bed with no acute distress.  She remains afebrile overnight, urine culture is negative, awaiting results of blood culture.    REVIEW OF SYSTEMS:  [ x ] Not able to elicit      MEDS:  vancomycin  IVPB 1000 milliGRAM(s) IV Intermittent daily    ALLERGIES: Allergies    No Known Allergies    Intolerances        VITALS:  Vital Signs Last 24 Hrs  T(C): 36.1 (18 Nov 2021 04:54), Max: 37.9 (17 Nov 2021 14:40)  T(F): 97 (18 Nov 2021 04:54), Max: 100.3 (17 Nov 2021 14:40)  HR: 94 (18 Nov 2021 08:43) (94 - 103)  BP: 151/61 (18 Nov 2021 04:54) (134/86 - 153/65)  BP(mean): --  RR: 19 (18 Nov 2021 04:54) (19 - 31)  SpO2: 100% (18 Nov 2021 08:43) (99% - 100%)      PHYSICAL EXAM:  HEENT: n/a  Neck: supple, trach +  Respiratory: diminished breath sounds on left, left sided pigtail +  Cardiovascular: S1 S2 reg no murmurs  Gastrointestinal: +BS with soft, nondistended abdomen; nontender, peg tube in place  Extremities: bilateral hand edema +2  Skin: no rashes  Ortho: n/a  Neuro: Awake and alert      LABS/DIAGNOSTIC TESTS:                        7.7    10.03 )-----------( 653      ( 18 Nov 2021 06:39 )             24.7     WBC Count: 10.03 K/uL (11-18 @ 06:39)  WBC Count: 12.17 K/uL (11-17 @ 08:14)  WBC Count: 9.82 K/uL (11-16 @ 07:11)  WBC Count: 12.28 K/uL (11-15 @ 07:21)  WBC Count: 13.13 K/uL (11-14 @ 07:09)    11-18    137  |  104  |  22<H>  ----------------------------<  140<H>  4.3   |  25  |  0.58    Ca    7.9<L>      18 Nov 2021 06:39  Phos  2.8     11-18  Mg     2.3     11-18    TPro  5.9<L>  /  Alb  1.4<L>  /  TBili  0.3  /  DBili  x   /  AST  31  /  ALT  34  /  AlkPhos  98  11-18      CULTURES:   Catheterized Catheterized  11-16 @ 18:43   No growth  --  --      .Blood Blood  11-03 @ 10:22   No Growth Final  --  --      Clean Catch Clean Catch (Midstream)  11-02 @ 21:01   No growth  --  --      .Sputum Sputum  11-01 @ 21:18   Moderate Streptococcus agalactiae (Group B) isolated  Group B streptococci are susceptible to ampicillin,  penicillin and cefazolin, but may be resistant to  erythromycin and clindamycin.  Recommendations for intrapartum prophylaxis for Group B  streptococci are penicillin or ampicillin.  Normal Respiratory [place] present  --    Rare polymorphonuclear leukocytes per low power field  No Squamous epithelial cells per low power field  Rare Yeast like cells seen per oil power field  Rare Gram Positive Rods seen per oil power field  Rare Gram positive cocci in pairs seen per oil power field      .Blood Blood-Peripheral  11-01 @ 03:56   No Growth Final  --  Blood Culture PCR  Escherichia coli        RADIOLOGY:  no new studies

## 2021-11-18 NOTE — PROGRESS NOTE ADULT - NUTRITIONAL ASSESSMENT
This patient has been assessed with a concern for Malnutrition and has been determined to have a diagnosis/diagnoses of Severe protein-calorie malnutrition and Underweight (BMI < 19).    +Severe temporal waisting  +Muscle waisting  Protein 5.9   Albumin 1.4    This patient is being managed with:   Diet NPO-  Tube Feeding Modality: Nasogastric  Jevity 1.5 Bg  Total Volume for 24 Hours (mL): 720  Continuous  Starting Tube Feed Rate {mL per Hour}: 20  Increase Tube Feed Rate by (mL): 10     Every 8 hours  Until Goal Tube Feed Rate (mL per Hour): 30  Tube Feed Duration (in Hours): 24  Tube Feed Start Time: 17:00  Entered: Nov 12 2021 11:10AM

## 2021-11-18 NOTE — PROGRESS NOTE ADULT - ASSESSMENT
89 y/o F from [facility] w/ Hx of HTN, Dementia, CVA w/ R sided hemiparesis, aphasia, Parkinson's Disease, GERD, CKD. She is bedbound at baseline and needs assistance for ADLs. She was brought to the E.D. due to episodes of desaturation at 70s and respiratory distress. She was also having cough and fever in the nursing home. She was initially on NRB placed by EMS but was eventually intubated in the E.D. She is vaccinated with Moderna. COVID test was negative on admission. Chest Xray showed left lower lobe infiltrate (pneumonia). Blood cultures were sent. She was given 1 dose of Vancomycin and Zosyn in the E.D. and was subsequently admitted to ICU.    In the ICU, she was placed on mechanical ventilator. Her blood pressure was low despite receiving 2L bolus of LR. NGT and Left IJ central line was placed. Levophed was started. CXR revealed Left pneumothorax. Thoracic Surgery was consulted and left chest tube was placed. Patient was also tachycardic and troponin was mildly elevated. EKG showed SR w/ ST changes in the inferior leads. Troponin was monitored and has since trended down. Heart rate has been stable. Lactate was also elevated at 11.2 and has also since trended down. Patient also presented with hypernatremia and elevated creatinine probably secondary to volume deficit. She was also given free water. She was on Heparin for DVT prophylaxis and Protonix for GI prophylaxis. Palliative was consulted regarding the [facility]. Blood culture results showed E. coli bacteremia. She was started on Rocephin IV for 10 days. Azithromycin was continued awaiting Legionella results. Infectious Disease was consulted. Urine culture was ordered. Patient spiked a fever and is tachycardic. Blood cultures were repeated. She was tachycardic however his EKG showed SR w/ new PACs. There was mild air leak in the chest tube drainage but it was fixed by Cardiothoracic Surgery. Urine culture and repeat blood culture was negative. She had 2 febrile episodes overnight at 100.4-100.5F. She was tachypneic at rate of 30. Her repeat Chest Xray showed worsening of bilateral infiltrates. Her albumin is still low at 1.0. Her urine output is net negative. She was given Lasix and Albumin to maintain net negative urine output and decrease congestion. Vancomycin was discontinued and Cefepime decreased from 2g to 1g to complete 14 days since negative culture (11/16/21). Echocardiogram was ordered. Central line was removed. Lactulose was ordered for bowel movement. On November 11, 2021, patient had her tracheostomy and PEG inserted. Patient son Jered was called and informed .

## 2021-11-18 NOTE — PROGRESS NOTE ADULT - ASSESSMENT
UTI  Bacteremia - E coli  Pneumonia  Septic Shock  Fevers   Leukocytosis - mild    Plan:  ·	Continue Vancomycin 1g iv qd for now ( empirically)  ·	Awaiting blood culture results  ·	poor prognosis, patient is full code                                 UTI - completed treatment  Bacteremia - E coli - cleared  Pneumonia - adequately treated  Septic Shock - resolved  Fevers - improving  Leukocytosis - mild    Plan:  ·	Continue Vancomycin 1g iv qd for now ( empirically)  ·	Awaiting blood culture results  ·	poor prognosis, patient is full code    I agree with above

## 2021-11-18 NOTE — PROGRESS NOTE ADULT - SUBJECTIVE AND OBJECTIVE BOX
AKASH CHACKO    SCU progress note    INTERVAL HPI/OVERNIGHT EVENTS: ***Tmax 101.3 yesterday. Left chest tube intact with small amount of drainage.    DNR [ ]   DNI  [  ]  FULL CODE    Covid - 19 PCR: Negative 11/10    The 4Ms    What Matters Most: see [facility]  Age appropriate Medications/Screen for High Risk Medication: Yes  Mentation: see CAM below  Mobility: defer to physical exam    The Confusion Assessment Method (CAM) Diagnostic Algorithm     1: Acute Onset or Fluctuating Course  - Is there evidence of an acute change in mental status from the patient’s baseline? Did the (abnormal) behavior  fluctuate during the day, that is, tend to come and go, or increase and decrease in severity?  [ ] YES [ ] NO     2: Inattention  - Did the patient have difficulty focusing attention, being easily distractible, or having difficulty keeping track of what was being said?  [ ] YES [ ] NO  Unable to access     3: Disorganized thinking  -Was the patient’s thinking disorganized or incoherent, such as rambling or irrelevant conversation, unclear or illogical flow of ideas, or unpredictable switching from subject to subject?  [ ] YES [ ] NO   Unable to access    4: Altered Level of consciousness?  [ ] YES [ ] NO    The diagnosis of delirium by CAM requires the presence of features 1 and 2 and either 3 or 4.    PRESSORS: [ ] YES [x ] NO  vancomycin  IVPB 1000 milliGRAM(s) IV Intermittent daily    Cardiovascular:  Heart Failure  Acute   Acute on Chronic  Chronic         Pulmonary:    Hematalogic:  enoxaparin Injectable 40 milliGRAM(s) SubCutaneous daily    Other:  acetaminophen    Suspension .. 650 milliGRAM(s) Oral every 6 hours PRN  chlorhexidine 0.12% Liquid 15 milliLiter(s) Oral Mucosa every 12 hours  pantoprazole   Suspension 40 milliGRAM(s) Oral daily  sodium chloride 0.9% lock flush 10 milliLiter(s) IV Push every 1 hour PRN    acetaminophen    Suspension .. 650 milliGRAM(s) Oral every 6 hours PRN  chlorhexidine 0.12% Liquid 15 milliLiter(s) Oral Mucosa every 12 hours  enoxaparin Injectable 40 milliGRAM(s) SubCutaneous daily  pantoprazole   Suspension 40 milliGRAM(s) Oral daily  sodium chloride 0.9% lock flush 10 milliLiter(s) IV Push every 1 hour PRN  vancomycin  IVPB 1000 milliGRAM(s) IV Intermittent daily    Drug Dosing Weight  Height (cm): 167.6 (2021 16:02)  Weight (kg): 40.9 (2021 02:20)  BMI (kg/m2): 14.6 (2021 16:02)  BSA (m2): 1.43 (2021 16:02)    CENTRAL LINE: [ ] YES [x ] NO  LOCATION:   DATE INSERTED:  REMOVE: [ ] YES [ ] NO  EXPLAIN:    SAEED: [ ] YES [ x] NO    DATE INSERTED:  REMOVE:  [ ] YES [ ] NO  EXPLAIN:    PAST MEDICAL & SURGICAL HISTORY:  HTN (hypertension)    Dementia                  Mode: AC/ CMV (Assist Control/ Continuous Mandatory Ventilation)  RR (machine): 16  TV (machine): 350  FiO2: 40  PEEP: 5  ITime: 1  MAP: 5  PIP: 20      PHYSICAL EXAM:    GENERAL: NAD, cachectic with severe temporal waisting. +Muscle waisting all extremities.  HEAD:  Atraumatic, Normocephalic  EYES: PERRLA, conjunctiva and sclera clear  ENMT: No tonsillar erythema, exudates  NECK: Supple, No JVD, tracheostomy intact  NERVOUS SYSTEM:  Awake. Nonverbal at baseline. Does not follow commands. Limited movement of extremities.  CHEST/LUNG: Diminished breath sounds bilaterally L>R. Scattered rhonchi. Left chest tube to water seal  HEART: Regular rate and rhythm; No murmurs, rubs, or gallops  ABDOMEN: Soft, Nontender, Nondistended; Bowel sounds present; Peg intact  EXTREMITIES: +Muscle waisting.  2+ Peripheral Pulses, No clubbing, cyanosis, or edema  LYMPH: No lymphadenopathy noted  SKIN: Skin tear left shoulder      LABS:  CBC Full  -  ( 2021 06:39 )  WBC Count : 10.03 K/uL  RBC Count : 2.65 M/uL  Hemoglobin : 7.7 g/dL  Hematocrit : 24.7 %  Platelet Count - Automated : 653 K/uL  Mean Cell Volume : 93.2 fl  Mean Cell Hemoglobin : 29.1 pg  Mean Cell Hemoglobin Concentration : 31.2 gm/dL  Auto Neutrophil # : x  Auto Lymphocyte # : x  Auto Monocyte # : x  Auto Eosinophil # : x  Auto Basophil # : x  Auto Neutrophil % : x  Auto Lymphocyte % : x  Auto Monocyte % : x  Auto Eosinophil % : x  Auto Basophil % : x    11-18    137  |  104  |  22<H>  ----------------------------<  140<H>  4.3   |  25  |  0.58    Ca    7.9<L>      2021 06:39  Phos  2.8       Mg     2.3         TPro  5.9<L>  /  Alb  1.4<L>  /  TBili  0.3  /  DBili  x   /  AST  31  /  ALT  34  /  AlkPhos  98        Urinalysis Basic - ( 2021 14:22 )    Color: Yellow / Appearance: Slightly Turbid / S.020 / pH: x  Gluc: x / Ketone: Trace  / Bili: Negative / Urobili: Negative   Blood: x / Protein: 100 / Nitrite: Negative   Leuk Esterase: Trace / RBC: 5-10 /HPF / WBC 11-25 /HPF   Sq Epi: x / Non Sq Epi: Many /HPF / Bacteria: Many /HPF            [  ]  DVT Prophylaxis  [  ]  Nutrition, Brand, Rate         Goal Rate        Abnormal Nutritional Status -  Malnutrition   Cachexia         RADIOLOGY & ADDITIONAL STUDIES:  ***  < from: Xray Chest 1 View- PORTABLE-Routine (Xray Chest 1 View- PORTABLE-Routine .) (21 @ 09:58) >  LEFT chest tube tip overlies LEFT apex. Recurrent LEFT lung of 40% pneumothorax with minimal right-sided shift.  Bilateral diffuse airspace disease.    The  heart is mildly enlarged in transversediameter. No hilar mass.    Visualized osseous structures are intact.    IMPRESSION:   LEFT chest tube in place. Recurrent LEFT lung 20% pneumothorax.  Diffuse airspace disease.    < end of copied text >    Goals of Care Discussion with Family/Proxy/Other   - see note from

## 2021-11-18 NOTE — PROGRESS NOTE ADULT - SUBJECTIVE AND OBJECTIVE BOX
HPI:  89 yo female from [facility] with medical h/o of HTN, Dementia, CVA with R sided hemiparesis, aphasia, parkinson's, GERD, CKD bedbound at baseline, dependant on assistance for ADL comes in with respiratory failure. Patient was found to have respiratory distress, saturating in 70s when EMS arrived, was placed on NRB on field. She was emergently intubated on arrival in ED. As per NH records patient was having fever and cough for past few days. Today she was found to have respiratory distress. She is vaccinated with moderna. Spoke to family son,  who stated the patient to remain full code. No other history could be obtained.  (2021 00:14)      Patient is a 89y old  Female who presents with a chief complaint of AHRF (2021 13:06)      INTERVAL HPI/OVERNIGHT EVENTS:  T(C): 36.1 (21 @ 04:54), Max: 37.9 (21 @ 14:40)  HR: 94 (21 @ 08:43) (94 - 103)  BP: 151/61 (21 @ 04:54) (134/86 - 151/61)  RR: 19 (21 @ 04:54) (19 - 26)  SpO2: 100% (21 @ 08:43) (99% - 100%)  Wt(kg): --  I&O's Summary      REVIEW OF SYSTEMS: denies fever, chills, SOB, palpitations, chest pain, abdominal pain, nausea, vomitting, diarrhea, constipation, dizziness    MEDICATIONS  (STANDING):  chlorhexidine 0.12% Liquid 15 milliLiter(s) Oral Mucosa every 12 hours  enoxaparin Injectable 40 milliGRAM(s) SubCutaneous daily  pantoprazole   Suspension 40 milliGRAM(s) Oral daily  vancomycin  IVPB 1000 milliGRAM(s) IV Intermittent daily    MEDICATIONS  (PRN):  acetaminophen    Suspension .. 650 milliGRAM(s) Oral every 6 hours PRN Temp greater or equal to 38C (100.4F), Mild Pain (1 - 3), Moderate Pain (4 - 6)  morphine  - Injectable 1 milliGRAM(s) IV Push every 4 hours PRN Respiratory distress  sodium chloride 0.9% lock flush 10 milliLiter(s) IV Push every 1 hour PRN Pre/post blood products, medications, blood draw, and to maintain line patency      PHYSICAL EXAM:  GENERAL: NAD, well-groomed, well-developed  HEAD:  Atraumatic, Normocephalic  EYES: EOMI, PERRLA, conjunctiva and sclera clear  ENMT: No tonsillar erythema, exudates, or enlargement; Moist mucous membranes, Good dentition, No lesions  NECK: Supple, No JVD, Normal thyroid  NERVOUS SYSTEM:  Alert & Oriented X3, Good concentration; Motor Strength 5/5 B/L upper and lower extremities; DTRs 2+ intact and symmetric  CHEST/LUNG: Clear to percussion bilaterally; No rales, rhonchi, wheezing, or rubs  HEART: Regular rate and rhythm; No murmurs, rubs, or gallops  ABDOMEN: Soft, Nontender, Nondistended; Bowel sounds present  EXTREMITIES:  2+ Peripheral Pulses, No clubbing, cyanosis, or edema  LYMPH: No lymphadenopathy noted  SKIN: No rashes or lesions  LABS:                        7.7    10.03 )-----------( 653      ( 2021 06:39 )             24.7     11-18    137  |  104  |  22<H>  ----------------------------<  140<H>  4.3   |  25  |  0.58    Ca    7.9<L>      2021 06:39  Phos  2.8       Mg     2.3         TPro  5.9<L>  /  Alb  1.4<L>  /  TBili  0.3  /  DBili  x   /  AST  31  /  ALT  34  /  AlkPhos  98  11-18      Urinalysis Basic - ( 2021 14:22 )    Color: Yellow / Appearance: Slightly Turbid / S.020 / pH: x  Gluc: x / Ketone: Trace  / Bili: Negative / Urobili: Negative   Blood: x / Protein: 100 / Nitrite: Negative   Leuk Esterase: Trace / RBC: 5-10 /HPF / WBC 11-25 /HPF   Sq Epi: x / Non Sq Epi: Many /HPF / Bacteria: Many /HPF      CAPILLARY BLOOD GLUCOSE      POCT Blood Glucose.: 169 mg/dL (2021 11:25)  POCT Blood Glucose.: 156 mg/dL (2021 05:31)  POCT Blood Glucose.: 146 mg/dL (2021 22:34)  POCT Blood Glucose.: 154 mg/dL (2021 17:01)        Urinalysis Basic - ( 2021 14:22 )    Color: Yellow / Appearance: Slightly Turbid / S.020 / pH: x  Gluc: x / Ketone: Trace  / Bili: Negative / Urobili: Negative   Blood: x / Protein: 100 / Nitrite: Negative   Leuk Esterase: Trace / RBC: 5-10 /HPF / WBC 11-25 /HPF   Sq Epi: x / Non Sq Epi: Many /HPF / Bacteria: Many /HPF

## 2021-11-18 NOTE — PROGRESS NOTE ADULT - PROBLEM SELECTOR PLAN 2
Continue mechanical ventilation.  Not a candidate for SBT at this time.  DID not tolerate SBT yesterday.  Monitor oxygen saturation.  Daily CXRs.

## 2021-11-18 NOTE — PROGRESS NOTE ADULT - PROBLEM SELECTOR PLAN 3
Completed 14 days of Cefepime yesterday.  New fevers. Blood cultures sent. F/U results.  Vanco started by ID.

## 2021-11-18 NOTE — PROGRESS NOTE ADULT - PROBLEM SELECTOR PLAN 1
Secondary to bacteremia  BC from 11/1 grew E coli. Completed Cefepime yesterday  Febrile. Blood cultures sent 11/17.  F/U blood cultures  Vanco started. ID Dr Wong following.

## 2021-11-18 NOTE — CHART NOTE - NSCHARTNOTEFT_GEN_A_CORE
Grandson updated on patient's status.  informed Ptx is smaller and afebrile today.  All questions answered.

## 2021-11-18 NOTE — PROGRESS NOTE ADULT - ASSESSMENT
seen and examined vsstable tem 100.3physical done  awake not in nay distress  on trach vent  peg   labs  hgb 7.7   platelets increasing  ua pos but urin cxs neg  blood cxs results pend  cxr improving pneumo  a/p cont same on vanco  d/w ID team    needs vent NH

## 2021-11-19 LAB
ALBUMIN SERPL ELPH-MCNC: 1.4 G/DL — LOW (ref 3.5–5)
ALP SERPL-CCNC: 88 U/L — SIGNIFICANT CHANGE UP (ref 40–120)
ALT FLD-CCNC: 31 U/L DA — SIGNIFICANT CHANGE UP (ref 10–60)
ANION GAP SERPL CALC-SCNC: 4 MMOL/L — LOW (ref 5–17)
AST SERPL-CCNC: 30 U/L — SIGNIFICANT CHANGE UP (ref 10–40)
BILIRUB SERPL-MCNC: 0.2 MG/DL — SIGNIFICANT CHANGE UP (ref 0.2–1.2)
BUN SERPL-MCNC: 19 MG/DL — HIGH (ref 7–18)
CALCIUM SERPL-MCNC: 8.3 MG/DL — LOW (ref 8.4–10.5)
CHLORIDE SERPL-SCNC: 105 MMOL/L — SIGNIFICANT CHANGE UP (ref 96–108)
CO2 SERPL-SCNC: 27 MMOL/L — SIGNIFICANT CHANGE UP (ref 22–31)
CREAT SERPL-MCNC: 0.56 MG/DL — SIGNIFICANT CHANGE UP (ref 0.5–1.3)
GLUCOSE SERPL-MCNC: 104 MG/DL — HIGH (ref 70–99)
HCT VFR BLD CALC: 28.6 % — LOW (ref 34.5–45)
HGB BLD-MCNC: 8.9 G/DL — LOW (ref 11.5–15.5)
MAGNESIUM SERPL-MCNC: 2.4 MG/DL — SIGNIFICANT CHANGE UP (ref 1.6–2.6)
MCHC RBC-ENTMCNC: 29.1 PG — SIGNIFICANT CHANGE UP (ref 27–34)
MCHC RBC-ENTMCNC: 31.1 GM/DL — LOW (ref 32–36)
MCV RBC AUTO: 93.5 FL — SIGNIFICANT CHANGE UP (ref 80–100)
NRBC # BLD: 0 /100 WBCS — SIGNIFICANT CHANGE UP (ref 0–0)
PHOSPHATE SERPL-MCNC: 3.4 MG/DL — SIGNIFICANT CHANGE UP (ref 2.5–4.5)
PLATELET # BLD AUTO: 465 K/UL — HIGH (ref 150–400)
POTASSIUM SERPL-MCNC: 4.5 MMOL/L — SIGNIFICANT CHANGE UP (ref 3.5–5.3)
POTASSIUM SERPL-SCNC: 4.5 MMOL/L — SIGNIFICANT CHANGE UP (ref 3.5–5.3)
PROT SERPL-MCNC: 5.9 G/DL — LOW (ref 6–8.3)
RBC # BLD: 3.06 M/UL — LOW (ref 3.8–5.2)
RBC # FLD: 15.2 % — HIGH (ref 10.3–14.5)
SARS-COV-2 RNA SPEC QL NAA+PROBE: SIGNIFICANT CHANGE UP
SODIUM SERPL-SCNC: 136 MMOL/L — SIGNIFICANT CHANGE UP (ref 135–145)
VANCOMYCIN TROUGH SERPL-MCNC: 16.7 UG/ML — SIGNIFICANT CHANGE UP (ref 10–20)
WBC # BLD: 10.32 K/UL — SIGNIFICANT CHANGE UP (ref 3.8–10.5)
WBC # FLD AUTO: 10.32 K/UL — SIGNIFICANT CHANGE UP (ref 3.8–10.5)

## 2021-11-19 PROCEDURE — 71045 X-RAY EXAM CHEST 1 VIEW: CPT | Mod: 26

## 2021-11-19 RX ADMIN — CHLORHEXIDINE GLUCONATE 15 MILLILITER(S): 213 SOLUTION TOPICAL at 05:24

## 2021-11-19 RX ADMIN — ENOXAPARIN SODIUM 40 MILLIGRAM(S): 100 INJECTION SUBCUTANEOUS at 17:26

## 2021-11-19 RX ADMIN — CHLORHEXIDINE GLUCONATE 15 MILLILITER(S): 213 SOLUTION TOPICAL at 17:26

## 2021-11-19 RX ADMIN — PANTOPRAZOLE SODIUM 40 MILLIGRAM(S): 20 TABLET, DELAYED RELEASE ORAL at 12:23

## 2021-11-19 RX ADMIN — Medication 650 MILLIGRAM(S): at 18:00

## 2021-11-19 RX ADMIN — Medication 650 MILLIGRAM(S): at 17:26

## 2021-11-19 RX ADMIN — Medication 250 MILLIGRAM(S): at 13:09

## 2021-11-19 NOTE — PROGRESS NOTE ADULT - ASSESSMENT
89 Y F with left sided chest tube placement s/p trach and PEG   -continue chest tube to water seal  -monitor output  -daily chest x-ray

## 2021-11-19 NOTE — PROGRESS NOTE ADULT - NUTRITIONAL ASSESSMENT
This patient has been assessed with a concern for Malnutrition and has been determined to have a diagnosis/diagnoses of Severe protein-calorie malnutrition and Underweight (BMI < 19).    This patient is being managed with:   Diet NPO with Tube Feed-  Tube Feeding Modality: Gastrostomy  Jevity 1.5 Bg  Total Volume for 24 Hours (mL): 720  Continuous  Starting Tube Feed Rate {mL per Hour}: 30  Until Goal Tube Feed Rate (mL per Hour): 30  Tube Feed Duration (in Hours): 24  Tube Feed Start Time: 15:00  No Carb Prosource (1pkg = 15gms Protein)     Qty per Day:  1  Entered: Nov 19 2021  3:07PM

## 2021-11-19 NOTE — PROGRESS NOTE ADULT - ASSESSMENT
seen and examined vs stable  on bed on trach vent  comfortable  not in any distress  awake eyes open  labs noted  hgb 8.9  blood cxs neg  a/p  resp failure  sec to pneumonia   fever but blood  urine cxs neg   on vanco  ID to f/u  dementia  cva   bed bound  poor prognosis

## 2021-11-19 NOTE — PROGRESS NOTE ADULT - SUBJECTIVE AND OBJECTIVE BOX
HPI:  89 yo female from [facility] with medical h/o of HTN, Dementia, CVA with R sided hemiparesis, aphasia, parkinson's, GERD, CKD bedbound at baseline, dependant on assistance for ADL comes in with respiratory failure. Patient was found to have respiratory distress, saturating in 70s when EMS arrived, was placed on NRB on field. She was emergently intubated on arrival in ED. As per NH records patient was having fever and cough for past few days. Today she was found to have respiratory distress. She is vaccinated with moderna. Spoke to family son,  who stated the patient to remain full code. No other history could be obtained.  (01 Nov 2021 00:14)      Patient is a 89y old  Female who presents with a chief complaint of AHRF (19 Nov 2021 11:39)      INTERVAL HPI/OVERNIGHT EVENTS:  T(C): 37.6 (11-19-21 @ 14:20), Max: 37.6 (11-19-21 @ 14:20)  HR: 97 (11-19-21 @ 14:20) (89 - 97)  BP: 158/67 (11-19-21 @ 14:20) (140/64 - 158/67)  RR: 29 (11-19-21 @ 14:20) (18 - 29)  SpO2: 97% (11-19-21 @ 14:20) (97% - 100%)  Wt(kg): --  I&O's Summary      REVIEW OF SYSTEMS: denies fever, chills, SOB, palpitations, chest pain, abdominal pain, nausea, vomitting, diarrhea, constipation, dizziness    MEDICATIONS  (STANDING):  chlorhexidine 0.12% Liquid 15 milliLiter(s) Oral Mucosa every 12 hours  enoxaparin Injectable 40 milliGRAM(s) SubCutaneous daily  pantoprazole   Suspension 40 milliGRAM(s) Oral daily  vancomycin  IVPB 1000 milliGRAM(s) IV Intermittent daily    MEDICATIONS  (PRN):  acetaminophen    Suspension .. 650 milliGRAM(s) Oral every 6 hours PRN Temp greater or equal to 38C (100.4F), Mild Pain (1 - 3), Moderate Pain (4 - 6)  morphine  - Injectable 1 milliGRAM(s) IV Push every 4 hours PRN Respiratory distress  sodium chloride 0.9% lock flush 10 milliLiter(s) IV Push every 1 hour PRN Pre/post blood products, medications, blood draw, and to maintain line patency      PHYSICAL EXAM:  GENERAL: NAD, well-groomed, well-developed  HEAD:  Atraumatic, Normocephalic  EYES: EOMI, PERRLA, conjunctiva and sclera clear  ENMT: No tonsillar erythema, exudates, or enlargement; Moist mucous membranes, Good dentition, No lesions  NECK: Supple, No JVD, Normal thyroid  NERVOUS SYSTEM:  Alert & Oriented X3, Good concentration; Motor Strength 5/5 B/L upper and lower extremities; DTRs 2+ intact and symmetric  CHEST/LUNG: Clear to percussion bilaterally; No rales, rhonchi, wheezing, or rubs  HEART: Regular rate and rhythm; No murmurs, rubs, or gallops  ABDOMEN: Soft, Nontender, Nondistended; Bowel sounds present  EXTREMITIES:  2+ Peripheral Pulses, No clubbing, cyanosis, or edema  LYMPH: No lymphadenopathy noted  SKIN: No rashes or lesions  LABS:                        8.9    10.32 )-----------( 465      ( 19 Nov 2021 07:17 )             28.6     11-19    136  |  105  |  19<H>  ----------------------------<  104<H>  4.5   |  27  |  0.56    Ca    8.3<L>      19 Nov 2021 07:17  Phos  3.4     11-19  Mg     2.4     11-19    TPro  5.9<L>  /  Alb  1.4<L>  /  TBili  0.2  /  DBili  x   /  AST  30  /  ALT  31  /  AlkPhos  88  11-19        CAPILLARY BLOOD GLUCOSE      POCT Blood Glucose.: 148 mg/dL (19 Nov 2021 11:13)  POCT Blood Glucose.: 113 mg/dL (19 Nov 2021 05:58)  POCT Blood Glucose.: 162 mg/dL (18 Nov 2021 22:32)  POCT Blood Glucose.: 179 mg/dL (18 Nov 2021 17:42)

## 2021-11-19 NOTE — PROGRESS NOTE ADULT - PROBLEM SELECTOR PLAN 2
Continue mechanical ventilation.  Not a candidate for SBT at this time.  DID not tolerate SBT 11/17  Monitor oxygen saturation.  Daily CXRs.

## 2021-11-19 NOTE — CHART NOTE - NSCHARTNOTEFT_GEN_A_CORE
Contacted pt's grandson Dr. Pathak and updated on pt's clinical condition and plan of care.   All questions and concerns addressed.     Alysha Mcdonough NP Medicine/[facility]  [phone]

## 2021-11-19 NOTE — PROGRESS NOTE ADULT - SUBJECTIVE AND OBJECTIVE BOX
89y Female is under our care for     REVIEW OF SYSTEMS:  [  ] Not able to elicit  General:	  Chest:	  GI:	  :  Skin:	  Musculoskeletal:	  Neuro:	    MEDS:  vancomycin  IVPB 1000 milliGRAM(s) IV Intermittent daily    ALLERGIES: Allergies    No Known Allergies    Intolerances        VITALS:  Vital Signs Last 24 Hrs  T(C): 36.6 (19 Nov 2021 04:50), Max: 36.8 (18 Nov 2021 13:58)  T(F): 97.9 (19 Nov 2021 04:50), Max: 98.2 (18 Nov 2021 13:58)  HR: 95 (19 Nov 2021 08:17) (88 - 95)  BP: 154/66 (19 Nov 2021 04:50) (140/64 - 154/66)  BP(mean): --  RR: 22 (19 Nov 2021 04:50) (18 - 22)  SpO2: 100% (19 Nov 2021 08:17) (98% - 100%)      PHYSICAL EXAM:  HEENT:  Neck:  Respiratory:  Cardiovascular:  Gastrointestinal:  Extremities:  Skin:  Ortho:  Neuro:    LABS/DIAGNOSTIC TESTS:                        8.9    10.32 )-----------( 465      ( 19 Nov 2021 07:17 )             28.6     WBC Count: 10.32 K/uL (11-19 @ 07:17)  WBC Count: 10.03 K/uL (11-18 @ 06:39)  WBC Count: 12.17 K/uL (11-17 @ 08:14)  WBC Count: 9.82 K/uL (11-16 @ 07:11)  WBC Count: 12.28 K/uL (11-15 @ 07:21)    11-19    136  |  105  |  19<H>  ----------------------------<  104<H>  4.5   |  27  |  0.56    Ca    8.3<L>      19 Nov 2021 07:17  Phos  3.4     11-19  Mg     2.4     11-19    TPro  5.9<L>  /  Alb  1.4<L>  /  TBili  0.2  /  DBili  x   /  AST  30  /  ALT  31  /  AlkPhos  88  11-19      CULTURES:   .Blood Blood-Peripheral  11-17 @ 13:16   No growth to date.  --  --      Catheterized Catheterized  11-16 @ 18:43   No growth  --  --      .Blood Blood  11-03 @ 10:22   No Growth Final  --  --      Clean Catch Clean Catch (Midstream)  11-02 @ 21:01   No growth  --  --      .Sputum Sputum  11-01 @ 21:18   Moderate Streptococcus agalactiae (Group B) isolated  Group B streptococci are susceptible to ampicillin,  penicillin and cefazolin, but may be resistant to  erythromycin and clindamycin.  Recommendations for intrapartum prophylaxis for Group B  streptococci are penicillin or ampicillin.  Normal Respiratory [place] present  --    Rare polymorphonuclear leukocytes per low power field  No Squamous epithelial cells per low power field  Rare Yeast like cells seen per oil power field  Rare Gram Positive Rods seen per oil power field  Rare Gram positive cocci in pairs seen per oil power field      .Blood Blood-Peripheral  11-01 @ 03:56   No Growth Final  --  Blood Culture PCR  Escherichia coli        RADIOLOGY:  no new studies 89y Female is under our care for E coli bacteremia, pneumonia and fever.  Patient was seen laying in bed with no acute distress.  She remains afebrile, repeat blood cultures are negative and WBC count is WNL.    REVIEW OF SYSTEMS:  [ x ] Not able to elicit      MEDS:  vancomycin  IVPB 1000 milliGRAM(s) IV Intermittent daily    ALLERGIES: Allergies    No Known Allergies    Intolerances        VITALS:  Vital Signs Last 24 Hrs  T(C): 36.6 (19 Nov 2021 04:50), Max: 36.8 (18 Nov 2021 13:58)  T(F): 97.9 (19 Nov 2021 04:50), Max: 98.2 (18 Nov 2021 13:58)  HR: 95 (19 Nov 2021 08:17) (88 - 95)  BP: 154/66 (19 Nov 2021 04:50) (140/64 - 154/66)  BP(mean): --  RR: 22 (19 Nov 2021 04:50) (18 - 22)  SpO2: 100% (19 Nov 2021 08:17) (98% - 100%)      PHYSICAL EXAM:  HEENT: n/a  Neck: supple, trach +  Respiratory: diminished breath sounds on left, left sided pigtail +  Cardiovascular: S1 S2 reg no murmurs  Gastrointestinal: +BS with soft, nondistended abdomen; nontender, peg tube in place  Extremities: bilateral hand edema +2  Skin: no rashes  Ortho: n/a  Neuro: Awake and alert      LABS/DIAGNOSTIC TESTS:                        8.9    10.32 )-----------( 465      ( 19 Nov 2021 07:17 )             28.6     WBC Count: 10.32 K/uL (11-19 @ 07:17)  WBC Count: 10.03 K/uL (11-18 @ 06:39)  WBC Count: 12.17 K/uL (11-17 @ 08:14)  WBC Count: 9.82 K/uL (11-16 @ 07:11)  WBC Count: 12.28 K/uL (11-15 @ 07:21)    11-19    136  |  105  |  19<H>  ----------------------------<  104<H>  4.5   |  27  |  0.56    Ca    8.3<L>      19 Nov 2021 07:17  Phos  3.4     11-19  Mg     2.4     11-19    TPro  5.9<L>  /  Alb  1.4<L>  /  TBili  0.2  /  DBili  x   /  AST  30  /  ALT  31  /  AlkPhos  88  11-19      CULTURES:   .Blood Blood-Peripheral  11-17 @ 13:16   No growth to date.  --  --      Catheterized Catheterized  11-16 @ 18:43   No growth  --  --      .Blood Blood  11-03 @ 10:22   No Growth Final  --  --      Clean Catch Clean Catch (Midstream)  11-02 @ 21:01   No growth  --  --      .Sputum Sputum  11-01 @ 21:18   Moderate Streptococcus agalactiae (Group B) isolated  Group B streptococci are susceptible to ampicillin,  penicillin and cefazolin, but may be resistant to  erythromycin and clindamycin.  Recommendations for intrapartum prophylaxis for Group B  streptococci are penicillin or ampicillin.  Normal Respiratory [place] present  --    Rare polymorphonuclear leukocytes per low power field  No Squamous epithelial cells per low power field  Rare Yeast like cells seen per oil power field  Rare Gram Positive Rods seen per oil power field  Rare Gram positive cocci in pairs seen per oil power field      .Blood Blood-Peripheral  11-01 @ 03:56   No Growth Final  --  Blood Culture PCR  Escherichia coli        RADIOLOGY:  no new studies

## 2021-11-19 NOTE — PROGRESS NOTE ADULT - ASSESSMENT
89 y/o F from [facility] w/ Hx of HTN, Dementia, CVA w/ R sided hemiparesis, aphasia, Parkinson's Disease, GERD, CKD. She is bedbound at baseline and needs assistance for ADLs. She was brought to the E.D. due to episodes of desaturation at 70s and respiratory distress. She was also having cough and fever in the nursing home. She was initially on NRB placed by EMS but was eventually intubated in the E.D. She is vaccinated with Moderna. COVID test was negative on admission. Chest Xray showed left lower lobe infiltrate (pneumonia). Blood cultures were sent. She was given 1 dose of Vancomycin and Zosyn in the E.D. and was subsequently admitted to ICU.    In the ICU, she was placed on mechanical ventilator. Her blood pressure was low despite receiving 2L bolus of LR. NGT and Left IJ central line was placed. Levophed was started. CXR revealed Left pneumothorax. Thoracic Surgery was consulted and left chest tube was placed. Patient was also tachycardic and troponin was mildly elevated. EKG showed SR w/ ST changes in the inferior leads. Troponin was monitored and has since trended down. Heart rate has been stable. Lactate was also elevated at 11.2 and has also since trended down. Patient also presented with hypernatremia and elevated creatinine probably secondary to volume deficit. She was also given free water. She was on Heparin for DVT prophylaxis and Protonix for GI prophylaxis. Palliative was consulted regarding the [facility]. Blood culture results showed E. coli bacteremia. She was started on Rocephin IV for 10 days. Azithromycin was continued awaiting Legionella results. Infectious Disease was consulted. Urine culture was ordered. Patient spiked a fever and is tachycardic. Blood cultures were repeated. She was tachycardic however his EKG showed SR w/ new PACs. There was mild air leak in the chest tube drainage but it was fixed by Cardiothoracic Surgery. Urine culture and repeat blood culture was negative. She had 2 febrile episodes overnight at 100.4-100.5F. She was tachypneic at rate of 30. Her repeat Chest Xray showed worsening of bilateral infiltrates. Her albumin is still low at 1.0. Her urine output is net negative. She was given Lasix and Albumin to maintain net negative urine output and decrease congestion. Vancomycin was discontinued and Cefepime decreased from 2g to 1g to complete 14 days since negative culture (11/16/21). Echocardiogram was ordered. Central line was removed. Lactulose was ordered for bowel movement. On November 11, 2021, patient had her tracheostomy and PEG inserted. Patient son Jered was called and informed .

## 2021-11-19 NOTE — CHART NOTE - NSCHARTNOTEFT_GEN_A_CORE
Assessment:   89yFemalePatient is a 89y old  Female who presents with a chief complaint of AHRF (19 Nov 2021 11:39). Pt visited. Pt is On Vent via Trach. Pt with Chest tube. TF  Jevity 1.5 infusing @ 30 ml/hr x24 hr providing 720 ml,1080 calories ,Protein 46 gms, Free water 547 ml /day. TF tolerated. Tf tolerated. Suggest Adding Prosource 1 Pkt /day  Via TF to provide additional 15 gms of Protein, 60 Kcal. Pt DG from ICU ti 4  on       Factors impacting intake: [ ] none [ ] nausea  [ ] vomiting [ ] diarrhea [ ] constipation  [ ]chewing problems [ ] swallowing issues  [ ] other:     Diet Presciption: Diet, NPO:   Tube Feeding Modality: Nasogastric  Jevity 1.5 Bg  Total Volume for 24 Hours (mL): 720  Continuous  Starting Tube Feed Rate {mL per Hour}: 20  Increase Tube Feed Rate by (mL): 10     Every 8 hours  Until Goal Tube Feed Rate (mL per Hour): 30  Tube Feed Duration (in Hours): 24  Tube Feed Start Time: 17:00 (11-12-21 @ 11:11)    Intake:  NPO w TF    Current Weight:   % Weight Change    Pertinent Medications: MEDICATIONS  (STANDING):  chlorhexidine 0.12% Liquid 15 milliLiter(s) Oral Mucosa every 12 hours  enoxaparin Injectable 40 milliGRAM(s) SubCutaneous daily  pantoprazole   Suspension 40 milliGRAM(s) Oral daily  vancomycin  IVPB 1000 milliGRAM(s) IV Intermittent daily    MEDICATIONS  (PRN):  acetaminophen    Suspension .. 650 milliGRAM(s) Oral every 6 hours PRN Temp greater or equal to 38C (100.4F), Mild Pain (1 - 3), Moderate Pain (4 - 6)  morphine  - Injectable 1 milliGRAM(s) IV Push every 4 hours PRN Respiratory distress  sodium chloride 0.9% lock flush 10 milliLiter(s) IV Push every 1 hour PRN Pre/post blood products, medications, blood draw, and to maintain line patency    Pertinent Labs: 11-19 Na136 mmol/L Glu 104 mg/dL<H> K+ 4.5 mmol/L Cr  0.56 mg/dL BUN 19 mg/dL<H> 11-19 Phos 3.4 mg/dL 11-19 Alb 1.4 g/dL<L> 11-07 Chol --    LDL --    HDL --    Trig 118 mg/dL     CAPILLARY BLOOD GLUCOSE      POCT Blood Glucose.: 148 mg/dL (19 Nov 2021 11:13)  POCT Blood Glucose.: 113 mg/dL (19 Nov 2021 05:58)  POCT Blood Glucose.: 162 mg/dL (18 Nov 2021 22:32)  POCT Blood Glucose.: 179 mg/dL (18 Nov 2021 17:42)    Skin:     Estimated Needs:   [ ] no change since previous assessment  [ ] recalculated:     Previous Nutrition Diagnosis:   [ ] Inadequate Energy Intake [ ]Inadequate Oral Intake [ ] Excessive Energy Intake   [ ] Underweight [ ] Increased Nutrient Needs [ ] Overweight/Obesity   [ ] Altered GI Function [ ] Unintended Weight Loss [ ] Food & Nutrition Related Knowledge Deficit [ ] Malnutrition     Nutrition Diagnosis is [ ] ongoing  [ ] resolved [ ] not applicable     New Nutrition Diagnosis: [ ] not applicable       Interventions:   Recommend  [ ] Change Diet To:  [ ] Nutrition Supplement  [ ] Nutrition Support  [ ] Other:     Monitoring and Evaluation:   [ ] PO intake [ x ] Tolerance to diet prescription [ x ] weights [ x ] labs[ x ] follow up per protocol  [ ] other: Assessment:   89yFemalePatient is a 89y old  Female who presents with a chief complaint of AHRF (19 Nov 2021 11:39). Pt visited. Pt is On Vent via Trach. Pt with Chest tube. TF  Jevity 1.5 infusing @ 30 ml/hr x24 hr providing 720 ml,1080 calories ,Protein 46 gms, Free water 547 ml /day. TF tolerated. Tf tolerated. Suggest Adding Prosource 1 Pkt /day  Via TF to provide additional 15 gms of Protein, 60 Kcal. Pt DG from ICU to 4  on  11/13. . Pt S/P PEG.       Factors impacting intake: [ ] none [ ] nausea  [ ] vomiting [ ] diarrhea [ ] constipation  [ ]chewing problems [ ] swallowing issues  [ ] other:     Diet Prescription: Diet , NPO:   Tube Feeding Modality: Nasogastric  Jevity 1.5 Bg  Total Volume for 24 Hours (mL): 720  Continuous  Starting Tube Feed Rate {mL per Hour}: 20  Increase Tube Feed Rate by (mL): 10     Every 8 hours  Until Goal Tube Feed Rate (mL per Hour): 30  Tube Feed Duration (in Hours): 24  Tube Feed Start Time: 17:00 (11-12-21 @ 11:11)    Intake:  NPO w TF     Current Weight:   % Weight Change Bed scale 80 LB with out SCD device.     Pertinent Medications: MEDICATIONS  (STANDING):  chlorhexidine 0.12% Liquid 15 milliLiter(s) Oral Mucosa every 12 hours  enoxaparin Injectable 40 milliGRAM(s) SubCutaneous daily  pantoprazole   Suspension 40 milliGRAM(s) Oral daily  vancomycin  IVPB 1000 milliGRAM(s) IV Intermittent daily    MEDICATIONS  (PRN):  acetaminophen    Suspension .. 650 milliGRAM(s) Oral every 6 hours PRN Temp greater or equal to 38C (100.4F), Mild Pain (1 - 3), Moderate Pain (4 - 6)  morphine  - Injectable 1 milliGRAM(s) IV Push every 4 hours PRN Respiratory distress  sodium chloride 0.9% lock flush 10 milliLiter(s) IV Push every 1 hour PRN Pre/post blood products, medications, blood draw, and to maintain line patency    Pertinent Labs: 11-19 Na136 mmol/L Glu 104 mg/dL<H> K+ 4.5 mmol/L Cr  0.56 mg/dL BUN 19 mg/dL<H> 11-19 Phos 3.4 mg/dL 11-19 Alb 1.4 g/dL<L> 11-07 Chol --    LDL --    HDL --    Trig 118 mg/dL     CAPILLARY BLOOD GLUCOSE      POCT Blood Glucose.: 148 mg/dL (19 Nov 2021 11:13)  POCT Blood Glucose.: 113 mg/dL (19 Nov 2021 05:58)  POCT Blood Glucose.: 162 mg/dL (18 Nov 2021 22:32)  POCT Blood Glucose.: 179 mg/dL (18 Nov 2021 17:42)    Skin:     Estimated Needs:   [ ] no change since previous assessment  [ ] recalculated:     Previous Nutrition Diagnosis:   [ ] Inadequate Energy Intake [ ]Inadequate Oral Intake [ ] Excessive Energy Intake   [ ] Underweight [ ] Increased Nutrient Needs [ ] Overweight/Obesity   [ ] Altered GI Function [ ] Unintended Weight Loss [ ] Food & Nutrition Related Knowledge Deficit [x ] Malnutrition Severe    Nutrition Diagnosis is [ x] ongoing  [ ] resolved [ ] not applicable     New Nutrition Diagnosis: [ ] not applicable       Interventions:   Recommend  [ ] Change Diet To:  [ ] Nutrition Supplement  [x ] Nutrition Support Jevity 1.5 @ 30 ml /hr x 24 as tolerated Via PEG   (x) Suggest Prosource 1 PKt / day via TF to provide additionally 15 gms of protein, 60 Kcal)  [ x] Other: D/W NP    Monitoring and Evaluation:   [ ] PO intake [ x ] Tolerance to diet prescription [ x ] weights [ x ] labs[ x ] follow up per protocol  [ ] other:

## 2021-11-19 NOTE — PROGRESS NOTE ADULT - PROBLEM SELECTOR PLAN 10
DVT and GI prophylaxis.  Continue mechanical ventilation.  Left chest tube to pleuravac to water seal.   Serial CXRs  Thoracic surgery f/u  Overall prognosis is extremely poor.  Remains FULL CODE as per family wishes.  SW consult for long term vent placement.

## 2021-11-19 NOTE — PROGRESS NOTE ADULT - SUBJECTIVE AND OBJECTIVE BOX
89 Y F s/p left sided chest tube placement, tracheostomy and PEG tube placement. Primary team requesting removal of chest tube as output has been minimal. Chest tube is to water seal, chest x-ray showed. Chest xray yesterday was performed, pending final read.     Vital Signs Last 24 Hrs  T(C): 36.6 (19 Nov 2021 04:50), Max: 36.8 (18 Nov 2021 13:58)  T(F): 97.9 (19 Nov 2021 04:50), Max: 98.2 (18 Nov 2021 13:58)  HR: 90 (19 Nov 2021 04:50) (88 - 94)  BP: 154/66 (19 Nov 2021 04:50) (140/64 - 154/66)  BP(mean): --  RR: 22 (19 Nov 2021 04:50) (18 - 22)  SpO2: 99% (19 Nov 2021 04:50) (98% - 100%)      No acute distress, AAOX1   non labored breathing, trached, on minimal vent settings, left chest tube output minimal since being unclamped   abd is soft, non tender, non distended, PEG tube at 2 1/2 cm at skin  '                        7.7    10.03 )-----------( 653      ( 18 Nov 2021 06:39 )             24.7   11-18    137  |  104  |  22<H>  ----------------------------<  140<H>  4.3   |  25  |  0.58    Ca    7.9<L>      18 Nov 2021 06:39  Phos  2.8     11-18  Mg     2.3     11-18    TPro  5.9<L>  /  Alb  1.4<L>  /  TBili  0.3  /  DBili  x   /  AST  31  /  ALT  34  /  AlkPhos  98  11-18

## 2021-11-19 NOTE — PROGRESS NOTE ADULT - PROBLEM SELECTOR PLAN 1
Secondary to bacteremia  BC from 11/1 grew E coli. Completed Cefepime .  Febrile 11/17  BC/UC both  NGTD   Continue Vanco Day 2   ID Dr Wong following.

## 2021-11-19 NOTE — PROGRESS NOTE ADULT - ASSESSMENT
UTI - completed treatment  Bacteremia - E coli - cleared  Pneumonia - adequately treated  Septic Shock - resolved  Fevers - improving  Leukocytosis - mild    Plan:  ·	Continue Vancomycin 1g iv qd for now ( empirically)  ·	poor prognosis, patient is full code                                     UTI - completed treatment  Bacteremia - E coli - cleared  Pneumonia - adequately treated  Septic Shock - resolved  Fevers - improving  Leukocytosis - mild    Plan:  ·	Continue Vancomycin 1g iv qd for now ( empirically)  ·	poor prognosis, patient is full code    I agree with above

## 2021-11-19 NOTE — PROGRESS NOTE ADULT - PROBLEM SELECTOR PLAN 3
Completed 14 days of Cefepime   Febrile 11/17  Repeat BC 11/17 NGTD   Continue IV Vanco, Day 2 per ID

## 2021-11-19 NOTE — PROGRESS NOTE ADULT - SUBJECTIVE AND OBJECTIVE BOX
AKASH CHACKO    SCU progress note    INTERVAL HPI/OVERNIGHT EVENTS: ***    DNR [ ]   DNI  [  ]    Covid - 19 PCR: COVID-19 PCR: NotDetec (19 Nov 2021 05:52)  COVID-19 PCR: NotDetec (10 Nov 2021 20:31)  COVID-19 PCR: NotDetec (31 Oct 2021 23:22)      [facility]    What Matters Most: see GOC  Age appropriate Medications/Screen for High Risk Medication: Yes  Mentation: see CAM below  Mobility: defer to physical exam    The Confusion Assessment Method (CAM) Diagnostic Algorithm     1: Acute Onset or Fluctuating Course  - Is there evidence of an acute change in mental status from the patient’s baseline? Did the (abnormal) behavior  fluctuate during the day, that is, tend to come and go, or increase and decrease in severity?  [ ] YES [ ] NO     2: Inattention  - Did the patient have difficulty focusing attention, being easily distractible, or having difficulty keeping track of what was being said?  [ ] YES [ ] NO     3: Disorganized thinking  -Was the patient’s thinking disorganized or incoherent, such as rambling or irrelevant conversation, unclear or illogical flow of ideas, or unpredictable switching from subject to subject?  [ ] YES [ ] NO    4: Altered Level of consciousness?  [ ] YES [ ] NO    The diagnosis of delirium by CAM requires the presence of features 1 and 2 and either 3 or 4.    PRESSORS: [ ] YES [ ] NO  vancomycin  IVPB 1000 milliGRAM(s) IV Intermittent daily    Cardiovascular:  Heart Failure  Acute   Acute on Chronic  Chronic         Pulmonary:    Hematalogic:  enoxaparin Injectable 40 milliGRAM(s) SubCutaneous daily    Other:  acetaminophen    Suspension .. 650 milliGRAM(s) Oral every 6 hours PRN  chlorhexidine 0.12% Liquid 15 milliLiter(s) Oral Mucosa every 12 hours  morphine  - Injectable 1 milliGRAM(s) IV Push every 4 hours PRN  pantoprazole   Suspension 40 milliGRAM(s) Oral daily  sodium chloride 0.9% lock flush 10 milliLiter(s) IV Push every 1 hour PRN    acetaminophen    Suspension .. 650 milliGRAM(s) Oral every 6 hours PRN  chlorhexidine 0.12% Liquid 15 milliLiter(s) Oral Mucosa every 12 hours  enoxaparin Injectable 40 milliGRAM(s) SubCutaneous daily  morphine  - Injectable 1 milliGRAM(s) IV Push every 4 hours PRN  pantoprazole   Suspension 40 milliGRAM(s) Oral daily  sodium chloride 0.9% lock flush 10 milliLiter(s) IV Push every 1 hour PRN  vancomycin  IVPB 1000 milliGRAM(s) IV Intermittent daily    Drug Dosing Weight  Height (cm): 167.6 (03 Nov 2021 16:02)  Weight (kg): 40.9 (01 Nov 2021 02:20)  BMI (kg/m2): 14.6 (03 Nov 2021 16:02)  BSA (m2): 1.43 (03 Nov 2021 16:02)    CENTRAL LINE: [ ] YES [ ] NO  LOCATION:   DATE INSERTED:  REMOVE: [ ] YES [ ] NO  EXPLAIN:    SAEED: [ ] YES [ ] NO    DATE INSERTED:  REMOVE:  [ ] YES [ ] NO  EXPLAIN:    PAST MEDICAL & SURGICAL HISTORY:  HTN (hypertension)    Dementia                  Mode: AC/ CMV (Assist Control/ Continuous Mandatory Ventilation)  RR (machine): 16  TV (machine): 350  FiO2: 40  PEEP: 5  ITime: 1  MAP: 9  PIP: 23      PHYSICAL EXAM:    GENERAL: NAD  HEAD:  Atraumatic, Normocephalic  EYES: EOMI, PERRLA, conjunctiva and sclera clear  ENMT: No tonsillar erythema, exudates, or enlargement; Moist mucous membranes, Good dentition, No lesions  NECK: Trach tube intact. Supple, No JVD  NERVOUS SYSTEM: Awake/ Alert, nonverbal , does not follow commands.  Motor Strength 3-5/5 B/L upper and 1-2/5 lower extremities.  CHEST/LUNG:Trach to vent.  Clear upper lobes, decreased at bases. No rales, rhonchi, wheezing, or rubs. Left chest tube to pleuravac, to water seal.   HEART: Regular rate and rhythm; No murmurs, rubs, or gallops  ABDOMEN: Soft, Nontender, Nondistended; Bowel sounds present. PEG tube intact.   EXTREMITIES:  2+ Peripheral Pulses, No clubbing, cyanosis, or edema  LYMPH: No lymphadenopathy noted  SKIN: No rashes or lesions      LABS:  CBC Full  -  ( 19 Nov 2021 07:17 )  WBC Count : 10.32 K/uL  RBC Count : 3.06 M/uL  Hemoglobin : 8.9 g/dL  Hematocrit : 28.6 %  Platelet Count - Automated : 465 K/uL  Mean Cell Volume : 93.5 fl  Mean Cell Hemoglobin : 29.1 pg  Mean Cell Hemoglobin Concentration : 31.1 gm/dL  Auto Neutrophil # : x  Auto Lymphocyte # : x  Auto Monocyte # : x  Auto Eosinophil # : x  Auto Basophil # : x  Auto Neutrophil % : x  Auto Lymphocyte % : x  Auto Monocyte % : x  Auto Eosinophil % : x  Auto Basophil % : x    11-19    136  |  105  |  19<H>  ----------------------------<  104<H>  4.5   |  27  |  0.56    Ca    8.3<L>      19 Nov 2021 07:17  Phos  3.4     11-19  Mg     2.4     11-19    TPro  5.9<L>  /  Alb  1.4<L>  /  TBili  0.2  /  DBili  x   /  AST  30  /  ALT  31  /  AlkPhos  88  11-19              [  ]  DVT Prophylaxis  [  ]   Abnormal Nutritional Status -  Malnutrition   Cachexia      Morbid Obesity BMI >/=40  Diet, NPO:   Tube Feeding Modality: Nasogastric  Jevity 1.5 Bg  Total Volume for 24 Hours (mL): 720  Continuous  Starting Tube Feed Rate mL per Hour: 20  Increase Tube Feed Rate by (mL): 10     Every 8 hours  Until Goal Tube Feed Rate (mL per Hour): 30  Tube Feed Duration (in Hours): 24  Tube Feed Start Time: 17:00 (11-12-21 @ 11:11) [Active]          RADIOLOGY & ADDITIONAL STUDIES:  ***    Goals of Care Discussion with Family/Proxy/Other   - see note from/family meeting set up for...     AKASH CHACKO    SCU progress note    INTERVAL HPI/OVERNIGHT EVENTS: ***No acute events overnight . Pt remains with left chest tube to     DNR [ ]   DNI  [  ] Full code.     Covid - 19 PCR: COVID-19 PCR: NotDetec (19 Nov 2021 05:52)  COVID-19 PCR: NotDetec (10 Nov 2021 20:31)  COVID-19 PCR: NotDetec (31 Oct 2021 23:22)      [facility]    What Matters Most: see GOC  Age appropriate Medications/Screen for High Risk Medication: Yes  Mentation: see CAM below  Mobility: defer to physical exam    The Confusion Assessment Method (CAM) Diagnostic Algorithm     1: Acute Onset or Fluctuating Course  - Is there evidence of an acute change in mental status from the patient’s baseline? Did the (abnormal) behavior  fluctuate during the day, that is, tend to come and go, or increase and decrease in severity?  [ ] YES [ ] NO     2: Inattention  - Did the patient have difficulty focusing attention, being easily distractible, or having difficulty keeping track of what was being said?  [ ] YES [ ] NO     3: Disorganized thinking  -Was the patient’s thinking disorganized or incoherent, such as rambling or irrelevant conversation, unclear or illogical flow of ideas, or unpredictable switching from subject to subject?  [ ] YES [ ] NO    4: Altered Level of consciousness?  [ ] YES [ ] NO    The diagnosis of delirium by CAM requires the presence of features 1 and 2 and either 3 or 4.    PRESSORS: [ ] YES [ ] NO  vancomycin  IVPB 1000 milliGRAM(s) IV Intermittent daily    Cardiovascular:  Heart Failure  Acute   Acute on Chronic  Chronic         Pulmonary:    Hematalogic:  enoxaparin Injectable 40 milliGRAM(s) SubCutaneous daily    Other:  acetaminophen    Suspension .. 650 milliGRAM(s) Oral every 6 hours PRN  chlorhexidine 0.12% Liquid 15 milliLiter(s) Oral Mucosa every 12 hours  morphine  - Injectable 1 milliGRAM(s) IV Push every 4 hours PRN  pantoprazole   Suspension 40 milliGRAM(s) Oral daily  sodium chloride 0.9% lock flush 10 milliLiter(s) IV Push every 1 hour PRN    acetaminophen    Suspension .. 650 milliGRAM(s) Oral every 6 hours PRN  chlorhexidine 0.12% Liquid 15 milliLiter(s) Oral Mucosa every 12 hours  enoxaparin Injectable 40 milliGRAM(s) SubCutaneous daily  morphine  - Injectable 1 milliGRAM(s) IV Push every 4 hours PRN  pantoprazole   Suspension 40 milliGRAM(s) Oral daily  sodium chloride 0.9% lock flush 10 milliLiter(s) IV Push every 1 hour PRN  vancomycin  IVPB 1000 milliGRAM(s) IV Intermittent daily    Drug Dosing Weight  Height (cm): 167.6 (03 Nov 2021 16:02)  Weight (kg): 40.9 (01 Nov 2021 02:20)  BMI (kg/m2): 14.6 (03 Nov 2021 16:02)  BSA (m2): 1.43 (03 Nov 2021 16:02)    CENTRAL LINE: [ ] YES [ ] NO  LOCATION:   DATE INSERTED:  REMOVE: [ ] YES [ ] NO  EXPLAIN:    SAEED: [ ] YES [ ] NO    DATE INSERTED:  REMOVE:  [ ] YES [ ] NO  EXPLAIN:    PAST MEDICAL & SURGICAL HISTORY:  HTN (hypertension)    Dementia                  Mode: AC/ CMV (Assist Control/ Continuous Mandatory Ventilation)  RR (machine): 16  TV (machine): 350  FiO2: 40  PEEP: 5  ITime: 1  MAP: 9  PIP: 23      PHYSICAL EXAM:    GENERAL: NAD  HEAD:  Atraumatic, Normocephalic  EYES: EOMI, PERRLA, conjunctiva and sclera clear  ENMT: No tonsillar erythema, exudates, or enlargement; Moist mucous membranes, Good dentition, No lesions  NECK: Trach tube intact. Supple, No JVD  NERVOUS SYSTEM: Awake/ Alert, nonverbal , does not follow commands.  Motor Strength 3-5/5 B/L upper and 1-2/5 lower extremities.  CHEST/LUNG:Trach to vent.  Clear upper lobes, decreased at bases. No rales, rhonchi, wheezing, or rubs. Left chest tube to pleuravac, to water seal.   HEART: Regular rate and rhythm; No murmurs, rubs, or gallops  ABDOMEN: Soft, Nontender, Nondistended; Bowel sounds present. PEG tube intact.   EXTREMITIES:  2+ Peripheral Pulses, No clubbing, cyanosis, or edema  LYMPH: No lymphadenopathy noted  SKIN: No rashes or lesions      LABS:  CBC Full  -  ( 19 Nov 2021 07:17 )  WBC Count : 10.32 K/uL  RBC Count : 3.06 M/uL  Hemoglobin : 8.9 g/dL  Hematocrit : 28.6 %  Platelet Count - Automated : 465 K/uL  Mean Cell Volume : 93.5 fl  Mean Cell Hemoglobin : 29.1 pg  Mean Cell Hemoglobin Concentration : 31.1 gm/dL  Auto Neutrophil # : x  Auto Lymphocyte # : x  Auto Monocyte # : x  Auto Eosinophil # : x  Auto Basophil # : x  Auto Neutrophil % : x  Auto Lymphocyte % : x  Auto Monocyte % : x  Auto Eosinophil % : x  Auto Basophil % : x    11-19    136  |  105  |  19<H>  ----------------------------<  104<H>  4.5   |  27  |  0.56    Ca    8.3<L>      19 Nov 2021 07:17  Phos  3.4     11-19  Mg     2.4     11-19    TPro  5.9<L>  /  Alb  1.4<L>  /  TBili  0.2  /  DBili  x   /  AST  30  /  ALT  31  /  AlkPhos  88  11-19              [  ]  DVT Prophylaxis  [  ]   Abnormal Nutritional Status -  Malnutrition   Cachexia      Morbid Obesity BMI >/=40  Diet, NPO:   Tube Feeding Modality: Nasogastric  Jevity 1.5 Bg  Total Volume for 24 Hours (mL): 720  Continuous  Starting Tube Feed Rate mL per Hour: 20  Increase Tube Feed Rate by (mL): 10     Every 8 hours  Until Goal Tube Feed Rate (mL per Hour): 30  Tube Feed Duration (in Hours): 24  Tube Feed Start Time: 17:00 (11-12-21 @ 11:11) [Active]          RADIOLOGY & ADDITIONAL STUDIES:  ***    Goals of Care Discussion with Family/Proxy/Other   - see note from/family meeting set up for...     AKASH CHACKO    SCU progress note    INTERVAL HPI/OVERNIGHT EVENTS: ***No acute events overnight . Pt remains with left chest tube to     DNR [ ]   DNI  [  ] Full code.     Covid - 19 PCR: COVID-19 PCR: NotDetec (19 Nov 2021 05:52)  COVID-19 PCR: NotDetec (10 Nov 2021 20:31)  COVID-19 PCR: NotDetec (31 Oct 2021 23:22)      [facility]    What Matters Most: see GOC  Age appropriate Medications/Screen for High Risk Medication: Yes  Mentation: see CAM below  Mobility: defer to physical exam    The Confusion Assessment Method (CAM) Diagnostic Algorithm     1: Acute Onset or Fluctuating Course  - Is there evidence of an acute change in mental status from the patient’s baseline? Did the (abnormal) behavior  fluctuate during the day, that is, tend to come and go, or increase and decrease in severity?  [ ] YES [x ] NO     2: Inattention  - Did the patient have difficulty focusing attention, being easily distractible, or having difficulty keeping track of what was being said?  [ ] YES [ ] NO  (x) Unable to assess      3: Disorganized thinking  -Was the patient’s thinking disorganized or incoherent, such as rambling or irrelevant conversation, unclear or illogical flow of ideas, or unpredictable switching from subject to subject?  [ ] YES [ ] NO  (x) Unable to assess     4: Altered Level of consciousness?  [ ] YES [ x] NO    The diagnosis of delirium by CAM requires the presence of features 1 and 2 and either 3 or 4.    PRESSORS: [ ] YES [x ] NO  vancomycin  IVPB 1000 milliGRAM(s) IV Intermittent daily    Cardiovascular:  Pulmonary:    Hematalogic:  enoxaparin Injectable 40 milliGRAM(s) SubCutaneous daily    Other:  acetaminophen    Suspension .. 650 milliGRAM(s) Oral every 6 hours PRN  chlorhexidine 0.12% Liquid 15 milliLiter(s) Oral Mucosa every 12 hours  morphine  - Injectable 1 milliGRAM(s) IV Push every 4 hours PRN  pantoprazole   Suspension 40 milliGRAM(s) Oral daily  sodium chloride 0.9% lock flush 10 milliLiter(s) IV Push every 1 hour PRN    acetaminophen    Suspension .. 650 milliGRAM(s) Oral every 6 hours PRN  chlorhexidine 0.12% Liquid 15 milliLiter(s) Oral Mucosa every 12 hours  enoxaparin Injectable 40 milliGRAM(s) SubCutaneous daily  morphine  - Injectable 1 milliGRAM(s) IV Push every 4 hours PRN  pantoprazole   Suspension 40 milliGRAM(s) Oral daily  sodium chloride 0.9% lock flush 10 milliLiter(s) IV Push every 1 hour PRN  vancomycin  IVPB 1000 milliGRAM(s) IV Intermittent daily    Drug Dosing Weight  Height (cm): 167.6 (03 Nov 2021 16:02)  Weight (kg): 40.9 (01 Nov 2021 02:20)  BMI (kg/m2): 14.6 (03 Nov 2021 16:02)  BSA (m2): 1.43 (03 Nov 2021 16:02)    CENTRAL LINE: [ ] YES [x ] NO  LOCATION:   DATE INSERTED:  REMOVE: [ ] YES [ ] NO  EXPLAIN:    SAEED: [ ] YES [x ] NO    DATE INSERTED:  REMOVE:  [ ] YES [ ] NO  EXPLAIN:    PAST MEDICAL & SURGICAL HISTORY:  HTN (hypertension)    Dementia      Mode: AC/ CMV (Assist Control/ Continuous Mandatory Ventilation)  RR (machine): 16  TV (machine): 350  FiO2: 40  PEEP: 5  ITime: 1  MAP: 9  PIP: 23      PHYSICAL EXAM:    GENERAL: NAD, cachectic  HEAD:  Atraumatic, Normocephalic  EYES: EOMI, PERRLA, conjunctiva and sclera clear  ENMT: No tonsillar erythema, exudates, or enlargement; Moist mucous membranes, Good dentition, No lesions  NECK: Trach tube intact. Supple, No JVD  NERVOUS SYSTEM: Awake/ Alert, nonverbal , does not follow commands.  Motor Strength 3-5/5 B/L upper and 1-2/5 lower extremities.  CHEST/LUNG:Trach to vent.  Clear upper lobes, decreased at bases. No rales, rhonchi, wheezing, or rubs. Left chest tube to pleuravac, to water seal.   HEART: Regular rate and rhythm; No murmurs, rubs, or gallops  ABDOMEN: Soft, Nontender, Nondistended; Bowel sounds present. PEG tube intact.   EXTREMITIES:  2+ Peripheral Pulses, No clubbing, cyanosis, or edema  LYMPH: No lymphadenopathy noted  SKIN: No rashes or lesions.       LABS:  CBC Full  -  ( 19 Nov 2021 07:17 )  WBC Count : 10.32 K/uL  RBC Count : 3.06 M/uL  Hemoglobin : 8.9 g/dL  Hematocrit : 28.6 %  Platelet Count - Automated : 465 K/uL  Mean Cell Volume : 93.5 fl  Mean Cell Hemoglobin : 29.1 pg  Mean Cell Hemoglobin Concentration : 31.1 gm/dL  Auto Neutrophil # : x  Auto Lymphocyte # : x  Auto Monocyte # : x  Auto Eosinophil # : x  Auto Basophil # : x  Auto Neutrophil % : x  Auto Lymphocyte % : x  Auto Monocyte % : x  Auto Eosinophil % : x  Auto Basophil % : x    11-19    136  |  105  |  19<H>  ----------------------------<  104<H>  4.5   |  27  |  0.56    Ca    8.3<L>      19 Nov 2021 07:17  Phos  3.4     11-19  Mg     2.4     11-19    TPro  5.9<L>  /  Alb  1.4<L>  /  TBili  0.2  /  DBili  x   /  AST  30  /  ALT  31  /  AlkPhos  88  11-19              [  ]  DVT Prophylaxis  [  ]   Abnormal Nutritional Status -  Malnutrition   Cachexia       Diet, NPO:   Tube Feeding Modality: Nasogastric  Jevity 1.5 Bg  Total Volume for 24 Hours (mL): 720  Continuous  Starting Tube Feed Rate mL per Hour: 20  Increase Tube Feed Rate by (mL): 10     Every 8 hours  Until Goal Tube Feed Rate (mL per Hour): 30  Tube Feed Duration (in Hours): 24  Tube Feed Start Time: 17:00 (11-12-21 @ 11:11) [Active]      RADIOLOGY & ADDITIONAL STUDIES:  ***        Goals of Care Discussion with Family/Proxy/Other   - see note from 11/09   AKASH CHACKO    SCU progress note    INTERVAL HPI/OVERNIGHT EVENTS: ***No acute events overnight . Pt remains with left chest tube to     DNR [ ]   DNI  [  ] Full code.     Covid - 19 PCR: COVID-19 PCR: NotDetec (19 Nov 2021 05:52)  COVID-19 PCR: NotDetec (10 Nov 2021 20:31)  COVID-19 PCR: NotDetec (31 Oct 2021 23:22)      [facility]    What Matters Most: see GOC  Age appropriate Medications/Screen for High Risk Medication: Yes  Mentation: see CAM below  Mobility: defer to physical exam    The Confusion Assessment Method (CAM) Diagnostic Algorithm     1: Acute Onset or Fluctuating Course  - Is there evidence of an acute change in mental status from the patient’s baseline? Did the (abnormal) behavior  fluctuate during the day, that is, tend to come and go, or increase and decrease in severity?  [ ] YES [x ] NO     2: Inattention  - Did the patient have difficulty focusing attention, being easily distractible, or having difficulty keeping track of what was being said?  [ ] YES [ ] NO  (x) Unable to assess      3: Disorganized thinking  -Was the patient’s thinking disorganized or incoherent, such as rambling or irrelevant conversation, unclear or illogical flow of ideas, or unpredictable switching from subject to subject?  [ ] YES [ ] NO  (x) Unable to assess     4: Altered Level of consciousness?  [ ] YES [ x] NO    The diagnosis of delirium by CAM requires the presence of features 1 and 2 and either 3 or 4.    PRESSORS: [ ] YES [x ] NO  vancomycin  IVPB 1000 milliGRAM(s) IV Intermittent daily    Cardiovascular:  Pulmonary:    Hematalogic:  enoxaparin Injectable 40 milliGRAM(s) SubCutaneous daily    Other:  acetaminophen    Suspension .. 650 milliGRAM(s) Oral every 6 hours PRN  chlorhexidine 0.12% Liquid 15 milliLiter(s) Oral Mucosa every 12 hours  morphine  - Injectable 1 milliGRAM(s) IV Push every 4 hours PRN  pantoprazole   Suspension 40 milliGRAM(s) Oral daily  sodium chloride 0.9% lock flush 10 milliLiter(s) IV Push every 1 hour PRN    acetaminophen    Suspension .. 650 milliGRAM(s) Oral every 6 hours PRN  chlorhexidine 0.12% Liquid 15 milliLiter(s) Oral Mucosa every 12 hours  enoxaparin Injectable 40 milliGRAM(s) SubCutaneous daily  morphine  - Injectable 1 milliGRAM(s) IV Push every 4 hours PRN  pantoprazole   Suspension 40 milliGRAM(s) Oral daily  sodium chloride 0.9% lock flush 10 milliLiter(s) IV Push every 1 hour PRN  vancomycin  IVPB 1000 milliGRAM(s) IV Intermittent daily    Drug Dosing Weight  Height (cm): 167.6 (03 Nov 2021 16:02)  Weight (kg): 40.9 (01 Nov 2021 02:20)  BMI (kg/m2): 14.6 (03 Nov 2021 16:02)  BSA (m2): 1.43 (03 Nov 2021 16:02)    CENTRAL LINE: [ ] YES [x ] NO  LOCATION:   DATE INSERTED:  REMOVE: [ ] YES [ ] NO  EXPLAIN:    SAEED: [ ] YES [x ] NO    DATE INSERTED:  REMOVE:  [ ] YES [ ] NO  EXPLAIN:    PAST MEDICAL & SURGICAL HISTORY:  HTN (hypertension)    Dementia      Mode: AC/ CMV (Assist Control/ Continuous Mandatory Ventilation)  RR (machine): 16  TV (machine): 350  FiO2: 40  PEEP: 5  ITime: 1  MAP: 9  PIP: 23      PHYSICAL EXAM:    GENERAL: NAD, cachectic  HEAD:  Atraumatic, Normocephalic  EYES: EOMI, PERRLA, conjunctiva and sclera clear  ENMT: No tonsillar erythema, exudates, or enlargement; Moist mucous membranes, Good dentition, No lesions  NECK: Trach tube intact. Supple, No JVD  NERVOUS SYSTEM: Awake/ Alert, nonverbal , does not follow commands.  Motor Strength 3-5/5 B/L upper and 1-2/5 lower extremities.  CHEST/LUNG:Trach to vent.  Clear upper lobes, decreased at bases. No rales, rhonchi, wheezing, or rubs. Left chest tube to pleuravac, to water seal.   HEART: Regular rate and rhythm; No murmurs, rubs, or gallops  ABDOMEN: Soft, Nontender, Nondistended; Bowel sounds present. PEG tube intact.   EXTREMITIES:  2+ Peripheral Pulses, No clubbing, cyanosis, or edema  LYMPH: No lymphadenopathy noted  SKIN: No rashes or lesions.       LABS:  CBC Full  -  ( 19 Nov 2021 07:17 )  WBC Count : 10.32 K/uL  RBC Count : 3.06 M/uL  Hemoglobin : 8.9 g/dL  Hematocrit : 28.6 %  Platelet Count - Automated : 465 K/uL  Mean Cell Volume : 93.5 fl  Mean Cell Hemoglobin : 29.1 pg  Mean Cell Hemoglobin Concentration : 31.1 gm/dL  Auto Neutrophil # : x  Auto Lymphocyte # : x  Auto Monocyte # : x  Auto Eosinophil # : x  Auto Basophil # : x  Auto Neutrophil % : x  Auto Lymphocyte % : x  Auto Monocyte % : x  Auto Eosinophil % : x  Auto Basophil % : x    11-19    136  |  105  |  19<H>  ----------------------------<  104<H>  4.5   |  27  |  0.56    Ca    8.3<L>      19 Nov 2021 07:17  Phos  3.4     11-19  Mg     2.4     11-19    TPro  5.9<L>  /  Alb  1.4<L>  /  TBili  0.2  /  DBili  x   /  AST  30  /  ALT  31  /  AlkPhos  88  11-19    ---------    Culture - Blood in AM (11.17.21 @ 13:16)   Specimen Source: .Blood Blood-Peripheral   Culture Results:   No growth to date. Culture - Blood in AM (11.17.21 @ 13:16)   Specimen Source: .Blood Blood-Peripheral   Culture Results:   No growth to date. Culture - Urine (11.16.21 @ 18:43)   Specimen Source: Catheterized Catheterized   Culture Results:   No growth           [  ]  DVT Prophylaxis  [  ]   Abnormal Nutritional Status -  Malnutrition   Cachexia       Diet, NPO:   Tube Feeding Modality: Nasogastric  Jevity 1.5 Bg  Total Volume for 24 Hours (mL): 720  Continuous  Starting Tube Feed Rate mL per Hour: 20  Increase Tube Feed Rate by (mL): 10     Every 8 hours  Until Goal Tube Feed Rate (mL per Hour): 30  Tube Feed Duration (in Hours): 24  Tube Feed Start Time: 17:00 (11-12-21 @ 11:11) [Active]      RADIOLOGY & ADDITIONAL STUDIES:  ***    < from: Xray Chest 1 View- PORTABLE-Routine (Xray Chest 1 View- PORTABLE-Routine in AM.) (11.17.21 @ 09:28) >    IMPRESSION:    Decreased small left pneumothorax. No other change.    < end of copied text >  < from: Xray Chest 1 View- PORTABLE-Routine (Xray Chest 1 View- PORTABLE-Routine .) (11.16.21 @ 09:58) >    IMPRESSION:   LEFT chest tube in place. Recurrent LEFT lung 20% pneumothorax.  Diffuse airspace disease.    < end of copied text >  < from: Xray Chest 1 View- PORTABLE-Routine (Xray Chest 1 View- PORTABLE-Routine in AM.) (11.15.21 @ 09:25) >    IMPRESSION:  Partial clearing, left lung. Small left pneumothorax.    < end of copied text >  < from: Xray Chest 1 View- PORTABLE-Routine (Xray Chest 1 View- PORTABLE-Routine in AM.) (11.14.21 @ 10:15) >  IMPRESSION:  Tracheostomy tube, left chest tube again noted. Small left apical pneumothorax unchanged. Opacities left lung worsening. Opacities right lung improving. Small right pleural effusion    Heart size cannot be accurately assessed in this projection.    < end of copied text >      Goals of Care Discussion with Family/Proxy/Other   - see note from 11/09

## 2021-11-20 PROCEDURE — 71045 X-RAY EXAM CHEST 1 VIEW: CPT | Mod: 26

## 2021-11-20 RX ADMIN — CHLORHEXIDINE GLUCONATE 15 MILLILITER(S): 213 SOLUTION TOPICAL at 16:29

## 2021-11-20 RX ADMIN — PANTOPRAZOLE SODIUM 40 MILLIGRAM(S): 20 TABLET, DELAYED RELEASE ORAL at 12:07

## 2021-11-20 RX ADMIN — Medication 650 MILLIGRAM(S): at 12:07

## 2021-11-20 RX ADMIN — CHLORHEXIDINE GLUCONATE 15 MILLILITER(S): 213 SOLUTION TOPICAL at 05:25

## 2021-11-20 RX ADMIN — Medication 250 MILLIGRAM(S): at 12:07

## 2021-11-20 RX ADMIN — Medication 650 MILLIGRAM(S): at 16:28

## 2021-11-20 RX ADMIN — ENOXAPARIN SODIUM 40 MILLIGRAM(S): 100 INJECTION SUBCUTANEOUS at 16:29

## 2021-11-20 NOTE — PROGRESS NOTE ADULT - ASSESSMENT
Bacteremia - E coli - cleared  Pneumonia and UTI- adequately treated  S/p septic shock/ fevers/ leukocytosis     Plan:  ·	Continue Vancomycin 1g iv qd for now (empirically)  ·	poor prognosis, patient is full code                                         Bacteremia - E coli - cleared  Pneumonia and UTI- adequately treated  S/p septic shock/ fevers/ leukocytosis     Plan:  ·	Continue Vancomycin 1g iv qd for now (empirically)  ·	poor prognosis, patient is full code    I agree with above

## 2021-11-20 NOTE — PROGRESS NOTE ADULT - SUBJECTIVE AND OBJECTIVE BOX
HPI:  89 yo female from [facility] with medical h/o of HTN, Dementia, CVA with R sided hemiparesis, aphasia, parkinson's, GERD, CKD bedbound at baseline, dependant on assistance for ADL comes in with respiratory failure. Patient was found to have respiratory distress, saturating in 70s when EMS arrived, was placed on NRB on field. She was emergently intubated on arrival in ED. As per NH records patient was having fever and cough for past few days. Today she was found to have respiratory distress. She is vaccinated with moderna. Spoke to family son,  who stated the patient to remain full code. No other history could be obtained.  (2021 00:14)      Patient is a 89y old  Female who presents with a chief complaint of AHRF         REVIEW OF SYSTEMS: denies fever, chills, SOB, palpitations, chest pain, abdominal pain, nausea, vomitting, diarrhea, constipation, dizziness    MEDICATIONS  (STANDING):  chlorhexidine 0.12% Liquid 15 milliLiter(s) Oral Mucosa every 12 hours  enoxaparin Injectable 40 milliGRAM(s) SubCutaneous daily  pantoprazole   Suspension 40 milliGRAM(s) Oral daily  vancomycin  IVPB 1000 milliGRAM(s) IV Intermittent daily    MEDICATIONS  (PRN):  acetaminophen    Suspension .. 650 milliGRAM(s) Oral every 6 hours PRN Temp greater or equal to 38C (100.4F), Mild Pain (1 - 3), Moderate Pain (4 - 6)  morphine  - Injectable 1 milliGRAM(s) IV Push every 4 hours PRN Respiratory distress  sodium chloride 0.9% lock flush 10 milliLiter(s) IV Push every 1 hour PRN Pre/post blood products, medications, blood draw, and to maintain line patency    Vital Signs Last 24 Hrs  T(C): 36.8 (21 @ 20:46), Max: 37.3 (21 @ 12:30)  T(F): 98.3 (21 @ 20:46), Max: 99.2 (21 @ 12:30)  HR: 88 (21 @ 20:46) (88 - 98)  BP: 142/65 (21 @ 20:46) (142/65 - 165/84)  BP(mean): --  RR: 19 (21 @ 20:46) ( - )  SpO2: 100% (21 @ 20:46) (100% - 100%)        PHYSICAL EXAM:  GENERAL: NAD, well-groomed, well-developed  HEAD:  Atraumatic, Normocephalic  EYES: EOMI, PERRLA, conjunctiva and sclera clear  ENMT: No tonsillar erythema, exudates, or enlargement; Moist mucous membranes, Good dentition, No lesions  NECK: Supple, No JVD, Normal thyroid  NERVOUS SYSTEM:  Alert & Oriented X3, Good concentration; Motor Strength 5/5 B/L upper and lower extremities; DTRs 2+ intact and symmetric  CHEST/LUNG: deec breath sounds at bases  HEART: Regular rate and rhythm; No murmurs, rubs, or gallops  ABDOMEN: Soft, Nontender, Nondistended; Bowel sounds present  EXTREMITIES:  2+ Peripheral Pulses, No clubbing, cyanosis, or edema    LABS:      136  |  105  |  19<H>  ----------------------------<  104<H>  4.5   |  27  |  0.56    Ca    8.3<L>      2021 07:17  Phos  3.4       Mg     2.4         TPro  5.9<L>  /  Alb  1.4<L>  /  TBili  0.2  /  DBili      /  AST  30  /  ALT  31  /  AlkPhos  88      Creatinine Trend: 0.56 <--, 0.58 <--, 0.69 <--, 0.59 <--, 0.62 <--, 0.68 <--                        8.9    10.32 )-----------( 465      ( 2021 07:17 )             28.6     Urine Studies:  Urinalysis Basic - ( 2021 14:22 )    Color: Yellow / Appearance: Slightly Turbid / S.020 / pH:   Gluc:  / Ketone: Trace  / Bili: Negative / Urobili: Negative   Blood:  / Protein: 100 / Nitrite: Negative   Leuk Esterase: Trace / RBC: 5-10 /HPF / WBC 11-25 /HPF   Sq Epi:  / Non Sq Epi: Many /HPF / Bacteria: Many /HPF              LIVER FUNCTIONS - ( 2021 07:17 )  Alb: 1.4 g/dL / Pro: 5.9 g/dL / ALK PHOS: 88 U/L / ALT: 31 U/L DA / AST: 30 U/L / GGT: x

## 2021-11-20 NOTE — PROGRESS NOTE ADULT - PROBLEM SELECTOR PLAN 3
Completed 14 days of Cefepime   Afebrile > 48 hrs, leukocytosis resolved.   Repeat BC 11/17 NGTD   Continue IV Vanco, Day 3 per ID

## 2021-11-20 NOTE — CHART NOTE - NSCHARTNOTEFT_GEN_A_CORE
Contacted pt's grandson Dr. Pathak and updated on pt's clinical condition and plan of care.   All questions and concerns addressed .    Alysha Mcdonough NP Medicine/[facility]  [phone]

## 2021-11-20 NOTE — PROGRESS NOTE ADULT - SUBJECTIVE AND OBJECTIVE BOX
[facility]    SCU progress note    INTERVAL HPI/OVERNIGHT EVENTS: ***No acute events overnight. CXR this am results noted: mild enlargement of left apical pneumothorax. Left chest tube unclamped, to waterseal. D/w Thoracic Team. Pt NAD.     DNR [ ]   DNI  [  ] Full code.     Covid - 19 PCR: COVID-19 PCR: NotDetec (19 Nov 2021 05:52)  COVID-19 PCR: NotDetec (10 Nov 2021 20:31)  COVID-19 PCR: NotDetec (31 Oct 2021 23:22)      The 4Ms    What Matters Most: see GOC  Age appropriate Medications/Screen for High Risk Medication: Yes  Mentation: see CAM below  Mobility: defer to physical exam    The Confusion Assessment Method (CAM) Diagnostic Algorithm     1: Acute Onset or Fluctuating Course  - Is there evidence of an acute change in mental status from the patient’s baseline? Did the (abnormal) behavior  fluctuate during the day, that is, tend to come and go, or increase and decrease in severity?  [ ] YES [x ] NO     2: Inattention  - Did the patient have difficulty focusing attention, being easily distractible, or having difficulty keeping track of what was being said?  [ ] YES [ ] NO   (x) Unable to assess          3: Disorganized thinking  -Was the patient’s thinking disorganized or incoherent, such as rambling or irrelevant conversation, unclear or illogical flow of ideas, or unpredictable switching from subject to subject?  [ ] YES [ ] NO  (x) Unable to assess     4: Altered Level of consciousness?  [ ] YES [x ] NO    The diagnosis of delirium by CAM requires the presence of features 1 and 2 and either 3 or 4.    PRESSORS: [ ] YES [x ] NO  vancomycin  IVPB 1000 milliGRAM(s) IV Intermittent daily    Cardiovascular:    Pulmonary:    Hematalogic:  enoxaparin Injectable 40 milliGRAM(s) SubCutaneous daily    Other:  acetaminophen    Suspension .. 650 milliGRAM(s) Oral every 6 hours PRN  chlorhexidine 0.12% Liquid 15 milliLiter(s) Oral Mucosa every 12 hours  morphine  - Injectable 1 milliGRAM(s) IV Push every 4 hours PRN  pantoprazole   Suspension 40 milliGRAM(s) Oral daily  sodium chloride 0.9% lock flush 10 milliLiter(s) IV Push every 1 hour PRN    acetaminophen    Suspension .. 650 milliGRAM(s) Oral every 6 hours PRN  chlorhexidine 0.12% Liquid 15 milliLiter(s) Oral Mucosa every 12 hours  enoxaparin Injectable 40 milliGRAM(s) SubCutaneous daily  morphine  - Injectable 1 milliGRAM(s) IV Push every 4 hours PRN  pantoprazole   Suspension 40 milliGRAM(s) Oral daily  sodium chloride 0.9% lock flush 10 milliLiter(s) IV Push every 1 hour PRN  vancomycin  IVPB 1000 milliGRAM(s) IV Intermittent daily    Drug Dosing Weight  Height (cm): 167.6 (03 Nov 2021 16:02)  Weight (kg): 40.9 (01 Nov 2021 02:20)  BMI (kg/m2): 14.6 (03 Nov 2021 16:02)  BSA (m2): 1.43 (03 Nov 2021 16:02)    CENTRAL LINE: [ ] YES [ x] NO  LOCATION:   DATE INSERTED:  REMOVE: [ ] YES [ ] NO  EXPLAIN:    SAEED: [ ] YES [x ] NO    DATE INSERTED:  REMOVE:  [ ] YES [ ] NO  EXPLAIN:    PAST MEDICAL & SURGICAL HISTORY:  HTN (hypertension)    Dementia        11-19 @ 07:01  -  11-20 @ 07:00  --------------------------------------------------------  IN: 0 mL / OUT: 10 mL / NET: -10 mL        Mode: AC/ CMV (Assist Control/ Continuous Mandatory Ventilation)  RR (machine): 16  TV (machine): 350  FiO2: 40  PEEP: 5  ITime: 1  MAP: 10  PIP: 24      PHYSICAL EXAM:    GENERAL:cachectic,  NAD  HEAD:  Atraumatic, Normocephalic  EYES: EOMI, PERRLA, conjunctiva and sclera clear  ENMT: No tonsillar erythema, exudates, or enlargement  NECK: Trach tube intact. Supple, No JVD  NERVOUS SYSTEM:Awake/ Alert. Nonverbal. Does not follow commands.  Motor Strength 3-4/5 B/L upper and 2-3/5 lower extremities  CHEST/LUNG:Trach to vent.  Clear upper airway, decreased at bases.  No rales, rhonchi, wheezing, or rubs  HEART: Regular rate and rhythm; No murmurs, rubs, or gallops  ABDOMEN: Soft, Nontender, Nondistended; Bowel sounds present. PEG tube intact.   EXTREMITIES:Muscle wasting.   2+ Peripheral Pulses, No clubbing, cyanosis, or edema  LYMPH: No lymphadenopathy noted  SKIN:No rash, no lesions. Left chest tube site with dsg, C/D/I      LABS:  CBC Full  -  ( 19 Nov 2021 07:17 )  WBC Count : 10.32 K/uL  RBC Count : 3.06 M/uL  Hemoglobin : 8.9 g/dL  Hematocrit : 28.6 %  Platelet Count - Automated : 465 K/uL  Mean Cell Volume : 93.5 fl  Mean Cell Hemoglobin : 29.1 pg  Mean Cell Hemoglobin Concentration : 31.1 gm/dL  Auto Neutrophil # : x  Auto Lymphocyte # : x  Auto Monocyte # : x  Auto Eosinophil # : x  Auto Basophil # : x  Auto Neutrophil % : x  Auto Lymphocyte % : x  Auto Monocyte % : x  Auto Eosinophil % : x  Auto Basophil % : x    11-19    136  |  105  |  19<H>  ----------------------------<  104<H>  4.5   |  27  |  0.56    Ca    8.3<L>      19 Nov 2021 07:17  Phos  3.4     11-19  Mg     2.4     11-19    TPro  5.9<L>  /  Alb  1.4<L>  /  TBili  0.2  /  DBili  x   /  AST  30  /  ALT  31  /  AlkPhos  88  11-19        [  ]  DVT Prophylaxis  [  ]   Abnormal Nutritional Status -  Malnutrition   Cachexia       , NPO with Tube Feed:   Tube Feeding Modality: Gastrostomy  Jevity 1.5 Bg  Total Volume for 24 Hours (mL): 720  Continuous  Starting Tube Feed Rate mL per Hour: 30  Until Goal Tube Feed Rate (mL per Hour): 30  Tube Feed Duration (in Hours): 24  Tube Feed Start Time: 15:00  No Carb Prosource (1pkg = 15gms Protein)     Qty per Day:  1 (11-19-21 @ 15:08) [Active]          RADIOLOGY & ADDITIONAL STUDIES:  ***  < from: Xray Chest 1 View- PORTABLE-Routine (Xray Chest 1 View- PORTABLE-Routine .) (11.20.21 @ 10:51) >    IMPRESSION:  Left chest tube in place.  Mild enlargement of left apical pneumothorax.    < end of copied text >  < from: Xray Chest 1 View- PORTABLE-Routine (Xray Chest 1 View- PORTABLE-Routine in AM.) (11.19.21 @ 09:51) >  IMPRESSION:  Tracheostomy tube and left chest tube are unchanged.  Decrease in size of small left apical pneumothorax.  Persistent small right pleural effusion.  Right-sided pneumonia is stable. Mild improvement in mid and lower left lung pneumonia.    < end of copied text >  < from: Xray Chest 1 View- PORTABLE-Routine (Xray Chest 1 View- PORTABLE-Routine in AM.) (11.18.21 @ 11:28) >  IMPRESSION:  Stable slight cardiomegaly.  Tracheostomy cannula remains in appropriate position.  Left chest tube tip at left apex.  Stable small left apical pneumothorax.  Stable small bilateral pleural effusions.  Stable bilateral pneumonic infiltrates in upper right lung and in mid and lower left lung.    < end of copied text >        Goals of Care Discussion with Family/Proxy/Other   - see note from 11/8/21

## 2021-11-20 NOTE — PROGRESS NOTE ADULT - PROBLEM SELECTOR PLAN 1
Secondary to bacteremia  BC from 11/1 grew E coli. Completed Cefepime .  Febrile 11/17  BC/UC both  NGTD   Continue Vanco Day 3   ID Dr Wong following.

## 2021-11-20 NOTE — PROGRESS NOTE ADULT - ASSESSMENT
89 y/o F from [facility] w/ Hx of HTN, Dementia, CVA w/ R sided hemiparesis, aphasia, Parkinson's Disease, GERD, CKD. She is bedbound at baseline and needs assistance for ADLs. She was brought to the E.D. due to episodes of desaturation at 70s and respiratory distress. She was also having cough and fever in the nursing home. She was initially on NRB placed by EMS but was eventually intubated in the E.D. She is vaccinated with Moderna. COVID test was negative on admission. Chest Xray showed left lower lobe infiltrate (pneumonia). Blood cultures were sent. She was given 1 dose of Vancomycin and Zosyn in the E.D. and was subsequently admitted to ICU.    In the ICU, she was placed on mechanical ventilator. Her blood pressure was low despite receiving 2L bolus of LR. NGT and Left IJ central line was placed. Levophed was started. CXR revealed Left pneumothorax. Thoracic Surgery was consulted and left chest tube was placed. Patient was also tachycardic and troponin was mildly elevated. EKG showed SR w/ ST changes in the inferior leads. Troponin was monitored and has since trended down. Heart rate has been stable. Lactate was also elevated at 11.2 and has also since trended down. Patient also presented with hypernatremia and elevated creatinine probably secondary to volume deficit. She was also given free water. She was on Heparin for DVT prophylaxis and Protonix for GI prophylaxis. Palliative was consulted regarding the [facility]. Blood culture results showed E. coli bacteremia. She was started on Rocephin IV for 10 days. Azithromycin was continued awaiting Legionella results. Infectious Disease was consulted. Urine culture was ordered. Patient spiked a fever and is tachycardic. Blood cultures were repeated. She was tachycardic however his EKG showed SR w/ new PACs. There was mild air leak in the chest tube drainage but it was fixed by Cardiothoracic Surgery. Urine culture and repeat blood culture was negative. She had 2 febrile episodes overnight at 100.4-100.5F. She was tachypneic at rate of 30. Her repeat Chest Xray showed worsening of bilateral infiltrates. Her albumin is still low at 1.0. Her urine output is net negative. She was given Lasix and Albumin to maintain net negative urine output and decrease congestion. Vancomycin was discontinued and Cefepime decreased from 2g to 1g to complete 14 days since negative culture (11/16/21). Echocardiogram was ordered. Central line was removed. Lactulose was ordered for bowel movement. On November 11, 2021, patient had her tracheostomy and PEG inserted. Patient son Jered was called and informed .     11/20 CXR shows mild enlargement of left apical pneumothorax. Left chest tube unclamped to water seal. Thoracic following.

## 2021-11-20 NOTE — PROGRESS NOTE ADULT - SUBJECTIVE AND OBJECTIVE BOX
89F s/p left sided chest tube placement for pneumothorax, tracheostomy and PEG tube placement for continued respiratory failure.     Vital Signs Last 24 Hrs  T(C): 36.5 (20 Nov 2021 04:55), Max: 37.6 (19 Nov 2021 14:20)  T(F): 97.7 (20 Nov 2021 04:55), Max: 99.6 (19 Nov 2021 14:20)  HR: 88 (20 Nov 2021 04:55) (87 - 97)  BP: 164/73 (20 Nov 2021 04:55) (130/56 - 164/73)  BP(mean): --  RR: 20 (20 Nov 2021 04:55) (20 - 29)  SpO2: 100% (20 Nov 2021 04:55) (97% - 100%)    NAD  tracheostomy noted, AC 16 350 40 5  left chest tube to water seal with no output, no air leak, clamped this morning   abdomen soft, nondistended, gastrostomy tube noted 2.5cm at skin      A/P:  89F with left chest tube in place for pneumothorax, minimal output with no air leak on water seal, clamped today at 0800.     follow up 4h chest XR at noon  leave chest tube clamped if XR is stable compared to yesterday, otherwise unclamp tube for worsening  will reassess tomorrow for possible removal if XR remains stable after being clamped today

## 2021-11-21 ENCOUNTER — TRANSCRIPTION ENCOUNTER (OUTPATIENT)
Age: 86
End: 2021-11-21

## 2021-11-21 LAB
ALBUMIN SERPL ELPH-MCNC: 1.5 G/DL — LOW (ref 3.5–5)
ALP SERPL-CCNC: 103 U/L — SIGNIFICANT CHANGE UP (ref 40–120)
ALT FLD-CCNC: 28 U/L DA — SIGNIFICANT CHANGE UP (ref 10–60)
ANION GAP SERPL CALC-SCNC: 7 MMOL/L — SIGNIFICANT CHANGE UP (ref 5–17)
AST SERPL-CCNC: 22 U/L — SIGNIFICANT CHANGE UP (ref 10–40)
BILIRUB SERPL-MCNC: 0.3 MG/DL — SIGNIFICANT CHANGE UP (ref 0.2–1.2)
BUN SERPL-MCNC: 17 MG/DL — SIGNIFICANT CHANGE UP (ref 7–18)
CALCIUM SERPL-MCNC: 8.2 MG/DL — LOW (ref 8.4–10.5)
CHLORIDE SERPL-SCNC: 102 MMOL/L — SIGNIFICANT CHANGE UP (ref 96–108)
CO2 SERPL-SCNC: 27 MMOL/L — SIGNIFICANT CHANGE UP (ref 22–31)
CREAT SERPL-MCNC: 0.6 MG/DL — SIGNIFICANT CHANGE UP (ref 0.5–1.3)
GLUCOSE SERPL-MCNC: 145 MG/DL — HIGH (ref 70–99)
HCT VFR BLD CALC: 29.5 % — LOW (ref 34.5–45)
HGB BLD-MCNC: 9.2 G/DL — LOW (ref 11.5–15.5)
MAGNESIUM SERPL-MCNC: 2.4 MG/DL — SIGNIFICANT CHANGE UP (ref 1.6–2.6)
MCHC RBC-ENTMCNC: 29 PG — SIGNIFICANT CHANGE UP (ref 27–34)
MCHC RBC-ENTMCNC: 31.2 GM/DL — LOW (ref 32–36)
MCV RBC AUTO: 93.1 FL — SIGNIFICANT CHANGE UP (ref 80–100)
NRBC # BLD: 0 /100 WBCS — SIGNIFICANT CHANGE UP (ref 0–0)
PHOSPHATE SERPL-MCNC: 3.2 MG/DL — SIGNIFICANT CHANGE UP (ref 2.5–4.5)
PLATELET # BLD AUTO: 687 K/UL — HIGH (ref 150–400)
POTASSIUM SERPL-MCNC: 4.5 MMOL/L — SIGNIFICANT CHANGE UP (ref 3.5–5.3)
POTASSIUM SERPL-SCNC: 4.5 MMOL/L — SIGNIFICANT CHANGE UP (ref 3.5–5.3)
PROT SERPL-MCNC: 6.6 G/DL — SIGNIFICANT CHANGE UP (ref 6–8.3)
RBC # BLD: 3.17 M/UL — LOW (ref 3.8–5.2)
RBC # FLD: 15 % — HIGH (ref 10.3–14.5)
SODIUM SERPL-SCNC: 136 MMOL/L — SIGNIFICANT CHANGE UP (ref 135–145)
WBC # BLD: 12.5 K/UL — HIGH (ref 3.8–10.5)
WBC # FLD AUTO: 12.5 K/UL — HIGH (ref 3.8–10.5)

## 2021-11-21 PROCEDURE — 71045 X-RAY EXAM CHEST 1 VIEW: CPT | Mod: 26

## 2021-11-21 RX ORDER — ASPIRIN/CALCIUM CARB/MAGNESIUM 324 MG
81 TABLET ORAL DAILY
Refills: 0 | Status: DISCONTINUED | OUTPATIENT
Start: 2021-11-21 | End: 2021-12-01

## 2021-11-21 RX ADMIN — ENOXAPARIN SODIUM 40 MILLIGRAM(S): 100 INJECTION SUBCUTANEOUS at 12:33

## 2021-11-21 RX ADMIN — CHLORHEXIDINE GLUCONATE 15 MILLILITER(S): 213 SOLUTION TOPICAL at 05:50

## 2021-11-21 RX ADMIN — PANTOPRAZOLE SODIUM 40 MILLIGRAM(S): 20 TABLET, DELAYED RELEASE ORAL at 17:15

## 2021-11-21 RX ADMIN — Medication 250 MILLIGRAM(S): at 12:33

## 2021-11-21 RX ADMIN — CHLORHEXIDINE GLUCONATE 15 MILLILITER(S): 213 SOLUTION TOPICAL at 17:15

## 2021-11-21 NOTE — PROGRESS NOTE ADULT - SUBJECTIVE AND OBJECTIVE BOX
89y Female is under our care for prior fevers with unclear source. Patient remains afebrile so will dc vanco and monitor for now. Patient had repeat CXR yesterday and it shows mildly enlargement of left apical ptx. Surgery wants water seal to remain clamped for now.     MEDS:  vancomycin  IVPB 1000 milliGRAM(s) IV Intermittent daily    ALLERGIES: Allergies    No Known Allergies    Intolerances      REVIEW OF SYSTEMS:  [ X ] Not able to illicit    VITALS:  Vital Signs Last 24 Hrs  T(C): 37.3 (21 Nov 2021 14:01), Max: 37.3 (21 Nov 2021 14:01)  T(F): 99.1 (21 Nov 2021 14:01), Max: 99.1 (21 Nov 2021 14:01)  HR: 102 (21 Nov 2021 14:01) (88 - 102)  BP: 142/70 (21 Nov 2021 14:01) (142/65 - 150/60)  BP(mean): --  RR: 25 (21 Nov 2021 14:01) (19 - 25)  SpO2: 98% (21 Nov 2021 14:01) (98% - 100%)    PHYSICAL EXAM:  HEENT: +vent via trach  Neck: supple no LN's   Respiratory: scattered few rhonchi bilaterally, no rales or wheezes +right CT  Cardiovascular: S1 S2 reg no murmurs  Gastrointestinal: +BS with soft, nondistended abdomen; nontender +peg  Extremities: bilateral hand edema  Skin: no rashes  Ortho: n/a  Neuro: AAO x 0      LABS/DIAGNOSTIC TESTS:                            9.2    12.50 )-----------( 687      ( 21 Nov 2021 07:45 )             29.5   WBC Count: 12.50 K/uL (11-21 @ 07:45)  WBC Count: 10.32 K/uL (11-19 @ 07:17)  WBC Count: 10.03 K/uL (11-18 @ 06:39)  WBC Count: 12.17 K/uL (11-17 @ 08:14)    11-21    136  |  102  |  17  ----------------------------<  145<H>  4.5   |  27  |  0.60    Ca    8.2<L>      21 Nov 2021 07:45  Phos  3.2     11-21  Mg     2.4     11-21    TPro  6.6  /  Alb  1.5<L>  /  TBili  0.3  /  DBili  x   /  AST  22  /  ALT  28  /  AlkPhos  103  11-21      CULTURES:   .Blood Blood-Peripheral  11-17 @ 13:16   No growth to date.  --  --      Catheterized Catheterized  11-16 @ 18:43   No growth  --  --      .Blood Blood  11-03 @ 10:22   No Growth Final  --  --      Clean Catch Clean Catch (Midstream)  11-02 @ 21:01   No growth  --  --      .Sputum Sputum  11-01 @ 21:18   Moderate Streptococcus agalactiae (Group B) isolated  Group B streptococci are susceptible to ampicillin,  penicillin and cefazolin, but may be resistant to  erythromycin and clindamycin.  Recommendations for intrapartum prophylaxis for Group B  streptococci are penicillin or ampicillin.  Normal Respiratory [place] present  --    Rare polymorphonuclear leukocytes per low power field  No Squamous epithelial cells per low power field  Rare Yeast like cells seen per oil power field  Rare Gram Positive Rods seen per oil power field  Rare Gram positive cocci in pairs seen per oil power field      .Blood Blood-Peripheral  11-01 @ 03:56   No Growth Final  --  Blood Culture PCR  Escherichia coli        RADIOLOGY:    EXAM:  XR CHEST PORTABLE ROUTINE 1V                            PROCEDURE DATE:  11/20/2021          INTERPRETATION:  DATE OF STUDY: 11/20/21    PRIOR:11/19/21    CLINICAL INDICATION: Left chest tube clamped.    TECHNIQUE: portable chest - done erect.    FINDINGS:  The cardiomediastinal silhouette is within normal limits.  Tracheostomy cannula and left chest tube unchanged.  Mild enlargement of left apical pneumothorax.  Stable bilateral pneumonic infiltrates. Stable small right pleural effusion.    IMPRESSION:  Left chest tube in place.  Mild enlargement of left apical pneumothorax.

## 2021-11-21 NOTE — CHART NOTE - NSCHARTNOTEFT_GEN_A_CORE
Contacted pt's grandson Dr. Pathak and updated on pt's clinical condition and plan of care.   All questions and concerns addressed .    Alysha Mcdonough NP Medicine/[facility]  [phone]

## 2021-11-21 NOTE — DISCHARGE NOTE PROVIDER - PROVIDER TOKENS
PROVIDER:[TOKEN:[[id number]]] PROVIDER:[TOKEN:[[id number]]],FREE:[LAST:[Facility Provider],PHONE:[(   )    -],FAX:[(   )    -]],PROVIDER:[TOKEN:[[id number]]]

## 2021-11-21 NOTE — PROGRESS NOTE ADULT - ASSESSMENT
89 y/o F from [facility] w/ Hx of HTN, Dementia, CVA w/ R sided hemiparesis, aphasia, Parkinson's Disease, GERD, CKD. She is bedbound at baseline and needs assistance for ADLs. She was brought to the E.D. due to episodes of desaturation at 70s and respiratory distress. She was also having cough and fever in the nursing home. She was initially on NRB placed by EMS but was eventually intubated in the E.D. She is vaccinated with Moderna. COVID test was negative on admission. Chest Xray showed left lower lobe infiltrate (pneumonia). Blood cultures were sent. She was given 1 dose of Vancomycin and Zosyn in the E.D. and was subsequently admitted to ICU.    In the ICU, she was placed on mechanical ventilator. Her blood pressure was low despite receiving 2L bolus of LR. NGT and Left IJ central line was placed. Levophed was started. CXR revealed Left pneumothorax. Thoracic Surgery was consulted and left chest tube was placed. Patient was also tachycardic and troponin was mildly elevated. EKG showed SR w/ ST changes in the inferior leads. Troponin was monitored and has since trended down. Heart rate has been stable. Lactate was also elevated at 11.2 and has also since trended down. Patient also presented with hypernatremia and elevated creatinine probably secondary to volume deficit. She was also given free water. She was on Heparin for DVT prophylaxis and Protonix for GI prophylaxis. Palliative was consulted regarding the [facility]. Blood culture results showed E. coli bacteremia. She was started on Rocephin IV for 10 days. Azithromycin was continued awaiting Legionella results. Infectious Disease was consulted. Urine culture was ordered. Patient spiked a fever and is tachycardic. Blood cultures were repeated. She was tachycardic however his EKG showed SR w/ new PACs. There was mild air leak in the chest tube drainage but it was fixed by Cardiothoracic Surgery. Urine culture and repeat blood culture was negative. She had 2 febrile episodes overnight at 100.4-100.5F. She was tachypneic at rate of 30. Her repeat Chest Xray showed worsening of bilateral infiltrates. Her albumin is still low at 1.0. Her urine output is net negative. She was given Lasix and Albumin to maintain net negative urine output and decrease congestion. Vancomycin was discontinued and Cefepime decreased from 2g to 1g to complete 14 days since negative culture (11/16/21). Echocardiogram was ordered. Central line was removed. Lactulose was ordered for bowel movement. On November 11, 2021, patient had her tracheostomy and PEG inserted. Patient son Jered was called and informed .     11/20 CXR shows mild enlargement of left apical pneumothorax. Left chest tube unclamped to water seal. Thoracic following.

## 2021-11-21 NOTE — DISCHARGE NOTE PROVIDER - NSDCMRMEDTOKEN_GEN_ALL_CORE_FT
Aricept 10 mg oral tablet: 1 tab(s) orally once a day (at bedtime)  aspirin 81 mg oral tablet: 1 tab(s) orally once a day  benztropine 0.5 mg oral tablet: 1 tab(s) orally once a day (at bedtime)  ferrous sulfate 220 mg/5 mL (44 mg/5 mL elemental iron) oral elixir: 7.5 milliliter(s) orally once a day  MiraLax oral powder for reconstitution: 1 unit(s) orally once a day  multivitamin with minerals: 1 tab(s) orally once a day  Senna 8.6 mg oral tablet: 1 tab(s) orally once a day (at bedtime)  Tylenol 325 mg oral tablet: 2 tab(s) orally every 4 hours   acetaminophen 160 mg/5 mL oral suspension: 650 milligram(s) by gastrostomy tube every 6 hours, As Needed - 3), Moderate Pain (4 - 6)  Aricept 10 mg oral tablet: 1 tab(s) by gastrostomy tube once a day (at bedtime)  aspirin 81 mg oral tablet, chewable: 1 tab(s) orally once a day  benztropine 0.5 mg oral tablet: 1 tab(s) by gastrostomy tube once a day (at bedtime)  enoxaparin: 40 milligram(s) subcutaneously once a day  ferrous sulfate 220 mg/5 mL (44 mg/5 mL elemental iron) oral elixir: 7.5 milliliter(s) orally once a day  MiraLax oral powder for reconstitution: 1 unit(s) orally once a day  multivitamin with minerals: 1 tab(s) orally once a day  Senna 8.6 mg oral tablet: 1 tab(s) orally once a day (at bedtime)   acetaminophen 160 mg/5 mL oral suspension: 650 milligram(s) by gastrostomy tube every 6 hours, As Needed - 3), Moderate Pain (4 - 6)  Aricept 10 mg oral tablet: 1 tab(s) by gastrostomy tube once a day (at bedtime)  aspirin 81 mg oral tablet, chewable: 1 tab(s) orally once a day  benztropine 0.5 mg oral tablet: 1 tab(s) by gastrostomy tube once a day (at bedtime)  enoxaparin: 40 milligram(s) subcutaneously once a day  ferrous sulfate 220 mg/5 mL (44 mg/5 mL elemental iron) oral elixir: 5 milliliter(s) by gastrostomy tube once a day  Multi-Mathieu oral liquid: 15 milliliter(s) by gastrostomy tube once a day  pantoprazole 40 mg oral granule, delayed release: 1 cap(s) by gastrostomy tube once a day

## 2021-11-21 NOTE — DISCHARGE NOTE PROVIDER - NSDCCPCAREPLAN_GEN_ALL_CORE_FT
PRINCIPAL DISCHARGE DIAGNOSIS  Diagnosis: Acute respiratory failure with hypoxia  Assessment and Plan of Treatment: Continue mechanical ventilation 350-16-40% +5 PEEP. Daily SBT trials currently tolerating PS 5. Continue weaning trials as tolerated. Monitor oxygen saturation.      SECONDARY DISCHARGE DIAGNOSES  Diagnosis: Pneumonia  Assessment and Plan of Treatment: You were treated with IV antibiotics while in the hospital. You no longer require any antibiotics.    Diagnosis: E coli bacteremia  Assessment and Plan of Treatment:     Diagnosis: Severe sepsis  Assessment and Plan of Treatment: Secondary  to E-coli bacteremia and pneumonia. You were treated with antibiotics in the hospital. You no longer require any antibiotics.    Diagnosis: Pneumothorax, left  Assessment and Plan of Treatment: You had a left sided pneumothorax. A chest tube was inserted. The chest tube converted tp Pneumostat valve. Minimal drainage 3-4ml in 24 hours. NO pneumothorax noted on recent chest xray.  Need to follow up with thoracic surgery in 3 weeks.    Diagnosis: Anemia of chronic disease  Assessment and Plan of Treatment: Hemoglobin low but stable. Continue iron supplementation.    Diagnosis: Thrombocytosis  Assessment and Plan of Treatment:     Diagnosis: Dementia  Assessment and Plan of Treatment: Maintain safety precautions.    Diagnosis: Severe protein-calorie malnutrition  Assessment and Plan of Treatment: Continue tube feeds and supplementations.

## 2021-11-21 NOTE — DISCHARGE NOTE PROVIDER - CARE PROVIDER_API CALL
Zach Edwards  INTERNAL MEDICINE  [address] [facility], [address]  [place] [address]  Phone: [phone]  Fax: [phone]  Follow Up Time:    Zach Edwards  INTERNAL MEDICINE  [address] [facility], [address]  [place] [address]  Phone: [phone]  Fax: [phone]  Follow Up Time:     Facility Provider,   Phone: (   )    -  Fax: (   )    -  Follow Up Time:     Courtney Ruzi)  Surgery; Thoracic Surgery  [address] [place] [address]  Phone: [phone]  Fax: [phone]  Follow Up Time:

## 2021-11-21 NOTE — PROGRESS NOTE ADULT - SUBJECTIVE AND OBJECTIVE BOX
AKASH CHACKO    SCU progress note    INTERVAL HPI/OVERNIGHT EVENTS: ***    DNR [ ]   DNI  [  ]    Covid - 19 PCR: COVID-19 PCR: NotDetec (19 Nov 2021 05:52)  COVID-19 PCR: NotDetec (10 Nov 2021 20:31)  COVID-19 PCR: NotDetec (31 Oct 2021 23:22)      The 4Ms    What Matters Most: see GOC  Age appropriate Medications/Screen for High Risk Medication: Yes  Mentation: see CAM below  Mobility: defer to physical exam    The Confusion Assessment Method (CAM) Diagnostic Algorithm     1: Acute Onset or Fluctuating Course  - Is there evidence of an acute change in mental status from the patient’s baseline? Did the (abnormal) behavior  fluctuate during the day, that is, tend to come and go, or increase and decrease in severity?  [ ] YES [ ] NO     2: Inattention  - Did the patient have difficulty focusing attention, being easily distractible, or having difficulty keeping track of what was being said?  [ ] YES [ ] NO     3: Disorganized thinking  -Was the patient’s thinking disorganized or incoherent, such as rambling or irrelevant conversation, unclear or illogical flow of ideas, or unpredictable switching from subject to subject?  [ ] YES [ ] NO    4: Altered Level of consciousness?  [ ] YES [ ] NO    The diagnosis of delirium by CAM requires the presence of features 1 and 2 and either 3 or 4.    PRESSORS: [ ] YES [ ] NO  vancomycin  IVPB 1000 milliGRAM(s) IV Intermittent daily    Cardiovascular:  Heart Failure  Acute   Acute on Chronic  Chronic         Pulmonary:    Hematalogic:  enoxaparin Injectable 40 milliGRAM(s) SubCutaneous daily    Other:  acetaminophen    Suspension .. 650 milliGRAM(s) Oral every 6 hours PRN  chlorhexidine 0.12% Liquid 15 milliLiter(s) Oral Mucosa every 12 hours  morphine  - Injectable 1 milliGRAM(s) IV Push every 4 hours PRN  pantoprazole   Suspension 40 milliGRAM(s) Oral daily  sodium chloride 0.9% lock flush 10 milliLiter(s) IV Push every 1 hour PRN    acetaminophen    Suspension .. 650 milliGRAM(s) Oral every 6 hours PRN  chlorhexidine 0.12% Liquid 15 milliLiter(s) Oral Mucosa every 12 hours  enoxaparin Injectable 40 milliGRAM(s) SubCutaneous daily  morphine  - Injectable 1 milliGRAM(s) IV Push every 4 hours PRN  pantoprazole   Suspension 40 milliGRAM(s) Oral daily  sodium chloride 0.9% lock flush 10 milliLiter(s) IV Push every 1 hour PRN  vancomycin  IVPB 1000 milliGRAM(s) IV Intermittent daily    Drug Dosing Weight  Height (cm): 167.6 (03 Nov 2021 16:02)  Weight (kg): 40.9 (01 Nov 2021 02:20)  BMI (kg/m2): 14.6 (03 Nov 2021 16:02)  BSA (m2): 1.43 (03 Nov 2021 16:02)    CENTRAL LINE: [ ] YES [ ] NO  LOCATION:   DATE INSERTED:  REMOVE: [ ] YES [ ] NO  EXPLAIN:    SAEED: [ ] YES [ ] NO    DATE INSERTED:  REMOVE:  [ ] YES [ ] NO  EXPLAIN:    PAST MEDICAL & SURGICAL HISTORY:  HTN (hypertension)    Dementia                  Mode: AC/ CMV (Assist Control/ Continuous Mandatory Ventilation)  RR (machine): 16  TV (machine): 350  FiO2: 40  PEEP: 5  ITime: 0.9  MAP: 10  PIP: 24      PHYSICAL EXAM:    GENERAL:cachectic,  NAD  HEAD:  Atraumatic, Normocephalic  EYES:  PERRL, conjunctiva and sclera clear  ENMT: No tonsillar erythema, exudates, or enlargement; Moist mucous membranes,   NECK: trach tube intact. Supple, No JVD  NERVOUS SYSTEM: Awakens easily, nonverbal , does not follow commands.  Motor Strength 3-4/5 B/L upper and 2-3/5 lower extremities  CHEST/LUNG:Trach to vent.  Clear upper airway, decreased at bases.  No rales, rhonchi, wheezing, or rubs  HEART: Regular rate and rhythm; No murmurs, rubs, or gallops  ABDOMEN: Soft, Nontender, Nondistended; Bowel sounds present. PEG tube intact.   EXTREMITIES:Muscle wasting to BLE,   2+ Peripheral Pulses, No clubbing, cyanosis, or edema  LYMPH: No lymphadenopathy noted  SKIN: No rashes or lesions      LABS:  CBC Full  -  ( 21 Nov 2021 07:45 )  WBC Count : 12.50 K/uL  RBC Count : 3.17 M/uL  Hemoglobin : 9.2 g/dL  Hematocrit : 29.5 %  Platelet Count - Automated : 687 K/uL  Mean Cell Volume : 93.1 fl  Mean Cell Hemoglobin : 29.0 pg  Mean Cell Hemoglobin Concentration : 31.2 gm/dL  Auto Neutrophil # : x  Auto Lymphocyte # : x  Auto Monocyte # : x  Auto Eosinophil # : x  Auto Basophil # : x  Auto Neutrophil % : x  Auto Lymphocyte % : x  Auto Monocyte % : x  Auto Eosinophil % : x  Auto Basophil % : x    11-21    136  |  102  |  17  ----------------------------<  145<H>  4.5   |  27  |  0.60    Ca    8.2<L>      21 Nov 2021 07:45  Phos  3.2     11-21  Mg     2.4     11-21    TPro  6.6  /  Alb  1.5<L>  /  TBili  0.3  /  DBili  x   /  AST  22  /  ALT  28  /  AlkPhos  103  11-21              [  ]  DVT Prophylaxis  [  ]   Abnormal Nutritional Status -  Malnutrition   Cachexia        Diet, NPO with Tube Feed:   Tube Feeding Modality: Gastrostomy  Jevity 1.5 Bg  Total Volume for 24 Hours (mL): 720  Continuous  Starting Tube Feed Rate mL per Hour: 30  Until Goal Tube Feed Rate (mL per Hour): 30  Tube Feed Duration (in Hours): 24  Tube Feed Start Time: 15:00  No Carb Prosource (1pkg = 15gms Protein)     Qty per Day:  1 (11-19-21 @ 15:08) [Active]          RADIOLOGY & ADDITIONAL STUDIES:  ***    Goals of Care Discussion with Family/Proxy/Other   - see note from 118/21 AKASH CHACKO    SCU progress note    INTERVAL HPI/OVERNIGHT EVENTS: ***No acute events overnight. Pt seen and examined this morning. Awake, nonverbal , trach to vent. NAD.     DNR [ ]   DNI  [  ] Full code.     Covid - 19 PCR: COVID-19 PCR: NotDetec (19 Nov 2021 05:52)  COVID-19 PCR: NotDetec (10 Nov 2021 20:31)  COVID-19 PCR: NotDetec (31 Oct 2021 23:22)      The 4Ms    What Matters Most: see GOC  Age appropriate Medications/Screen for High Risk Medication: Yes  Mentation: see CAM below  Mobility: defer to physical exam    The Confusion Assessment Method (CAM) Diagnostic Algorithm     1: Acute Onset or Fluctuating Course  - Is there evidence of an acute change in mental status from the patient’s baseline? Did the (abnormal) behavior  fluctuate during the day, that is, tend to come and go, or increase and decrease in severity?  [ ] YES [x ] NO     2: Inattention  - Did the patient have difficulty focusing attention, being easily distractible, or having difficulty keeping track of what was being said?  [ ] YES [ ] NO   (x) unable to assess      3: Disorganized thinking  -Was the patient’s thinking disorganized or incoherent, such as rambling or irrelevant conversation, unclear or illogical flow of ideas, or unpredictable switching from subject to subject?  [ ] YES [ ] NO   (x) unable to assess     4: Altered Level of consciousness?  [ ] YES [x ] NO    The diagnosis of delirium by CAM requires the presence of features 1 and 2 and either 3 or 4.    PRESSORS: [ ] YES [ ] NO  vancomycin  IVPB 1000 milliGRAM(s) IV Intermittent daily    Cardiovascular:  Heart Failure  Acute   Acute on Chronic  Chronic         Pulmonary:    Hematalogic:  enoxaparin Injectable 40 milliGRAM(s) SubCutaneous daily    Other:  acetaminophen    Suspension .. 650 milliGRAM(s) Oral every 6 hours PRN  chlorhexidine 0.12% Liquid 15 milliLiter(s) Oral Mucosa every 12 hours  morphine  - Injectable 1 milliGRAM(s) IV Push every 4 hours PRN  pantoprazole   Suspension 40 milliGRAM(s) Oral daily  sodium chloride 0.9% lock flush 10 milliLiter(s) IV Push every 1 hour PRN    acetaminophen    Suspension .. 650 milliGRAM(s) Oral every 6 hours PRN  chlorhexidine 0.12% Liquid 15 milliLiter(s) Oral Mucosa every 12 hours  enoxaparin Injectable 40 milliGRAM(s) SubCutaneous daily  morphine  - Injectable 1 milliGRAM(s) IV Push every 4 hours PRN  pantoprazole   Suspension 40 milliGRAM(s) Oral daily  sodium chloride 0.9% lock flush 10 milliLiter(s) IV Push every 1 hour PRN  vancomycin  IVPB 1000 milliGRAM(s) IV Intermittent daily    Drug Dosing Weight  Height (cm): 167.6 (03 Nov 2021 16:02)  Weight (kg): 40.9 (01 Nov 2021 02:20)  BMI (kg/m2): 14.6 (03 Nov 2021 16:02)  BSA (m2): 1.43 (03 Nov 2021 16:02)    CENTRAL LINE: [ ] YES [x ] NO  LOCATION:   DATE INSERTED:  REMOVE: [ ] YES [ ] NO  EXPLAIN:    SAEED: [ ] YES [x ] NO    DATE INSERTED:  REMOVE:  [ ] YES [ ] NO  EXPLAIN:    PAST MEDICAL & SURGICAL HISTORY:  HTN (hypertension)    Dementia      Mode: AC/ CMV (Assist Control/ Continuous Mandatory Ventilation)  RR (machine): 16  TV (machine): 350  FiO2: 40  PEEP: 5  ITime: 0.9  MAP: 10  PIP: 24      PHYSICAL EXAM:    GENERAL:cachectic,  NAD  HEAD:  Atraumatic, Normocephalic  EYES:  PERRL, conjunctiva and sclera clear  ENMT: No tonsillar erythema, exudates, or enlargement; Moist mucous membranes,   NECK: trach tube intact. Supple, No JVD  NERVOUS SYSTEM: Awakens easily, nonverbal , does not follow commands.  Motor Strength 3-4/5 B/L upper and 2-3/5 lower extremities  CHEST/LUNG:Trach to vent.  Clear upper airway, decreased at bases.  No rales, rhonchi, wheezing, or rubs  HEART: Regular rate and rhythm; No murmurs, rubs, or gallops  ABDOMEN: Soft, Nontender, Nondistended; Bowel sounds present. PEG tube intact.   EXTREMITIES:Muscle wasting to BLE,   2+ Peripheral Pulses, No clubbing, cyanosis, or edema  LYMPH: No lymphadenopathy noted  SKIN: No rashes or lesions      LABS:  CBC Full  -  ( 21 Nov 2021 07:45 )  WBC Count : 12.50 K/uL  RBC Count : 3.17 M/uL  Hemoglobin : 9.2 g/dL  Hematocrit : 29.5 %  Platelet Count - Automated : 687 K/uL  Mean Cell Volume : 93.1 fl  Mean Cell Hemoglobin : 29.0 pg  Mean Cell Hemoglobin Concentration : 31.2 gm/dL  Auto Neutrophil # : x  Auto Lymphocyte # : x  Auto Monocyte # : x  Auto Eosinophil # : x  Auto Basophil # : x  Auto Neutrophil % : x  Auto Lymphocyte % : x  Auto Monocyte % : x  Auto Eosinophil % : x  Auto Basophil % : x    11-21    136  |  102  |  17  ----------------------------<  145<H>  4.5   |  27  |  0.60    Ca    8.2<L>      21 Nov 2021 07:45  Phos  3.2     11-21  Mg     2.4     11-21    TPro  6.6  /  Alb  1.5<L>  /  TBili  0.3  /  DBili  x   /  AST  22  /  ALT  28  /  AlkPhos  103  11-21      [  ]  DVT Prophylaxis  [  ]   Abnormal Nutritional Status -  Malnutrition   Cachexia        Diet, NPO with Tube Feed:   Tube Feeding Modality: Gastrostomy  Jevity 1.5 Bg  Total Volume for 24 Hours (mL): 720  Continuous  Starting Tube Feed Rate mL per Hour: 30  Until Goal Tube Feed Rate (mL per Hour): 30  Tube Feed Duration (in Hours): 24  Tube Feed Start Time: 15:00  No Carb Prosource (1pkg = 15gms Protein)     Qty per Day:  1 (11-19-21 @ 15:08) [Active]      RADIOLOGY & ADDITIONAL STUDIES:  ***  < from: Xray Chest 1 View- PORTABLE-Routine (Xray Chest 1 View- PORTABLE-Routine .) (11.20.21 @ 10:51) >  IMPRESSION:  Left chest tube in place.  Mild enlargement of left apical pneumothorax.    < end of copied text >        Goals of Care Discussion with Family/Proxy/Other   - see note from 11/18/21

## 2021-11-21 NOTE — PROGRESS NOTE ADULT - PROBLEM SELECTOR PLAN 3
Resolved.   Secondary to bacteremia  BC from 11/1 grew E coli. Completed Cefepime .  Febrile 11/17  BC/UC both  NGTD   Continue Vanco Day 5  ID Dr Wong following.

## 2021-11-21 NOTE — PROGRESS NOTE ADULT - ASSESSMENT
S/p fevers  Leukocytosis - mild   Bacteremia - E coli - cleared  Pneumonia and UTI- adequately treated    Plan:  ·	dc Vancomycin, will monitor for now  ·	poor prognosis, patient is full code                                         S/p fevers  Leukocytosis - mild   Bacteremia - E coli - cleared  Pneumonia and UTI- adequately treated    Plan:  ·	dc Vancomycin, will monitor for now  ·	poor prognosis, patient is full code    I agree with above

## 2021-11-21 NOTE — PROGRESS NOTE ADULT - SUBJECTIVE AND OBJECTIVE BOX
89F s/p left sided chest tube placement for pneumothorax, tracheostomy and PEG tube placement for continued respiratory failure.     Vital Signs Last 24 Hrs  T(C): 35.9 (21 Nov 2021 05:15), Max: 37.3 (20 Nov 2021 12:30)  T(F): 96.7 (21 Nov 2021 05:15), Max: 99.2 (20 Nov 2021 12:30)  HR: 89 (21 Nov 2021 05:15) (88 - 98)  BP: 150/60 (21 Nov 2021 05:15) (142/65 - 165/84)  BP(mean): --  RR: 21 (21 Nov 2021 05:15) (19 - 22)  SpO2: 100% (21 Nov 2021 05:15) (100% - 100%)    NAD  tracheostomy noted, AC 16 350 40 5  left chest tube clamped, minimal output in tubing  abdomen soft, nondistended, gastrostomy tube noted 2.5cm at skin with trickle feeds running      A/P:  89F with left chest tube in place for pneumothorax, minimal output with no air leak on water seal, clamped.     follow up XR today for possible removal

## 2021-11-21 NOTE — PROGRESS NOTE ADULT - PROBLEM SELECTOR PLAN 2
Completed 14 days of Cefepime   Afebrile > 48 hrs  leukocytosis   Repeat BC 11/17 NGTD   Continue IV Vanco Day 5  ID following

## 2021-11-21 NOTE — PROGRESS NOTE ADULT - PROBLEM SELECTOR PLAN 10
DVT and GI prophylaxis.  Continue mechanical ventilation.  Left chest tube clamped   Serial CXRs  Thoracic surgery f/u  Overall prognosis is extremely poor.  Remains FULL CODE as per family wishes.  SW consult for long term vent placement.

## 2021-11-21 NOTE — DISCHARGE NOTE PROVIDER - CARE PROVIDERS DIRECT ADDRESSES
,DirectAddress_Unknown ,DirectAddress_Unknown,DirectAddress_Unknown,[email or web address]

## 2021-11-21 NOTE — DISCHARGE NOTE PROVIDER - DETAILS OF MALNUTRITION DIAGNOSIS/DIAGNOSES
This patient has been assessed with a concern for Malnutrition and was treated during this hospitalization for the following Nutrition diagnosis/diagnoses:     -  11/03/2021: Severe protein-calorie malnutrition   -  11/03/2021: Underweight (BMI < 19)

## 2021-11-21 NOTE — PROGRESS NOTE ADULT - PROBLEM SELECTOR PLAN 1
Continue mechanical ventilation.  Not a candidate for SBT at this time.  DID not tolerate SBT 11/17  Monitor oxygen saturation.  Daily CXRs .

## 2021-11-21 NOTE — DISCHARGE NOTE PROVIDER - HOSPITAL COURSE
89 y/o F from [facility] w/ Hx of HTN, Dementia, CVA w/ R sided hemiparesis, aphasia, Parkinson's Disease, GERD, CKD. She is bedbound at baseline and needs assistance for ADLs. She was brought to the E.D. due to episodes of desaturation at 70s and respiratory distress. She was also having cough and fever in the nursing home. She was initially on NRB placed by EMS but was eventually intubated in the E.D. She is vaccinated with Moderna. COVID test was negative on admission. Chest Xray showed left lower lobe infiltrate (pneumonia). Blood cultures were sent. She was given 1 dose of Vancomycin and Zosyn in the E.D. and was subsequently admitted to ICU.    In the ICU, she was placed on mechanical ventilator. Her blood pressure was low despite receiving 2L bolus of LR. NGT and Left IJ central line was placed. Levophed was started. CXR revealed Left pneumothorax. Thoracic Surgery was consulted and left chest tube was placed. Patient was also tachycardic and troponin was mildly elevated. EKG showed SR w/ ST changes in the inferior leads. Troponin was monitored and has since trended down. Heart rate has been stable. Lactate was also elevated at 11.2 and has also since trended down. Patient also presented with hypernatremia and elevated creatinine probably secondary to volume deficit. She was also given free water. She was on Heparin for DVT prophylaxis and Protonix for GI prophylaxis. Palliative was consulted regarding the [facility]. Blood culture results showed E. coli bacteremia. She was started on Rocephin IV for 10 days. Azithromycin was continued awaiting Legionella results. Infectious Disease was consulted. Urine culture was ordered. Patient spiked a fever and is tachycardic. Blood cultures were repeated. She was tachycardic however his EKG showed SR w/ new PACs. There was mild air leak in the chest tube drainage but it was fixed by Cardiothoracic Surgery. Urine culture and repeat blood culture was negative. She had 2 febrile episodes overnight at 100.4-100.5F. She was tachypneic at rate of 30. Her repeat Chest Xray showed worsening of bilateral infiltrates. Her albumin is still low at 1.0. Her urine output is net negative. She was given Lasix and Albumin to maintain net negative urine output and decrease congestion. Vancomycin was discontinued and Cefepime decreased from 2g to 1g to complete 14 days since negative culture (11/16/21). Echocardiogram was ordered. Central line was removed. Lactulose was ordered for bowel movement. On November 11, 2021, patient had her tracheostomy and PEG inserted. Patient son Jered was called and informed .     11/20 CXR shows mild enlargement of left apical pneumothorax. Left chest tube unclamped to water seal. Thoracic following.   Hospital course complicated by recurrent fever for which additional antimicrobial therapy was administered. Repeat BC on 11/17 NGTD. ID following.        89 y/o F from [facility] w/ Hx of HTN, Dementia, CVA w/ R sided hemiparesis, aphasia, Parkinson's Disease, GERD, CKD. She is bedbound at baseline and needs assistance for ADLs. She was brought to the E.D. due to episodes of desaturation at 70s and respiratory distress. She was also having cough and fever in the nursing home. She was initially on NRB placed by EMS but was eventually intubated in the E.D. She is vaccinated with Moderna. COVID test was negative on admission. Chest Xray showed left lower lobe infiltrate (pneumonia). Blood cultures were sent. She was given 1 dose of Vancomycin and Zosyn in the E.D. and was subsequently admitted to ICU.    In the ICU, she was placed on mechanical ventilator. Her blood pressure was low despite receiving 2L bolus of LR. NGT and Left IJ central line was placed. Levophed was started. CXR revealed Left pneumothorax. Thoracic Surgery was consulted and left chest tube was placed. Patient was also tachycardic and troponin was mildly elevated. EKG showed SR w/ ST changes in the inferior leads. Troponin was monitored and has since trended down. Heart rate has been stable. Lactate was also elevated at 11.2 and has also since trended down. Patient also presented with hypernatremia and elevated creatinine probably secondary to volume deficit. She was also given free water. She was on Heparin for DVT prophylaxis and Protonix for GI prophylaxis. Palliative was consulted regarding the [facility]. Blood culture results showed E. coli bacteremia. She was started on Rocephin IV for 10 days. Azithromycin was continued awaiting Legionella results. Infectious Disease was consulted. Urine culture was ordered. Patient spiked a fever and is tachycardic. Blood cultures were repeated. She was tachycardic however his EKG showed SR w/ new PACs. There was mild air leak in the chest tube drainage but it was fixed by Cardiothoracic Surgery. Urine culture and repeat blood culture was negative. She had 2 febrile episodes overnight at 100.4-100.5F. She was tachypneic at rate of 30. Her repeat Chest Xray showed worsening of bilateral infiltrates. Her albumin is still low at 1.0. Her urine output is net negative. She was given Lasix and Albumin to maintain net negative urine output and decrease congestion. Vancomycin was discontinued and Cefepime decreased from 2g to 1g to complete 14 days since negative culture (11/16/21). Echocardiogram was ordered. Central line was removed. Lactulose was ordered for bowel movement. On November 11, 2021, patient had her tracheostomy and PEG inserted. Patient son Jered was called and informed .     11/20 CXR shows mild enlargement of left apical pneumothorax. Left chest tube unclamped to water seal. Thoracic following.   Hospital course complicated by recurrent fever for which additional antimicrobial therapy was administered. Repeat BC on 11/17 NGTD. ID following.   11/24  Chest tube attached to Pneumostat Valve. Tolerating well.   89 y/o F from [facility] w/ Hx of HTN, Dementia, CVA w/ R sided hemiparesis, aphasia, Parkinson's Disease, GERD, CKD. She is bedbound at baseline and needs assistance for ADLs. She was brought to the E.D. due to episodes of desaturation at 70s and respiratory distress. She was also having cough and fever in the nursing home. She was initially on NRB placed by EMS but was eventually intubated in the E.D. She is vaccinated with Moderna. COVID test was negative on admission. Chest Xray showed left lower lobe infiltrate (pneumonia). Blood cultures were sent. She was given 1 dose of Vancomycin and Zosyn in the E.D. and was subsequently admitted to ICU.    In the ICU, she was placed on mechanical ventilator. Her blood pressure was low despite receiving 2L bolus of LR. NGT and Left IJ central line was placed. Levophed was started. CXR revealed Left pneumothorax. Thoracic Surgery was consulted and left chest tube was placed. Patient was also tachycardic and troponin was mildly elevated. EKG showed SR w/ ST changes in the inferior leads. Troponin was monitored and has since trended down. Heart rate has been stable. Lactate was also elevated at 11.2 and has also since trended down. Patient also presented with hypernatremia and elevated creatinine probably secondary to volume deficit. She was also given free water. She was on Heparin for DVT prophylaxis and Protonix for GI prophylaxis. Palliative was consulted regarding the [facility]. Blood culture results showed E. coli bacteremia. She was started on Rocephin IV for 10 days. Azithromycin was continued awaiting Legionella results. Infectious Disease was consulted. Urine culture was ordered. Patient spiked a fever and is tachycardic. Blood cultures were repeated. She was tachycardic however his EKG showed SR w/ new PACs. There was mild air leak in the chest tube drainage but it was fixed by Cardiothoracic Surgery. Urine culture and repeat blood culture was negative. She had 2 febrile episodes overnight at 100.4-100.5F. She was tachypneic at rate of 30. Her repeat Chest Xray showed worsening of bilateral infiltrates. Her albumin is still low at 1.0. Her urine output is net negative. She was given Lasix and Albumin to maintain net negative urine output and decrease congestion. Vancomycin was discontinued and Cefepime decreased from 2g to 1g to complete 14 days since negative culture (11/16/21). Echocardiogram was ordered. Central line was removed. Lactulose was ordered for bowel movement. On November 11, 2021, patient had her tracheostomy and PEG inserted. Patient son Jered was called and informed .     11/20 CXR shows mild enlargement of left apical pneumothorax. Left chest tube unclamped to water seal. Thoracic following.   Hospital course complicated by recurrent fever for which additional antimicrobial therapy was administered. Repeat BC on 11/17 NGTD. ID following.   11/24  Chest tube attached to Pneumostat Valve. Tolerating well. Any draining small amount  3-4ml per 24 hours.  LABS:  CBC Full  -  ( 30 Nov 2021 07:18 )  WBC Count : 18.49 K/uL  RBC Count : 2.88 M/uL  Hemoglobin : 8.3 g/dL  Hematocrit : 26.7 %  Platelet Count - Automated : 438 K/uL  Mean Cell Volume : 92.7 fl  Mean Cell Hemoglobin : 28.8 pg  Mean Cell Hemoglobin Concentration : 31.1 gm/dL  Auto Neutrophil # : x  Auto Lymphocyte # : x  Auto Monocyte # : x  Auto Eosinophil # : x  Auto Basophil # : x  Auto Neutrophil % : x  Auto Lymphocyte % : x  Auto Monocyte % : x  Auto Eosinophil % : x  Auto Basophil % : x    11-30    137  |  103  |  27<H>  ----------------------------<  168<H>  5.3   |  31  |  0.72    Ca    8.2<L>      30 Nov 2021 07:18  Phos  3.4     11-30  Mg     2.3     11-30    TPro  6.8  /  Alb  1.8<L>  /  TBili  0.3  /  DBili  x   /  AST  39  /  ALT  31  /  AlkPhos  82  11-30     89 y/o F from [facility] w/ Hx of HTN, Dementia, CVA w/ R sided hemiparesis, aphasia, Parkinson's Disease, GERD, CKD. She is bedbound at baseline and needs assistance for ADLs. She was brought to the E.D. due to episodes of desaturation at 70s and respiratory distress. She was also having cough and fever in the nursing home. She was initially on NRB placed by EMS but was eventually intubated in the E.D. She is vaccinated with Moderna. COVID test was negative on admission. Chest Xray showed left lower lobe infiltrate (pneumonia). Blood cultures were sent. She was given 1 dose of Vancomycin and Zosyn in the E.D. and was subsequently admitted to ICU.    In the ICU, she was placed on mechanical ventilator. Her blood pressure was low despite receiving 2L bolus of LR. NGT and Left IJ central line was placed. Levophed was started. CXR revealed Left pneumothorax. Thoracic Surgery was consulted and left chest tube was placed. Patient was also tachycardic and troponin was mildly elevated. EKG showed SR w/ ST changes in the inferior leads. Troponin was monitored and has since trended down. Heart rate has been stable. Lactate was also elevated at 11.2 and has also since trended down. Patient also presented with hypernatremia and elevated creatinine probably secondary to volume deficit. She was also given free water. She was on Heparin for DVT prophylaxis and Protonix for GI prophylaxis. Palliative was consulted regarding the [facility]. Blood culture results showed E. coli bacteremia. She was started on Rocephin IV for 10 days. Azithromycin was continued awaiting Legionella results. Infectious Disease was consulted. Urine culture was ordered. Patient spiked a fever and is tachycardic. Blood cultures were repeated. She was tachycardic however his EKG showed SR w/ new PACs. There was mild air leak in the chest tube drainage but it was fixed by Cardiothoracic Surgery. Urine culture and repeat blood culture was negative. She had 2 febrile episodes overnight at 100.4-100.5F. She was tachypneic at rate of 30. Her repeat Chest Xray showed worsening of bilateral infiltrates. Her albumin is still low at 1.0. Her urine output is net negative. She was given Lasix and Albumin to maintain net negative urine output and decrease congestion. Vancomycin was discontinued and Cefepime decreased from 2g to 1g to complete 14 days since negative culture (11/16/21). Echocardiogram was ordered. Central line was removed. Lactulose was ordered for bowel movement. On November 11, 2021, patient had her tracheostomy and PEG inserted. Patient son Jered was called and informed .     11/20 CXR shows mild enlargement of left apical pneumothorax. Left chest tube unclamped to water seal. Thoracic following.   Hospital course complicated by recurrent fever for which additional antimicrobial therapy was administered. Repeat BC on 11/17 NGTD. ID following.   11/24  Chest tube attached to Pneumostat Valve. Tolerating well. Any draining small amount  3-4ml per 24 hours.  LABS:  CBC Full  -  ( 30 Nov 2021 07:18 )  WBC Count : 18.49 K/uL  RBC Count : 2.88 M/uL  Hemoglobin : 8.3 g/dL  Hematocrit : 26.7 %  Platelet Count - Automated : 438 K/uL  Mean Cell Volume : 92.7 fl  Mean Cell Hemoglobin : 28.8 pg  Mean Cell Hemoglobin Concentration : 31.1 gm/dL  Auto Neutrophil # : x  Auto Lymphocyte # : x  Auto Monocyte # : x  Auto Eosinophil # : x  Auto Basophil # : x  Auto Neutrophil % : x  Auto Lymphocyte % : x  Auto Monocyte % : x  Auto Eosinophil % : x  Auto Basophil % : x    11-30    137  |  103  |  27<H>  ----------------------------<  168<H>  5.3   |  31  |  0.72    Ca    8.2<L>      30 Nov 2021 07:18  Phos  3.4     11-30  Mg     2.3     11-30    TPro  6.8  /  Alb  1.8<L>  /  TBili  0.3  /  DBili  x   /  AST  39  /  ALT  31  /  AlkPhos  82  11-30    < from: Xray Chest 1 View- PORTABLE-Urgent (Xray Chest 1 View- PORTABLE-Urgent .) (11.27.21 @ 11:16) >    Mild cardiomegaly and normal pulmonary vasculature with no right pneumothorax or acute osseous finding.    Patchy perihilar infiltrates sparing the the left upper lobe, grossly unchanged.    Right pleural effusion/atelectasis, unchanged.    IMPRESSION:    Left chest tube in satisfactory position with no pneumothorax, unchanged.    Mild to moderate perihilar interstitial infiltrates, grossly unchanged.    Right pleural effusion/adjacent atelectasis, unchanged    < end of copied text >         89 y/o F from [facility] w/ Hx of HTN, Dementia, CVA w/ R sided hemiparesis, aphasia, Parkinson's Disease, GERD, CKD. She is bedbound at baseline and needs assistance for ADLs. She was brought to the E.D. due to episodes of desaturation at 70s and respiratory distress. She was also having cough and fever in the nursing home. She was initially on NRB placed by EMS but was eventually intubated in the E.D. She is vaccinated with Moderna. COVID test was negative on admission. Chest Xray showed left lower lobe infiltrate (pneumonia). Blood cultures were sent. She was given 1 dose of Vancomycin and Zosyn in the E.D. and was subsequently admitted to ICU.    In the ICU, she was placed on mechanical ventilator. Her blood pressure was low despite receiving 2L bolus of LR. NGT and Left IJ central line was placed. Levophed was started. CXR revealed Left pneumothorax. Thoracic Surgery was consulted and left chest tube was placed. Patient was also tachycardic and troponin was mildly elevated. EKG showed SR w/ ST changes in the inferior leads. Troponin was monitored and has since trended down. Heart rate has been stable. Lactate was also elevated at 11.2 and has also since trended down. Patient also presented with hypernatremia and elevated creatinine probably secondary to volume deficit. She was also given free water. She was on Heparin for DVT prophylaxis and Protonix for GI prophylaxis. Palliative was consulted regarding the [facility]. Blood culture results showed E. coli bacteremia. She was started on Rocephin IV for 10 days. Azithromycin was continued awaiting Legionella results. Infectious Disease was consulted. Urine culture was ordered. Patient spiked a fever and is tachycardic. Blood cultures were repeated. She was tachycardic however his EKG showed SR w/ new PACs. There was mild air leak in the chest tube drainage but it was fixed by Cardiothoracic Surgery. Urine culture and repeat blood culture was negative. She had 2 febrile episodes overnight at 100.4-100.5F. She was tachypneic at rate of 30. Her repeat Chest Xray showed worsening of bilateral infiltrates. Her albumin is still low at 1.0. Her urine output is net negative. She was given Lasix and Albumin to maintain net negative urine output and decrease congestion. Vancomycin was discontinued and Cefepime decreased from 2g to 1g to complete 14 days since negative culture (11/16/21). Echocardiogram was ordered. Central line was removed. Lactulose was ordered for bowel movement. On November 11, 2021, patient had her tracheostomy and PEG inserted. Patient son Jered was called and informed .     11/20 CXR shows mild enlargement of left apical pneumothorax. Left chest tube unclamped to water seal. Thoracic following.   Hospital course complicated by recurrent fever for which additional antimicrobial therapy was administered. Repeat BC on 11/17 NGTD. ID following.   11/24  Chest tube attached to Pneumostat Valve. Tolerating well. Any draining small amount  3-4ml per 24 hours.  LABS:  CBC Full  -  ( 30 Nov 2021 07:18 )  WBC Count : 18.49 K/uL  RBC Count : 2.88 M/uL  Hemoglobin : 8.3 g/dL  Hematocrit : 26.7 %  Platelet Count - Automated : 438 K/uL  Mean Cell Volume : 92.7 fl  Mean Cell Hemoglobin : 28.8 pg  Mean Cell Hemoglobin Concentration : 31.1 gm/dL  Auto Neutrophil # : x  Auto Lymphocyte # : x  Auto Monocyte # : x  Auto Eosinophil # : x  Auto Basophil # : x  Auto Neutrophil % : x  Auto Lymphocyte % : x  Auto Monocyte % : x  Auto Eosinophil % : x  Auto Basophil % : x    11-30    137  |  103  |  27<H>  ----------------------------<  168<H>  5.3   |  31  |  0.72    Ca    8.2<L>      30 Nov 2021 07:18  Phos  3.4     11-30  Mg     2.3     11-30    TPro  6.8  /  Alb  1.8<L>  /  TBili  0.3  /  DBili  x   /  AST  39  /  ALT  31  /  AlkPhos  82  11-30    < from: Xray Chest 1 View- PORTABLE-Urgent (Xray Chest 1 View- PORTABLE-Urgent .) (11.27.21 @ 11:16) >    Mild cardiomegaly and normal pulmonary vasculature with no right pneumothorax or acute osseous finding.    Patchy perihilar infiltrates sparing the the left upper lobe, grossly unchanged.    Right pleural effusion/atelectasis, unchanged.    IMPRESSION:    Left chest tube in satisfactory position with no pneumothorax, unchanged.    Mild to moderate perihilar interstitial infiltrates, grossly unchanged.    Right pleural effusion/adjacent atelectasis, unchanged    < end of copied text >      for a full account of hospital course please refer to actual medical records for this is a brief summery

## 2021-11-22 LAB
ALBUMIN SERPL ELPH-MCNC: 1.4 G/DL — LOW (ref 3.5–5)
ALP SERPL-CCNC: 85 U/L — SIGNIFICANT CHANGE UP (ref 40–120)
ALT FLD-CCNC: 25 U/L DA — SIGNIFICANT CHANGE UP (ref 10–60)
ANION GAP SERPL CALC-SCNC: 5 MMOL/L — SIGNIFICANT CHANGE UP (ref 5–17)
AST SERPL-CCNC: 24 U/L — SIGNIFICANT CHANGE UP (ref 10–40)
BILIRUB SERPL-MCNC: 0.2 MG/DL — SIGNIFICANT CHANGE UP (ref 0.2–1.2)
BUN SERPL-MCNC: 16 MG/DL — SIGNIFICANT CHANGE UP (ref 7–18)
CALCIUM SERPL-MCNC: 8.1 MG/DL — LOW (ref 8.4–10.5)
CHLORIDE SERPL-SCNC: 103 MMOL/L — SIGNIFICANT CHANGE UP (ref 96–108)
CO2 SERPL-SCNC: 29 MMOL/L — SIGNIFICANT CHANGE UP (ref 22–31)
CREAT SERPL-MCNC: 0.58 MG/DL — SIGNIFICANT CHANGE UP (ref 0.5–1.3)
CULTURE RESULTS: SIGNIFICANT CHANGE UP
CULTURE RESULTS: SIGNIFICANT CHANGE UP
GLUCOSE SERPL-MCNC: 147 MG/DL — HIGH (ref 70–99)
HCT VFR BLD CALC: 27 % — LOW (ref 34.5–45)
HGB BLD-MCNC: 8.3 G/DL — LOW (ref 11.5–15.5)
MAGNESIUM SERPL-MCNC: 2.4 MG/DL — SIGNIFICANT CHANGE UP (ref 1.6–2.6)
MCHC RBC-ENTMCNC: 28.6 PG — SIGNIFICANT CHANGE UP (ref 27–34)
MCHC RBC-ENTMCNC: 30.7 GM/DL — LOW (ref 32–36)
MCV RBC AUTO: 93.1 FL — SIGNIFICANT CHANGE UP (ref 80–100)
NRBC # BLD: 0 /100 WBCS — SIGNIFICANT CHANGE UP (ref 0–0)
PHOSPHATE SERPL-MCNC: 3.3 MG/DL — SIGNIFICANT CHANGE UP (ref 2.5–4.5)
PLATELET # BLD AUTO: 565 K/UL — HIGH (ref 150–400)
POTASSIUM SERPL-MCNC: 4.2 MMOL/L — SIGNIFICANT CHANGE UP (ref 3.5–5.3)
POTASSIUM SERPL-SCNC: 4.2 MMOL/L — SIGNIFICANT CHANGE UP (ref 3.5–5.3)
PROT SERPL-MCNC: 6.2 G/DL — SIGNIFICANT CHANGE UP (ref 6–8.3)
RBC # BLD: 2.9 M/UL — LOW (ref 3.8–5.2)
RBC # FLD: 15.1 % — HIGH (ref 10.3–14.5)
SODIUM SERPL-SCNC: 137 MMOL/L — SIGNIFICANT CHANGE UP (ref 135–145)
SPECIMEN SOURCE: SIGNIFICANT CHANGE UP
SPECIMEN SOURCE: SIGNIFICANT CHANGE UP
WBC # BLD: 11.4 K/UL — HIGH (ref 3.8–10.5)
WBC # FLD AUTO: 11.4 K/UL — HIGH (ref 3.8–10.5)

## 2021-11-22 PROCEDURE — 71045 X-RAY EXAM CHEST 1 VIEW: CPT | Mod: 26

## 2021-11-22 RX ADMIN — CHLORHEXIDINE GLUCONATE 15 MILLILITER(S): 213 SOLUTION TOPICAL at 05:43

## 2021-11-22 RX ADMIN — CHLORHEXIDINE GLUCONATE 15 MILLILITER(S): 213 SOLUTION TOPICAL at 18:05

## 2021-11-22 RX ADMIN — ENOXAPARIN SODIUM 40 MILLIGRAM(S): 100 INJECTION SUBCUTANEOUS at 12:36

## 2021-11-22 RX ADMIN — Medication 81 MILLIGRAM(S): at 12:36

## 2021-11-22 RX ADMIN — PANTOPRAZOLE SODIUM 40 MILLIGRAM(S): 20 TABLET, DELAYED RELEASE ORAL at 14:42

## 2021-11-22 NOTE — PROGRESS NOTE ADULT - SUBJECTIVE AND OBJECTIVE BOX
89y Female is under our care for     REVIEW OF SYSTEMS:  [  ] Not able to elicit  General:	  Chest:	  GI:	  :  Skin:	  Musculoskeletal:	  Neuro:	    MEDS:    ALLERGIES: Allergies    No Known Allergies    Intolerances        VITALS:  Vital Signs Last 24 Hrs  T(C): 36.6 (22 Nov 2021 05:00), Max: 37.3 (21 Nov 2021 14:01)  T(F): 97.8 (22 Nov 2021 05:00), Max: 99.1 (21 Nov 2021 14:01)  HR: 87 (22 Nov 2021 05:00) (87 - 102)  BP: 127/61 (22 Nov 2021 05:00) (127/61 - 145/64)  BP(mean): --  RR: 18 (22 Nov 2021 05:00) (18 - 25)  SpO2: 98% (22 Nov 2021 05:00) (98% - 99%)      PHYSICAL EXAM:  HEENT:  Neck:  Respiratory:  Cardiovascular:  Gastrointestinal:  Extremities:  Skin:  Ortho:  Neuro:    LABS/DIAGNOSTIC TESTS:                        8.3    11.40 )-----------( 565      ( 22 Nov 2021 07:41 )             27.0     WBC Count: 11.40 K/uL (11-22 @ 07:41)  WBC Count: 12.50 K/uL (11-21 @ 07:45)  WBC Count: 10.32 K/uL (11-19 @ 07:17)  WBC Count: 10.03 K/uL (11-18 @ 06:39)    11-22    137  |  103  |  16  ----------------------------<  147<H>  4.2   |  29  |  0.58    Ca    8.1<L>      22 Nov 2021 07:41  Phos  3.3     11-22  Mg     2.4     11-22    TPro  6.2  /  Alb  1.4<L>  /  TBili  0.2  /  DBili  x   /  AST  24  /  ALT  25  /  AlkPhos  85  11-22      CULTURES:   .Blood Blood-Peripheral  11-17 @ 13:16   No growth to date.  --  --      Catheterized Catheterized  11-16 @ 18:43   No growth  --  --      .Blood Blood  11-03 @ 10:22   No Growth Final  --  --      Clean Catch Clean Catch (Midstream)  11-02 @ 21:01   No growth  --  --      .Sputum Sputum  11-01 @ 21:18   Moderate Streptococcus agalactiae (Group B) isolated  Group B streptococci are susceptible to ampicillin,  penicillin and cefazolin, but may be resistant to  erythromycin and clindamycin.  Recommendations for intrapartum prophylaxis for Group B  streptococci are penicillin or ampicillin.  Normal Respiratory [place] present  --    Rare polymorphonuclear leukocytes per low power field  No Squamous epithelial cells per low power field  Rare Yeast like cells seen per oil power field  Rare Gram Positive Rods seen per oil power field  Rare Gram positive cocci in pairs seen per oil power field      .Blood Blood-Peripheral  11-01 @ 03:56   No Growth Final  --  Blood Culture PCR  Escherichia coli        RADIOLOGY:  no new studies 89y Female is under our care for E coli bacteremia, pneumonia and fever.  Patient was seen laying in bed with no acute distress.  All antibiotics have been discontinued and patient remains afebrile with downtrending WBC count.    REVIEW OF SYSTEMS:  [ x ] Not able to elicit    MEDS:    ALLERGIES: Allergies    No Known Allergies    Intolerances        VITALS:  Vital Signs Last 24 Hrs  T(C): 36.6 (22 Nov 2021 05:00), Max: 37.3 (21 Nov 2021 14:01)  T(F): 97.8 (22 Nov 2021 05:00), Max: 99.1 (21 Nov 2021 14:01)  HR: 87 (22 Nov 2021 05:00) (87 - 102)  BP: 127/61 (22 Nov 2021 05:00) (127/61 - 145/64)  BP(mean): --  RR: 18 (22 Nov 2021 05:00) (18 - 25)  SpO2: 98% (22 Nov 2021 05:00) (98% - 99%)      PHYSICAL EXAM:  HEENT: n/a  Neck: supple, trach +  Respiratory: diminished breath sounds on left, left sided pigtail + (clamped)  Cardiovascular: S1 S2 reg no murmurs  Gastrointestinal: +BS with soft, nondistended abdomen; nontender, peg tube in place  Extremities: bilateral hand edema +1  Skin: no rashes  Ortho: n/a  Neuro: Awake and alert    LABS/DIAGNOSTIC TESTS:                        8.3    11.40 )-----------( 565      ( 22 Nov 2021 07:41 )             27.0     WBC Count: 11.40 K/uL (11-22 @ 07:41)  WBC Count: 12.50 K/uL (11-21 @ 07:45)  WBC Count: 10.32 K/uL (11-19 @ 07:17)  WBC Count: 10.03 K/uL (11-18 @ 06:39)    11-22    137  |  103  |  16  ----------------------------<  147<H>  4.2   |  29  |  0.58    Ca    8.1<L>      22 Nov 2021 07:41  Phos  3.3     11-22  Mg     2.4     11-22    TPro  6.2  /  Alb  1.4<L>  /  TBili  0.2  /  DBili  x   /  AST  24  /  ALT  25  /  AlkPhos  85  11-22      CULTURES:   .Blood Blood-Peripheral  11-17 @ 13:16   No growth to date.  --  --      Catheterized Catheterized  11-16 @ 18:43   No growth  --  --      .Blood Blood  11-03 @ 10:22   No Growth Final  --  --      Clean Catch Clean Catch (Midstream)  11-02 @ 21:01   No growth  --  --      .Sputum Sputum  11-01 @ 21:18   Moderate Streptococcus agalactiae (Group B) isolated  Group B streptococci are susceptible to ampicillin,  penicillin and cefazolin, but may be resistant to  erythromycin and clindamycin.  Recommendations for intrapartum prophylaxis for Group B  streptococci are penicillin or ampicillin.  Normal Respiratory [place] present  --    Rare polymorphonuclear leukocytes per low power field  No Squamous epithelial cells per low power field  Rare Yeast like cells seen per oil power field  Rare Gram Positive Rods seen per oil power field  Rare Gram positive cocci in pairs seen per oil power field      .Blood Blood-Peripheral  11-01 @ 03:56   No Growth Final  --  Blood Culture PCR  Escherichia coli        RADIOLOGY:  no new studies

## 2021-11-22 NOTE — PROGRESS NOTE ADULT - SUBJECTIVE AND OBJECTIVE BOX
89F s/p left sided chest tube placement for pneumothorax, tracheostomy and PEG tube placement for continued respiratory failure. X-ray from yesterday pending read.     Vital Signs Last 24 Hrs  T(C): 36.6 (22 Nov 2021 05:00), Max: 37.3 (21 Nov 2021 14:01)  T(F): 97.8 (22 Nov 2021 05:00), Max: 99.1 (21 Nov 2021 14:01)  HR: 87 (22 Nov 2021 05:00) (87 - 102)  BP: 127/61 (22 Nov 2021 05:00) (127/61 - 145/64)  BP(mean): --  RR: 18 (22 Nov 2021 05:00) (18 - 25)  SpO2: 98% (22 Nov 2021 05:00) (98% - 99%)      NAD  tracheostomy noted, AC 16 350 40 5  left chest tube clamped, minimal output in tubing  abdomen soft, nondistended, gastrostomy tube noted 2.5cm at skin with trickle feeds running                          9.2    12.50 )-----------( 687      ( 21 Nov 2021 07:45 )             29.5   11-21    136  |  102  |  17  ----------------------------<  145<H>  4.5   |  27  |  0.60    Ca    8.2<L>      21 Nov 2021 07:45  Phos  3.2     11-21  Mg     2.4     11-21    TPro  6.6  /  Alb  1.5<L>  /  TBili  0.3  /  DBili  x   /  AST  22  /  ALT  28  /  AlkPhos  103  11-21

## 2021-11-22 NOTE — PROGRESS NOTE ADULT - SUBJECTIVE AND OBJECTIVE BOX
HPI:  89 yo female from [facility] with medical h/o of HTN, Dementia, CVA with R sided hemiparesis, aphasia, parkinson's, GERD, CKD bedbound at baseline, dependant on assistance for ADL comes in with respiratory failure. Patient was found to have respiratory distress, saturating in 70s when EMS arrived, was placed on NRB on field. She was emergently intubated on arrival in ED. As per NH records patient was having fever and cough for past few days. Today she was found to have respiratory distress. She is vaccinated with moderna. Spoke to family son,  who stated the patient to remain full code. No other history could be obtained.  (01 Nov 2021 00:14)      Patient is a 89y old  Female who presents with a chief complaint of AHRF (22 Nov 2021 11:50)      INTERVAL HPI/OVERNIGHT EVENTS:  T(C): 37.3 (11-22-21 @ 20:01), Max: 38 (11-22-21 @ 13:37)  HR: 89 (11-22-21 @ 12:47) (87 - 94)  BP: 142/68 (11-22-21 @ 12:47) (127/61 - 145/64)  RR: 21 (11-22-21 @ 12:47) (18 - 21)  SpO2: 100% (11-22-21 @ 12:47) (98% - 100%)  Wt(kg): --  I&O's Summary      REVIEW OF SYSTEMS: denies fever, chills, SOB, palpitations, chest pain, abdominal pain, nausea, vomitting, diarrhea, constipation, dizziness    MEDICATIONS  (STANDING):  aspirin  chewable 81 milliGRAM(s) Oral daily  chlorhexidine 0.12% Liquid 15 milliLiter(s) Oral Mucosa every 12 hours  enoxaparin Injectable 40 milliGRAM(s) SubCutaneous daily  pantoprazole   Suspension 40 milliGRAM(s) Oral daily    MEDICATIONS  (PRN):  acetaminophen    Suspension .. 650 milliGRAM(s) Oral every 6 hours PRN Temp greater or equal to 38C (100.4F), Mild Pain (1 - 3), Moderate Pain (4 - 6)  morphine  - Injectable 1 milliGRAM(s) IV Push every 4 hours PRN Respiratory distress  sodium chloride 0.9% lock flush 10 milliLiter(s) IV Push every 1 hour PRN Pre/post blood products, medications, blood draw, and to maintain line patency      PHYSICAL EXAM:  GENERAL: NAD, well-groomed, well-developed  HEAD:  Atraumatic, Normocephalic  EYES: EOMI, PERRLA, conjunctiva and sclera clear  ENMT: No tonsillar erythema, exudates, or enlargement; Moist mucous membranes, Good dentition, No lesions  NECK: Supple, No JVD, Normal thyroid  NERVOUS SYSTEM:  Alert & Oriented X3, Good concentration; Motor Strength 5/5 B/L upper and lower extremities; DTRs 2+ intact and symmetric  CHEST/LUNG: Clear to percussion bilaterally; No rales, rhonchi, wheezing, or rubs  HEART: Regular rate and rhythm; No murmurs, rubs, or gallops  ABDOMEN: Soft, Nontender, Nondistended; Bowel sounds present  EXTREMITIES:  2+ Peripheral Pulses, No clubbing, cyanosis, or edema  LYMPH: No lymphadenopathy noted  SKIN: No rashes or lesions  LABS:                        8.3    11.40 )-----------( 565      ( 22 Nov 2021 07:41 )             27.0     11-22    137  |  103  |  16  ----------------------------<  147<H>  4.2   |  29  |  0.58    Ca    8.1<L>      22 Nov 2021 07:41  Phos  3.3     11-22  Mg     2.4     11-22    TPro  6.2  /  Alb  1.4<L>  /  TBili  0.2  /  DBili  x   /  AST  24  /  ALT  25  /  AlkPhos  85  11-22        CAPILLARY BLOOD GLUCOSE      POCT Blood Glucose.: 142 mg/dL (22 Nov 2021 17:00)  POCT Blood Glucose.: 156 mg/dL (22 Nov 2021 12:27)  POCT Blood Glucose.: 154 mg/dL (22 Nov 2021 05:34)  POCT Blood Glucose.: 152 mg/dL (21 Nov 2021 23:20)

## 2021-11-22 NOTE — PROGRESS NOTE ADULT - ASSESSMENT
S/p fevers  Leukocytosis - improving  Bacteremia - E coli - cleared  Pneumonia and UTI- adequately treated    Plan:  ·	All antibiotics discontinued, Will monitor off antibiotics  ·	poor prognosis, patient is full code                                               S/p fevers  Leukocytosis - improving  Bacteremia - E coli - cleared  Pneumonia and UTI- adequately treated    Plan:  ·	All antibiotics discontinued, Will monitor off antibiotics  ·	poor prognosis, patient is full code  ·	Reconsult prn                                               S/p fevers  Leukocytosis - improving  Bacteremia - E coli - cleared  Pneumonia and UTI- adequately treated    Plan:  ·	All antibiotics discontinued, Will monitor off antibiotics  ·	poor prognosis, patient is full code  ·	Reconsult prn    I agree with above

## 2021-11-22 NOTE — PROGRESS NOTE ADULT - ASSESSMENT
A/P:  89F with left chest tube in place for pneumothorax, minimal output with no air leak on water seal, clamped.     follow up XR today for possible removal

## 2021-11-22 NOTE — PROGRESS NOTE ADULT - PROBLEM SELECTOR PLAN 3
Completed 14 days of Cefepime   Afebrile > 48 hrs  leukocytosis   Repeat BC 11/17 NGTD   Continue IV Vanco Day 5  ID following.

## 2021-11-22 NOTE — PROGRESS NOTE ADULT - ASSESSMENT
89 y/o F from [facility] w/ Hx of HTN, Dementia, CVA w/ R sided hemiparesis, aphasia, Parkinson's Disease, GERD, CKD. She is bedbound at baseline and needs assistance for ADLs. She was brought to the E.D. due to episodes of desaturation at 70s and respiratory distress. She was also having cough and fever in the nursing home. She was initially on NRB placed by EMS but was eventually intubated in the E.D. She is vaccinated with Moderna. COVID test was negative on admission. Chest Xray showed left lower lobe infiltrate (pneumonia). Blood cultures were sent. She was given 1 dose of Vancomycin and Zosyn in the E.D. and was subsequently admitted to ICU.    In the ICU, she was placed on mechanical ventilator. Her blood pressure was low despite receiving 2L bolus of LR. NGT and Left IJ central line was placed. Levophed was started. CXR revealed Left pneumothorax. Thoracic Surgery was consulted and left chest tube was placed. Patient was also tachycardic and troponin was mildly elevated. EKG showed SR w/ ST changes in the inferior leads. Troponin was monitored and has since trended down. Heart rate has been stable. Lactate was also elevated at 11.2 and has also since trended down. Patient also presented with hypernatremia and elevated creatinine probably secondary to volume deficit. She was also given free water. She was on Heparin for DVT prophylaxis and Protonix for GI prophylaxis. Palliative was consulted regarding the [facility]. Blood culture results showed E. coli bacteremia. She was started on Rocephin IV for 10 days. Azithromycin was continued awaiting Legionella results. Infectious Disease was consulted. Urine culture was ordered. Patient spiked a fever and is tachycardic. Blood cultures were repeated. She was tachycardic however his EKG showed SR w/ new PACs. There was mild air leak in the chest tube drainage but it was fixed by Cardiothoracic Surgery. Urine culture and repeat blood culture was negative. She had 2 febrile episodes overnight at 100.4-100.5F. She was tachypneic at rate of 30. Her repeat Chest Xray showed worsening of bilateral infiltrates. Her albumin is still low at 1.0. Her urine output is net negative. She was given Lasix and Albumin to maintain net negative urine output and decrease congestion. Vancomycin was discontinued and Cefepime decreased from 2g to 1g to complete 14 days since negative culture (11/16/21). Echocardiogram was ordered. Central line was removed. Lactulose was ordered for bowel movement. On November 11, 2021, patient had her tracheostomy and PEG inserted. Patient son Jered was called and informed .     11/20 CXR shows mild enlargement of left apical pneumothorax. Left chest tube unclamped to water seal. Thoracic following.

## 2021-11-22 NOTE — PROGRESS NOTE ADULT - SUBJECTIVE AND OBJECTIVE BOX
AKASH CHACKO    SCU progress note    INTERVAL HPI/OVERNIGHT EVENTS:     DNR [ ]   DNI  [  ]   FULL CODE    Covid - 19 PCR: Negative 11/19    The 4Ms    What Matters Most: see GOC  Age appropriate Medications/Screen for High Risk Medication: Yes  Mentation: see CAM below  Mobility: defer to physical exam    The Confusion Assessment Method (CAM) Diagnostic Algorithm     1: Acute Onset or Fluctuating Course  - Is there evidence of an acute change in mental status from the patient’s baseline? Did the (abnormal) behavior  fluctuate during the day, that is, tend to come and go, or increase and decrease in severity?  [ ] YES [x ] NO     2: Inattention  - Did the patient have difficulty focusing attention, being easily distractible, or having difficulty keeping track of what was being said?  [ ] YES [ ] NO   Unable to access     3: Disorganized thinking  -Was the patient’s thinking disorganized or incoherent, such as rambling or irrelevant conversation, unclear or illogical flow of ideas, or unpredictable switching from subject to subject?  [ ] YES [ ] NO   Unable to access    4: Altered Level of consciousness?  [ ] YES [ ] NO    The diagnosis of delirium by CAM requires the presence of features 1 and 2 and either 3 or 4.    PRESSORS: [ ] YES [x ] NO    Cardiovascular:  Heart Failure  Acute   Acute on Chronic  Chronic         Pulmonary:    Hematalogic:  aspirin  chewable 81 milliGRAM(s) Oral daily  enoxaparin Injectable 40 milliGRAM(s) SubCutaneous daily    Other:  acetaminophen    Suspension .. 650 milliGRAM(s) Oral every 6 hours PRN  chlorhexidine 0.12% Liquid 15 milliLiter(s) Oral Mucosa every 12 hours  morphine  - Injectable 1 milliGRAM(s) IV Push every 4 hours PRN  pantoprazole   Suspension 40 milliGRAM(s) Oral daily  sodium chloride 0.9% lock flush 10 milliLiter(s) IV Push every 1 hour PRN    acetaminophen    Suspension .. 650 milliGRAM(s) Oral every 6 hours PRN  aspirin  chewable 81 milliGRAM(s) Oral daily  chlorhexidine 0.12% Liquid 15 milliLiter(s) Oral Mucosa every 12 hours  enoxaparin Injectable 40 milliGRAM(s) SubCutaneous daily  morphine  - Injectable 1 milliGRAM(s) IV Push every 4 hours PRN  pantoprazole   Suspension 40 milliGRAM(s) Oral daily  sodium chloride 0.9% lock flush 10 milliLiter(s) IV Push every 1 hour PRN    Drug Dosing Weight  Height (cm): 167.6 (03 Nov 2021 16:02)  Weight (kg): 40.9 (01 Nov 2021 02:20)  BMI (kg/m2): 14.6 (03 Nov 2021 16:02)  BSA (m2): 1.43 (03 Nov 2021 16:02)    CENTRAL LINE: [ ] YES [x ] NO  LOCATION:   DATE INSERTED:  REMOVE: [ ] YES [ ] NO  EXPLAIN:    SAEED: [ ] YES [x ] NO    DATE INSERTED:  REMOVE:  [ ] YES [ ] NO  EXPLAIN:    PAST MEDICAL & SURGICAL HISTORY:  HTN (hypertension)    Dementia                  Mode: AC/ CMV (Assist Control/ Continuous Mandatory Ventilation)  RR (machine): 16  TV (machine): 350  FiO2: 40  PEEP: 5  ITime: 0.9  MAP: 9  PIP: 20      PHYSICAL EXAM:    GENERAL: NAD, cachectice with severe temporal waisting +Muscle waisting all extremities  HEAD:  Atraumatic, Normocephalic  EYES: PERRLA, conjunctiva and sclera clear  ENMT: No tonsillar erythema, exudates  NECK: Supple, No JVD, tracheostomy intact  NERVOUS SYSTEM:  Awake. Nonverbal at baseline. Does not follow commands  CHEST/LUNG: Diminished breath sounds bilaterally L>R. Left chest tube intact  HEART: Regular rate and rhythm; No murmurs, rubs, or gallops  ABDOMEN: Soft, Nontender, Nondistended; Bowel sounds present; Peg intact.  EXTREMITIES: + Muscle waisting 2+ Peripheral Pulses, No clubbing, cyanosis, or edema  LYMPH: No lymphadenopathy noted  SKIN: No rashes or lesions      LABS:  CBC Full  -  ( 22 Nov 2021 07:41 )  WBC Count : 11.40 K/uL  RBC Count : 2.90 M/uL  Hemoglobin : 8.3 g/dL  Hematocrit : 27.0 %  Platelet Count - Automated : 565 K/uL  Mean Cell Volume : 93.1 fl  Mean Cell Hemoglobin : 28.6 pg  Mean Cell Hemoglobin Concentration : 30.7 gm/dL  Auto Neutrophil # : x  Auto Lymphocyte # : x  Auto Monocyte # : x  Auto Eosinophil # : x  Auto Basophil # : x  Auto Neutrophil % : x  Auto Lymphocyte % : x  Auto Monocyte % : x  Auto Eosinophil % : x  Auto Basophil % : x    11-22    137  |  103  |  16  ----------------------------<  147<H>  4.2   |  29  |  0.58    Ca    8.1<L>      22 Nov 2021 07:41  Phos  3.3     11-22  Mg     2.4     11-22    TPro  6.2  /  Alb  1.4<L>  /  TBili  0.2  /  DBili  x   /  AST  24  /  ALT  25  /  AlkPhos  85  11-22              [  ]  DVT Prophylaxis  [  ]  Nutrition, Brand, Rate         Goal Rate        Abnormal Nutritional Status -  Malnutrition   Cachexia          RADIOLOGY & ADDITIONAL STUDIES:  ***    Goals of Care Discussion with Family/Proxy/Other   - see note from/family meeting set up for...     AKASH CHACKO    SCU progress note    INTERVAL HPI/OVERNIGHT EVENTS:     DNR [ ]   DNI  [  ]   FULL CODE    Covid - 19 PCR: Negative 11/19    The 4Ms    What Matters Most: see GOC  Age appropriate Medications/Screen for High Risk Medication: Yes  Mentation: see CAM below  Mobility: defer to physical exam    The Confusion Assessment Method (CAM) Diagnostic Algorithm     1: Acute Onset or Fluctuating Course  - Is there evidence of an acute change in mental status from the patient’s baseline? Did the (abnormal) behavior  fluctuate during the day, that is, tend to come and go, or increase and decrease in severity?  [ ] YES [x ] NO     2: Inattention  - Did the patient have difficulty focusing attention, being easily distractible, or having difficulty keeping track of what was being said?  [ ] YES [ ] NO   Unable to access     3: Disorganized thinking  -Was the patient’s thinking disorganized or incoherent, such as rambling or irrelevant conversation, unclear or illogical flow of ideas, or unpredictable switching from subject to subject?  [ ] YES [ ] NO   Unable to access    4: Altered Level of consciousness?  [ ] YES [ ] NO    The diagnosis of delirium by CAM requires the presence of features 1 and 2 and either 3 or 4.    PRESSORS: [ ] YES [x ] NO    Cardiovascular:  Heart Failure  Acute   Acute on Chronic  Chronic         Pulmonary:    Hematalogic:  aspirin  chewable 81 milliGRAM(s) Oral daily  enoxaparin Injectable 40 milliGRAM(s) SubCutaneous daily    Other:  acetaminophen    Suspension .. 650 milliGRAM(s) Oral every 6 hours PRN  chlorhexidine 0.12% Liquid 15 milliLiter(s) Oral Mucosa every 12 hours  morphine  - Injectable 1 milliGRAM(s) IV Push every 4 hours PRN  pantoprazole   Suspension 40 milliGRAM(s) Oral daily  sodium chloride 0.9% lock flush 10 milliLiter(s) IV Push every 1 hour PRN    acetaminophen    Suspension .. 650 milliGRAM(s) Oral every 6 hours PRN  aspirin  chewable 81 milliGRAM(s) Oral daily  chlorhexidine 0.12% Liquid 15 milliLiter(s) Oral Mucosa every 12 hours  enoxaparin Injectable 40 milliGRAM(s) SubCutaneous daily  morphine  - Injectable 1 milliGRAM(s) IV Push every 4 hours PRN  pantoprazole   Suspension 40 milliGRAM(s) Oral daily  sodium chloride 0.9% lock flush 10 milliLiter(s) IV Push every 1 hour PRN    Drug Dosing Weight  Height (cm): 167.6 (03 Nov 2021 16:02)  Weight (kg): 40.9 (01 Nov 2021 02:20)  BMI (kg/m2): 14.6 (03 Nov 2021 16:02)  BSA (m2): 1.43 (03 Nov 2021 16:02)    CENTRAL LINE: [ ] YES [x ] NO  LOCATION:   DATE INSERTED:  REMOVE: [ ] YES [ ] NO  EXPLAIN:    SAEED: [ ] YES [x ] NO    DATE INSERTED:  REMOVE:  [ ] YES [ ] NO  EXPLAIN:    PAST MEDICAL & SURGICAL HISTORY:  HTN (hypertension)    Dementia                  Mode: AC/ CMV (Assist Control/ Continuous Mandatory Ventilation)  RR (machine): 16  TV (machine): 350  FiO2: 40  PEEP: 5  ITime: 0.9  MAP: 9  PIP: 20      PHYSICAL EXAM:    GENERAL: NAD, cachectice with severe temporal waisting +Muscle waisting all extremities  HEAD:  Atraumatic, Normocephalic  EYES: PERRLA, conjunctiva and sclera clear  ENMT: No tonsillar erythema, exudates  NECK: Supple, No JVD, tracheostomy intact  NERVOUS SYSTEM:  Awake. Nonverbal at baseline. Does not follow commands  CHEST/LUNG: Diminished breath sounds bilaterally L>R. Left chest tube intact  HEART: Regular rate and rhythm; No murmurs, rubs, or gallops  ABDOMEN: Soft, Nontender, Nondistended; Bowel sounds present; Peg intact.  EXTREMITIES: + Muscle waisting 2+ Peripheral Pulses, No clubbing, cyanosis, or edema  LYMPH: No lymphadenopathy noted  SKIN: No rashes or lesions      LABS:  CBC Full  -  ( 22 Nov 2021 07:41 )  WBC Count : 11.40 K/uL  RBC Count : 2.90 M/uL  Hemoglobin : 8.3 g/dL  Hematocrit : 27.0 %  Platelet Count - Automated : 565 K/uL  Mean Cell Volume : 93.1 fl  Mean Cell Hemoglobin : 28.6 pg  Mean Cell Hemoglobin Concentration : 30.7 gm/dL  Auto Neutrophil # : x  Auto Lymphocyte # : x  Auto Monocyte # : x  Auto Eosinophil # : x  Auto Basophil # : x  Auto Neutrophil % : x  Auto Lymphocyte % : x  Auto Monocyte % : x  Auto Eosinophil % : x  Auto Basophil % : x    11-22    137  |  103  |  16  ----------------------------<  147<H>  4.2   |  29  |  0.58    Ca    8.1<L>      22 Nov 2021 07:41  Phos  3.3     11-22  Mg     2.4     11-22    TPro  6.2  /  Alb  1.4<L>  /  TBili  0.2  /  DBili  x   /  AST  24  /  ALT  25  /  AlkPhos  85  11-22              [  ]  DVT Prophylaxis  [  ]  Nutrition, Brand, Rate         Goal Rate        Abnormal Nutritional Status -  Malnutrition   Cachexia          RADIOLOGY & ADDITIONAL STUDIES:  ***  < from: Xray Chest 1 View- PORTABLE-Routine (Xray Chest 1 View- PORTABLE-Routine .) (11.20.21 @ 10:51) >  The cardiomediastinal silhouette is within normal limits.  Tracheostomy cannula and left chest tube unchanged.  Mild enlargement of left apical pneumothorax.  Stable bilateral pneumonic infiltrates. Stable small right pleural effusion.    IMPRESSION:  Left chest tube in place.  Mild enlargement of left apical pneumothorax.    < end of copied text >    Goals of Care Discussion with Family/Proxy/Other   - see note from 11/09

## 2021-11-22 NOTE — PROGRESS NOTE ADULT - NUTRITIONAL ASSESSMENT
This patient has been assessed with a concern for Malnutrition and has been determined to have a diagnosis/diagnoses of Severe protein-calorie malnutrition and Underweight (BMI < 19).    This patient is being managed with:   Diet NPO with Tube Feed-  Tube Feeding Modality: Gastrostomy  Jevity 1.5 Bg  Total Volume for 24 Hours (mL): 720  Continuous  Starting Tube Feed Rate {mL per Hour}: 30  Until Goal Tube Feed Rate (mL per Hour): 30  Tube Feed Duration (in Hours): 24  Tube Feed Start Time: 15:00  No Carb Prosource (1pkg = 15gms Protein)     Qty per Day:  1  Entered: Nov 19 2021  3:07PM     This patient has been assessed with a concern for Malnutrition and has been determined to have a diagnosis/diagnoses of Severe protein-calorie malnutrition and Underweight (BMI < 19).  +Severe Temporal waisting  +Muscle waisting    This patient is being managed with:   Diet NPO with Tube Feed-  Tube Feeding Modality: Gastrostomy  Jevity 1.5 Bg  Total Volume for 24 Hours (mL): 720  Continuous  Starting Tube Feed Rate {mL per Hour}: 30  Until Goal Tube Feed Rate (mL per Hour): 30  Tube Feed Duration (in Hours): 24  Tube Feed Start Time: 15:00  No Carb Prosource (1pkg = 15gms Protein)     Qty per Day:  1  Entered: Nov 19 2021  3:07PM

## 2021-11-22 NOTE — CHART NOTE - NSCHARTNOTEFT_GEN_A_CORE
Grandson updated on patient's status.  Informed chest tube remains in place.  Last day of antibiotics.  Grandson preparing Grandfather (Spouse) for patient's poor prognosis.  Remains FULL CODE at this time.  All questions all questions answered.

## 2021-11-22 NOTE — PROGRESS NOTE ADULT - PROBLEM SELECTOR PLAN 1
Continue mechanical ventilation with daily SBT as tolerated  Thus far not tolerating more than a few minutes.  Monitor oxygen saturation.

## 2021-11-22 NOTE — PROGRESS NOTE ADULT - ASSESSMENT
seen and examined vs stable afebrile physical done ok  on trach vent  afebrile awake    labs noted  hgb 8.3   a/p cont same   d/c planning if icu agree  watch hgb

## 2021-11-23 LAB
ALBUMIN SERPL ELPH-MCNC: 1.6 G/DL — LOW (ref 3.5–5)
ALP SERPL-CCNC: 93 U/L — SIGNIFICANT CHANGE UP (ref 40–120)
ALT FLD-CCNC: 30 U/L DA — SIGNIFICANT CHANGE UP (ref 10–60)
ANION GAP SERPL CALC-SCNC: 5 MMOL/L — SIGNIFICANT CHANGE UP (ref 5–17)
AST SERPL-CCNC: 30 U/L — SIGNIFICANT CHANGE UP (ref 10–40)
BILIRUB SERPL-MCNC: 0.2 MG/DL — SIGNIFICANT CHANGE UP (ref 0.2–1.2)
BUN SERPL-MCNC: 20 MG/DL — HIGH (ref 7–18)
CALCIUM SERPL-MCNC: 7.9 MG/DL — LOW (ref 8.4–10.5)
CHLORIDE SERPL-SCNC: 103 MMOL/L — SIGNIFICANT CHANGE UP (ref 96–108)
CO2 SERPL-SCNC: 27 MMOL/L — SIGNIFICANT CHANGE UP (ref 22–31)
CREAT SERPL-MCNC: 0.54 MG/DL — SIGNIFICANT CHANGE UP (ref 0.5–1.3)
GLUCOSE SERPL-MCNC: 154 MG/DL — HIGH (ref 70–99)
HCT VFR BLD CALC: 28.7 % — LOW (ref 34.5–45)
HGB BLD-MCNC: 8.9 G/DL — LOW (ref 11.5–15.5)
MAGNESIUM SERPL-MCNC: 2.2 MG/DL — SIGNIFICANT CHANGE UP (ref 1.6–2.6)
MCHC RBC-ENTMCNC: 28.6 PG — SIGNIFICANT CHANGE UP (ref 27–34)
MCHC RBC-ENTMCNC: 31 GM/DL — LOW (ref 32–36)
MCV RBC AUTO: 92.3 FL — SIGNIFICANT CHANGE UP (ref 80–100)
NRBC # BLD: 0 /100 WBCS — SIGNIFICANT CHANGE UP (ref 0–0)
PHOSPHATE SERPL-MCNC: 3.4 MG/DL — SIGNIFICANT CHANGE UP (ref 2.5–4.5)
PLATELET # BLD AUTO: 541 K/UL — HIGH (ref 150–400)
POTASSIUM SERPL-MCNC: 4.3 MMOL/L — SIGNIFICANT CHANGE UP (ref 3.5–5.3)
POTASSIUM SERPL-SCNC: 4.3 MMOL/L — SIGNIFICANT CHANGE UP (ref 3.5–5.3)
PROT SERPL-MCNC: 6.6 G/DL — SIGNIFICANT CHANGE UP (ref 6–8.3)
RBC # BLD: 3.11 M/UL — LOW (ref 3.8–5.2)
RBC # FLD: 14.8 % — HIGH (ref 10.3–14.5)
SODIUM SERPL-SCNC: 135 MMOL/L — SIGNIFICANT CHANGE UP (ref 135–145)
WBC # BLD: 10.95 K/UL — HIGH (ref 3.8–10.5)
WBC # FLD AUTO: 10.95 K/UL — HIGH (ref 3.8–10.5)

## 2021-11-23 PROCEDURE — 99232 SBSQ HOSP IP/OBS MODERATE 35: CPT | Mod: 57

## 2021-11-23 PROCEDURE — 71045 X-RAY EXAM CHEST 1 VIEW: CPT | Mod: 26

## 2021-11-23 RX ADMIN — CHLORHEXIDINE GLUCONATE 15 MILLILITER(S): 213 SOLUTION TOPICAL at 18:12

## 2021-11-23 RX ADMIN — Medication 81 MILLIGRAM(S): at 11:48

## 2021-11-23 RX ADMIN — ENOXAPARIN SODIUM 40 MILLIGRAM(S): 100 INJECTION SUBCUTANEOUS at 11:48

## 2021-11-23 RX ADMIN — PANTOPRAZOLE SODIUM 40 MILLIGRAM(S): 20 TABLET, DELAYED RELEASE ORAL at 11:48

## 2021-11-23 RX ADMIN — CHLORHEXIDINE GLUCONATE 15 MILLILITER(S): 213 SOLUTION TOPICAL at 05:06

## 2021-11-23 NOTE — PROGRESS NOTE ADULT - SUBJECTIVE AND OBJECTIVE BOX
AKASH CHACKO    SCU progress note    INTERVAL HPI/OVERNIGHT EVENTS: ***    DNR [ ]   DNI  [  ]    Covid - 19 PCR:     The 4Ms    What Matters Most: see GOC  Age appropriate Medications/Screen for High Risk Medication: Yes  Mentation: see CAM below  Mobility: defer to physical exam    The Confusion Assessment Method (CAM) Diagnostic Algorithm     1: Acute Onset or Fluctuating Course  - Is there evidence of an acute change in mental status from the patient’s baseline? Did the (abnormal) behavior  fluctuate during the day, that is, tend to come and go, or increase and decrease in severity?  [ ] YES [x ] NO     2: Inattention  - Did the patient have difficulty focusing attention, being easily distractible, or having difficulty keeping track of what was being said?  [ ] YES [x ] NO     3: Disorganized thinking  -Was the patient’s thinking disorganized or incoherent, such as rambling or irrelevant conversation, unclear or illogical flow of ideas, or unpredictable switching from subject to subject?  [ ] YES [x ] NO    4: Altered Level of consciousness?  [ ] YES [x ] NO    The diagnosis of delirium by CAM requires the presence of features 1 and 2 and either 3 or 4.    PRESSORS: [ ] YES [x ] NO    Cardiovascular:  Heart Failure  Acute   Acute on Chronic  Chronic         Pulmonary:    Hematalogic:  aspirin  chewable 81 milliGRAM(s) Oral daily  enoxaparin Injectable 40 milliGRAM(s) SubCutaneous daily    Other:  acetaminophen    Suspension .. 650 milliGRAM(s) Oral every 6 hours PRN  chlorhexidine 0.12% Liquid 15 milliLiter(s) Oral Mucosa every 12 hours  morphine  - Injectable 1 milliGRAM(s) IV Push every 4 hours PRN  pantoprazole   Suspension 40 milliGRAM(s) Oral daily  sodium chloride 0.9% lock flush 10 milliLiter(s) IV Push every 1 hour PRN    acetaminophen    Suspension .. 650 milliGRAM(s) Oral every 6 hours PRN  aspirin  chewable 81 milliGRAM(s) Oral daily  chlorhexidine 0.12% Liquid 15 milliLiter(s) Oral Mucosa every 12 hours  enoxaparin Injectable 40 milliGRAM(s) SubCutaneous daily  morphine  - Injectable 1 milliGRAM(s) IV Push every 4 hours PRN  pantoprazole   Suspension 40 milliGRAM(s) Oral daily  sodium chloride 0.9% lock flush 10 milliLiter(s) IV Push every 1 hour PRN    Drug Dosing Weight  Height (cm): 167.6 (03 Nov 2021 16:02)  Weight (kg): 40.9 (01 Nov 2021 02:20)  BMI (kg/m2): 14.6 (03 Nov 2021 16:02)  BSA (m2): 1.43 (03 Nov 2021 16:02)    CENTRAL LINE: [ ] YES [xx ] NO  LOCATION:   DATE INSERTED:  REMOVE: [ ] YES [ ] NO  EXPLAIN:    SAEED: [ ] YES [x ] NO    DATE INSERTED:  REMOVE:  [ ] YES [ ] NO  EXPLAIN:    PAST MEDICAL & SURGICAL HISTORY:  HTN (hypertension)    Dementia                  Mode: AC/ CMV (Assist Control/ Continuous Mandatory Ventilation)  RR (machine): 16  TV (machine): 350  FiO2: 40  PEEP: 5  ITime: 0.9  MAP: 6  PIP: 20      PHYSICAL EXAM:    GENERAL: NAD, well-groomed, well-developed  HEAD:  Atraumatic, Normocephalic  EYES: EOMI, PERRLA, conjunctiva and sclera clear  ENMT: No tonsillar erythema, exudates, or enlargement; Moist mucous membranes, Good dentition, No lesions  NECK: Supple, No JVD, Normal thyroid  NERVOUS SYSTEM:  Alert & Oriented X3, Good concentration; Motor Strength 5/5 B/L upper and lower extremities; DTRs 2+ intact and symmetric  CHEST/LUNG: Clear to percussion bilaterally; No rales, rhonchi, wheezing, or rubs  HEART: Regular rate and rhythm; No murmurs, rubs, or gallops  ABDOMEN: Soft, Nontender, Nondistended; Bowel sounds present  EXTREMITIES:  2+ Peripheral Pulses, No clubbing, cyanosis, or edema  LYMPH: No lymphadenopathy noted  SKIN: No rashes or lesions      LABS:  CBC Full  -  ( 23 Nov 2021 07:39 )  WBC Count : 10.95 K/uL  RBC Count : 3.11 M/uL  Hemoglobin : 8.9 g/dL  Hematocrit : 28.7 %  Platelet Count - Automated : 541 K/uL  Mean Cell Volume : 92.3 fl  Mean Cell Hemoglobin : 28.6 pg  Mean Cell Hemoglobin Concentration : 31.0 gm/dL  Auto Neutrophil # : x  Auto Lymphocyte # : x  Auto Monocyte # : x  Auto Eosinophil # : x  Auto Basophil # : x  Auto Neutrophil % : x  Auto Lymphocyte % : x  Auto Monocyte % : x  Auto Eosinophil % : x  Auto Basophil % : x    11-23    135  |  103  |  20<H>  ----------------------------<  154<H>  4.3   |  27  |  0.54    Ca    7.9<L>      23 Nov 2021 07:39  Phos  3.4     11-23  Mg     2.2     11-23    TPro  6.6  /  Alb  1.6<L>  /  TBili  0.2  /  DBili  x   /  AST  30  /  ALT  30  /  AlkPhos  93  11-23              [  ]  DVT Prophylaxis  [  ]  Nutrition, Brand, Rate         Goal Rate        Abnormal Nutritional Status -  Malnutrition   Cachexia      Morbid Obesity BMI >/=40    RADIOLOGY & ADDITIONAL STUDIES:  ***    Goals of Care Discussion with Family/Proxy/Other   - see note from/family meeting set up for...     AKASH CHACKO    SCU progress note    INTERVAL HPI/OVERNIGHT EVENTS: No acute events overnight    DNR [ ]   DNI  [  ]    Covid - 19 PCR:     [facility] 4Ms    What Matters Most: see GOC  Age appropriate Medications/Screen for High Risk Medication: Yes  Mentation: see CAM below  Mobility: defer to physical exam    The Confusion Assessment Method (CAM) Diagnostic Algorithm     1: Acute Onset or Fluctuating Course  - Is there evidence of an acute change in mental status from the patient’s baseline? Did the (abnormal) behavior  fluctuate during the day, that is, tend to come and go, or increase and decrease in severity?  [ ] YES [x ] NO     2: Inattention  - Did the patient have difficulty focusing attention, being easily distractible, or having difficulty keeping track of what was being said?  [ ] YES [x ] NO     3: Disorganized thinking  -Was the patient’s thinking disorganized or incoherent, such as rambling or irrelevant conversation, unclear or illogical flow of ideas, or unpredictable switching from subject to subject?  [ ] YES [x ] NO    4: Altered Level of consciousness?  [ ] YES [x ] NO    The diagnosis of delirium by CAM requires the presence of features 1 and 2 and either 3 or 4.    PRESSORS: [ ] YES [x ] NO    Cardiovascular:  Heart Failure  Acute   Acute on Chronic  Chronic         Pulmonary:    Hematalogic:  aspirin  chewable 81 milliGRAM(s) Oral daily  enoxaparin Injectable 40 milliGRAM(s) SubCutaneous daily    Other:  acetaminophen    Suspension .. 650 milliGRAM(s) Oral every 6 hours PRN  chlorhexidine 0.12% Liquid 15 milliLiter(s) Oral Mucosa every 12 hours  morphine  - Injectable 1 milliGRAM(s) IV Push every 4 hours PRN  pantoprazole   Suspension 40 milliGRAM(s) Oral daily  sodium chloride 0.9% lock flush 10 milliLiter(s) IV Push every 1 hour PRN    acetaminophen    Suspension .. 650 milliGRAM(s) Oral every 6 hours PRN  aspirin  chewable 81 milliGRAM(s) Oral daily  chlorhexidine 0.12% Liquid 15 milliLiter(s) Oral Mucosa every 12 hours  enoxaparin Injectable 40 milliGRAM(s) SubCutaneous daily  morphine  - Injectable 1 milliGRAM(s) IV Push every 4 hours PRN  pantoprazole   Suspension 40 milliGRAM(s) Oral daily  sodium chloride 0.9% lock flush 10 milliLiter(s) IV Push every 1 hour PRN    Drug Dosing Weight  Height (cm): 167.6 (03 Nov 2021 16:02)  Weight (kg): 40.9 (01 Nov 2021 02:20)  BMI (kg/m2): 14.6 (03 Nov 2021 16:02)  BSA (m2): 1.43 (03 Nov 2021 16:02)    CENTRAL LINE: [ ] YES [xx ] NO  LOCATION:   DATE INSERTED:  REMOVE: [ ] YES [ ] NO  EXPLAIN:    SAEED: [ ] YES [x ] NO    DATE INSERTED:  REMOVE:  [ ] YES [ ] NO  EXPLAIN:    PAST MEDICAL & SURGICAL HISTORY:  HTN (hypertension)    Dementia                  Mode: AC/ CMV (Assist Control/ Continuous Mandatory Ventilation)  RR (machine): 16  TV (machine): 350  FiO2: 40  PEEP: 5  ITime: 0.9  MAP: 6  PIP: 20      PHYSICAL EXAM:    GENERAL: NAD, well-groomed, well-developed  HEAD:  Atraumatic, Normocephalic  EYES: EOMI, PERRLA, conjunctiva and sclera clear  ENMT: No tonsillar erythema, exudates, or enlargement; Moist mucous membranes, Good dentition, No lesions  NECK: Supple, No JVD, Normal thyroid  NERVOUS SYSTEM:  Alert & Oriented X3, Good concentration; Motor Strength 5/5 B/L upper and lower extremities; DTRs 2+ intact and symmetric  CHEST/LUNG: Clear to percussion bilaterally; No rales, rhonchi, wheezing, or rubs  HEART: Regular rate and rhythm; No murmurs, rubs, or gallops  ABDOMEN: Soft, Nontender, Nondistended; Bowel sounds present  EXTREMITIES:  2+ Peripheral Pulses, No clubbing, cyanosis, or edema  LYMPH: No lymphadenopathy noted  SKIN: No rashes or lesions      LABS:  CBC Full  -  ( 23 Nov 2021 07:39 )  WBC Count : 10.95 K/uL  RBC Count : 3.11 M/uL  Hemoglobin : 8.9 g/dL  Hematocrit : 28.7 %  Platelet Count - Automated : 541 K/uL  Mean Cell Volume : 92.3 fl  Mean Cell Hemoglobin : 28.6 pg  Mean Cell Hemoglobin Concentration : 31.0 gm/dL  Auto Neutrophil # : x  Auto Lymphocyte # : x  Auto Monocyte # : x  Auto Eosinophil # : x  Auto Basophil # : x  Auto Neutrophil % : x  Auto Lymphocyte % : x  Auto Monocyte % : x  Auto Eosinophil % : x  Auto Basophil % : x    11-23    135  |  103  |  20<H>  ----------------------------<  154<H>  4.3   |  27  |  0.54    Ca    7.9<L>      23 Nov 2021 07:39  Phos  3.4     11-23  Mg     2.2     11-23    TPro  6.6  /  Alb  1.6<L>  /  TBili  0.2  /  DBili  x   /  AST  30  /  ALT  30  /  AlkPhos  93  11-23              [  ]  DVT Prophylaxis  [  ]  Nutrition, Brand, Rate         Goal Rate        Abnormal Nutritional Status -  Malnutrition   Cachexia      Morbid Obesity BMI >/=40    RADIOLOGY & ADDITIONAL STUDIES:  ***    Goals of Care Discussion with Family/Proxy/Other   - see note from/family meeting set up for...     AKASH CHACKO    SCU progress note    INTERVAL HPI/OVERNIGHT EVENTS: No acute events overnight    DNR [ ]   DNI  [  ]    Covid - 19 PCR:     [facility] 4Ms    What Matters Most: see GOC  Age appropriate Medications/Screen for High Risk Medication: Yes  Mentation: see CAM below  Mobility: defer to physical exam    The Confusion Assessment Method (CAM) Diagnostic Algorithm     1: Acute Onset or Fluctuating Course  - Is there evidence of an acute change in mental status from the patient’s baseline? Did the (abnormal) behavior  fluctuate during the day, that is, tend to come and go, or increase and decrease in severity?  [ ] YES [x ] NO     2: Inattention  - Did the patient have difficulty focusing attention, being easily distractible, or having difficulty keeping track of what was being said?  [ ] YES [x ] NO     3: Disorganized thinking  -Was the patient’s thinking disorganized or incoherent, such as rambling or irrelevant conversation, unclear or illogical flow of ideas, or unpredictable switching from subject to subject?  [ ] YES [ ] NO- unable to assess    4: Altered Level of consciousness?  [ ] YES [ ] NO- unable to assess    The diagnosis of delirium by CAM requires the presence of features 1 and 2 and either 3 or 4.    PRESSORS: [ ] YES [x ] NO    Cardiovascular:  Heart Failure  Acute   Acute on Chronic  Chronic         Pulmonary:    Hematalogic:  aspirin  chewable 81 milliGRAM(s) Oral daily  enoxaparin Injectable 40 milliGRAM(s) SubCutaneous daily    Other:  acetaminophen    Suspension .. 650 milliGRAM(s) Oral every 6 hours PRN  chlorhexidine 0.12% Liquid 15 milliLiter(s) Oral Mucosa every 12 hours  morphine  - Injectable 1 milliGRAM(s) IV Push every 4 hours PRN  pantoprazole   Suspension 40 milliGRAM(s) Oral daily  sodium chloride 0.9% lock flush 10 milliLiter(s) IV Push every 1 hour PRN    acetaminophen    Suspension .. 650 milliGRAM(s) Oral every 6 hours PRN  aspirin  chewable 81 milliGRAM(s) Oral daily  chlorhexidine 0.12% Liquid 15 milliLiter(s) Oral Mucosa every 12 hours  enoxaparin Injectable 40 milliGRAM(s) SubCutaneous daily  morphine  - Injectable 1 milliGRAM(s) IV Push every 4 hours PRN  pantoprazole   Suspension 40 milliGRAM(s) Oral daily  sodium chloride 0.9% lock flush 10 milliLiter(s) IV Push every 1 hour PRN    Drug Dosing Weight  Height (cm): 167.6 (03 Nov 2021 16:02)  Weight (kg): 40.9 (01 Nov 2021 02:20)  BMI (kg/m2): 14.6 (03 Nov 2021 16:02)  BSA (m2): 1.43 (03 Nov 2021 16:02)    CENTRAL LINE: [ ] YES [xx ] NO  LOCATION:   DATE INSERTED:  REMOVE: [ ] YES [ ] NO  EXPLAIN:    SAEED: [ ] YES [x ] NO    DATE INSERTED:  REMOVE:  [ ] YES [ ] NO  EXPLAIN:    PAST MEDICAL & SURGICAL HISTORY:  HTN (hypertension)    Dementia                  Mode: AC/ CMV (Assist Control/ Continuous Mandatory Ventilation)  RR (machine): 16  TV (machine): 350  FiO2: 40  PEEP: 5  ITime: 0.9  MAP: 6  PIP: 20      PHYSICAL EXAM:    GENERAL: malnourished appearing, NAD,   HEAD:  Atraumatic, Normocephalic  EYES: EOMI, PERRL, conjunctiva and sclera clear  ENMT: no oral pharynx erythema or exudate, Moist mucous membranes,   NECK: + Trach, No JVD, Normal thyroid  NERVOUS SYSTEM:  Awake, does not follow instruction, no voluntary movements of upper or lower extremities, b/l r reflex hand  b/l  CHEST/LUNG: left chest tube to waterseal, B/l lung fields w/ diminish bs, L>R, no crepitus over left chest wall,   HEART: Regular rate and rhythm; No murmurs, rubs, or gallops  ABDOMEN: + peg, nt, nd, no rebound or guarding,   EXTREMITIES:  2+ Peripheral Pulses, No clubbing, cyanosis, or edema  LYMPH: No lymphadenopathy noted  SKIN: No rashes or lesions      LABS:  CBC Full  -  ( 23 Nov 2021 07:39 )  WBC Count : 10.95 K/uL  RBC Count : 3.11 M/uL  Hemoglobin : 8.9 g/dL  Hematocrit : 28.7 %  Platelet Count - Automated : 541 K/uL  Mean Cell Volume : 92.3 fl  Mean Cell Hemoglobin : 28.6 pg  Mean Cell Hemoglobin Concentration : 31.0 gm/dL  Auto Neutrophil # : x  Auto Lymphocyte # : x  Auto Monocyte # : x  Auto Eosinophil # : x  Auto Basophil # : x  Auto Neutrophil % : x  Auto Lymphocyte % : x  Auto Monocyte % : x  Auto Eosinophil % : x  Auto Basophil % : x    11-23    135  |  103  |  20<H>  ----------------------------<  154<H>  4.3   |  27  |  0.54    Ca    7.9<L>      23 Nov 2021 07:39  Phos  3.4     11-23  Mg     2.2     11-23    TPro  6.6  /  Alb  1.6<L>  /  TBili  0.2  /  DBili  x   /  AST  30  /  ALT  30  /  AlkPhos  93  11-23              [  ]  DVT Prophylaxis  [  ]  Nutrition, Brand, Rate         Goal Rate        Abnormal Nutritional Status -  Malnutrition   Cachexia      Morbid Obesity BMI >/=40    RADIOLOGY & ADDITIONAL STUDIES:  ***    Goals of Care Discussion with Family/Proxy/Other   - see note from/family meeting set up for...     AKASH CHACKO    SCU progress note    INTERVAL HPI/OVERNIGHT EVENTS: No acute events overnight    DNR [ ]   DNI  [  ]- FULL CODE    Covid - 19 PCR: COVID-19 PCR: [place] (19 Nov 2021 05:52)    The 4Ms    What Matters Most: see GOC  Age appropriate Medications/Screen for High Risk Medication: Yes  Mentation: see CAM below  Mobility: defer to physical exam    The Confusion Assessment Method (CAM) Diagnostic Algorithm     1: Acute Onset or Fluctuating Course  - Is there evidence of an acute change in mental status from the patient’s baseline? Did the (abnormal) behavior  fluctuate during the day, that is, tend to come and go, or increase and decrease in severity?  [ ] YES [x ] NO     2: Inattention  - Did the patient have difficulty focusing attention, being easily distractible, or having difficulty keeping track of what was being said?  [ ] YES [x ] NO     3: Disorganized thinking  -Was the patient’s thinking disorganized or incoherent, such as rambling or irrelevant conversation, unclear or illogical flow of ideas, or unpredictable switching from subject to subject?  [ ] YES [ ] NO- unable to assess    4: Altered Level of consciousness?  [ ] YES [ ] NO- unable to assess    The diagnosis of delirium by CAM requires the presence of features 1 and 2 and either 3 or 4.    PRESSORS: [ ] YES [x ] NO    Cardiovascular:  Heart Failure  Acute   Acute on Chronic  Chronic         Pulmonary:    Hematalogic:  aspirin  chewable 81 milliGRAM(s) Oral daily  enoxaparin Injectable 40 milliGRAM(s) SubCutaneous daily    Other:  acetaminophen    Suspension .. 650 milliGRAM(s) Oral every 6 hours PRN  chlorhexidine 0.12% Liquid 15 milliLiter(s) Oral Mucosa every 12 hours  morphine  - Injectable 1 milliGRAM(s) IV Push every 4 hours PRN  pantoprazole   Suspension 40 milliGRAM(s) Oral daily  sodium chloride 0.9% lock flush 10 milliLiter(s) IV Push every 1 hour PRN    acetaminophen    Suspension .. 650 milliGRAM(s) Oral every 6 hours PRN  aspirin  chewable 81 milliGRAM(s) Oral daily  chlorhexidine 0.12% Liquid 15 milliLiter(s) Oral Mucosa every 12 hours  enoxaparin Injectable 40 milliGRAM(s) SubCutaneous daily  morphine  - Injectable 1 milliGRAM(s) IV Push every 4 hours PRN  pantoprazole   Suspension 40 milliGRAM(s) Oral daily  sodium chloride 0.9% lock flush 10 milliLiter(s) IV Push every 1 hour PRN    Drug Dosing Weight  Height (cm): 167.6 (03 Nov 2021 16:02)  Weight (kg): 40.9 (01 Nov 2021 02:20)  BMI (kg/m2): 14.6 (03 Nov 2021 16:02)  BSA (m2): 1.43 (03 Nov 2021 16:02)    CENTRAL LINE: [ ] YES [xx ] NO  LOCATION:   DATE INSERTED:  REMOVE: [ ] YES [ ] NO  EXPLAIN:    SAEED: [ ] YES [x ] NO    DATE INSERTED:  REMOVE:  [ ] YES [ ] NO  EXPLAIN:    PAST MEDICAL & SURGICAL HISTORY:  HTN (hypertension)    Dementia                  Mode: AC/ CMV (Assist Control/ Continuous Mandatory Ventilation)  RR (machine): 16  TV (machine): 350  FiO2: 40  PEEP: 5  ITime: 0.9  MAP: 6  PIP: 20      PHYSICAL EXAM:    GENERAL: malnourished appearing, NAD,   HEAD:  Atraumatic, Normocephalic  EYES: EOMI, PERRL, conjunctiva and sclera clear  ENMT: no oral pharynx erythema or exudate, Moist mucous membranes,   NECK: + Trach, No JVD, Normal thyroid  NERVOUS SYSTEM:  Awake, does not follow instruction, no voluntary movements of upper or lower extremities, b/l r reflex hand  b/l  CHEST/LUNG: left chest tube to waterseal, B/l lung fields w/ diminish bs, L>R, no crepitus over left chest wall,   HEART: Regular rate and rhythm; No murmurs, rubs, or gallops  ABDOMEN: + peg, nt, nd, no rebound or guarding,   EXTREMITIES:  2+ Peripheral Pulses, No clubbing, cyanosis, or edema  LYMPH: No lymphadenopathy noted  SKIN: No rashes or lesions      LABS:  CBC Full  -  ( 23 Nov 2021 07:39 )  WBC Count : 10.95 K/uL  RBC Count : 3.11 M/uL  Hemoglobin : 8.9 g/dL  Hematocrit : 28.7 %  Platelet Count - Automated : 541 K/uL  Mean Cell Volume : 92.3 fl  Mean Cell Hemoglobin : 28.6 pg  Mean Cell Hemoglobin Concentration : 31.0 gm/dL  Auto Neutrophil # : x  Auto Lymphocyte # : x  Auto Monocyte # : x  Auto Eosinophil # : x  Auto Basophil # : x  Auto Neutrophil % : x  Auto Lymphocyte % : x  Auto Monocyte % : x  Auto Eosinophil % : x  Auto Basophil % : x    11-23    135  |  103  |  20<H>  ----------------------------<  154<H>  4.3   |  27  |  0.54    Ca    7.9<L>      23 Nov 2021 07:39  Phos  3.4     11-23  Mg     2.2     11-23    TPro  6.6  /  Alb  1.6<L>  /  TBili  0.2  /  DBili  x   /  AST  30  /  ALT  30  /  AlkPhos  93  11-23              [  ]  DVT Prophylaxis  [  ]  Nutrition, Brand, Rate         Goal Rate        Abnormal Nutritional Status -  Malnutrition   Cachexia      Morbid Obesity BMI >/=40    RADIOLOGY & ADDITIONAL STUDIES:  ***    Goals of Care Discussion with Family/Proxy/Other   - see note from/family meeting set up for...

## 2021-11-23 NOTE — PROGRESS NOTE ADULT - ASSESSMENT
89 y/o F from [facility] w/ Hx of HTN, Dementia, CVA w/ R sided hemiparesis, aphasia, Parkinson's Disease, GERD, CKD. She is bedbound at baseline and needs assistance for ADLs. She was brought to the E.D. due to episodes of desaturation at 70s and respiratory distress. She was also having cough and fever in the nursing home. She was initially on NRB placed by EMS but was eventually intubated in the E.D. She is vaccinated with Moderna. COVID test was negative on admission. Chest Xray showed left lower lobe infiltrate (pneumonia). Blood cultures were sent. She was given 1 dose of Vancomycin and Zosyn in the E.D. and was subsequently admitted to ICU.    In the ICU, she was placed on mechanical ventilator. Her blood pressure was low despite receiving 2L bolus of LR. NGT and Left IJ central line was placed. Levophed was started. CXR revealed Left pneumothorax. Thoracic Surgery was consulted and left chest tube was placed. Patient was also tachycardic and troponin was mildly elevated. EKG showed SR w/ ST changes in the inferior leads. Troponin was monitored and has since trended down. Heart rate has been stable. Lactate was also elevated at 11.2 and has also since trended down. Patient also presented with hypernatremia and elevated creatinine probably secondary to volume deficit. She was also given free water. She was on Heparin for DVT prophylaxis and Protonix for GI prophylaxis. Palliative was consulted regarding the [facility]. Blood culture results showed E. coli bacteremia. She was started on Rocephin IV for 10 days. Azithromycin was continued awaiting Legionella results. Infectious Disease was consulted. Urine culture was ordered. Patient spiked a fever and is tachycardic. Blood cultures were repeated. She was tachycardic however his EKG showed SR w/ new PACs. There was mild air leak in the chest tube drainage but it was fixed by Cardiothoracic Surgery. Urine culture and repeat blood culture was negative. She had 2 febrile episodes overnight at 100.4-100.5F. She was tachypneic at rate of 30. Her repeat Chest Xray showed worsening of bilateral infiltrates. Her albumin is still low at 1.0. Her urine output is net negative. She was given Lasix and Albumin to maintain net negative urine output and decrease congestion. Vancomycin was discontinued and Cefepime decreased from 2g to 1g to complete 14 days since negative culture (11/16/21). Echocardiogram was ordered. Central line was removed. Lactulose was ordered for bowel movement. On November 11, 2021, patient had her tracheostomy and PEG inserted. Patient son Jered was called and informed .     11/20 CXR shows mild enlargement of left apical pneumothorax. Left chest tube unclamped to water seal. Thoracic following.   11/23-Left chest tube to waterseal, vented and pulling good tidal volume.  CT sx following, awaiting x-ray.

## 2021-11-23 NOTE — PROGRESS NOTE ADULT - PROBLEM SELECTOR PLAN 3
Completed 14 days of Cefepime   febrile 11/22-   leukocytosis resolving    Repeat BC from 11/17 NGTD   ID following.

## 2021-11-23 NOTE — PROGRESS NOTE ADULT - ASSESSMENT
89F with left chest tube in place for pneumothorax, minimal output with no air leak on water seal  -pending x-ray for today   -continue chest tube to water seal  -monitor chest tube output

## 2021-11-23 NOTE — CHART NOTE - NSCHARTNOTEFT_GEN_A_CORE
Case discussed with pt's grandson Tommie Pathak and provided him with update regarding pt's current medical condition.  Plan of care discussed and all questions answered.

## 2021-11-23 NOTE — PROGRESS NOTE ADULT - SUBJECTIVE AND OBJECTIVE BOX
89F s/p left sided chest tube placement for pneumothorax, tracheostomy and PEG tube placement for continued respiratory failure. X ray performed yesterday after chest tube was clamped, demonstrated worsening pnuemo. Chest tube was unclamped and put back to waterseal.     Vital Signs Last 24 Hrs  T(C): 36.5 (23 Nov 2021 05:52), Max: 38 (22 Nov 2021 13:37)  T(F): 97.7 (23 Nov 2021 05:52), Max: 100.4 (22 Nov 2021 13:37)  HR: 79 (23 Nov 2021 05:52) (79 - 89)  BP: 133/48 (23 Nov 2021 05:52) (105/60 - 142/68)  BP(mean): --  RR: 18 (23 Nov 2021 05:52) (17 - 21)  SpO2: 98% (23 Nov 2021 05:52) (98% - 100%)    NAD  tracheostomy noted, AC 16 350 40 5  left chest tube waterseal, minimal output in tubing  abdomen soft, nondistended, gastrostomy tube noted 2.5cm at skin with trickle feeds running                            8.9    10.95 )-----------( 541      ( 23 Nov 2021 07:39 )             28.7   11-22    137  |  103  |  16  ----------------------------<  147<H>  4.2   |  29  |  0.58    Ca    8.1<L>      22 Nov 2021 07:41  Phos  3.3     11-22  Mg     2.4     11-22    TPro  6.2  /  Alb  1.4<L>  /  TBili  0.2  /  DBili  x   /  AST  24  /  ALT  25  /  AlkPhos  85  11-22

## 2021-11-23 NOTE — PROGRESS NOTE ADULT - ASSESSMENT
seen and eaxmined vsstable afebrile physical done  orally intubated  not in any distress  labs  hgn 8.9  a/p afebrile   chest tube  CT surgery   vent facility placement

## 2021-11-23 NOTE — PROGRESS NOTE ADULT - SUBJECTIVE AND OBJECTIVE BOX
HPI:  89 yo female from [facility] with medical h/o of HTN, Dementia, CVA with R sided hemiparesis, aphasia, parkinson's, GERD, CKD bedbound at baseline, dependant on assistance for ADL comes in with respiratory failure. Patient was found to have respiratory distress, saturating in 70s when EMS arrived, was placed on NRB on field. She was emergently intubated on arrival in ED. As per NH records patient was having fever and cough for past few days. Today she was found to have respiratory distress. She is vaccinated with moderna. Spoke to family son,  who stated the patient to remain full code. No other history could be obtained.  (01 Nov 2021 00:14)      Patient is a 89y old  Female who presents with a chief complaint of AHRF (23 Nov 2021 11:23)      INTERVAL HPI/OVERNIGHT EVENTS:  T(C): 37.3 (11-23-21 @ 13:53), Max: 37.3 (11-22-21 @ 20:01)  HR: 84 (11-23-21 @ 13:53) (79 - 85)  BP: 136/86 (11-23-21 @ 13:53) (105/60 - 136/86)  RR: 16 (11-23-21 @ 13:53) (16 - 18)  SpO2: 100% (11-23-21 @ 13:53) (98% - 100%)  Wt(kg): --  I&O's Summary      REVIEW OF SYSTEMS: denies fever, chills, SOB, palpitations, chest pain, abdominal pain, nausea, vomitting, diarrhea, constipation, dizziness    MEDICATIONS  (STANDING):  aspirin  chewable 81 milliGRAM(s) Oral daily  chlorhexidine 0.12% Liquid 15 milliLiter(s) Oral Mucosa every 12 hours  enoxaparin Injectable 40 milliGRAM(s) SubCutaneous daily  pantoprazole   Suspension 40 milliGRAM(s) Oral daily    MEDICATIONS  (PRN):  acetaminophen    Suspension .. 650 milliGRAM(s) Oral every 6 hours PRN Temp greater or equal to 38C (100.4F), Mild Pain (1 - 3), Moderate Pain (4 - 6)  morphine  - Injectable 1 milliGRAM(s) IV Push every 4 hours PRN Respiratory distress  sodium chloride 0.9% lock flush 10 milliLiter(s) IV Push every 1 hour PRN Pre/post blood products, medications, blood draw, and to maintain line patency      PHYSICAL EXAM:  GENERAL: NAD, well-groomed, well-developed  HEAD:  Atraumatic, Normocephalic  EYES: EOMI, PERRLA, conjunctiva and sclera clear  ENMT: No tonsillar erythema, exudates, or enlargement; Moist mucous membranes, Good dentition, No lesions  NECK: Supple, No JVD, Normal thyroid  NERVOUS SYSTEM:  Alert & Oriented X3, Good concentration; Motor Strength 5/5 B/L upper and lower extremities; DTRs 2+ intact and symmetric  CHEST/LUNG: Clear to percussion bilaterally; No rales, rhonchi, wheezing, or rubs  HEART: Regular rate and rhythm; No murmurs, rubs, or gallops  ABDOMEN: Soft, Nontender, Nondistended; Bowel sounds present  EXTREMITIES:  2+ Peripheral Pulses, No clubbing, cyanosis, or edema  LYMPH: No lymphadenopathy noted  SKIN: No rashes or lesions  LABS:                        8.9    10.95 )-----------( 541      ( 23 Nov 2021 07:39 )             28.7     11-23    135  |  103  |  20<H>  ----------------------------<  154<H>  4.3   |  27  |  0.54    Ca    7.9<L>      23 Nov 2021 07:39  Phos  3.4     11-23  Mg     2.2     11-23    TPro  6.6  /  Alb  1.6<L>  /  TBili  0.2  /  DBili  x   /  AST  30  /  ALT  30  /  AlkPhos  93  11-23        CAPILLARY BLOOD GLUCOSE      POCT Blood Glucose.: 149 mg/dL (23 Nov 2021 17:14)  POCT Blood Glucose.: 157 mg/dL (23 Nov 2021 11:51)  POCT Blood Glucose.: 160 mg/dL (23 Nov 2021 06:05)  POCT Blood Glucose.: 126 mg/dL (22 Nov 2021 23:31)

## 2021-11-24 LAB
ALBUMIN SERPL ELPH-MCNC: 1.6 G/DL — LOW (ref 3.5–5)
ALP SERPL-CCNC: 87 U/L — SIGNIFICANT CHANGE UP (ref 40–120)
ALT FLD-CCNC: 34 U/L DA — SIGNIFICANT CHANGE UP (ref 10–60)
ANION GAP SERPL CALC-SCNC: 7 MMOL/L — SIGNIFICANT CHANGE UP (ref 5–17)
AST SERPL-CCNC: 30 U/L — SIGNIFICANT CHANGE UP (ref 10–40)
BILIRUB SERPL-MCNC: 0.2 MG/DL — SIGNIFICANT CHANGE UP (ref 0.2–1.2)
BUN SERPL-MCNC: 18 MG/DL — SIGNIFICANT CHANGE UP (ref 7–18)
CALCIUM SERPL-MCNC: 8.2 MG/DL — LOW (ref 8.4–10.5)
CHLORIDE SERPL-SCNC: 102 MMOL/L — SIGNIFICANT CHANGE UP (ref 96–108)
CO2 SERPL-SCNC: 26 MMOL/L — SIGNIFICANT CHANGE UP (ref 22–31)
CREAT SERPL-MCNC: 0.53 MG/DL — SIGNIFICANT CHANGE UP (ref 0.5–1.3)
GLUCOSE SERPL-MCNC: 150 MG/DL — HIGH (ref 70–99)
HCT VFR BLD CALC: 27.5 % — LOW (ref 34.5–45)
HGB BLD-MCNC: 8.6 G/DL — LOW (ref 11.5–15.5)
MAGNESIUM SERPL-MCNC: 2.5 MG/DL — SIGNIFICANT CHANGE UP (ref 1.6–2.6)
MCHC RBC-ENTMCNC: 28.5 PG — SIGNIFICANT CHANGE UP (ref 27–34)
MCHC RBC-ENTMCNC: 31.3 GM/DL — LOW (ref 32–36)
MCV RBC AUTO: 91.1 FL — SIGNIFICANT CHANGE UP (ref 80–100)
NRBC # BLD: 0 /100 WBCS — SIGNIFICANT CHANGE UP (ref 0–0)
PHOSPHATE SERPL-MCNC: 3.6 MG/DL — SIGNIFICANT CHANGE UP (ref 2.5–4.5)
PLATELET # BLD AUTO: 567 K/UL — HIGH (ref 150–400)
POTASSIUM SERPL-MCNC: 4.6 MMOL/L — SIGNIFICANT CHANGE UP (ref 3.5–5.3)
POTASSIUM SERPL-SCNC: 4.6 MMOL/L — SIGNIFICANT CHANGE UP (ref 3.5–5.3)
PROT SERPL-MCNC: 6.6 G/DL — SIGNIFICANT CHANGE UP (ref 6–8.3)
RBC # BLD: 3.02 M/UL — LOW (ref 3.8–5.2)
RBC # FLD: 14.9 % — HIGH (ref 10.3–14.5)
SODIUM SERPL-SCNC: 135 MMOL/L — SIGNIFICANT CHANGE UP (ref 135–145)
WBC # BLD: 12.03 K/UL — HIGH (ref 3.8–10.5)
WBC # FLD AUTO: 12.03 K/UL — HIGH (ref 3.8–10.5)

## 2021-11-24 PROCEDURE — 99232 SBSQ HOSP IP/OBS MODERATE 35: CPT | Mod: 57

## 2021-11-24 PROCEDURE — 71045 X-RAY EXAM CHEST 1 VIEW: CPT | Mod: 26

## 2021-11-24 RX ADMIN — Medication 81 MILLIGRAM(S): at 11:51

## 2021-11-24 RX ADMIN — PANTOPRAZOLE SODIUM 40 MILLIGRAM(S): 20 TABLET, DELAYED RELEASE ORAL at 11:51

## 2021-11-24 RX ADMIN — ENOXAPARIN SODIUM 40 MILLIGRAM(S): 100 INJECTION SUBCUTANEOUS at 11:51

## 2021-11-24 RX ADMIN — CHLORHEXIDINE GLUCONATE 15 MILLILITER(S): 213 SOLUTION TOPICAL at 18:00

## 2021-11-24 RX ADMIN — Medication 650 MILLIGRAM(S): at 12:45

## 2021-11-24 RX ADMIN — Medication 650 MILLIGRAM(S): at 11:51

## 2021-11-24 RX ADMIN — CHLORHEXIDINE GLUCONATE 15 MILLILITER(S): 213 SOLUTION TOPICAL at 07:21

## 2021-11-24 NOTE — PROGRESS NOTE ADULT - ASSESSMENT
89 y/o F from [facility] w/ Hx of HTN, Dementia, CVA w/ R sided hemiparesis, aphasia, Parkinson's Disease, GERD, CKD. She is bedbound at baseline and needs assistance for ADLs. She was brought to the E.D. due to episodes of desaturation at 70s and respiratory distress. She was also having cough and fever in the nursing home. She was initially on NRB placed by EMS but was eventually intubated in the E.D. She is vaccinated with Moderna. COVID test was negative on admission. Chest Xray showed left lower lobe infiltrate (pneumonia). Blood cultures were sent. She was given 1 dose of Vancomycin and Zosyn in the E.D. and was subsequently admitted to ICU.    In the ICU, she was placed on mechanical ventilator. Her blood pressure was low despite receiving 2L bolus of LR. NGT and Left IJ central line was placed. Levophed was started. CXR revealed Left pneumothorax. Thoracic Surgery was consulted and left chest tube was placed. Patient was also tachycardic and troponin was mildly elevated. EKG showed SR w/ ST changes in the inferior leads. Troponin was monitored and has since trended down. Heart rate has been stable. Lactate was also elevated at 11.2 and has also since trended down. Patient also presented with hypernatremia and elevated creatinine probably secondary to volume deficit. She was also given free water. She was on Heparin for DVT prophylaxis and Protonix for GI prophylaxis. Palliative was consulted regarding the [facility]. Blood culture results showed E. coli bacteremia. She was started on Rocephin IV for 10 days. Azithromycin was continued awaiting Legionella results. Infectious Disease was consulted. Urine culture was ordered. Patient spiked a fever and is tachycardic. Blood cultures were repeated. She was tachycardic however his EKG showed SR w/ new PACs. There was mild air leak in the chest tube drainage but it was fixed by Cardiothoracic Surgery. Urine culture and repeat blood culture was negative. She had 2 febrile episodes overnight at 100.4-100.5F. She was tachypneic at rate of 30. Her repeat Chest Xray showed worsening of bilateral infiltrates. Her albumin is still low at 1.0. Her urine output is net negative. She was given Lasix and Albumin to maintain net negative urine output and decrease congestion. Vancomycin was discontinued and Cefepime decreased from 2g to 1g to complete 14 days since negative culture (11/16/21). Echocardiogram was ordered. Central line was removed. Lactulose was ordered for bowel movement. On November 11, 2021, patient had her tracheostomy and PEG inserted. Patient son Jered was called and informed .     11/20 CXR shows mild enlargement of left apical pneumothorax. Left chest tube unclamped to water seal. Thoracic following.

## 2021-11-24 NOTE — PROGRESS NOTE ADULT - PROBLEM SELECTOR PLAN 1
Continue mechanical ventilation with daily SBT as tolerated  Currently tolerating SBT with PS 12.  Monitor oxygen saturation.

## 2021-11-24 NOTE — PROGRESS NOTE ADULT - PROBLEM SELECTOR PLAN 2
Resolved.   Secondary to bacteremia  BC from 11/1 grew E coli. Completed antibiotics Cefepime and vanco.  BC/UC both  NGTD   ID Dr Wong following.

## 2021-11-24 NOTE — PROGRESS NOTE ADULT - ASSESSMENT
seen and examined vsstable afebrile  on trach vent  physical done ok   no cp or sob or palp  labs noted  wbc 12.03  a/p  cardiothoracic  f/o   ? pneumostat ( has chest tube)  poor prognosis   change in position q 2

## 2021-11-24 NOTE — PROGRESS NOTE ADULT - NUTRITIONAL ASSESSMENT
This patient has been assessed with a concern for Malnutrition and has been determined to have a diagnosis/diagnoses of Severe protein-calorie malnutrition and Underweight (BMI < 19).  +Severe temporal waisting  +Muscle waisting all extremities.     This patient is being managed with:   Diet NPO with Tube Feed-  Tube Feeding Modality: Gastrostomy  Jevity 1.5 Bg  Total Volume for 24 Hours (mL): 720  Continuous  Starting Tube Feed Rate {mL per Hour}: 30  Until Goal Tube Feed Rate (mL per Hour): 30  Tube Feed Duration (in Hours): 24  Tube Feed Start Time: 15:00  No Carb Prosource (1pkg = 15gms Protein)     Qty per Day:  1  Entered: Nov 19 2021  3:07PM

## 2021-11-24 NOTE — PROGRESS NOTE ADULT - SUBJECTIVE AND OBJECTIVE BOX
HPI:  89 yo female from [facility] with medical h/o of HTN, Dementia, CVA with R sided hemiparesis, aphasia, parkinson's, GERD, CKD bedbound at baseline, dependant on assistance for ADL comes in with respiratory failure. Patient was found to have respiratory distress, saturating in 70s when EMS arrived, was placed on NRB on field. She was emergently intubated on arrival in ED. As per NH records patient was having fever and cough for past few days. Today she was found to have respiratory distress. She is vaccinated with moderna. Spoke to family son,  who stated the patient to remain full code. No other history could be obtained.  (01 Nov 2021 00:14)      Patient is a 89y old  Female who presents with a chief complaint of AHRF (24 Nov 2021 09:36)      INTERVAL HPI/OVERNIGHT EVENTS:  T(C): 36.9 (11-24-21 @ 14:27), Max: 37.6 (11-23-21 @ 22:17)  HR: 90 (11-24-21 @ 14:27) (81 - 91)  BP: 109/55 (11-24-21 @ 14:27) (109/55 - 163/62)  RR: 28 (11-24-21 @ 14:27) (18 - 28)  SpO2: 98% (11-24-21 @ 14:27) (98% - 100%)  Wt(kg): --  I&O's Summary      REVIEW OF SYSTEMS: denies fever, chills, SOB, palpitations, chest pain, abdominal pain, nausea, vomitting, diarrhea, constipation, dizziness    MEDICATIONS  (STANDING):  aspirin  chewable 81 milliGRAM(s) Oral daily  chlorhexidine 0.12% Liquid 15 milliLiter(s) Oral Mucosa every 12 hours  enoxaparin Injectable 40 milliGRAM(s) SubCutaneous daily  pantoprazole   Suspension 40 milliGRAM(s) Oral daily    MEDICATIONS  (PRN):  acetaminophen    Suspension .. 650 milliGRAM(s) Oral every 6 hours PRN Temp greater or equal to 38C (100.4F), Mild Pain (1 - 3), Moderate Pain (4 - 6)  morphine  - Injectable 1 milliGRAM(s) IV Push every 4 hours PRN Respiratory distress  sodium chloride 0.9% lock flush 10 milliLiter(s) IV Push every 1 hour PRN Pre/post blood products, medications, blood draw, and to maintain line patency      PHYSICAL EXAM:  GENERAL: NAD, well-groomed, well-developed  HEAD:  Atraumatic, Normocephalic  EYES: EOMI, PERRLA, conjunctiva and sclera clear  ENMT: No tonsillar erythema, exudates, or enlargement; Moist mucous membranes, Good dentition, No lesions  NECK: Supple, No JVD, Normal thyroid  NERVOUS SYSTEM:  Alert & Oriented X3, Good concentration; Motor Strength 5/5 B/L upper and lower extremities; DTRs 2+ intact and symmetric  CHEST/LUNG: Clear to percussion bilaterally; No rales, rhonchi, wheezing, or rubs  HEART: Regular rate and rhythm; No murmurs, rubs, or gallops  ABDOMEN: Soft, Nontender, Nondistended; Bowel sounds present  EXTREMITIES:  2+ Peripheral Pulses, No clubbing, cyanosis, or edema  LYMPH: No lymphadenopathy noted  SKIN: No rashes or lesions  LABS:                        8.6    12.03 )-----------( 567      ( 24 Nov 2021 09:04 )             27.5     11-24    135  |  102  |  18  ----------------------------<  150<H>  4.6   |  26  |  0.53    Ca    8.2<L>      24 Nov 2021 09:04  Phos  3.6     11-24  Mg     2.5     11-24    TPro  6.6  /  Alb  1.6<L>  /  TBili  0.2  /  DBili  x   /  AST  30  /  ALT  34  /  AlkPhos  87  11-24        CAPILLARY BLOOD GLUCOSE      POCT Blood Glucose.: 158 mg/dL (24 Nov 2021 11:51)  POCT Blood Glucose.: 156 mg/dL (24 Nov 2021 04:55)  POCT Blood Glucose.: 133 mg/dL (24 Nov 2021 00:00)  POCT Blood Glucose.: 149 mg/dL (23 Nov 2021 17:14)

## 2021-11-24 NOTE — PROGRESS NOTE ADULT - ASSESSMENT
89F with left chest tube in place for pneumothorax, minimal output with no air leak on water seal  -pending x-ray for today   -continue chest tube to water seal  -monitor chest tube output  -pneumostat to be placed

## 2021-11-24 NOTE — PROGRESS NOTE ADULT - SUBJECTIVE AND OBJECTIVE BOX
89F s/p left sided chest tube placement for pneumothorax, tracheostomy and PEG tube placement for continued respiratory failure. X ray performed yesterday after chest tube was clamped, demonstrated worsening pnuemo. Chest tube was unclamped and put back to waterseal.     Vital Signs Last 24 Hrs  T(C): 36.4 (24 Nov 2021 05:00), Max: 37.6 (23 Nov 2021 22:17)  T(F): 97.5 (24 Nov 2021 05:00), Max: 99.7 (23 Nov 2021 22:17)  HR: 88 (24 Nov 2021 05:00) (81 - 88)  BP: 163/62 (24 Nov 2021 05:00) (136/86 - 163/62)  BP(mean): --  RR: 24 (24 Nov 2021 05:00) (16 - 24)  SpO2: 100% (24 Nov 2021 05:00) (99% - 100%)    No acute distress, AAOX0   non labored breathing, b/l breath sounds  chest tube is to water seal with minimal serous output  chest xray from yesterday demonstrated decrease in left pneumothorax                             8.9    10.95 )-----------( 541      ( 23 Nov 2021 07:39 )             28.7   11-23    135  |  103  |  20<H>  ----------------------------<  154<H>  4.3   |  27  |  0.54    Ca    7.9<L>      23 Nov 2021 07:39  Phos  3.4     11-23  Mg     2.2     11-23    TPro  6.6  /  Alb  1.6<L>  /  TBili  0.2  /  DBili  x   /  AST  30  /  ALT  30  /  AlkPhos  93  11-23

## 2021-11-25 LAB
ALBUMIN SERPL ELPH-MCNC: 1.7 G/DL — LOW (ref 3.5–5)
ALP SERPL-CCNC: 82 U/L — SIGNIFICANT CHANGE UP (ref 40–120)
ALT FLD-CCNC: 30 U/L DA — SIGNIFICANT CHANGE UP (ref 10–60)
ANION GAP SERPL CALC-SCNC: 4 MMOL/L — LOW (ref 5–17)
AST SERPL-CCNC: 26 U/L — SIGNIFICANT CHANGE UP (ref 10–40)
BILIRUB SERPL-MCNC: 0.3 MG/DL — SIGNIFICANT CHANGE UP (ref 0.2–1.2)
BUN SERPL-MCNC: 19 MG/DL — HIGH (ref 7–18)
CALCIUM SERPL-MCNC: 8.1 MG/DL — LOW (ref 8.4–10.5)
CHLORIDE SERPL-SCNC: 104 MMOL/L — SIGNIFICANT CHANGE UP (ref 96–108)
CO2 SERPL-SCNC: 29 MMOL/L — SIGNIFICANT CHANGE UP (ref 22–31)
CREAT SERPL-MCNC: 0.63 MG/DL — SIGNIFICANT CHANGE UP (ref 0.5–1.3)
GLUCOSE SERPL-MCNC: 147 MG/DL — HIGH (ref 70–99)
HCT VFR BLD CALC: 29.2 % — LOW (ref 34.5–45)
HGB BLD-MCNC: 9 G/DL — LOW (ref 11.5–15.5)
MAGNESIUM SERPL-MCNC: 2.4 MG/DL — SIGNIFICANT CHANGE UP (ref 1.6–2.6)
MCHC RBC-ENTMCNC: 28.8 PG — SIGNIFICANT CHANGE UP (ref 27–34)
MCHC RBC-ENTMCNC: 30.8 GM/DL — LOW (ref 32–36)
MCV RBC AUTO: 93.3 FL — SIGNIFICANT CHANGE UP (ref 80–100)
NRBC # BLD: 0 /100 WBCS — SIGNIFICANT CHANGE UP (ref 0–0)
PHOSPHATE SERPL-MCNC: 3.6 MG/DL — SIGNIFICANT CHANGE UP (ref 2.5–4.5)
PLATELET # BLD AUTO: 529 K/UL — HIGH (ref 150–400)
POTASSIUM SERPL-MCNC: 4.8 MMOL/L — SIGNIFICANT CHANGE UP (ref 3.5–5.3)
POTASSIUM SERPL-SCNC: 4.8 MMOL/L — SIGNIFICANT CHANGE UP (ref 3.5–5.3)
PROT SERPL-MCNC: 6.5 G/DL — SIGNIFICANT CHANGE UP (ref 6–8.3)
RBC # BLD: 3.13 M/UL — LOW (ref 3.8–5.2)
RBC # FLD: 14.7 % — HIGH (ref 10.3–14.5)
SODIUM SERPL-SCNC: 137 MMOL/L — SIGNIFICANT CHANGE UP (ref 135–145)
WBC # BLD: 11.79 K/UL — HIGH (ref 3.8–10.5)
WBC # FLD AUTO: 11.79 K/UL — HIGH (ref 3.8–10.5)

## 2021-11-25 PROCEDURE — 99232 SBSQ HOSP IP/OBS MODERATE 35: CPT | Mod: 57

## 2021-11-25 PROCEDURE — 71045 X-RAY EXAM CHEST 1 VIEW: CPT | Mod: 26

## 2021-11-25 RX ORDER — ASPIRIN/CALCIUM CARB/MAGNESIUM 324 MG
1 TABLET ORAL
Qty: 0 | Refills: 0 | DISCHARGE
Start: 2021-11-25

## 2021-11-25 RX ORDER — BENZTROPINE MESYLATE 1 MG
1 TABLET ORAL
Qty: 0 | Refills: 0 | DISCHARGE

## 2021-11-25 RX ORDER — ASPIRIN/CALCIUM CARB/MAGNESIUM 324 MG
1 TABLET ORAL
Qty: 0 | Refills: 0 | DISCHARGE

## 2021-11-25 RX ORDER — ENOXAPARIN SODIUM 100 MG/ML
40 INJECTION SUBCUTANEOUS
Qty: 0 | Refills: 0 | DISCHARGE
Start: 2021-11-25

## 2021-11-25 RX ORDER — ACETAMINOPHEN 500 MG
2 TABLET ORAL
Qty: 0 | Refills: 0 | DISCHARGE

## 2021-11-25 RX ORDER — DONEPEZIL HYDROCHLORIDE 10 MG/1
1 TABLET, FILM COATED ORAL
Qty: 0 | Refills: 0 | DISCHARGE

## 2021-11-25 RX ORDER — ACETAMINOPHEN 500 MG
650 TABLET ORAL
Qty: 0 | Refills: 0 | DISCHARGE
Start: 2021-11-25

## 2021-11-25 RX ORDER — MORPHINE SULFATE 50 MG/1
1 CAPSULE, EXTENDED RELEASE ORAL EVERY 4 HOURS
Refills: 0 | Status: DISCONTINUED | OUTPATIENT
Start: 2021-11-25 | End: 2021-12-01

## 2021-11-25 RX ADMIN — CHLORHEXIDINE GLUCONATE 15 MILLILITER(S): 213 SOLUTION TOPICAL at 17:33

## 2021-11-25 RX ADMIN — ENOXAPARIN SODIUM 40 MILLIGRAM(S): 100 INJECTION SUBCUTANEOUS at 12:27

## 2021-11-25 RX ADMIN — Medication 81 MILLIGRAM(S): at 12:28

## 2021-11-25 RX ADMIN — PANTOPRAZOLE SODIUM 40 MILLIGRAM(S): 20 TABLET, DELAYED RELEASE ORAL at 12:28

## 2021-11-25 RX ADMIN — CHLORHEXIDINE GLUCONATE 15 MILLILITER(S): 213 SOLUTION TOPICAL at 07:18

## 2021-11-25 NOTE — PROGRESS NOTE ADULT - PROBLEM SELECTOR PLAN 10
DVT and GI prophylaxis.  Continue mechanical ventilation.  Left chest tube clamped. Placed on pneumostatic valve yesterday. Tolerating well.  Serial CXRs  Thoracic surgery f/u  Overall prognosis is extremely poor.  Remains FULL CODE as per family wishes.  SW consult for long term vent placement. DVT and GI prophylaxis.  Continue mechanical ventilation.  Left chest tube clamped. Placed on pneumostat valve yesterday. Tolerating well.  Serial CXRs  Thoracic surgery f/u  Overall prognosis is extremely poor.  Remains FULL CODE as per family wishes.  SW consult for long term vent placement.

## 2021-11-25 NOTE — PROGRESS NOTE ADULT - NUTRITIONAL ASSESSMENT
This patient has been assessed with a concern for Malnutrition and has been determined to have a diagnosis/diagnoses of Severe protein-calorie malnutrition and Underweight (BMI < 19).    This patient is being managed with:   Diet NPO with Tube Feed-  Tube Feeding Modality: Gastrostomy  Jevity 1.5 Bg  Total Volume for 24 Hours (mL): 960  Continuous  Starting Tube Feed Rate {mL per Hour}: 30  Until Goal Tube Feed Rate (mL per Hour): 40  Tube Feed Duration (in Hours): 24  Tube Feed Start Time: 15:00  No Carb Prosource (1pkg = 15gms Protein)     Qty per Day:  1  Entered: Nov 25 2021  3:21PM

## 2021-11-25 NOTE — PROGRESS NOTE ADULT - SUBJECTIVE AND OBJECTIVE BOX
HPI:  89 yo female from [facility] with medical h/o of HTN, Dementia, CVA with R sided hemiparesis, aphasia, parkinson's, GERD, CKD bedbound at baseline, dependant on assistance for ADL comes in with respiratory failure. Patient was found to have respiratory distress, saturating in 70s when EMS arrived, was placed on NRB on field. She was emergently intubated on arrival in ED. As per NH records patient was having fever and cough for past few days. Today she was found to have respiratory distress. She is vaccinated with moderna. Spoke to family son,  who stated the patient to remain full code. No other history could be obtained.  (01 Nov 2021 00:14)      Patient is a 89y old  Female who presents with a chief complaint of AHRF (25 Nov 2021 09:37)      INTERVAL HPI/OVERNIGHT EVENTS:  T(C): 36.3 (11-25-21 @ 06:05), Max: 36.8 (11-24-21 @ 20:07)  HR: 90 (11-25-21 @ 16:45) (80 - 90)  BP: 131/52 (11-25-21 @ 14:00) (131/52 - 175/66)  RR: 22 (11-25-21 @ 14:00) (17 - 22)  SpO2: 97% (11-25-21 @ 16:45) (97% - 100%)  Wt(kg): --  I&O's Summary    25 Nov 2021 07:01  -  25 Nov 2021 17:40  --------------------------------------------------------  IN: 0 mL / OUT: 19 mL / NET: -19 mL        REVIEW OF SYSTEMS: denies fever, chills, SOB, palpitations, chest pain, abdominal pain, nausea, vomitting, diarrhea, constipation, dizziness    MEDICATIONS  (STANDING):  aspirin  chewable 81 milliGRAM(s) Oral daily  chlorhexidine 0.12% Liquid 15 milliLiter(s) Oral Mucosa every 12 hours  enoxaparin Injectable 40 milliGRAM(s) SubCutaneous daily  pantoprazole   Suspension 40 milliGRAM(s) Oral daily    MEDICATIONS  (PRN):  acetaminophen    Suspension .. 650 milliGRAM(s) Oral every 6 hours PRN Temp greater or equal to 38C (100.4F), Mild Pain (1 - 3), Moderate Pain (4 - 6)  morphine  - Injectable 1 milliGRAM(s) IV Push every 4 hours PRN Respiratory distress  sodium chloride 0.9% lock flush 10 milliLiter(s) IV Push every 1 hour PRN Pre/post blood products, medications, blood draw, and to maintain line patency      PHYSICAL EXAM:  GENERAL: NAD, well-groomed, well-developed  HEAD:  Atraumatic, Normocephalic  EYES: EOMI, PERRLA, conjunctiva and sclera clear  ENMT: No tonsillar erythema, exudates, or enlargement; Moist mucous membranes, Good dentition, No lesions  NECK: Supple, No JVD, Normal thyroid  NERVOUS SYSTEM:  Alert & Oriented X3, Good concentration; Motor Strength 5/5 B/L upper and lower extremities; DTRs 2+ intact and symmetric  CHEST/LUNG: Clear to percussion bilaterally; No rales, rhonchi, wheezing, or rubs  HEART: Regular rate and rhythm; No murmurs, rubs, or gallops  ABDOMEN: Soft, Nontender, Nondistended; Bowel sounds present  EXTREMITIES:  2+ Peripheral Pulses, No clubbing, cyanosis, or edema  LYMPH: No lymphadenopathy noted  SKIN: No rashes or lesions  LABS:                        9.0    11.79 )-----------( 529      ( 25 Nov 2021 06:41 )             29.2     11-25    137  |  104  |  19<H>  ----------------------------<  147<H>  4.8   |  29  |  0.63    Ca    8.1<L>      25 Nov 2021 06:41  Phos  3.6     11-25  Mg     2.4     11-25    TPro  6.5  /  Alb  1.7<L>  /  TBili  0.3  /  DBili  x   /  AST  26  /  ALT  30  /  AlkPhos  82  11-25        CAPILLARY BLOOD GLUCOSE      POCT Blood Glucose.: 141 mg/dL (25 Nov 2021 11:17)

## 2021-11-25 NOTE — PROGRESS NOTE ADULT - NUTRITIONAL ASSESSMENT
This patient has been assessed with a concern for Malnutrition and has been determined to have a diagnosis/diagnoses of Severe protein-calorie malnutrition and Underweight (BMI < 19).    This patient is being managed with:   Diet NPO with Tube Feed-  Tube Feeding Modality: Gastrostomy  Jevity 1.5 Bg  Total Volume for 24 Hours (mL): 720  Continuous  Starting Tube Feed Rate {mL per Hour}: 30  Until Goal Tube Feed Rate (mL per Hour): 30  Tube Feed Duration (in Hours): 24  Tube Feed Start Time: 15:00  No Carb Prosource (1pkg = 15gms Protein)     Qty per Day:  1  Entered: Nov 19 2021  3:07PM     This patient has been assessed with a concern for Malnutrition and has been determined to have a diagnosis/diagnoses of Severe protein-calorie malnutrition and Underweight (BMI < 19).  +Severe temporal waisting  +Muscle waisting all extemities  Protein 6.5   Albumin  1.7    This patient is being managed with:   Diet NPO with Tube Feed-  Tube Feeding Modality: Gastrostomy  Jevity 1.5 Bg  Total Volume for 24 Hours (mL): 720  Continuous  Starting Tube Feed Rate {mL per Hour}: 30  Until Goal Tube Feed Rate (mL per Hour): 30  Tube Feed Duration (in Hours): 24  Tube Feed Start Time: 15:00  No Carb Prosource (1pkg = 15gms Protein)     Qty per Day:  1  Entered: Nov 19 2021  3:07PM This patient has been assessed with a concern for Malnutrition and has been determined to have a diagnosis/diagnoses of Severe protein-calorie malnutrition and Underweight (BMI < 19).  +Severe temporal waisting  +Muscle waisting all extremities  Protein 6.5   Albumin  1.7    This patient is being managed with:   Diet NPO with Tube Feed-  Tube Feeding Modality: Gastrostomy  Jevity 1.5 Bg  Total Volume for 24 Hours (mL): 720  Continuous  Starting Tube Feed Rate {mL per Hour}: 30  Until Goal Tube Feed Rate (mL per Hour): 30  Tube Feed Duration (in Hours): 24  Tube Feed Start Time: 15:00  No Carb Prosource (1pkg = 15gms Protein)     Qty per Day:  1  Entered: Nov 19 2021  3:07PM

## 2021-11-25 NOTE — PROGRESS NOTE ADULT - PROBLEM SELECTOR PLAN 4
Left chest tube placed on Pneumostatic valve yesterday.  Serial CXRs  So far tolerating valve. Left chest tube placed on Pneumostatic valve yesterday.  Serial CXRs  So far tolerating valve.  19ml drained today. Left chest tube placed on Pneumostat valve yesterday.  Serial CXRs  So far tolerating valve.  19ml drained today.

## 2021-11-25 NOTE — PROGRESS NOTE ADULT - PROBLEM SELECTOR PLAN 1
Continue mechanical ventilation with daily SBT as tolerated  Currently tolerating SBT with PS 12 for 1 hour.  Monitor oxygen saturation.

## 2021-11-25 NOTE — CHART NOTE - NSCHARTNOTEFT_GEN_A_CORE
Koko updated.  Diagnosis, treatment plan, and prognosis discussed.  Tolerating Pneumostatic valve well.  All questions answered.

## 2021-11-25 NOTE — PROGRESS NOTE ADULT - ASSESSMENT
seen and examined vsstable afebrile physical unchanged  awake trach vent  holding hands   labs 9.0  a/p resp failure sec to pneumonia now has vent trach/peg    s/p bacterimia    chest tube  for pneumo tx   thoracic surgeon [facility]

## 2021-11-25 NOTE — PROGRESS NOTE ADULT - SUBJECTIVE AND OBJECTIVE BOX
89F s/p left sided chest tube placement for pneumothorax, tracheostomy and PEG tube placement for continued respiratory failure.   no pneumothorax when chest tube to water seal but develops after clamping. pleuravac changed to pneumostat yesterday.      Vital Signs Last 24 Hrs  T(C): 36.3 (25 Nov 2021 06:05), Max: 36.9 (24 Nov 2021 14:27)  T(F): 97.3 (25 Nov 2021 06:05), Max: 98.4 (24 Nov 2021 14:27)  HR: 83 (25 Nov 2021 06:05) (81 - 90)  BP: 175/66 (25 Nov 2021 06:05) (109/55 - 175/66)  BP(mean): --  RR: 17 (25 Nov 2021 06:05) (17 - 28)  SpO2: 100% (25 Nov 2021 06:05) (98% - 100%)    NAD  AC 16 350 40  left chest tube in place, pneumostat container with ~15mL serous output  LUQ gastrostomy tube 2.5cm at the skin   skin warm and well perfused                           9.0    11.79 )-----------( 529      ( 25 Nov 2021 06:41 )             29.2   11-25    137  |  104  |  19<H>  ----------------------------<  147<H>  4.8   |  29  |  0.63    Ca    8.1<L>      25 Nov 2021 06:41  Phos  3.6     11-25  Mg     2.4     11-25    TPro  6.5  /  Alb  1.7<L>  /  TBili  0.3  /  DBili  x   /  AST  26  /  ALT  30  /  AlkPhos  82  11-25      89F with left chest tube in place for pneumothorax, minimal output with no air leak on water seal but persistently recurrent pneumothorax after clamping, pneumostat placed.     maintain pneumostat and empty as needed  follow up in clinic 3 weeks  please call with any additional questions

## 2021-11-25 NOTE — PROGRESS NOTE ADULT - ASSESSMENT
89 y/o F from [facility] w/ Hx of HTN, Dementia, CVA w/ R sided hemiparesis, aphasia, Parkinson's Disease, GERD, CKD. She is bedbound at baseline and needs assistance for ADLs. She was brought to the E.D. due to episodes of desaturation at 70s and respiratory distress. She was also having cough and fever in the nursing home. She was initially on NRB placed by EMS but was eventually intubated in the E.D. She is vaccinated with Moderna. COVID test was negative on admission. Chest Xray showed left lower lobe infiltrate (pneumonia). Blood cultures were sent. She was given 1 dose of Vancomycin and Zosyn in the E.D. and was subsequently admitted to ICU.    In the ICU, she was placed on mechanical ventilator. Her blood pressure was low despite receiving 2L bolus of LR. NGT and Left IJ central line was placed. Levophed was started. CXR revealed Left pneumothorax. Thoracic Surgery was consulted and left chest tube was placed. Patient was also tachycardic and troponin was mildly elevated. EKG showed SR w/ ST changes in the inferior leads. Troponin was monitored and has since trended down. Heart rate has been stable. Lactate was also elevated at 11.2 and has also since trended down. Patient also presented with hypernatremia and elevated creatinine probably secondary to volume deficit. She was also given free water. She was on Heparin for DVT prophylaxis and Protonix for GI prophylaxis. Palliative was consulted regarding the [facility]. Blood culture results showed E. coli bacteremia. She was started on Rocephin IV for 10 days. Azithromycin was continued awaiting Legionella results. Infectious Disease was consulted. Urine culture was ordered. Patient spiked a fever and is tachycardic. Blood cultures were repeated. She was tachycardic however his EKG showed SR w/ new PACs. There was mild air leak in the chest tube drainage but it was fixed by Cardiothoracic Surgery. Urine culture and repeat blood culture was negative. She had 2 febrile episodes overnight at 100.4-100.5F. She was tachypneic at rate of 30. Her repeat Chest Xray showed worsening of bilateral infiltrates. Her albumin is still low at 1.0. Her urine output is net negative. She was given Lasix and Albumin to maintain net negative urine output and decrease congestion. Vancomycin was discontinued and Cefepime decreased from 2g to 1g to complete 14 days since negative culture (11/16/21). Echocardiogram was ordered. Central line was removed. Lactulose was ordered for bowel movement. On November 11, 2021, patient had her tracheostomy and PEG inserted. Patient son Jered was called and informed .     11/20 CXR shows mild enlargement of left apical pneumothorax. Left chest tube unclamped to water seal. Thoracic following.   11/24  Chest tube placed on Pneumostatic valve   89 y/o F from [facility] w/ Hx of HTN, Dementia, CVA w/ R sided hemiparesis, aphasia, Parkinson's Disease, GERD, CKD. She is bedbound at baseline and needs assistance for ADLs. She was brought to the E.D. due to episodes of desaturation at 70s and respiratory distress. She was also having cough and fever in the nursing home. She was initially on NRB placed by EMS but was eventually intubated in the E.D. She is vaccinated with Moderna. COVID test was negative on admission. Chest Xray showed left lower lobe infiltrate (pneumonia). Blood cultures were sent. She was given 1 dose of Vancomycin and Zosyn in the E.D. and was subsequently admitted to ICU.    In the ICU, she was placed on mechanical ventilator. Her blood pressure was low despite receiving 2L bolus of LR. NGT and Left IJ central line was placed. Levophed was started. CXR revealed Left pneumothorax. Thoracic Surgery was consulted and left chest tube was placed. Patient was also tachycardic and troponin was mildly elevated. EKG showed SR w/ ST changes in the inferior leads. Troponin was monitored and has since trended down. Heart rate has been stable. Lactate was also elevated at 11.2 and has also since trended down. Patient also presented with hypernatremia and elevated creatinine probably secondary to volume deficit. She was also given free water. She was on Heparin for DVT prophylaxis and Protonix for GI prophylaxis. Palliative was consulted regarding the [facility]. Blood culture results showed E. coli bacteremia. She was started on Rocephin IV for 10 days. Azithromycin was continued awaiting Legionella results. Infectious Disease was consulted. Urine culture was ordered. Patient spiked a fever and is tachycardic. Blood cultures were repeated. She was tachycardic however his EKG showed SR w/ new PACs. There was mild air leak in the chest tube drainage but it was fixed by Cardiothoracic Surgery. Urine culture and repeat blood culture was negative. She had 2 febrile episodes overnight at 100.4-100.5F. She was tachypneic at rate of 30. Her repeat Chest Xray showed worsening of bilateral infiltrates. Her albumin is still low at 1.0. Her urine output is net negative. She was given Lasix and Albumin to maintain net negative urine output and decrease congestion. Vancomycin was discontinued and Cefepime decreased from 2g to 1g to complete 14 days since negative culture (11/16/21). Echocardiogram was ordered. Central line was removed. Lactulose was ordered for bowel movement. On November 11, 2021, patient had her tracheostomy and PEG inserted. Patient son Jered was called and informed .     11/20 CXR shows mild enlargement of left apical pneumothorax. Left chest tube unclamped to water seal. Thoracic following.   11/24  Chest tube placed on Pneumostat valve

## 2021-11-25 NOTE — PROGRESS NOTE ADULT - SUBJECTIVE AND OBJECTIVE BOX
AKASH CHACKO    SCU progress note    INTERVAL HPI/OVERNIGHT EVENTS: ***Pleurex changed to Pneumostatic valve yesterday. Tolerating well.    DNR [ ]   DNI  [  ]    Covid - 19 PCR:     The 4Ms    What Matters Most: see GOC  Age appropriate Medications/Screen for High Risk Medication: Yes  Mentation: see CAM below  Mobility: defer to physical exam    The Confusion Assessment Method (CAM) Diagnostic Algorithm     1: Acute Onset or Fluctuating Course  - Is there evidence of an acute change in mental status from the patient’s baseline? Did the (abnormal) behavior  fluctuate during the day, that is, tend to come and go, or increase and decrease in severity?  [ ] YES [ ] NO     2: Inattention  - Did the patient have difficulty focusing attention, being easily distractible, or having difficulty keeping track of what was being said?  [ ] YES [ ] NO     3: Disorganized thinking  -Was the patient’s thinking disorganized or incoherent, such as rambling or irrelevant conversation, unclear or illogical flow of ideas, or unpredictable switching from subject to subject?  [ ] YES [ ] NO    4: Altered Level of consciousness?  [ ] YES [ ] NO    The diagnosis of delirium by CAM requires the presence of features 1 and 2 and either 3 or 4.    PRESSORS: [ ] YES [ ] NO    Cardiovascular:  Heart Failure  Acute   Acute on Chronic  Chronic         Pulmonary:    Hematalogic:  aspirin  chewable 81 milliGRAM(s) Oral daily  enoxaparin Injectable 40 milliGRAM(s) SubCutaneous daily    Other:  acetaminophen    Suspension .. 650 milliGRAM(s) Oral every 6 hours PRN  chlorhexidine 0.12% Liquid 15 milliLiter(s) Oral Mucosa every 12 hours  morphine  - Injectable 1 milliGRAM(s) IV Push every 4 hours PRN  pantoprazole   Suspension 40 milliGRAM(s) Oral daily  sodium chloride 0.9% lock flush 10 milliLiter(s) IV Push every 1 hour PRN    acetaminophen    Suspension .. 650 milliGRAM(s) Oral every 6 hours PRN  aspirin  chewable 81 milliGRAM(s) Oral daily  chlorhexidine 0.12% Liquid 15 milliLiter(s) Oral Mucosa every 12 hours  enoxaparin Injectable 40 milliGRAM(s) SubCutaneous daily  morphine  - Injectable 1 milliGRAM(s) IV Push every 4 hours PRN  pantoprazole   Suspension 40 milliGRAM(s) Oral daily  sodium chloride 0.9% lock flush 10 milliLiter(s) IV Push every 1 hour PRN    Drug Dosing Weight  Height (cm): 167.6 (03 Nov 2021 16:02)  Weight (kg): 40.9 (01 Nov 2021 02:20)  BMI (kg/m2): 14.6 (03 Nov 2021 16:02)  BSA (m2): 1.43 (03 Nov 2021 16:02)    CENTRAL LINE: [ ] YES [ ] NO  LOCATION:   DATE INSERTED:  REMOVE: [ ] YES [ ] NO  EXPLAIN:    SAEED: [ ] YES [ ] NO    DATE INSERTED:  REMOVE:  [ ] YES [ ] NO  EXPLAIN:    PAST MEDICAL & SURGICAL HISTORY:  HTN (hypertension)    Dementia                  Mode: AC/ CMV (Assist Control/ Continuous Mandatory Ventilation)  RR (machine): 16  TV (machine): 350  FiO2: 40  PEEP: 5  ITime: 0.9  MAP: 9  PIP: 21      PHYSICAL EXAM:    GENERAL: NAD, well-groomed, well-developed  HEAD:  Atraumatic, Normocephalic  EYES: EOMI, PERRLA, conjunctiva and sclera clear  ENMT: No tonsillar erythema, exudates, or enlargement; Moist mucous membranes, Good dentition, No lesions  NECK: Supple, No JVD, Normal thyroid  NERVOUS SYSTEM:  Alert & Oriented X3, Good concentration; Motor Strength 5/5 B/L upper and lower extremities; DTRs 2+ intact and symmetric  CHEST/LUNG: Clear to percussion bilaterally; No rales, rhonchi, wheezing, or rubs  HEART: Regular rate and rhythm; No murmurs, rubs, or gallops  ABDOMEN: Soft, Nontender, Nondistended; Bowel sounds present  EXTREMITIES:  2+ Peripheral Pulses, No clubbing, cyanosis, or edema  LYMPH: No lymphadenopathy noted  SKIN: No rashes or lesions      LABS:  CBC Full  -  ( 25 Nov 2021 06:41 )  WBC Count : 11.79 K/uL  RBC Count : 3.13 M/uL  Hemoglobin : 9.0 g/dL  Hematocrit : 29.2 %  Platelet Count - Automated : 529 K/uL  Mean Cell Volume : 93.3 fl  Mean Cell Hemoglobin : 28.8 pg  Mean Cell Hemoglobin Concentration : 30.8 gm/dL  Auto Neutrophil # : x  Auto Lymphocyte # : x  Auto Monocyte # : x  Auto Eosinophil # : x  Auto Basophil # : x  Auto Neutrophil % : x  Auto Lymphocyte % : x  Auto Monocyte % : x  Auto Eosinophil % : x  Auto Basophil % : x    11-25    137  |  104  |  19<H>  ----------------------------<  147<H>  4.8   |  29  |  0.63    Ca    8.1<L>      25 Nov 2021 06:41  Phos  3.6     11-25  Mg     2.4     11-25    TPro  6.5  /  Alb  1.7<L>  /  TBili  0.3  /  DBili  x   /  AST  26  /  ALT  30  /  AlkPhos  82  11-25              [  ]  DVT Prophylaxis  [  ]  Nutrition, Brand, Rate         Goal Rate        Abnormal Nutritional Status -  Malnutrition   Cachexia      Morbid Obesity BMI >/=40    RADIOLOGY & ADDITIONAL STUDIES:  ***    Goals of Care Discussion with Family/Proxy/Other   - see note from/family meeting set up for...     AKASH CHACKO    SCU progress note    INTERVAL HPI/OVERNIGHT EVENTS: ***Pleurex changed to Pneumostatic valve yesterday. Tolerating well.    DNR [ ]   DNI  [  ]   FULL CODE    Covid - 19 PCR: Negative 11/19    The 4Ms    What Matters Most: see [facility]  Age appropriate Medications/Screen for High Risk Medication: Yes  Mentation: see CAM below  Mobility: defer to physical exam    The Confusion Assessment Method (CAM) Diagnostic Algorithm     1: Acute Onset or Fluctuating Course  - Is there evidence of an acute change in mental status from the patient’s baseline? Did the (abnormal) behavior  fluctuate during the day, that is, tend to come and go, or increase and decrease in severity?  [ ] YES [x ] NO     2: Inattention  - Did the patient have difficulty focusing attention, being easily distractible, or having difficulty keeping track of what was being said?  [ ] YES [ ] NO   Unable to access     3: Disorganized thinking  -Was the patient’s thinking disorganized or incoherent, such as rambling or irrelevant conversation, unclear or illogical flow of ideas, or unpredictable switching from subject to subject?  [ ] YES [ ] NO   Unable to access    4: Altered Level of consciousness?  [ ] YES [x ] NO    The diagnosis of delirium by CAM requires the presence of features 1 and 2 and either 3 or 4.    PRESSORS: [ ] YES [x ] NO    Cardiovascular:  Heart Failure  Acute   Acute on Chronic  Chronic         Pulmonary:    Hematalogic:  aspirin  chewable 81 milliGRAM(s) Oral daily  enoxaparin Injectable 40 milliGRAM(s) SubCutaneous daily    Other:  acetaminophen    Suspension .. 650 milliGRAM(s) Oral every 6 hours PRN  chlorhexidine 0.12% Liquid 15 milliLiter(s) Oral Mucosa every 12 hours  morphine  - Injectable 1 milliGRAM(s) IV Push every 4 hours PRN  pantoprazole   Suspension 40 milliGRAM(s) Oral daily  sodium chloride 0.9% lock flush 10 milliLiter(s) IV Push every 1 hour PRN    acetaminophen    Suspension .. 650 milliGRAM(s) Oral every 6 hours PRN  aspirin  chewable 81 milliGRAM(s) Oral daily  chlorhexidine 0.12% Liquid 15 milliLiter(s) Oral Mucosa every 12 hours  enoxaparin Injectable 40 milliGRAM(s) SubCutaneous daily  morphine  - Injectable 1 milliGRAM(s) IV Push every 4 hours PRN  pantoprazole   Suspension 40 milliGRAM(s) Oral daily  sodium chloride 0.9% lock flush 10 milliLiter(s) IV Push every 1 hour PRN    Drug Dosing Weight  Height (cm): 167.6 (03 Nov 2021 16:02)  Weight (kg): 40.9 (01 Nov 2021 02:20)  BMI (kg/m2): 14.6 (03 Nov 2021 16:02)  BSA (m2): 1.43 (03 Nov 2021 16:02)    CENTRAL LINE: [ ] YES [x ] NO  LOCATION:   DATE INSERTED:  REMOVE: [ ] YES [ ] NO  EXPLAIN:    SAEED: [ ] YES [x ] NO    DATE INSERTED:  REMOVE:  [ ] YES [ ] NO  EXPLAIN:    PAST MEDICAL & SURGICAL HISTORY:  HTN (hypertension)    Dementia                  Mode: AC/ CMV (Assist Control/ Continuous Mandatory Ventilation)  RR (machine): 16  TV (machine): 350  FiO2: 40  PEEP: 5  ITime: 0.9  MAP: 9  PIP: 21      PHYSICAL EXAM:    GENERAL: NAD, cachectic with severe temporal waisting.  HEAD:  Atraumatic, Normocephalic  EYES: EOMI, PERRLA, conjunctiva and sclera clear  ENMT: No tonsillar erythema, exudates  NECK: Supple, No JVD, tracheostomy intact  NERVOUS SYSTEM:  Awake. Not orientated. Nonverbal at baseline. Does not follow commands. Moving all extremities.  CHEST/LUNG: Diminished breath sounds bilateral bases L>R. Left chest tube Pneumostatic intact  HEART: Regular rate and rhythm; No murmurs, rubs, or gallops  ABDOMEN: Soft, Nontender, Nondistended; Bowel sounds present; Peg intact  EXTREMITIES: +Muscle waisting 2+ Peripheral Pulses, No clubbing, cyanosis, or edema  LYMPH: No lymphadenopathy noted  SKIN: No rashes or lesions      LABS:  CBC Full  -  ( 25 Nov 2021 06:41 )  WBC Count : 11.79 K/uL  RBC Count : 3.13 M/uL  Hemoglobin : 9.0 g/dL  Hematocrit : 29.2 %  Platelet Count - Automated : 529 K/uL  Mean Cell Volume : 93.3 fl  Mean Cell Hemoglobin : 28.8 pg  Mean Cell Hemoglobin Concentration : 30.8 gm/dL  Auto Neutrophil # : x  Auto Lymphocyte # : x  Auto Monocyte # : x  Auto Eosinophil # : x  Auto Basophil # : x  Auto Neutrophil % : x  Auto Lymphocyte % : x  Auto Monocyte % : x  Auto Eosinophil % : x  Auto Basophil % : x    11-25    137  |  104  |  19<H>  ----------------------------<  147<H>  4.8   |  29  |  0.63    Ca    8.1<L>      25 Nov 2021 06:41  Phos  3.6     11-25  Mg     2.4     11-25    TPro  6.5  /  Alb  1.7<L>  /  TBili  0.3  /  DBili  x   /  AST  26  /  ALT  30  /  AlkPhos  82  11-25              [  ]  DVT Prophylaxis  [  ]  Nutrition, Brand, Rate         Goal Rate        Abnormal Nutritional Status -  Malnutrition   Cachexia          RADIOLOGY & ADDITIONAL STUDIES:  ***  < from: Xray Chest 1 View- PORTABLE-Routine (Xray Chest 1 View- PORTABLE-Routine in AM.) (11.23.21 @ 09:21) >  CLINICAL INFORMATION: LEFT chest tube. Follow-up    TECHNIQUE:  Portable  AP view of the chest.    COMPARISON: 11/20/2021 chest available for review.    FINDINGS: Tracheostomy tube in place.    LEFT chest tube tip overlies LEFT apex.  LEFTpneumothorax diminished now approximating 20% LEFT lung volume.    Residual perihilar diffuse airspace disease and/or a mild to moderate RIGHT effusion..    The  heart is mildly enlarged in transverse diameter. No hilar mass.   Visualized osseous structures are intact.    IMPRESSION:   LEFT chest tube in place. Decreased LEFT pneumothorax.  Residual perihilar diffuse airspace disease.  .    FOLLOW-UP AP PORTABLE CHEST RADIOGRAPH 11/22/2021 AT 10:55 AM:  Follow-up AP portable chest radiograph 11/22/2021 AT 10:55 AM:  No significant interval change.    FOLLOW-UP AP PORTABLE CHEST RADIOGRAPH 11/23/2021 AT 9 7:00 AM:?    LEFT chest tube tip remains in apex of lung.  LEFT pneumothorax has resolved.  Residual LEFT perihilar diffuse airspace diseaseand/or moderate RIGHT effusion.      < end of copied text >    Goals of Care Discussion with Family/Proxy/Other   - see note from 11/09     AKASH CHACKO    SCU progress note    INTERVAL HPI/OVERNIGHT EVENTS: ***Pleurex changed to Pneumostatic valve yesterday. Tolerating well.  19ml drained from Pneumostatic valve today.    DNR [ ]   DNI  [  ]   FULL CODE    Covid - 19 PCR: Negative 11/19    The 4Ms    What Matters Most: see GOC  Age appropriate Medications/Screen for High Risk Medication: Yes  Mentation: see CAM below  Mobility: defer to physical exam    The Confusion Assessment Method (CAM) Diagnostic Algorithm     1: Acute Onset or Fluctuating Course  - Is there evidence of an acute change in mental status from the patient’s baseline? Did the (abnormal) behavior  fluctuate during the day, that is, tend to come and go, or increase and decrease in severity?  [ ] YES [x ] NO     2: Inattention  - Did the patient have difficulty focusing attention, being easily distractible, or having difficulty keeping track of what was being said?  [ ] YES [ ] NO   Unable to access     3: Disorganized thinking  -Was the patient’s thinking disorganized or incoherent, such as rambling or irrelevant conversation, unclear or illogical flow of ideas, or unpredictable switching from subject to subject?  [ ] YES [ ] NO   Unable to access    4: Altered Level of consciousness?  [ ] YES [x ] NO    The diagnosis of delirium by CAM requires the presence of features 1 and 2 and either 3 or 4.    PRESSORS: [ ] YES [x ] NO    Cardiovascular:  Heart Failure  Acute   Acute on Chronic  Chronic         Pulmonary:    Hematalogic:  aspirin  chewable 81 milliGRAM(s) Oral daily  enoxaparin Injectable 40 milliGRAM(s) SubCutaneous daily    Other:  acetaminophen    Suspension .. 650 milliGRAM(s) Oral every 6 hours PRN  chlorhexidine 0.12% Liquid 15 milliLiter(s) Oral Mucosa every 12 hours  morphine  - Injectable 1 milliGRAM(s) IV Push every 4 hours PRN  pantoprazole   Suspension 40 milliGRAM(s) Oral daily  sodium chloride 0.9% lock flush 10 milliLiter(s) IV Push every 1 hour PRN    acetaminophen    Suspension .. 650 milliGRAM(s) Oral every 6 hours PRN  aspirin  chewable 81 milliGRAM(s) Oral daily  chlorhexidine 0.12% Liquid 15 milliLiter(s) Oral Mucosa every 12 hours  enoxaparin Injectable 40 milliGRAM(s) SubCutaneous daily  morphine  - Injectable 1 milliGRAM(s) IV Push every 4 hours PRN  pantoprazole   Suspension 40 milliGRAM(s) Oral daily  sodium chloride 0.9% lock flush 10 milliLiter(s) IV Push every 1 hour PRN    Drug Dosing Weight  Height (cm): 167.6 (03 Nov 2021 16:02)  Weight (kg): 40.9 (01 Nov 2021 02:20)  BMI (kg/m2): 14.6 (03 Nov 2021 16:02)  BSA (m2): 1.43 (03 Nov 2021 16:02)    CENTRAL LINE: [ ] YES [x ] NO  LOCATION:   DATE INSERTED:  REMOVE: [ ] YES [ ] NO  EXPLAIN:    SAEED: [ ] YES [x ] NO    DATE INSERTED:  REMOVE:  [ ] YES [ ] NO  EXPLAIN:    PAST MEDICAL & SURGICAL HISTORY:  HTN (hypertension)    Dementia                  Mode: AC/ CMV (Assist Control/ Continuous Mandatory Ventilation)  RR (machine): 16  TV (machine): 350  FiO2: 40  PEEP: 5  ITime: 0.9  MAP: 9  PIP: 21      PHYSICAL EXAM:    GENERAL: NAD, cachectic with severe temporal waisting.  HEAD:  Atraumatic, Normocephalic  EYES: EOMI, PERRLA, conjunctiva and sclera clear  ENMT: No tonsillar erythema, exudates  NECK: Supple, No JVD, tracheostomy intact  NERVOUS SYSTEM:  Awake. Not orientated. Nonverbal at baseline. Does not follow commands. Moving all extremities.  CHEST/LUNG: Diminished breath sounds bilateral bases L>R. Left chest tube Pneumostatic intact  HEART: Regular rate and rhythm; No murmurs, rubs, or gallops  ABDOMEN: Soft, Nontender, Nondistended; Bowel sounds present; Peg intact  EXTREMITIES: +Muscle waisting 2+ Peripheral Pulses, No clubbing, cyanosis, or edema  LYMPH: No lymphadenopathy noted  SKIN: No rashes or lesions      LABS:  CBC Full  -  ( 25 Nov 2021 06:41 )  WBC Count : 11.79 K/uL  RBC Count : 3.13 M/uL  Hemoglobin : 9.0 g/dL  Hematocrit : 29.2 %  Platelet Count - Automated : 529 K/uL  Mean Cell Volume : 93.3 fl  Mean Cell Hemoglobin : 28.8 pg  Mean Cell Hemoglobin Concentration : 30.8 gm/dL  Auto Neutrophil # : x  Auto Lymphocyte # : x  Auto Monocyte # : x  Auto Eosinophil # : x  Auto Basophil # : x  Auto Neutrophil % : x  Auto Lymphocyte % : x  Auto Monocyte % : x  Auto Eosinophil % : x  Auto Basophil % : x    11-25    137  |  104  |  19<H>  ----------------------------<  147<H>  4.8   |  29  |  0.63    Ca    8.1<L>      25 Nov 2021 06:41  Phos  3.6     11-25  Mg     2.4     11-25    TPro  6.5  /  Alb  1.7<L>  /  TBili  0.3  /  DBili  x   /  AST  26  /  ALT  30  /  AlkPhos  82  11-25              [  ]  DVT Prophylaxis  [  ]  Nutrition, Brand, Rate         Goal Rate        Abnormal Nutritional Status -  Malnutrition   Cachexia          RADIOLOGY & ADDITIONAL STUDIES:  ***  < from: Xray Chest 1 View- PORTABLE-Routine (Xray Chest 1 View- PORTABLE-Routine in AM.) (11.23.21 @ 09:21) >  CLINICAL INFORMATION: LEFT chest tube. Follow-up    TECHNIQUE:  Portable  AP view of the chest.    COMPARISON: 11/20/2021 chest available for review.    FINDINGS: Tracheostomy tube in place.    LEFT chest tube tip overlies LEFT apex.  LEFTpneumothorax diminished now approximating 20% LEFT lung volume.    Residual perihilar diffuse airspace disease and/or a mild to moderate RIGHT effusion..    The  heart is mildly enlarged in transverse diameter. No hilar mass.   Visualized osseous structures are intact.    IMPRESSION:   LEFT chest tube in place. Decreased LEFT pneumothorax.  Residual perihilar diffuse airspace disease.  .    FOLLOW-UP AP PORTABLE CHEST RADIOGRAPH 11/22/2021 AT 10:55 AM:  Follow-up AP portable chest radiograph 11/22/2021 AT 10:55 AM:  No significant interval change.    FOLLOW-UP AP PORTABLE CHEST RADIOGRAPH 11/23/2021 AT 9 7:00 AM:?    LEFT chest tube tip remains in apex of lung.  LEFT pneumothorax has resolved.  Residual LEFT perihilar diffuse airspace diseaseand/or moderate RIGHT effusion.      < end of copied text >    Goals of Care Discussion with Family/Proxy/Other   - see note from 11/09     AKASH CHACKO    SCU progress note    INTERVAL HPI/OVERNIGHT EVENTS: ***Pleurex changed to Pneumostat valve yesterday. Tolerating well.  19ml drained from Pneumostat valve today.    DNR [ ]   DNI  [  ]   FULL CODE    Covid - 19 PCR: Negative 11/19    The 4Ms    What Matters Most: see GOC  Age appropriate Medications/Screen for High Risk Medication: Yes  Mentation: see CAM below  Mobility: defer to physical exam    The Confusion Assessment Method (CAM) Diagnostic Algorithm     1: Acute Onset or Fluctuating Course  - Is there evidence of an acute change in mental status from the patient’s baseline? Did the (abnormal) behavior  fluctuate during the day, that is, tend to come and go, or increase and decrease in severity?  [ ] YES [x ] NO     2: Inattention  - Did the patient have difficulty focusing attention, being easily distractible, or having difficulty keeping track of what was being said?  [ ] YES [ ] NO   Unable to access     3: Disorganized thinking  -Was the patient’s thinking disorganized or incoherent, such as rambling or irrelevant conversation, unclear or illogical flow of ideas, or unpredictable switching from subject to subject?  [ ] YES [ ] NO   Unable to access    4: Altered Level of consciousness?  [ ] YES [x ] NO    The diagnosis of delirium by CAM requires the presence of features 1 and 2 and either 3 or 4.    PRESSORS: [ ] YES [x ] NO    Cardiovascular:  Heart Failure  Acute   Acute on Chronic  Chronic         Pulmonary:    Hematalogic:  aspirin  chewable 81 milliGRAM(s) Oral daily  enoxaparin Injectable 40 milliGRAM(s) SubCutaneous daily    Other:  acetaminophen    Suspension .. 650 milliGRAM(s) Oral every 6 hours PRN  chlorhexidine 0.12% Liquid 15 milliLiter(s) Oral Mucosa every 12 hours  morphine  - Injectable 1 milliGRAM(s) IV Push every 4 hours PRN  pantoprazole   Suspension 40 milliGRAM(s) Oral daily  sodium chloride 0.9% lock flush 10 milliLiter(s) IV Push every 1 hour PRN    acetaminophen    Suspension .. 650 milliGRAM(s) Oral every 6 hours PRN  aspirin  chewable 81 milliGRAM(s) Oral daily  chlorhexidine 0.12% Liquid 15 milliLiter(s) Oral Mucosa every 12 hours  enoxaparin Injectable 40 milliGRAM(s) SubCutaneous daily  morphine  - Injectable 1 milliGRAM(s) IV Push every 4 hours PRN  pantoprazole   Suspension 40 milliGRAM(s) Oral daily  sodium chloride 0.9% lock flush 10 milliLiter(s) IV Push every 1 hour PRN    Drug Dosing Weight  Height (cm): 167.6 (03 Nov 2021 16:02)  Weight (kg): 40.9 (01 Nov 2021 02:20)  BMI (kg/m2): 14.6 (03 Nov 2021 16:02)  BSA (m2): 1.43 (03 Nov 2021 16:02)    CENTRAL LINE: [ ] YES [x ] NO  LOCATION:   DATE INSERTED:  REMOVE: [ ] YES [ ] NO  EXPLAIN:    SAEED: [ ] YES [x ] NO    DATE INSERTED:  REMOVE:  [ ] YES [ ] NO  EXPLAIN:    PAST MEDICAL & SURGICAL HISTORY:  HTN (hypertension)    Dementia                  Mode: AC/ CMV (Assist Control/ Continuous Mandatory Ventilation)  RR (machine): 16  TV (machine): 350  FiO2: 40  PEEP: 5  ITime: 0.9  MAP: 9  PIP: 21      PHYSICAL EXAM:    GENERAL: NAD, cachectic with severe temporal waisting.  HEAD:  Atraumatic, Normocephalic  EYES: EOMI, PERRLA, conjunctiva and sclera clear  ENMT: No tonsillar erythema, exudates  NECK: Supple, No JVD, tracheostomy intact  NERVOUS SYSTEM:  Awake. Not orientated. Nonverbal at baseline. Does not follow commands. Moving all extremities.  CHEST/LUNG: Diminished breath sounds bilateral bases L>R. Left chest tube Pneumostat intact  HEART: Regular rate and rhythm; No murmurs, rubs, or gallops  ABDOMEN: Soft, Nontender, Nondistended; Bowel sounds present; Peg intact  EXTREMITIES: +Muscle waisting 2+ Peripheral Pulses, No clubbing, cyanosis, or edema  LYMPH: No lymphadenopathy noted  SKIN: No rashes or lesions      LABS:  CBC Full  -  ( 25 Nov 2021 06:41 )  WBC Count : 11.79 K/uL  RBC Count : 3.13 M/uL  Hemoglobin : 9.0 g/dL  Hematocrit : 29.2 %  Platelet Count - Automated : 529 K/uL  Mean Cell Volume : 93.3 fl  Mean Cell Hemoglobin : 28.8 pg  Mean Cell Hemoglobin Concentration : 30.8 gm/dL  Auto Neutrophil # : x  Auto Lymphocyte # : x  Auto Monocyte # : x  Auto Eosinophil # : x  Auto Basophil # : x  Auto Neutrophil % : x  Auto Lymphocyte % : x  Auto Monocyte % : x  Auto Eosinophil % : x  Auto Basophil % : x    11-25    137  |  104  |  19<H>  ----------------------------<  147<H>  4.8   |  29  |  0.63    Ca    8.1<L>      25 Nov 2021 06:41  Phos  3.6     11-25  Mg     2.4     11-25    TPro  6.5  /  Alb  1.7<L>  /  TBili  0.3  /  DBili  x   /  AST  26  /  ALT  30  /  AlkPhos  82  11-25              [  ]  DVT Prophylaxis  [  ]  Nutrition, Brand, Rate         Goal Rate        Abnormal Nutritional Status -  Malnutrition   Cachexia          RADIOLOGY & ADDITIONAL STUDIES:  ***  < from: Xray Chest 1 View- PORTABLE-Routine (Xray Chest 1 View- PORTABLE-Routine in AM.) (11.23.21 @ 09:21) >  CLINICAL INFORMATION: LEFT chest tube. Follow-up    TECHNIQUE:  Portable  AP view of the chest.    COMPARISON: 11/20/2021 chest available for review.    FINDINGS: Tracheostomy tube in place.    LEFT chest tube tip overlies LEFT apex.  LEFTpneumothorax diminished now approximating 20% LEFT lung volume.    Residual perihilar diffuse airspace disease and/or a mild to moderate RIGHT effusion..    The  heart is mildly enlarged in transverse diameter. No hilar mass.   Visualized osseous structures are intact.    IMPRESSION:   LEFT chest tube in place. Decreased LEFT pneumothorax.  Residual perihilar diffuse airspace disease.  .    FOLLOW-UP AP PORTABLE CHEST RADIOGRAPH 11/22/2021 AT 10:55 AM:  Follow-up AP portable chest radiograph 11/22/2021 AT 10:55 AM:  No significant interval change.    FOLLOW-UP AP PORTABLE CHEST RADIOGRAPH 11/23/2021 AT 9 7:00 AM:?    LEFT chest tube tip remains in apex of lung.  LEFT pneumothorax has resolved.  Residual LEFT perihilar diffuse airspace diseaseand/or moderate RIGHT effusion.      < end of copied text >    Goals of Care Discussion with Family/Proxy/Other   - see note from 11/09

## 2021-11-26 LAB — SARS-COV-2 RNA SPEC QL NAA+PROBE: SIGNIFICANT CHANGE UP

## 2021-11-26 RX ADMIN — ENOXAPARIN SODIUM 40 MILLIGRAM(S): 100 INJECTION SUBCUTANEOUS at 12:34

## 2021-11-26 RX ADMIN — CHLORHEXIDINE GLUCONATE 15 MILLILITER(S): 213 SOLUTION TOPICAL at 05:48

## 2021-11-26 RX ADMIN — PANTOPRAZOLE SODIUM 40 MILLIGRAM(S): 20 TABLET, DELAYED RELEASE ORAL at 12:34

## 2021-11-26 RX ADMIN — Medication 81 MILLIGRAM(S): at 12:34

## 2021-11-26 RX ADMIN — CHLORHEXIDINE GLUCONATE 15 MILLILITER(S): 213 SOLUTION TOPICAL at 17:42

## 2021-11-26 NOTE — PROGRESS NOTE ADULT - PROBLEM SELECTOR PLAN 4
Left chest tube placed on Pneumostat valve yesterday.  Serial CXRs  So far tolerating valve.  19ml drained today.

## 2021-11-26 NOTE — CHART NOTE - NSCHARTNOTEFT_GEN_A_CORE
Contacted and spoke with Tommie Weiss [phone]. Explained current condition. Encouraged family to address concerns and emotional support given, all concerns and questions addressed.

## 2021-11-26 NOTE — PROGRESS NOTE ADULT - ASSESSMENT
seen and examined vsstable afebrile physical done on trach vent   alert awake  not in nay distress  labs noted  hgb 9.0    a/p resp failure  better  penumothorax better

## 2021-11-26 NOTE — PROGRESS NOTE ADULT - PROBLEM SELECTOR PLAN 10
DVT and GI prophylaxis.  Continue mechanical ventilation.  Left chest tube clamped. Placed on pneumostat valve yesterday. Tolerating well.  Serial CXRs  Thoracic surgery f/u  Overall prognosis is extremely poor.  Remains FULL CODE as per family wishes.  SW consult for long term vent placement.

## 2021-11-26 NOTE — PROGRESS NOTE ADULT - ASSESSMENT
89 y/o F from [facility] w/ Hx of HTN, Dementia, CVA w/ R sided hemiparesis, aphasia, Parkinson's Disease, GERD, CKD. She is bedbound at baseline and needs assistance for ADLs. She was brought to the E.D. due to episodes of desaturation at 70s and respiratory distress. She was also having cough and fever in the nursing home. She was initially on NRB placed by EMS but was eventually intubated in the E.D. She is vaccinated with Moderna. COVID test was negative on admission. Chest Xray showed left lower lobe infiltrate (pneumonia). Blood cultures were sent. She was given 1 dose of Vancomycin and Zosyn in the E.D. and was subsequently admitted to ICU.    In the ICU, she was placed on mechanical ventilator. Her blood pressure was low despite receiving 2L bolus of LR. NGT and Left IJ central line was placed. Levophed was started. CXR revealed Left pneumothorax. Thoracic Surgery was consulted and left chest tube was placed. Patient was also tachycardic and troponin was mildly elevated. EKG showed SR w/ ST changes in the inferior leads. Troponin was monitored and has since trended down. Heart rate has been stable. Lactate was also elevated at 11.2 and has also since trended down. Patient also presented with hypernatremia and elevated creatinine probably secondary to volume deficit. She was also given free water. She was on Heparin for DVT prophylaxis and Protonix for GI prophylaxis. Palliative was consulted regarding the [facility]. Blood culture results showed E. coli bacteremia. She was started on Rocephin IV for 10 days. Azithromycin was continued awaiting Legionella results. Infectious Disease was consulted. Urine culture was ordered. Patient spiked a fever and is tachycardic. Blood cultures were repeated. She was tachycardic however his EKG showed SR w/ new PACs. There was mild air leak in the chest tube drainage but it was fixed by Cardiothoracic Surgery. Urine culture and repeat blood culture was negative. She had 2 febrile episodes overnight at 100.4-100.5F. She was tachypneic at rate of 30. Her repeat Chest Xray showed worsening of bilateral infiltrates. Her albumin is still low at 1.0. Her urine output is net negative. She was given Lasix and Albumin to maintain net negative urine output and decrease congestion. Vancomycin was discontinued and Cefepime decreased from 2g to 1g to complete 14 days since negative culture (11/16/21). Echocardiogram was ordered. Central line was removed. Lactulose was ordered for bowel movement. On November 11, 2021, patient had her tracheostomy and PEG inserted. Patient son Jered was called and informed .     11/20 CXR shows mild enlargement of left apical pneumothorax. Left chest tube unclamped to water seal. Thoracic following.   11/24  Chest tube placed on Pneumostat valve  11/26 No overnight events, tolerating Pneumostat

## 2021-11-26 NOTE — PROGRESS NOTE ADULT - SUBJECTIVE AND OBJECTIVE BOX
AKASH CHACKO    SCU progress note    INTERVAL HPI/OVERNIGHT EVENTS: *** Patient seen and examined at bedside, no overnight events.     DNR [ ]   DNI  [  ] FULL CODE    Covid - 19 PCR: 11/26 negative    The 4Ms    What Matters Most: see GOC  Age appropriate Medications/Screen for High Risk Medication: Yes  Mentation: see CAM below  Mobility: defer to physical exam    The Confusion Assessment Method (CAM) Diagnostic Algorithm     1: Acute Onset or Fluctuating Course  - Is there evidence of an acute change in mental status from the patient’s baseline? Did the (abnormal) behavior  fluctuate during the day, that is, tend to come and go, or increase and decrease in severity?  [ ] YES [x ] NO     2: Inattention  - Did the patient have difficulty focusing attention, being easily distractible, or having difficulty keeping track of what was being said?  [ ] YES [ ] NO unable to assess     3: Disorganized thinking  -Was the patient’s thinking disorganized or incoherent, such as rambling or irrelevant conversation, unclear or illogical flow of ideas, or unpredictable switching from subject to subject?  [ ] YES [ ] NO unable to assess    4: Altered Level of consciousness?  [ ] YES [ ] NO unable to assess    The diagnosis of delirium by CAM requires the presence of features 1 and 2 and either 3 or 4.    PRESSORS: [ ] YES [x ] NO    Cardiovascular:  Heart Failure  Acute   Acute on Chronic  Chronic         Pulmonary:    Hematalogic:  aspirin  chewable 81 milliGRAM(s) Oral daily  enoxaparin Injectable 40 milliGRAM(s) SubCutaneous daily    Other:  acetaminophen    Suspension .. 650 milliGRAM(s) Oral every 6 hours PRN  chlorhexidine 0.12% Liquid 15 milliLiter(s) Oral Mucosa every 12 hours  morphine  - Injectable 1 milliGRAM(s) IV Push every 4 hours PRN  pantoprazole   Suspension 40 milliGRAM(s) Oral daily  sodium chloride 0.9% lock flush 10 milliLiter(s) IV Push every 1 hour PRN    acetaminophen    Suspension .. 650 milliGRAM(s) Oral every 6 hours PRN  aspirin  chewable 81 milliGRAM(s) Oral daily  chlorhexidine 0.12% Liquid 15 milliLiter(s) Oral Mucosa every 12 hours  enoxaparin Injectable 40 milliGRAM(s) SubCutaneous daily  morphine  - Injectable 1 milliGRAM(s) IV Push every 4 hours PRN  pantoprazole   Suspension 40 milliGRAM(s) Oral daily  sodium chloride 0.9% lock flush 10 milliLiter(s) IV Push every 1 hour PRN    Drug Dosing Weight  Height (cm): 167.6 (03 Nov 2021 16:02)  Weight (kg): 40.9 (01 Nov 2021 02:20)  BMI (kg/m2): 14.6 (03 Nov 2021 16:02)  BSA (m2): 1.43 (03 Nov 2021 16:02)    CENTRAL LINE: [ ] YES [ x] NO  LOCATION:   DATE INSERTED:  REMOVE: [ ] YES [ ] NO  EXPLAIN:    SAEED: [ ] YES [x ] NO    DATE INSERTED:  REMOVE:  [ ] YES [ ] NO  EXPLAIN:    PAST MEDICAL & SURGICAL HISTORY:  HTN (hypertension)    Dementia                11-25 @ 07:01  -  11-26 @ 07:00  --------------------------------------------------------  IN: 0 mL / OUT: 19 mL / NET: -19 mL        Mode: AC/ CMV (Assist Control/ Continuous Mandatory Ventilation)  RR (machine): 16  TV (machine): 350  FiO2: 40  PEEP: 5  ITime: 1  MAP: 5  PIP: 29      PHYSICAL EXAM:    GENERAL: NAD, cachectic with severe temporal waisting.  HEAD:  Atraumatic, Normocephalic  EYES: EOMI, PERRLA, conjunctiva and sclera clear  ENMT: No tonsillar erythema, exudates  NECK: Supple, No JVD, tracheostomy intact  NERVOUS SYSTEM:  Awake. Not orientated. Nonverbal at baseline. Does not follow commands. Moving all extremities.  CHEST/LUNG: Diminished breath sounds bilateral bases L>R. Left chest tube Pneumostat intact  HEART: Regular rate and rhythm; No murmurs, rubs, or gallops  ABDOMEN: Soft, Nontender, Nondistended; Bowel sounds present; Peg intact  EXTREMITIES: +Muscle waisting 2+ Peripheral Pulses, No clubbing, cyanosis, or edema  LYMPH: No lymphadenopathy noted  SKIN: No rashes or lesions    LABS:  CBC Full  -  ( 25 Nov 2021 06:41 )  WBC Count : 11.79 K/uL  RBC Count : 3.13 M/uL  Hemoglobin : 9.0 g/dL  Hematocrit : 29.2 %  Platelet Count - Automated : 529 K/uL  Mean Cell Volume : 93.3 fl  Mean Cell Hemoglobin : 28.8 pg  Mean Cell Hemoglobin Concentration : 30.8 gm/dL  Auto Neutrophil # : x  Auto Lymphocyte # : x  Auto Monocyte # : x  Auto Eosinophil # : x  Auto Basophil # : x  Auto Neutrophil % : x  Auto Lymphocyte % : x  Auto Monocyte % : x  Auto Eosinophil % : x  Auto Basophil % : x    11-25    137  |  104  |  19<H>  ----------------------------<  147<H>  4.8   |  29  |  0.63    Ca    8.1<L>      25 Nov 2021 06:41  Phos  3.6     11-25  Mg     2.4     11-25    TPro  6.5  /  Alb  1.7<L>  /  TBili  0.3  /  DBili  x   /  AST  26  /  ALT  30  /  AlkPhos  82  11-25      [x  ]  DVT Prophylaxis  [  ]  Nutrition, Brand, Rate        Diet Prescription: Diet, NPO with Tube Feed:   Tube Feeding Modality: Gastrostomy  Jevity 1.5 Bg  Total Volume for 24 Hours (mL): 720  Continuous  Starting Tube Feed Rate {mL per Hour}: 30  Until Goal Tube Feed Rate (mL per Hour): 30  Tube Feed Duration (in Hours): 24  Tube Feed Start Time: 15:00  No Carb Pro source (1pkg = 15gms Protein)     Qty per Day:  1        Abnormal Nutritional Status -  Malnutrition   Cachexia        RADIOLOGY & ADDITIONAL STUDIES:  ***  < from: Xray Chest 1 View- PORTABLE-Urgent (Xray Chest 1 View- PORTABLE-Urgent .) (11.24.21 @ 19:01) >  EXAM:  XR CHEST PORTABLE URGENT 1V                            PROCEDURE DATE:  11/24/2021          INTERPRETATION:  CLINICAL STATEMENT: Pneumothorax    TECHNIQUE: AP view of the chest.      COMPARISON: 1/23/2021    Tracheostomy tube and left-sided chest tube, unchanged in position. The heart is not enlarged. Mitral valve annulus calcification. Aortic calcification.    Chronic lung changes. Infiltrates not excluded. No evidence of pneumothorax. Small right pleural effusion. Streaky density rightmid to upper lung field. Apical pleural calcifications. Compression fracture deformity lower thoracic vertebral body.    IMPRESSION:    No significant interval changes. No pneumothorax appreciated.      < end of copied text >    Goals of Care Discussion with Family/Proxy/Other   - see note from/family meeting set up for...     AKASH CHACKO    SCU progress note    INTERVAL HPI/OVERNIGHT EVENTS: *** Patient seen and examined at bedside, no overnight events.     DNR [ ]   DNI  [  ] FULL CODE    Covid - 19 PCR: 11/26 negative    The 4Ms    What Matters Most: see GOC  Age appropriate Medications/Screen for High Risk Medication: Yes  Mentation: see CAM below  Mobility: defer to physical exam    The Confusion Assessment Method (CAM) Diagnostic Algorithm     1: Acute Onset or Fluctuating Course  - Is there evidence of an acute change in mental status from the patient’s baseline? Did the (abnormal) behavior  fluctuate during the day, that is, tend to come and go, or increase and decrease in severity?  [ ] YES [x ] NO     2: Inattention  - Did the patient have difficulty focusing attention, being easily distractible, or having difficulty keeping track of what was being said?  [ ] YES [ ] NO unable to assess     3: Disorganized thinking  -Was the patient’s thinking disorganized or incoherent, such as rambling or irrelevant conversation, unclear or illogical flow of ideas, or unpredictable switching from subject to subject?  [ ] YES [ ] NO unable to assess    4: Altered Level of consciousness?  [ ] YES [ ] NO unable to assess    The diagnosis of delirium by CAM requires the presence of features 1 and 2 and either 3 or 4.    PRESSORS: [ ] YES [x ] NO    Cardiovascular:  Heart Failure  Acute   Acute on Chronic  Chronic         Pulmonary:    Hematalogic:  aspirin  chewable 81 milliGRAM(s) Oral daily  enoxaparin Injectable 40 milliGRAM(s) SubCutaneous daily    Other:  acetaminophen    Suspension .. 650 milliGRAM(s) Oral every 6 hours PRN  chlorhexidine 0.12% Liquid 15 milliLiter(s) Oral Mucosa every 12 hours  morphine  - Injectable 1 milliGRAM(s) IV Push every 4 hours PRN  pantoprazole   Suspension 40 milliGRAM(s) Oral daily  sodium chloride 0.9% lock flush 10 milliLiter(s) IV Push every 1 hour PRN    acetaminophen    Suspension .. 650 milliGRAM(s) Oral every 6 hours PRN  aspirin  chewable 81 milliGRAM(s) Oral daily  chlorhexidine 0.12% Liquid 15 milliLiter(s) Oral Mucosa every 12 hours  enoxaparin Injectable 40 milliGRAM(s) SubCutaneous daily  morphine  - Injectable 1 milliGRAM(s) IV Push every 4 hours PRN  pantoprazole   Suspension 40 milliGRAM(s) Oral daily  sodium chloride 0.9% lock flush 10 milliLiter(s) IV Push every 1 hour PRN    Drug Dosing Weight  Height (cm): 167.6 (03 Nov 2021 16:02)  Weight (kg): 40.9 (01 Nov 2021 02:20)  BMI (kg/m2): 14.6 (03 Nov 2021 16:02)  BSA (m2): 1.43 (03 Nov 2021 16:02)    CENTRAL LINE: [ ] YES [ x] NO  LOCATION:   DATE INSERTED:  REMOVE: [ ] YES [ ] NO  EXPLAIN:    SAEED: [ ] YES [x ] NO    DATE INSERTED:  REMOVE:  [ ] YES [ ] NO  EXPLAIN:    PAST MEDICAL & SURGICAL HISTORY:  HTN (hypertension)    Dementia                11-25 @ 07:01  -  11-26 @ 07:00  --------------------------------------------------------  IN: 0 mL / OUT: 19 mL / NET: -19 mL        Mode: AC/ CMV (Assist Control/ Continuous Mandatory Ventilation)  RR (machine): 16  TV (machine): 350  FiO2: 40  PEEP: 5  ITime: 1  MAP: 5  PIP: 29      PHYSICAL EXAM:    GENERAL: NAD, cachectic with severe temporal waisting.  HEAD:  Atraumatic, Normocephalic  EYES: EOMI, PERRLA, conjunctiva and sclera clear  ENMT: No tonsillar erythema, exudates  NECK: Supple, No JVD, tracheostomy intact  NERVOUS SYSTEM:  Awake. Not orientated. Nonverbal at baseline. Does not follow commands. Moving all extremities.  CHEST/LUNG: Diminished breath sounds bilateral bases L>R. Left chest tube Pneumostat intact  HEART: Regular rate and rhythm; No murmurs, rubs, or gallops  ABDOMEN: Soft, Nontender, Nondistended; Bowel sounds present; Peg intact  EXTREMITIES: +Muscle waisting 2+ Peripheral Pulses, No clubbing, cyanosis, or edema  LYMPH: No lymphadenopathy noted  SKIN: No rashes or lesions    LABS:  CBC Full  -  ( 25 Nov 2021 06:41 )  WBC Count : 11.79 K/uL  RBC Count : 3.13 M/uL  Hemoglobin : 9.0 g/dL  Hematocrit : 29.2 %  Platelet Count - Automated : 529 K/uL  Mean Cell Volume : 93.3 fl  Mean Cell Hemoglobin : 28.8 pg  Mean Cell Hemoglobin Concentration : 30.8 gm/dL  Auto Neutrophil # : x  Auto Lymphocyte # : x  Auto Monocyte # : x  Auto Eosinophil # : x  Auto Basophil # : x  Auto Neutrophil % : x  Auto Lymphocyte % : x  Auto Monocyte % : x  Auto Eosinophil % : x  Auto Basophil % : x    11-25    137  |  104  |  19<H>  ----------------------------<  147<H>  4.8   |  29  |  0.63    Ca    8.1<L>      25 Nov 2021 06:41  Phos  3.6     11-25  Mg     2.4     11-25    TPro  6.5  /  Alb  1.7<L>  /  TBili  0.3  /  DBili  x   /  AST  26  /  ALT  30  /  AlkPhos  82  11-25      [x  ]  DVT Prophylaxis  [  ]  Nutrition, Brand, Rate        Diet Prescription: Diet, NPO with Tube Feed:   Tube Feeding Modality: Gastrostomy  Jevity 1.5 Bg  Total Volume for 24 Hours (mL): 720  Continuous  Starting Tube Feed Rate {mL per Hour}: 30  Until Goal Tube Feed Rate (mL per Hour): 30  Tube Feed Duration (in Hours): 24  Tube Feed Start Time: 15:00  No Carb Pro source (1pkg = 15gms Protein)     Qty per Day:  1        Abnormal Nutritional Status -  Malnutrition   Cachexia        RADIOLOGY & ADDITIONAL STUDIES:  ***  < from: Xray Chest 1 View- PORTABLE-Urgent (Xray Chest 1 View- PORTABLE-Urgent .) (11.24.21 @ 19:01) >  EXAM:  XR CHEST PORTABLE URGENT 1V                            PROCEDURE DATE:  11/24/2021          INTERPRETATION:  CLINICAL STATEMENT: Pneumothorax    TECHNIQUE: AP view of the chest.      COMPARISON: 1/23/2021    Tracheostomy tube and left-sided chest tube, unchanged in position. The heart is not enlarged. Mitral valve annulus calcification. Aortic calcification.    Chronic lung changes. Infiltrates not excluded. No evidence of pneumothorax. Small right pleural effusion. Streaky density rightmid to upper lung field. Apical pleural calcifications. Compression fracture deformity lower thoracic vertebral body.    IMPRESSION:    No significant interval changes. No pneumothorax appreciated.      < end of copied text >    Goals of Care Discussion with Family/Proxy/Other   - see note from 11/09

## 2021-11-26 NOTE — PROGRESS NOTE ADULT - PROBLEM SELECTOR PLAN 1
Continue mechanical ventilation with daily SBT as tolerated  SBT with PS 12 for 1 hour tomorrow  Monitor oxygen saturation.

## 2021-11-26 NOTE — PROGRESS NOTE ADULT - SUBJECTIVE AND OBJECTIVE BOX
HPI:  89 yo female from [facility] with medical h/o of HTN, Dementia, CVA with R sided hemiparesis, aphasia, parkinson's, GERD, CKD bedbound at baseline, dependant on assistance for ADL comes in with respiratory failure. Patient was found to have respiratory distress, saturating in 70s when EMS arrived, was placed on NRB on field. She was emergently intubated on arrival in ED. As per NH records patient was having fever and cough for past few days. Today she was found to have respiratory distress. She is vaccinated with moderna. Spoke to family son,  who stated the patient to remain full code. No other history could be obtained.  (01 Nov 2021 00:14)      Patient is a 89y old  Female who presents with a chief complaint of AHRF (26 Nov 2021 15:20)      INTERVAL HPI/OVERNIGHT EVENTS:  T(C): 37.7 (11-26-21 @ 11:50), Max: 37.7 (11-26-21 @ 11:50)  HR: 85 (11-26-21 @ 11:50) (77 - 85)  BP: 153/62 (11-26-21 @ 11:50) (141/53 - 153/62)  RR: 22 (11-26-21 @ 11:50) (20 - 22)  SpO2: 99% (11-26-21 @ 11:50) (99% - 100%)  Wt(kg): --  I&O's Summary    25 Nov 2021 07:01  -  26 Nov 2021 07:00  --------------------------------------------------------  IN: 0 mL / OUT: 19 mL / NET: -19 mL        REVIEW OF SYSTEMS: denies fever, chills, SOB, palpitations, chest pain, abdominal pain, nausea, vomitting, diarrhea, constipation, dizziness    MEDICATIONS  (STANDING):  aspirin  chewable 81 milliGRAM(s) Oral daily  chlorhexidine 0.12% Liquid 15 milliLiter(s) Oral Mucosa every 12 hours  enoxaparin Injectable 40 milliGRAM(s) SubCutaneous daily  pantoprazole   Suspension 40 milliGRAM(s) Oral daily    MEDICATIONS  (PRN):  acetaminophen    Suspension .. 650 milliGRAM(s) Oral every 6 hours PRN Temp greater or equal to 38C (100.4F), Mild Pain (1 - 3), Moderate Pain (4 - 6)  morphine  - Injectable 1 milliGRAM(s) IV Push every 4 hours PRN Respiratory distress  sodium chloride 0.9% lock flush 10 milliLiter(s) IV Push every 1 hour PRN Pre/post blood products, medications, blood draw, and to maintain line patency      PHYSICAL EXAM:  GENERAL: NAD, well-groomed, well-developed  HEAD:  Atraumatic, Normocephalic  EYES: EOMI, PERRLA, conjunctiva and sclera clear  ENMT: No tonsillar erythema, exudates, or enlargement; Moist mucous membranes, Good dentition, No lesions  NECK: Supple, No JVD, Normal thyroid  NERVOUS SYSTEM:  Alert & Oriented X3, Good concentration; Motor Strength 5/5 B/L upper and lower extremities; DTRs 2+ intact and symmetric  CHEST/LUNG: Clear to percussion bilaterally; No rales, rhonchi, wheezing, or rubs  HEART: Regular rate and rhythm; No murmurs, rubs, or gallops  ABDOMEN: Soft, Nontender, Nondistended; Bowel sounds present  EXTREMITIES:  2+ Peripheral Pulses, No clubbing, cyanosis, or edema  LYMPH: No lymphadenopathy noted  SKIN: No rashes or lesions  LABS:                        9.0    11.79 )-----------( 529      ( 25 Nov 2021 06:41 )             29.2     11-25    137  |  104  |  19<H>  ----------------------------<  147<H>  4.8   |  29  |  0.63    Ca    8.1<L>      25 Nov 2021 06:41  Phos  3.6     11-25  Mg     2.4     11-25    TPro  6.5  /  Alb  1.7<L>  /  TBili  0.3  /  DBili  x   /  AST  26  /  ALT  30  /  AlkPhos  82  11-25        CAPILLARY BLOOD GLUCOSE      POCT Blood Glucose.: 162 mg/dL (26 Nov 2021 12:00)

## 2021-11-27 LAB
ALBUMIN SERPL ELPH-MCNC: 1.7 G/DL — LOW (ref 3.5–5)
ALP SERPL-CCNC: 77 U/L — SIGNIFICANT CHANGE UP (ref 40–120)
ALT FLD-CCNC: 31 U/L DA — SIGNIFICANT CHANGE UP (ref 10–60)
ANION GAP SERPL CALC-SCNC: 5 MMOL/L — SIGNIFICANT CHANGE UP (ref 5–17)
AST SERPL-CCNC: 32 U/L — SIGNIFICANT CHANGE UP (ref 10–40)
BILIRUB SERPL-MCNC: 0.2 MG/DL — SIGNIFICANT CHANGE UP (ref 0.2–1.2)
BUN SERPL-MCNC: 17 MG/DL — SIGNIFICANT CHANGE UP (ref 7–18)
CALCIUM SERPL-MCNC: 8.7 MG/DL — SIGNIFICANT CHANGE UP (ref 8.4–10.5)
CHLORIDE SERPL-SCNC: 105 MMOL/L — SIGNIFICANT CHANGE UP (ref 96–108)
CO2 SERPL-SCNC: 27 MMOL/L — SIGNIFICANT CHANGE UP (ref 22–31)
CREAT SERPL-MCNC: 0.55 MG/DL — SIGNIFICANT CHANGE UP (ref 0.5–1.3)
GLUCOSE SERPL-MCNC: 146 MG/DL — HIGH (ref 70–99)
HCT VFR BLD CALC: 27.1 % — LOW (ref 34.5–45)
HGB BLD-MCNC: 8.3 G/DL — LOW (ref 11.5–15.5)
MAGNESIUM SERPL-MCNC: 2.4 MG/DL — SIGNIFICANT CHANGE UP (ref 1.6–2.6)
MCHC RBC-ENTMCNC: 28.5 PG — SIGNIFICANT CHANGE UP (ref 27–34)
MCHC RBC-ENTMCNC: 30.6 GM/DL — LOW (ref 32–36)
MCV RBC AUTO: 93.1 FL — SIGNIFICANT CHANGE UP (ref 80–100)
NRBC # BLD: 0 /100 WBCS — SIGNIFICANT CHANGE UP (ref 0–0)
PHOSPHATE SERPL-MCNC: 4 MG/DL — SIGNIFICANT CHANGE UP (ref 2.5–4.5)
PLATELET # BLD AUTO: 399 K/UL — SIGNIFICANT CHANGE UP (ref 150–400)
POTASSIUM SERPL-MCNC: 4.9 MMOL/L — SIGNIFICANT CHANGE UP (ref 3.5–5.3)
POTASSIUM SERPL-SCNC: 4.9 MMOL/L — SIGNIFICANT CHANGE UP (ref 3.5–5.3)
PROT SERPL-MCNC: 6.5 G/DL — SIGNIFICANT CHANGE UP (ref 6–8.3)
RBC # BLD: 2.91 M/UL — LOW (ref 3.8–5.2)
RBC # FLD: 15.1 % — HIGH (ref 10.3–14.5)
SODIUM SERPL-SCNC: 137 MMOL/L — SIGNIFICANT CHANGE UP (ref 135–145)
WBC # BLD: 12.71 K/UL — HIGH (ref 3.8–10.5)
WBC # FLD AUTO: 12.71 K/UL — HIGH (ref 3.8–10.5)

## 2021-11-27 PROCEDURE — 71045 X-RAY EXAM CHEST 1 VIEW: CPT | Mod: 26

## 2021-11-27 RX ADMIN — PANTOPRAZOLE SODIUM 40 MILLIGRAM(S): 20 TABLET, DELAYED RELEASE ORAL at 11:44

## 2021-11-27 RX ADMIN — Medication 650 MILLIGRAM(S): at 22:45

## 2021-11-27 RX ADMIN — CHLORHEXIDINE GLUCONATE 15 MILLILITER(S): 213 SOLUTION TOPICAL at 06:36

## 2021-11-27 RX ADMIN — CHLORHEXIDINE GLUCONATE 15 MILLILITER(S): 213 SOLUTION TOPICAL at 17:20

## 2021-11-27 RX ADMIN — Medication 650 MILLIGRAM(S): at 20:25

## 2021-11-27 RX ADMIN — ENOXAPARIN SODIUM 40 MILLIGRAM(S): 100 INJECTION SUBCUTANEOUS at 11:44

## 2021-11-27 RX ADMIN — Medication 81 MILLIGRAM(S): at 11:44

## 2021-11-27 RX ADMIN — MORPHINE SULFATE 1 MILLIGRAM(S): 50 CAPSULE, EXTENDED RELEASE ORAL at 21:20

## 2021-11-27 RX ADMIN — MORPHINE SULFATE 1 MILLIGRAM(S): 50 CAPSULE, EXTENDED RELEASE ORAL at 21:10

## 2021-11-27 NOTE — CHART NOTE - NSCHARTNOTEFT_GEN_A_CORE
Contacted and spoke with Tommie Weiss [phone]. Explained current condition. Encouraged family to address concerns and emotional support given, all concerns and questions addressed.

## 2021-11-27 NOTE — PROGRESS NOTE ADULT - SUBJECTIVE AND OBJECTIVE BOX
HPI:  89 yo female from [facility] with medical h/o of HTN, Dementia, CVA with R sided hemiparesis, aphasia, parkinson's, GERD, CKD bedbound at baseline, dependant on assistance for ADL comes in with respiratory failure. Patient was found to have respiratory distress, saturating in 70s when EMS arrived, was placed on NRB on field. She was emergently intubated on arrival in ED. As per NH records patient was having fever and cough for past few days. Today she was found to have respiratory distress. She is vaccinated with moderna. Spoke to family son,  who stated the patient to remain full code. No other history could be obtained.  (01 Nov 2021 00:14)      Patient is a 89y old  Female who presents with a chief complaint of AHRF (27 Nov 2021 14:35)      INTERVAL HPI/OVERNIGHT EVENTS:  T(C): 36.8 (11-27-21 @ 11:35), Max: 36.9 (11-27-21 @ 04:44)  HR: 87 (11-27-21 @ 11:35) (77 - 87)  BP: 150/64 (11-27-21 @ 11:35) (143/50 - 156/55)  RR: 25 (11-27-21 @ 11:35) (20 - 25)  SpO2: 99% (11-27-21 @ 11:35) (99% - 100%)  Wt(kg): --  I&O's Summary    26 Nov 2021 07:01  -  27 Nov 2021 07:00  --------------------------------------------------------  IN: 0 mL / OUT: 14 mL / NET: -14 mL        REVIEW OF SYSTEMS: denies fever, chills, SOB, palpitations, chest pain, abdominal pain, nausea, vomitting, diarrhea, constipation, dizziness    MEDICATIONS  (STANDING):  aspirin  chewable 81 milliGRAM(s) Oral daily  chlorhexidine 0.12% Liquid 15 milliLiter(s) Oral Mucosa every 12 hours  enoxaparin Injectable 40 milliGRAM(s) SubCutaneous daily  pantoprazole   Suspension 40 milliGRAM(s) Oral daily    MEDICATIONS  (PRN):  acetaminophen    Suspension .. 650 milliGRAM(s) Oral every 6 hours PRN Temp greater or equal to 38C (100.4F), Mild Pain (1 - 3), Moderate Pain (4 - 6)  morphine  - Injectable 1 milliGRAM(s) IV Push every 4 hours PRN Respiratory distress  sodium chloride 0.9% lock flush 10 milliLiter(s) IV Push every 1 hour PRN Pre/post blood products, medications, blood draw, and to maintain line patency      PHYSICAL EXAM:  GENERAL: NAD, well-groomed, well-developed  HEAD:  Atraumatic, Normocephalic  EYES: EOMI, PERRLA, conjunctiva and sclera clear  ENMT: No tonsillar erythema, exudates, or enlargement; Moist mucous membranes, Good dentition, No lesions  NECK: Supple, No JVD, Normal thyroid  NERVOUS SYSTEM:  Alert & Oriented X3, Good concentration; Motor Strength 5/5 B/L upper and lower extremities; DTRs 2+ intact and symmetric  CHEST/LUNG: Clear to percussion bilaterally; No rales, rhonchi, wheezing, or rubs  HEART: Regular rate and rhythm; No murmurs, rubs, or gallops  ABDOMEN: Soft, Nontender, Nondistended; Bowel sounds present  EXTREMITIES:  2+ Peripheral Pulses, No clubbing, cyanosis, or edema  LYMPH: No lymphadenopathy noted  SKIN: No rashes or lesions  LABS:                        8.3    12.71 )-----------( 399      ( 27 Nov 2021 06:41 )             27.1     11-27    137  |  105  |  17  ----------------------------<  146<H>  4.9   |  27  |  0.55    Ca    8.7      27 Nov 2021 06:41  Phos  4.0     11-27  Mg     2.4     11-27    TPro  6.5  /  Alb  1.7<L>  /  TBili  0.2  /  DBili  x   /  AST  32  /  ALT  31  /  AlkPhos  77  11-27        CAPILLARY BLOOD GLUCOSE      POCT Blood Glucose.: 162 mg/dL (27 Nov 2021 11:29)  POCT Blood Glucose.: 157 mg/dL (27 Nov 2021 06:12)  POCT Blood Glucose.: 129 mg/dL (26 Nov 2021 23:19)

## 2021-11-27 NOTE — PROGRESS NOTE ADULT - PROBLEM SELECTOR PLAN 1
Continue mechanical ventilation with daily SBT as tolerated  SBT with PS 12 for 1 hour daily  Monitor oxygen saturation.

## 2021-11-27 NOTE — PROGRESS NOTE ADULT - PROBLEM SELECTOR PLAN 10
DVT and GI prophylaxis.  Continue mechanical ventilation.  Left chest tube clamped. Placed on pneumostat valve. Tolerating well.  Thoracic surgery f/u  Overall prognosis is extremely poor.  Remains FULL CODE as per family wishes.  SW consult for long term vent placement.

## 2021-11-27 NOTE — PROGRESS NOTE ADULT - SUBJECTIVE AND OBJECTIVE BOX
AKASH CHACKO    SCU progress note    INTERVAL HPI/OVERNIGHT EVENTS: *** Patient seen and examined at bedside.    DNR [ ]   DNI  [  ] FULL CODE    Covid - 19 PCR: 11/26 negative    The 4Ms    What Matters Most: see GO  Age appropriate Medications/Screen for High Risk Medication: Yes  Mentation: see CAM below  Mobility: defer to physical exam    The Confusion Assessment Method (CAM) Diagnostic Algorithm     1: Acute Onset or Fluctuating Course  - Is there evidence of an acute change in mental status from the patient’s baseline? Did the (abnormal) behavior  fluctuate during the day, that is, tend to come and go, or increase and decrease in severity?  [ ] YES [x ] NO     2: Inattention  - Did the patient have difficulty focusing attention, being easily distractible, or having difficulty keeping track of what was being said?  [ ] YES [ ] NO unable to assess     3: Disorganized thinking  -Was the patient’s thinking disorganized or incoherent, such as rambling or irrelevant conversation, unclear or illogical flow of ideas, or unpredictable switching from subject to subject?  [ ] YES [ ] NO unable to assess    4: Altered Level of consciousness?  [ ] YES [ ] NO unable to assess    The diagnosis of delirium by CAM requires the presence of features 1 and 2 and either 3 or 4.    PRESSORS: [ ] YES [ x] NO    Cardiovascular:  Heart Failure  Acute   Acute on Chronic  Chronic         Pulmonary:    Hematalogic:  aspirin  chewable 81 milliGRAM(s) Oral daily  enoxaparin Injectable 40 milliGRAM(s) SubCutaneous daily    Other:  acetaminophen    Suspension .. 650 milliGRAM(s) Oral every 6 hours PRN  chlorhexidine 0.12% Liquid 15 milliLiter(s) Oral Mucosa every 12 hours  morphine  - Injectable 1 milliGRAM(s) IV Push every 4 hours PRN  pantoprazole   Suspension 40 milliGRAM(s) Oral daily  sodium chloride 0.9% lock flush 10 milliLiter(s) IV Push every 1 hour PRN    acetaminophen    Suspension .. 650 milliGRAM(s) Oral every 6 hours PRN  aspirin  chewable 81 milliGRAM(s) Oral daily  chlorhexidine 0.12% Liquid 15 milliLiter(s) Oral Mucosa every 12 hours  enoxaparin Injectable 40 milliGRAM(s) SubCutaneous daily  morphine  - Injectable 1 milliGRAM(s) IV Push every 4 hours PRN  pantoprazole   Suspension 40 milliGRAM(s) Oral daily  sodium chloride 0.9% lock flush 10 milliLiter(s) IV Push every 1 hour PRN    Drug Dosing Weight  Height (cm): 167.6 (03 Nov 2021 16:02)  Weight (kg): 40.9 (01 Nov 2021 02:20)  BMI (kg/m2): 14.6 (03 Nov 2021 16:02)  BSA (m2): 1.43 (03 Nov 2021 16:02)    CENTRAL LINE: [ ] YES [ ] NO  LOCATION:   DATE INSERTED:  REMOVE: [ ] YES [ ] NO  EXPLAIN:    SAEED: [ ] YES [ ] NO    DATE INSERTED:  REMOVE:  [ ] YES [ ] NO  EXPLAIN:    PAST MEDICAL & SURGICAL HISTORY:  HTN (hypertension)    Dementia        11-26 @ 07:01  -  11-27 @ 07:00  --------------------------------------------------------  IN: 0 mL / OUT: 14 mL / NET: -14 mL      Mode: AC/ CMV (Assist Control/ Continuous Mandatory Ventilation)  RR (machine): 16  TV (machine): 350  FiO2: 40  PEEP: 5  ITime: 0.9  MAP: 10  PIP: 26      PHYSICAL EXAM:    GENERAL: NAD, cachectic with severe temporal waisting.  HEAD:  Atraumatic, Normocephalic  EYES: EOMI, PERRLA, conjunctiva and sclera clear  ENMT: No tonsillar erythema, exudates  NECK: Supple, No JVD, tracheostomy intact  NERVOUS SYSTEM:  Awake. Not orientated. Nonverbal at baseline. Does not follow commands. Moving all extremities.  CHEST/LUNG: Diminished breath sounds bilateral bases L>R. Left chest tube Pneumostat intact  HEART: Regular rate and rhythm; No murmurs, rubs, or gallops  ABDOMEN: Soft, Nontender, Nondistended; Bowel sounds present; Peg intact  EXTREMITIES: +Muscle waisting 2+ Peripheral Pulses, No clubbing, cyanosis, or edema  LYMPH: No lymphadenopathy noted  SKIN: No rashes or lesions        LABS:  CBC Full  -  ( 27 Nov 2021 06:41 )  WBC Count : 12.71 K/uL  RBC Count : 2.91 M/uL  Hemoglobin : 8.3 g/dL  Hematocrit : 27.1 %  Platelet Count - Automated : 399 K/uL  Mean Cell Volume : 93.1 fl  Mean Cell Hemoglobin : 28.5 pg  Mean Cell Hemoglobin Concentration : 30.6 gm/dL  Auto Neutrophil # : x  Auto Lymphocyte # : x  Auto Monocyte # : x  Auto Eosinophil # : x  Auto Basophil # : x  Auto Neutrophil % : x  Auto Lymphocyte % : x  Auto Monocyte % : x  Auto Eosinophil % : x  Auto Basophil % : x    11-27    137  |  105  |  17  ----------------------------<  146<H>  4.9   |  27  |  0.55    Ca    8.7      27 Nov 2021 06:41  Phos  4.0     11-27  Mg     2.4     11-27    TPro  6.5  /  Alb  1.7<L>  /  TBili  0.2  /  DBili  x   /  AST  32  /  ALT  31  /  AlkPhos  77  11-27        [x  ]  DVT Prophylaxis  [  x]   Nutrition, Brand, Rate        Diet Prescription: Diet, NPO with Tube Feed:   Tube Feeding Modality: Gastrostomy  Jevity 1.5 Bg  Total Volume for 24 Hours (mL): 720  Continuous  Starting Tube Feed Rate {mL per Hour}: 30  Until Goal Tube Feed Rate (mL per Hour): 30  Tube Feed Duration (in Hours): 24  Tube Feed Start Time: 15:00  No Carb Pro source (1pkg = 15gms Protein)     Qty per Day:  1        Abnormal Nutritional Status -  Malnutrition   Cachexia          RADIOLOGY & ADDITIONAL STUDIES:  ***  < from: Xray Chest 1 View- PORTABLE-Urgent (Xray Chest 1 View- PORTABLE-Urgent .) (11.24.21 @ 19:01) >    EXAM:  XR CHEST PORTABLE URGENT 1V                            PROCEDURE DATE:  11/24/2021          INTERPRETATION:  CLINICAL STATEMENT: Pneumothorax    TECHNIQUE: AP view of the chest.      COMPARISON: 1/23/2021    Tracheostomy tube and left-sided chest tube, unchanged in position. The heart is not enlarged. Mitral valve annulus calcification. Aortic calcification.    Chronic lung changes. Infiltrates not excluded. No evidence of pneumothorax. Small right pleural effusion. Streaky density rightmid to upper lung field. Apical pleural calcifications. Compression fracture deformity lower thoracic vertebral body.    IMPRESSION:    No significant interval changes. No pneumothorax appreciated.    < end of copied text >    Goals of Care Discussion with Family/Proxy/Other   -  see note from 11/09

## 2021-11-27 NOTE — PROGRESS NOTE ADULT - ASSESSMENT
seen and examined vsstable afebrile physical done   not in any distress with trach vent  awake not in any distress  labs noted  wbc 12.7   hgb 8.3  pneumothorax better  wbc little high  no fever  no cough, no dysurea

## 2021-11-27 NOTE — PROGRESS NOTE ADULT - ASSESSMENT
89 y/o F from [facility] w/ Hx of HTN, Dementia, CVA w/ R sided hemiparesis, aphasia, Parkinson's Disease, GERD, CKD. She is bedbound at baseline and needs assistance for ADLs. She was brought to the E.D. due to episodes of desaturation at 70s and respiratory distress. She was also having cough and fever in the nursing home. She was initially on NRB placed by EMS but was eventually intubated in the E.D. She is vaccinated with Moderna. COVID test was negative on admission. Chest Xray showed left lower lobe infiltrate (pneumonia). Blood cultures were sent. She was given 1 dose of Vancomycin and Zosyn in the E.D. and was subsequently admitted to ICU.    In the ICU, she was placed on mechanical ventilator. Her blood pressure was low despite receiving 2L bolus of LR. NGT and Left IJ central line was placed. Levophed was started. CXR revealed Left pneumothorax. Thoracic Surgery was consulted and left chest tube was placed. Patient was also tachycardic and troponin was mildly elevated. EKG showed SR w/ ST changes in the inferior leads. Troponin was monitored and has since trended down. Heart rate has been stable. Lactate was also elevated at 11.2 and has also since trended down. Patient also presented with hypernatremia and elevated creatinine probably secondary to volume deficit. She was also given free water. She was on Heparin for DVT prophylaxis and Protonix for GI prophylaxis. Palliative was consulted regarding the [facility]. Blood culture results showed E. coli bacteremia. She was started on Rocephin IV for 10 days. Azithromycin was continued awaiting Legionella results. Infectious Disease was consulted. Urine culture was ordered. Patient spiked a fever and is tachycardic. Blood cultures were repeated. She was tachycardic however his EKG showed SR w/ new PACs. There was mild air leak in the chest tube drainage but it was fixed by Cardiothoracic Surgery. Urine culture and repeat blood culture was negative. She had 2 febrile episodes overnight at 100.4-100.5F. She was tachypneic at rate of 30. Her repeat Chest Xray showed worsening of bilateral infiltrates. Her albumin is still low at 1.0. Her urine output is net negative. She was given Lasix and Albumin to maintain net negative urine output and decrease congestion. Vancomycin was discontinued and Cefepime decreased from 2g to 1g to complete 14 days since negative culture (11/16/21). Echocardiogram was ordered. Central line was removed. Lactulose was ordered for bowel movement. On November 11, 2021, patient had her tracheostomy and PEG inserted. Patient son Jered was called and informed .     11/20 CXR shows mild enlargement of left apical pneumothorax. Left chest tube unclamped to water seal. Thoracic following.   11/24  Chest tube placed on Pneumostat valve  11/26 No overnight events, tolerating Pneumostat

## 2021-11-27 NOTE — PROGRESS NOTE ADULT - PROBLEM SELECTOR PLAN 4
Left chest tube placed on Pneumostat valve yesterday.  Hold daily - Serial CXRs as Pneumothorax resolved  So far tolerating valve.  4ml drained today.  Thoracic surgery recommending follow up in 3 weeks for further management

## 2021-11-28 LAB
ALBUMIN SERPL ELPH-MCNC: 1.8 G/DL — LOW (ref 3.5–5)
ALP SERPL-CCNC: 89 U/L — SIGNIFICANT CHANGE UP (ref 40–120)
ALT FLD-CCNC: 29 U/L DA — SIGNIFICANT CHANGE UP (ref 10–60)
ANION GAP SERPL CALC-SCNC: 5 MMOL/L — SIGNIFICANT CHANGE UP (ref 5–17)
AST SERPL-CCNC: 25 U/L — SIGNIFICANT CHANGE UP (ref 10–40)
BILIRUB SERPL-MCNC: 0.3 MG/DL — SIGNIFICANT CHANGE UP (ref 0.2–1.2)
BUN SERPL-MCNC: 20 MG/DL — HIGH (ref 7–18)
CALCIUM SERPL-MCNC: 8.4 MG/DL — SIGNIFICANT CHANGE UP (ref 8.4–10.5)
CHLORIDE SERPL-SCNC: 104 MMOL/L — SIGNIFICANT CHANGE UP (ref 96–108)
CO2 SERPL-SCNC: 29 MMOL/L — SIGNIFICANT CHANGE UP (ref 22–31)
CREAT SERPL-MCNC: 0.67 MG/DL — SIGNIFICANT CHANGE UP (ref 0.5–1.3)
GLUCOSE SERPL-MCNC: 144 MG/DL — HIGH (ref 70–99)
HCT VFR BLD CALC: 29 % — LOW (ref 34.5–45)
HGB BLD-MCNC: 8.9 G/DL — LOW (ref 11.5–15.5)
MAGNESIUM SERPL-MCNC: 2.3 MG/DL — SIGNIFICANT CHANGE UP (ref 1.6–2.6)
MCHC RBC-ENTMCNC: 28.5 PG — SIGNIFICANT CHANGE UP (ref 27–34)
MCHC RBC-ENTMCNC: 30.7 GM/DL — LOW (ref 32–36)
MCV RBC AUTO: 92.9 FL — SIGNIFICANT CHANGE UP (ref 80–100)
NRBC # BLD: 0 /100 WBCS — SIGNIFICANT CHANGE UP (ref 0–0)
PHOSPHATE SERPL-MCNC: 4.2 MG/DL — SIGNIFICANT CHANGE UP (ref 2.5–4.5)
PLATELET # BLD AUTO: 463 K/UL — HIGH (ref 150–400)
POTASSIUM SERPL-MCNC: 4.9 MMOL/L — SIGNIFICANT CHANGE UP (ref 3.5–5.3)
POTASSIUM SERPL-SCNC: 4.9 MMOL/L — SIGNIFICANT CHANGE UP (ref 3.5–5.3)
PROT SERPL-MCNC: 6.8 G/DL — SIGNIFICANT CHANGE UP (ref 6–8.3)
RBC # BLD: 3.12 M/UL — LOW (ref 3.8–5.2)
RBC # FLD: 14.8 % — HIGH (ref 10.3–14.5)
SARS-COV-2 RNA SPEC QL NAA+PROBE: SIGNIFICANT CHANGE UP
SODIUM SERPL-SCNC: 138 MMOL/L — SIGNIFICANT CHANGE UP (ref 135–145)
WBC # BLD: 12.49 K/UL — HIGH (ref 3.8–10.5)
WBC # FLD AUTO: 12.49 K/UL — HIGH (ref 3.8–10.5)

## 2021-11-28 RX ADMIN — MORPHINE SULFATE 1 MILLIGRAM(S): 50 CAPSULE, EXTENDED RELEASE ORAL at 11:33

## 2021-11-28 RX ADMIN — Medication 650 MILLIGRAM(S): at 22:20

## 2021-11-28 RX ADMIN — MORPHINE SULFATE 1 MILLIGRAM(S): 50 CAPSULE, EXTENDED RELEASE ORAL at 20:06

## 2021-11-28 RX ADMIN — PANTOPRAZOLE SODIUM 40 MILLIGRAM(S): 20 TABLET, DELAYED RELEASE ORAL at 11:34

## 2021-11-28 RX ADMIN — MORPHINE SULFATE 1 MILLIGRAM(S): 50 CAPSULE, EXTENDED RELEASE ORAL at 13:00

## 2021-11-28 RX ADMIN — ENOXAPARIN SODIUM 40 MILLIGRAM(S): 100 INJECTION SUBCUTANEOUS at 11:34

## 2021-11-28 RX ADMIN — Medication 81 MILLIGRAM(S): at 11:35

## 2021-11-28 RX ADMIN — CHLORHEXIDINE GLUCONATE 15 MILLILITER(S): 213 SOLUTION TOPICAL at 05:45

## 2021-11-28 RX ADMIN — Medication 650 MILLIGRAM(S): at 20:03

## 2021-11-28 RX ADMIN — CHLORHEXIDINE GLUCONATE 15 MILLILITER(S): 213 SOLUTION TOPICAL at 17:39

## 2021-11-28 RX ADMIN — MORPHINE SULFATE 1 MILLIGRAM(S): 50 CAPSULE, EXTENDED RELEASE ORAL at 22:20

## 2021-11-28 NOTE — PROGRESS NOTE ADULT - PROBLEM SELECTOR PLAN 1
Continue mechanical ventilation with daily SBT as tolerated  C/w SBT trails daily  Monitor oxygen saturation.

## 2021-11-28 NOTE — PROGRESS NOTE ADULT - ASSESSMENT
89 y/o F from [facility] w/ Hx of HTN, Dementia, CVA w/ R sided hemiparesis, aphasia, Parkinson's Disease, GERD, CKD. She is bedbound at baseline and needs assistance for ADLs. She was brought to the E.D. due to episodes of desaturation at 70s and respiratory distress. She was also having cough and fever in the nursing home. She was initially on NRB placed by EMS but was eventually intubated in the E.D. She is vaccinated with Moderna. COVID test was negative on admission. Chest Xray showed left lower lobe infiltrate (pneumonia). Blood cultures were sent. She was given 1 dose of Vancomycin and Zosyn in the E.D. and was subsequently admitted to ICU.    In the ICU, she was placed on mechanical ventilator. Her blood pressure was low despite receiving 2L bolus of LR. NGT and Left IJ central line was placed. Levophed was started. CXR revealed Left pneumothorax. Thoracic Surgery was consulted and left chest tube was placed. Patient was also tachycardic and troponin was mildly elevated. EKG showed SR w/ ST changes in the inferior leads. Troponin was monitored and has since trended down. Heart rate has been stable. Lactate was also elevated at 11.2 and has also since trended down. Patient also presented with hypernatremia and elevated creatinine probably secondary to volume deficit. She was also given free water. She was on Heparin for DVT prophylaxis and Protonix for GI prophylaxis. Palliative was consulted regarding the [facility]. Blood culture results showed E. coli bacteremia. She was started on Rocephin IV for 10 days. Azithromycin was continued awaiting Legionella results. Infectious Disease was consulted. Urine culture was ordered. Patient spiked a fever and is tachycardic. Blood cultures were repeated. She was tachycardic however his EKG showed SR w/ new PACs. There was mild air leak in the chest tube drainage but it was fixed by Cardiothoracic Surgery. Urine culture and repeat blood culture was negative. She had 2 febrile episodes overnight at 100.4-100.5F. She was tachypneic at rate of 30. Her repeat Chest Xray showed worsening of bilateral infiltrates. Her albumin is still low at 1.0. Her urine output is net negative. She was given Lasix and Albumin to maintain net negative urine output and decrease congestion. Vancomycin was discontinued and Cefepime decreased from 2g to 1g to complete 14 days since negative culture (11/16/21). Echocardiogram was ordered. Central line was removed. Lactulose was ordered for bowel movement. On November 11, 2021, patient had her tracheostomy and PEG inserted. Patient son Jered was called and informed .     11/20 CXR shows mild enlargement of left apical pneumothorax. Left chest tube unclamped to water seal. Thoracic following.   11/24  Chest tube placed on Pneumostat valve  11/26 No overnight events, tolerating Pneumostat

## 2021-11-28 NOTE — CHART NOTE - NSCHARTNOTEFT_GEN_A_CORE
called and spoke with Tommie Weiss [phone]. Updated regarding pt's condition and discharge planing. all concerns and questions addressed.

## 2021-11-28 NOTE — PROGRESS NOTE ADULT - SUBJECTIVE AND OBJECTIVE BOX
AKASH CHACKO    SCU progress note    INTERVAL HPI/OVERNIGHT EVENTS: No new complaints    DNR [ ]   DNI  [  ] FUll Code    Covid - 19 PCR: negative    The 4Ms    What Matters Most: see GOC  Age appropriate Medications/Screen for High Risk Medication: Yes  Mentation: see CAM below  Mobility: defer to physical exam    The Confusion Assessment Method (CAM) Diagnostic Algorithm     1: Acute Onset or Fluctuating Course  - Is there evidence of an acute change in mental status from the patient’s baseline? Did the (abnormal) behavior  fluctuate during the day, that is, tend to come and go, or increase and decrease in severity?  [ ] YES [ x] NO     2: Inattention  - Did the patient have difficulty focusing attention, being easily distractible, or having difficulty keeping track of what was being said?  [ ] YES [ x] NO     3: Disorganized thinking  -Was the patient’s thinking disorganized or incoherent, such as rambling or irrelevant conversation, unclear or illogical flow of ideas, or unpredictable switching from subject to subject?  [ ] YES [x ] NO    4: Altered Level of consciousness?  [ ] YES [x ] NO    The diagnosis of delirium by CAM requires the presence of features 1 and 2 and either 3 or 4.    PRESSORS: [ ] YES [x] NO    Cardiovascular:  Heart Failure  Acute   Acute on Chronic  Chronic         Pulmonary:    Hematalogic:  aspirin  chewable 81 milliGRAM(s) Oral daily  enoxaparin Injectable 40 milliGRAM(s) SubCutaneous daily    Other:  acetaminophen    Suspension .. 650 milliGRAM(s) Oral every 6 hours PRN  chlorhexidine 0.12% Liquid 15 milliLiter(s) Oral Mucosa every 12 hours  morphine  - Injectable 1 milliGRAM(s) IV Push every 4 hours PRN  pantoprazole   Suspension 40 milliGRAM(s) Oral daily  sodium chloride 0.9% lock flush 10 milliLiter(s) IV Push every 1 hour PRN    acetaminophen    Suspension .. 650 milliGRAM(s) Oral every 6 hours PRN  aspirin  chewable 81 milliGRAM(s) Oral daily  chlorhexidine 0.12% Liquid 15 milliLiter(s) Oral Mucosa every 12 hours  enoxaparin Injectable 40 milliGRAM(s) SubCutaneous daily  morphine  - Injectable 1 milliGRAM(s) IV Push every 4 hours PRN  pantoprazole   Suspension 40 milliGRAM(s) Oral daily  sodium chloride 0.9% lock flush 10 milliLiter(s) IV Push every 1 hour PRN    Drug Dosing Weight  Height (cm): 167.6 (03 Nov 2021 16:02)  Weight (kg): 40.9 (01 Nov 2021 02:20)  BMI (kg/m2): 14.6 (03 Nov 2021 16:02)  BSA (m2): 1.43 (03 Nov 2021 16:02)    CENTRAL LINE: [ ] YES [x ] NO  LOCATION:   DATE INSERTED:  REMOVE: [ ] YES [ ] NO  EXPLAIN:    SAEED: [ ] YES [x ] NO    DATE INSERTED:  REMOVE:  [ ] YES [ ] NO  EXPLAIN:    PAST MEDICAL & SURGICAL HISTORY:  HTN (hypertension)    Dementia    11-27 @ 07:01  -  11-28 @ 07:00  --------------------------------------------------------  IN: 0 mL / OUT: 6 mL / NET: -6 mL        Mode: CPAP with PS  FiO2: 40  PEEP: 5  PS: 12  ITime: 1  MAP: 5  PIP: 17    PHYSICAL EXAM:    GENERAL: NAD,   HEAD:  Atraumatic, Normocephalic  EYES: EOMI, PERRLA, conjunctiva and sclera clear  ENMT: No tonsillar erythema, exudates, or enlargement; Moist mucous membranes, Good dentition, No lesions, trach site CDI  NECK: Supple, No JVD, Normal thyroid  NERVOUS SYSTEM:  Alert & Oriented X3, Good concentration; Motor Strength 5/5 B/L upper and lower extremities; DTRs 2+ intact and symmetric  CHEST/LUNG: Clear to percussion bilaterally; No rales, rhonchi, wheezing, or rubs  HEART: Regular rate and rhythm; No murmurs, rubs, or gallops  ABDOMEN: Soft, Nontender, Nondistended; Bowel sounds present,, peg tube site CDI  EXTREMITIES:  2+ Peripheral Pulses, No clubbing, cyanosis, or edema  LYMPH: No lymphadenopathy noted  SKIN: No rashes or lesions      LABS:  CBC Full  -  ( 28 Nov 2021 06:08 )  WBC Count : 12.49 K/uL  RBC Count : 3.12 M/uL  Hemoglobin : 8.9 g/dL  Hematocrit : 29.0 %  Platelet Count - Automated : 463 K/uL  Mean Cell Volume : 92.9 fl  Mean Cell Hemoglobin : 28.5 pg  Mean Cell Hemoglobin Concentration : 30.7 gm/dL  Auto Neutrophil # : x  Auto Lymphocyte # : x  Auto Monocyte # : x  Auto Eosinophil # : x  Auto Basophil # : x  Auto Neutrophil % : x  Auto Lymphocyte % : x  Auto Monocyte % : x  Auto Eosinophil % : x  Auto Basophil % : x    11-28    138  |  104  |  20<H>  ----------------------------<  144<H>  4.9   |  29  |  0.67    Ca    8.4      28 Nov 2021 06:08  Phos  4.2     11-28  Mg     2.3     11-28    TPro  6.8  /  Alb  1.8<L>  /  TBili  0.3  /  DBili  x   /  AST  25  /  ALT  29  /  AlkPhos  89  11-28    [x]  DVT Prophylaxis  [  ]  Nutrition, Brand, Rate Jevity at 40ml/hr          RADIOLOGY & ADDITIONAL STUDIES:   < from: Xray Chest 1 View- PORTABLE-Urgent (Xray Chest 1 View- PORTABLE-Urgent .) (11.24.21 @ 19:01) >  EXAM:  XR CHEST PORTABLE URGENT 1V                            PROCEDURE DATE:  11/24/2021          INTERPRETATION:  CLINICAL STATEMENT: Pneumothorax    TECHNIQUE: AP view of the chest.      COMPARISON: 1/23/2021    Tracheostomy tube and left-sided chest tube, unchanged in position. The heart is not enlarged. Mitral valve annulus calcification. Aortic calcification.    Chronic lung changes. Infiltrates not excluded. No evidence of pneumothorax. Small right pleural effusion. Streaky density rightmid to upper lung field. Apical pleural calcifications. Compression fracture deformity lower thoracic vertebral body.    IMPRESSION:    No significant interval changes. No pneumothorax appreciated.    --- End of Report ---      ELLIOTT LUJAN MD; Attending Radiologist  This document has been electronically signed. Nov 26 2021 12:51PM    < end of copied text >      Goals of Care Discussion with Family/Proxy/Other   - see note from/family meeting set up for     AKASH CHACKO    SCU progress note    INTERVAL HPI/OVERNIGHT EVENTS: No new complaints    DNR [ ]   DNI  [  ] FUll Code    Covid - 19 PCR: negative    The 4Ms    What Matters Most: see GOC  Age appropriate Medications/Screen for High Risk Medication: Yes  Mentation: see CAM below  Mobility: defer to physical exam    The Confusion Assessment Method (CAM) Diagnostic Algorithm     1: Acute Onset or Fluctuating Course  - Is there evidence of an acute change in mental status from the patient’s baseline? Did the (abnormal) behavior  fluctuate during the day, that is, tend to come and go, or increase and decrease in severity?  [ ] YES [ x] NO     2: Inattention  - Did the patient have difficulty focusing attention, being easily distractible, or having difficulty keeping track of what was being said?  [ ] YES [ x] NO     3: Disorganized thinking  -Was the patient’s thinking disorganized or incoherent, such as rambling or irrelevant conversation, unclear or illogical flow of ideas, or unpredictable switching from subject to subject?  [ ] YES [x ] NO    4: Altered Level of consciousness?  [ ] YES [x ] NO    The diagnosis of delirium by CAM requires the presence of features 1 and 2 and either 3 or 4.    PRESSORS: [ ] YES [x] NO    Cardiovascular:  Heart Failure  Acute   Acute on Chronic  Chronic         Pulmonary:    Hematalogic:  aspirin  chewable 81 milliGRAM(s) Oral daily  enoxaparin Injectable 40 milliGRAM(s) SubCutaneous daily    Other:  acetaminophen    Suspension .. 650 milliGRAM(s) Oral every 6 hours PRN  chlorhexidine 0.12% Liquid 15 milliLiter(s) Oral Mucosa every 12 hours  morphine  - Injectable 1 milliGRAM(s) IV Push every 4 hours PRN  pantoprazole   Suspension 40 milliGRAM(s) Oral daily  sodium chloride 0.9% lock flush 10 milliLiter(s) IV Push every 1 hour PRN    acetaminophen    Suspension .. 650 milliGRAM(s) Oral every 6 hours PRN  aspirin  chewable 81 milliGRAM(s) Oral daily  chlorhexidine 0.12% Liquid 15 milliLiter(s) Oral Mucosa every 12 hours  enoxaparin Injectable 40 milliGRAM(s) SubCutaneous daily  morphine  - Injectable 1 milliGRAM(s) IV Push every 4 hours PRN  pantoprazole   Suspension 40 milliGRAM(s) Oral daily  sodium chloride 0.9% lock flush 10 milliLiter(s) IV Push every 1 hour PRN    Drug Dosing Weight  Height (cm): 167.6 (03 Nov 2021 16:02)  Weight (kg): 40.9 (01 Nov 2021 02:20)  BMI (kg/m2): 14.6 (03 Nov 2021 16:02)  BSA (m2): 1.43 (03 Nov 2021 16:02)    CENTRAL LINE: [ ] YES [x ] NO  LOCATION:   DATE INSERTED:  REMOVE: [ ] YES [ ] NO  EXPLAIN:    SAEED: [ ] YES [x ] NO    DATE INSERTED:  REMOVE:  [ ] YES [ ] NO  EXPLAIN:    PAST MEDICAL & SURGICAL HISTORY:  HTN (hypertension)    Dementia    11-27 @ 07:01  -  11-28 @ 07:00  --------------------------------------------------------  IN: 0 mL / OUT: 6 mL / NET: -6 mL        Mode: CPAP with PS  FiO2: 40  PEEP: 5  PS: 12  ITime: 1  MAP: 5  PIP: 17    PHYSICAL EXAM:    GENERAL: NAD,   HEAD:  Atraumatic, Normocephalic  EYES: EOMI, PERRLA, conjunctiva and sclera clear  ENMT: No tonsillar erythema, exudates, or enlargement; Moist mucous membranes, Good dentition, No lesions, trach site CDI  NECK: Supple, No JVD, Normal thyroid  NERVOUS SYSTEM:  Alert & Oriented X3, Good concentration; Motor Strength 5/5 B/L upper and lower extremities; DTRs 2+ intact and symmetric  CHEST/LUNG: Clear to percussion bilaterally; No rales, rhonchi, wheezing, or rubs, R throrax with Pneumostat valve  HEART: Regular rate and rhythm; No murmurs, rubs, or gallops  ABDOMEN: Soft, Nontender, Nondistended; Bowel sounds present,, peg tube site CDI  EXTREMITIES:  2+ Peripheral Pulses, No clubbing, cyanosis, or edema  LYMPH: No lymphadenopathy noted  SKIN: No rashes or lesions      LABS:  CBC Full  -  ( 28 Nov 2021 06:08 )  WBC Count : 12.49 K/uL  RBC Count : 3.12 M/uL  Hemoglobin : 8.9 g/dL  Hematocrit : 29.0 %  Platelet Count - Automated : 463 K/uL  Mean Cell Volume : 92.9 fl  Mean Cell Hemoglobin : 28.5 pg  Mean Cell Hemoglobin Concentration : 30.7 gm/dL  Auto Neutrophil # : x  Auto Lymphocyte # : x  Auto Monocyte # : x  Auto Eosinophil # : x  Auto Basophil # : x  Auto Neutrophil % : x  Auto Lymphocyte % : x  Auto Monocyte % : x  Auto Eosinophil % : x  Auto Basophil % : x    11-28    138  |  104  |  20<H>  ----------------------------<  144<H>  4.9   |  29  |  0.67    Ca    8.4      28 Nov 2021 06:08  Phos  4.2     11-28  Mg     2.3     11-28    TPro  6.8  /  Alb  1.8<L>  /  TBili  0.3  /  DBili  x   /  AST  25  /  ALT  29  /  AlkPhos  89  11-28    [x]  DVT Prophylaxis  [  ]  Nutrition, Brand, Rate Jevity at 40ml/hr          RADIOLOGY & ADDITIONAL STUDIES:   < from: Xray Chest 1 View- PORTABLE-Urgent (Xray Chest 1 View- PORTABLE-Urgent .) (11.24.21 @ 19:01) >  EXAM:  XR CHEST PORTABLE URGENT 1V                            PROCEDURE DATE:  11/24/2021          INTERPRETATION:  CLINICAL STATEMENT: Pneumothorax    TECHNIQUE: AP view of the chest.      COMPARISON: 1/23/2021    Tracheostomy tube and left-sided chest tube, unchanged in position. The heart is not enlarged. Mitral valve annulus calcification. Aortic calcification.    Chronic lung changes. Infiltrates not excluded. No evidence of pneumothorax. Small right pleural effusion. Streaky density rightmid to upper lung field. Apical pleural calcifications. Compression fracture deformity lower thoracic vertebral body.    IMPRESSION:    No significant interval changes. No pneumothorax appreciated.    --- End of Report ---      ELLIOTT LUJAN MD; Attending Radiologist  This document has been electronically signed. Nov 26 2021 12:51PM    < end of copied text >      Goals of Care Discussion with Family/Proxy/Other   - see note from/family meeting set up for

## 2021-11-29 LAB
ALBUMIN SERPL ELPH-MCNC: 1.8 G/DL — LOW (ref 3.5–5)
ALP SERPL-CCNC: 83 U/L — SIGNIFICANT CHANGE UP (ref 40–120)
ALT FLD-CCNC: 26 U/L DA — SIGNIFICANT CHANGE UP (ref 10–60)
ANION GAP SERPL CALC-SCNC: 5 MMOL/L — SIGNIFICANT CHANGE UP (ref 5–17)
AST SERPL-CCNC: 20 U/L — SIGNIFICANT CHANGE UP (ref 10–40)
BILIRUB SERPL-MCNC: 0.2 MG/DL — SIGNIFICANT CHANGE UP (ref 0.2–1.2)
BUN SERPL-MCNC: 25 MG/DL — HIGH (ref 7–18)
CALCIUM SERPL-MCNC: 8.6 MG/DL — SIGNIFICANT CHANGE UP (ref 8.4–10.5)
CHLORIDE SERPL-SCNC: 104 MMOL/L — SIGNIFICANT CHANGE UP (ref 96–108)
CO2 SERPL-SCNC: 30 MMOL/L — SIGNIFICANT CHANGE UP (ref 22–31)
CREAT SERPL-MCNC: 0.64 MG/DL — SIGNIFICANT CHANGE UP (ref 0.5–1.3)
GLUCOSE SERPL-MCNC: 188 MG/DL — HIGH (ref 70–99)
HCT VFR BLD CALC: 26.2 % — LOW (ref 34.5–45)
HGB BLD-MCNC: 8.1 G/DL — LOW (ref 11.5–15.5)
MCHC RBC-ENTMCNC: 28.6 PG — SIGNIFICANT CHANGE UP (ref 27–34)
MCHC RBC-ENTMCNC: 30.9 GM/DL — LOW (ref 32–36)
MCV RBC AUTO: 92.6 FL — SIGNIFICANT CHANGE UP (ref 80–100)
NRBC # BLD: 0 /100 WBCS — SIGNIFICANT CHANGE UP (ref 0–0)
PLATELET # BLD AUTO: 460 K/UL — HIGH (ref 150–400)
POTASSIUM SERPL-MCNC: 4.5 MMOL/L — SIGNIFICANT CHANGE UP (ref 3.5–5.3)
POTASSIUM SERPL-SCNC: 4.5 MMOL/L — SIGNIFICANT CHANGE UP (ref 3.5–5.3)
PROT SERPL-MCNC: 7.1 G/DL — SIGNIFICANT CHANGE UP (ref 6–8.3)
RBC # BLD: 2.83 M/UL — LOW (ref 3.8–5.2)
RBC # FLD: 14.8 % — HIGH (ref 10.3–14.5)
SODIUM SERPL-SCNC: 139 MMOL/L — SIGNIFICANT CHANGE UP (ref 135–145)
WBC # BLD: 11.56 K/UL — HIGH (ref 3.8–10.5)
WBC # FLD AUTO: 11.56 K/UL — HIGH (ref 3.8–10.5)

## 2021-11-29 RX ADMIN — CHLORHEXIDINE GLUCONATE 15 MILLILITER(S): 213 SOLUTION TOPICAL at 05:40

## 2021-11-29 RX ADMIN — PANTOPRAZOLE SODIUM 40 MILLIGRAM(S): 20 TABLET, DELAYED RELEASE ORAL at 11:31

## 2021-11-29 RX ADMIN — ENOXAPARIN SODIUM 40 MILLIGRAM(S): 100 INJECTION SUBCUTANEOUS at 11:31

## 2021-11-29 RX ADMIN — CHLORHEXIDINE GLUCONATE 15 MILLILITER(S): 213 SOLUTION TOPICAL at 17:05

## 2021-11-29 RX ADMIN — Medication 81 MILLIGRAM(S): at 11:31

## 2021-11-29 NOTE — PROGRESS NOTE ADULT - NUTRITIONAL ASSESSMENT
This patient has been assessed with a concern for Malnutrition and has been determined to have a diagnosis/diagnoses of Severe protein-calorie malnutrition and Underweight (BMI < 19).  +Severe temporal waisting  +Muscle waisting    This patient is being managed with:   Diet NPO with Tube Feed-  Tube Feeding Modality: Gastrostomy  Jevity 1.5 Bg  Total Volume for 24 Hours (mL): 960  Continuous  Starting Tube Feed Rate {mL per Hour}: 30  Until Goal Tube Feed Rate (mL per Hour): 40  Tube Feed Duration (in Hours): 24  Tube Feed Start Time: 15:00  No Carb Prosource (1pkg = 15gms Protein)     Qty per Day:  1  Entered: Nov 25 2021  3:21PM

## 2021-11-29 NOTE — PROGRESS NOTE ADULT - ASSESSMENT
seen and examined vsnoted stable except tmax 100.1   awake alert holding hands   lungs heart abd ok  ID to f/u

## 2021-11-29 NOTE — CHART NOTE - NSCHARTNOTEFT_GEN_A_CORE
Assessment:   89yFemalePatient is a 89y old  Female who presents with a chief complaint of AHRF (29 Nov 2021 09:50). Pt visited.  Pt is On vent Via Trach.  Tf Jevity 1.5 infusing @ 40 ml /hr x 24 hr Providing 960 ml 1440 calories, Protein 61 gms, Free Water 730 ml /day.+ Prosource 1 PKT Once a day.   TF tolerated. Labs Noted  v      Factors impacting intake: [ ] none [ ] nausea  [ ] vomiting [ ] diarrhea [ ] constipation  [ ]chewing problems [ ] swallowing issues  [ ] other:     Diet Presciption: Diet, NPO with Tube Feed:   Tube Feeding Modality: Gastrostomy  Jevity 1.5 Bg  Total Volume for 24 Hours (mL): 960  Continuous  Starting Tube Feed Rate {mL per Hour}: 30  Until Goal Tube Feed Rate (mL per Hour): 40  Tube Feed Duration (in Hours): 24  Tube Feed Start Time: 15:00  No Carb Prosource (1pkg = 15gms Protein)     Qty per Day:  1 (11-25-21 @ 15:21)     INTAKE NPO  W TF    Current Weight:   % Weight Change    Pertinent Medications: MEDICATIONS  (STANDING):  aspirin  chewable 81 milliGRAM(s) Oral daily  chlorhexidine 0.12% Liquid 15 milliLiter(s) Oral Mucosa every 12 hours  enoxaparin Injectable 40 milliGRAM(s) SubCutaneous daily  pantoprazole   Suspension 40 milliGRAM(s) Oral daily    MEDICATIONS  (PRN):  acetaminophen    Suspension .. 650 milliGRAM(s) Oral every 6 hours PRN Temp greater or equal to 38C (100.4F), Mild Pain (1 - 3), Moderate Pain (4 - 6)  morphine  - Injectable 1 milliGRAM(s) IV Push every 4 hours PRN Respiratory distress  sodium chloride 0.9% lock flush 10 milliLiter(s) IV Push every 1 hour PRN Pre/post blood products, medications, blood draw, and to maintain line patency    Pertinent Labs: 11-29 Na139 mmol/L Glu 188 mg/dL<H> K+ 4.5 mmol/L Cr  0.64 mg/dL BUN 25 mg/dL<H> 11-28 Phos 4.2 mg/dL 11-29 Alb 1.8 g/dL<L> 11-07 Chol --    LDL --    HDL --    Trig 118 mg/dL     CAPILLARY BLOOD GLUCOSE      POCT Blood Glucose.: 179 mg/dL (29 Nov 2021 11:28)  POCT Blood Glucose.: 158 mg/dL (29 Nov 2021 05:59)    Skin:     Estimated Needs:   [ ] no change since previous assessment  [ ] recalculated:     Previous Nutrition Diagnosis:   [ ] Inadequate Energy Intake [ ]Inadequate Oral Intake [ ] Excessive Energy Intake   [ ] Underweight [ ] Increased Nutrient Needs [ ] Overweight/Obesity   [ ] Altered GI Function [ ] Unintended Weight Loss [ ] Food & Nutrition Related Knowledge Deficit [ x] Malnutrition Severe    Nutrition Diagnosis is [ x] ongoing  [ ] resolved [ ] not applicable     New Nutrition Diagnosis: [ ] not applicable       Interventions:   Recommend  [ ] Change Diet To:  [ ] Nutrition Supplement  [ x] Nutrition Support Continue with  Jevity 1.5 @ 40 ml /hr x 24 hr as tolerated. +Prosource 1 PKT / day Via TF.  [ ] Other:     Monitoring and Evaluation:   [ ] PO intake [ x ] Tolerance to diet prescription [ x ] weights [ x ] labs[ x ] follow up per protocol  [ ] other:

## 2021-11-29 NOTE — CHART NOTE - NSCHARTNOTEFT_GEN_A_CORE
Grandson updated on patient's status.  Informed patient is ready for discharge to [facility] facility.  All questions answered.  Grandson will discuss discharge with father and grandfather.

## 2021-11-29 NOTE — PROGRESS NOTE ADULT - PROBLEM SELECTOR PLAN 10
DVT and GI prophylaxis.  Continue mechanical ventilation.  Left chest tube clamped. Placed on pneumostat valve. Tolerating well.  Thoracic surgery f/u  Overall prognosis is extremely poor.  Remains FULL CODE as per family wishes.

## 2021-11-29 NOTE — PROGRESS NOTE ADULT - PROBLEM SELECTOR PLAN 1
Continue mechanical ventilation with daily SBT as tolerated.  Monitor oxygen saturation.  Will need vent facility.

## 2021-11-29 NOTE — PROGRESS NOTE ADULT - PROBLEM SELECTOR PLAN 4
Left chest tube placed on Pneumostat valve.  Tolerating well.  No pneumonthorax noted on CXR  Thoracic surgery recommending follow up in 3 weeks for further management.

## 2021-11-29 NOTE — PROGRESS NOTE ADULT - SUBJECTIVE AND OBJECTIVE BOX
HPI:  89 yo female from [facility] with medical h/o of HTN, Dementia, CVA with R sided hemiparesis, aphasia, parkinson's, GERD, CKD bedbound at baseline, dependant on assistance for ADL comes in with respiratory failure. Patient was found to have respiratory distress, saturating in 70s when EMS arrived, was placed on NRB on field. She was emergently intubated on arrival in ED. As per NH records patient was having fever and cough for past few days. Today she was found to have respiratory distress. She is vaccinated with moderna. Spoke to family son,  who stated the patient to remain full code. No other history could be obtained.  (01 Nov 2021 00:14)      Patient is a 89y old  Female who presents with a chief complaint of AHRF (29 Nov 2021 09:50)      INTERVAL HPI/OVERNIGHT EVENTS:  T(C): 36.6 (11-29-21 @ 11:31), Max: 37.9 (11-28-21 @ 22:20)  HR: 83 (11-29-21 @ 16:02) (78 - 92)  BP: 140/55 (11-29-21 @ 11:31) (112/53 - 140/55)  RR: 22 (11-29-21 @ 11:31) (22 - 22)  SpO2: 98% (11-29-21 @ 16:02) (98% - 100%)  Wt(kg): --  I&O's Summary    28 Nov 2021 07:01  -  29 Nov 2021 07:00  --------------------------------------------------------  IN: 0 mL / OUT: 10 mL / NET: -10 mL        REVIEW OF SYSTEMS: denies fever, chills, SOB, palpitations, chest pain, abdominal pain, nausea, vomitting, diarrhea, constipation, dizziness    MEDICATIONS  (STANDING):  aspirin  chewable 81 milliGRAM(s) Oral daily  chlorhexidine 0.12% Liquid 15 milliLiter(s) Oral Mucosa every 12 hours  enoxaparin Injectable 40 milliGRAM(s) SubCutaneous daily  pantoprazole   Suspension 40 milliGRAM(s) Oral daily    MEDICATIONS  (PRN):  acetaminophen    Suspension .. 650 milliGRAM(s) Oral every 6 hours PRN Temp greater or equal to 38C (100.4F), Mild Pain (1 - 3), Moderate Pain (4 - 6)  morphine  - Injectable 1 milliGRAM(s) IV Push every 4 hours PRN Respiratory distress  sodium chloride 0.9% lock flush 10 milliLiter(s) IV Push every 1 hour PRN Pre/post blood products, medications, blood draw, and to maintain line patency      PHYSICAL EXAM:  GENERAL: NAD, well-groomed, well-developed  HEAD:  Atraumatic, Normocephalic  EYES: EOMI, PERRLA, conjunctiva and sclera clear  ENMT: No tonsillar erythema, exudates, or enlargement; Moist mucous membranes, Good dentition, No lesions  NECK: Supple, No JVD, Normal thyroid  NERVOUS SYSTEM:  Alert & Oriented X3, Good concentration; Motor Strength 5/5 B/L upper and lower extremities; DTRs 2+ intact and symmetric  CHEST/LUNG: Clear to percussion bilaterally; No rales, rhonchi, wheezing, or rubs  HEART: Regular rate and rhythm; No murmurs, rubs, or gallops  ABDOMEN: Soft, Nontender, Nondistended; Bowel sounds present  EXTREMITIES:  2+ Peripheral Pulses, No clubbing, cyanosis, or edema  LYMPH: No lymphadenopathy noted  SKIN: No rashes or lesions  LABS:                        8.1    11.56 )-----------( 460      ( 29 Nov 2021 10:12 )             26.2     11-29    139  |  104  |  25<H>  ----------------------------<  188<H>  4.5   |  30  |  0.64    Ca    8.6      29 Nov 2021 10:12  Phos  4.2     11-28  Mg     2.3     11-28    TPro  7.1  /  Alb  1.8<L>  /  TBili  0.2  /  DBili  x   /  AST  20  /  ALT  26  /  AlkPhos  83  11-29        CAPILLARY BLOOD GLUCOSE      POCT Blood Glucose.: 158 mg/dL (29 Nov 2021 16:34)  POCT Blood Glucose.: 179 mg/dL (29 Nov 2021 11:28)  POCT Blood Glucose.: 158 mg/dL (29 Nov 2021 05:59)

## 2021-11-29 NOTE — PROGRESS NOTE ADULT - ASSESSMENT
89 y/o F from [facility] w/ Hx of HTN, Dementia, CVA w/ R sided hemiparesis, aphasia, Parkinson's Disease, GERD, CKD. She is bedbound at baseline and needs assistance for ADLs. She was brought to the E.D. due to episodes of desaturation at 70s and respiratory distress. She was also having cough and fever in the nursing home. She was initially on NRB placed by EMS but was eventually intubated in the E.D. She is vaccinated with Moderna. COVID test was negative on admission. Chest Xray showed left lower lobe infiltrate (pneumonia). Blood cultures were sent. She was given 1 dose of Vancomycin and Zosyn in the E.D. and was subsequently admitted to ICU.    In the ICU, she was placed on mechanical ventilator. Her blood pressure was low despite receiving 2L bolus of LR. NGT and Left IJ central line was placed. Levophed was started. CXR revealed Left pneumothorax. Thoracic Surgery was consulted and left chest tube was placed. Patient was also tachycardic and troponin was mildly elevated. EKG showed SR w/ ST changes in the inferior leads. Troponin was monitored and has since trended down. Heart rate has been stable. Lactate was also elevated at 11.2 and has also since trended down. Patient also presented with hypernatremia and elevated creatinine probably secondary to volume deficit. She was also given free water. She was on Heparin for DVT prophylaxis and Protonix for GI prophylaxis. Palliative was consulted regarding the [facility]. Blood culture results showed E. coli bacteremia. She was started on Rocephin IV for 10 days. Azithromycin was continued awaiting Legionella results. Infectious Disease was consulted. Urine culture was ordered. Patient spiked a fever and is tachycardic. Blood cultures were repeated. She was tachycardic however his EKG showed SR w/ new PACs. There was mild air leak in the chest tube drainage but it was fixed by Cardiothoracic Surgery. Urine culture and repeat blood culture was negative. She had 2 febrile episodes overnight at 100.4-100.5F. She was tachypneic at rate of 30. Her repeat Chest Xray showed worsening of bilateral infiltrates. Her albumin is still low at 1.0. Her urine output is net negative. She was given Lasix and Albumin to maintain net negative urine output and decrease congestion. Vancomycin was discontinued and Cefepime decreased from 2g to 1g to complete 14 days since negative culture (11/16/21). Echocardiogram was ordered. Central line was removed. Lactulose was ordered for bowel movement. On November 11, 2021, patient had her tracheostomy and PEG inserted. Patient son Jered was called and informed .     11/20 CXR shows mild enlargement of left apical pneumothorax. Left chest tube unclamped to water seal. Thoracic following.   11/24  Chest tube placed on Pneumostat valve  11/26 No overnight events, tolerating Pneumostat

## 2021-11-29 NOTE — PROGRESS NOTE ADULT - SUBJECTIVE AND OBJECTIVE BOX
AKASH CHACKO    SCU progress note    INTERVAL HPI/OVERNIGHT EVENTS: ***No overnight events    DNR [ ]   DNI  [  ]   FULL CODE    Covid - 19 PCR: Negative 11/28    The 4Ms    What Matters Most: see GOC  Age appropriate Medications/Screen for High Risk Medication: Yes  Mentation: see CAM below  Mobility: defer to physical exam    The Confusion Assessment Method (CAM) Diagnostic Algorithm     1: Acute Onset or Fluctuating Course  - Is there evidence of an acute change in mental status from the patient’s baseline? Did the (abnormal) behavior  fluctuate during the day, that is, tend to come and go, or increase and decrease in severity?  [ ] YES [x ] NO     2: Inattention  - Did the patient have difficulty focusing attention, being easily distractible, or having difficulty keeping track of what was being said?  [ ] YES [ x] NO     3: Disorganized thinking  -Was the patient’s thinking disorganized or incoherent, such as rambling or irrelevant conversation, unclear or illogical flow of ideas, or unpredictable switching from subject to subject?  [ ] YES [x ] NO    4: Altered Level of consciousness?  [ ] YES [x ] NO    The diagnosis of delirium by CAM requires the presence of features 1 and 2 and either 3 or 4.    PRESSORS: [ ] YES [x ] NO    Cardiovascular:  Heart Failure  Acute   Acute on Chronic  Chronic         Pulmonary:    Hematalogic:  aspirin  chewable 81 milliGRAM(s) Oral daily  enoxaparin Injectable 40 milliGRAM(s) SubCutaneous daily    Other:  acetaminophen    Suspension .. 650 milliGRAM(s) Oral every 6 hours PRN  chlorhexidine 0.12% Liquid 15 milliLiter(s) Oral Mucosa every 12 hours  morphine  - Injectable 1 milliGRAM(s) IV Push every 4 hours PRN  pantoprazole   Suspension 40 milliGRAM(s) Oral daily  sodium chloride 0.9% lock flush 10 milliLiter(s) IV Push every 1 hour PRN    acetaminophen    Suspension .. 650 milliGRAM(s) Oral every 6 hours PRN  aspirin  chewable 81 milliGRAM(s) Oral daily  chlorhexidine 0.12% Liquid 15 milliLiter(s) Oral Mucosa every 12 hours  enoxaparin Injectable 40 milliGRAM(s) SubCutaneous daily  morphine  - Injectable 1 milliGRAM(s) IV Push every 4 hours PRN  pantoprazole   Suspension 40 milliGRAM(s) Oral daily  sodium chloride 0.9% lock flush 10 milliLiter(s) IV Push every 1 hour PRN    Drug Dosing Weight  Height (cm): 167.6 (03 Nov 2021 16:02)  Weight (kg): 40.9 (01 Nov 2021 02:20)  BMI (kg/m2): 14.6 (03 Nov 2021 16:02)  BSA (m2): 1.43 (03 Nov 2021 16:02)    CENTRAL LINE: [ ] YES [x ] NO  LOCATION:   DATE INSERTED:  REMOVE: [ ] YES [ ] NO  EXPLAIN:    SAEED: [ ] YES [x ] NO    DATE INSERTED:  REMOVE:  [ ] YES [ ] NO  EXPLAIN:    PAST MEDICAL & SURGICAL HISTORY:  HTN (hypertension)    Dementia                11-28 @ 07:01  -  11-29 @ 07:00  --------------------------------------------------------  IN: 0 mL / OUT: 10 mL / NET: -10 mL        Mode: CPAP with PS  FiO2: 40  PEEP: 5  PS: 12  ITime: 0.9      PHYSICAL EXAM:    GENERAL: NAD, Cachectic with severe temporal waisting  HEAD:  Atraumatic, Normocephalic  EYES: EOMI, PERRLA, conjunctiva and sclera clear  ENMT: No tonsillar erythema, exudates  NECK: Supple, No JVD, tracheostomy intact  NERVOUS SYSTEM:  Awake and alert. Not orientated. Does not follow commands. Moving all extremities.  CHEST/LUNG: Diminished breath sounds bilaterally. Left chest tube to pneumostat drainage system.  HEART: Regular rate and rhythm; No murmurs, rubs, or gallops  ABDOMEN: Soft, Nontender, Nondistended; Bowel sounds present  EXTREMITIES:  + Muscle waisting 2+ Peripheral Pulses, No clubbing, cyanosis, or edema  LYMPH: No lymphadenopathy noted  SKIN: No rashes or lesions      LABS:  CBC Full  -  ( 28 Nov 2021 06:08 )  WBC Count : 12.49 K/uL  RBC Count : 3.12 M/uL  Hemoglobin : 8.9 g/dL  Hematocrit : 29.0 %  Platelet Count - Automated : 463 K/uL  Mean Cell Volume : 92.9 fl  Mean Cell Hemoglobin : 28.5 pg  Mean Cell Hemoglobin Concentration : 30.7 gm/dL  Auto Neutrophil # : x  Auto Lymphocyte # : x  Auto Monocyte # : x  Auto Eosinophil # : x  Auto Basophil # : x  Auto Neutrophil % : x  Auto Lymphocyte % : x  Auto Monocyte % : x  Auto Eosinophil % : x  Auto Basophil % : x    11-28    138  |  104  |  20<H>  ----------------------------<  144<H>  4.9   |  29  |  0.67    Ca    8.4      28 Nov 2021 06:08  Phos  4.2     11-28  Mg     2.3     11-28    TPro  6.8  /  Alb  1.8<L>  /  TBili  0.3  /  DBili  x   /  AST  25  /  ALT  29  /  AlkPhos  89  11-28              [  ]  DVT Prophylaxis  [  ]  Nutrition, Brand, Rate         Goal Rate        Abnormal Nutritional Status -  Malnutrition   Cachexia          RADIOLOGY & ADDITIONAL STUDIES:  ***    < from: Xray Chest 1 View- PORTABLE-Urgent (Xray Chest 1 View- PORTABLE-Urgent .) (11.24.21 @ 19:01) >  Tracheostomy tube and left-sided chest tube, unchanged in position. The heart is not enlarged. Mitral valve annulus calcification. Aortic calcification.    Chronic lung changes. Infiltrates not excluded. No evidence of pneumothorax. Small right pleural effusion. Streaky density rightmid to upper lung field. Apical pleural calcifications. Compression fracture deformity lower thoracic vertebral body.    IMPRESSION:    No significant interval changes. No pneumothorax appreciated.    < end of copied text >    Goals of Care Discussion with Family/Proxy/Other   - see note from 11/05

## 2021-11-30 ENCOUNTER — TRANSCRIPTION ENCOUNTER (OUTPATIENT)
Age: 86
End: 2021-11-30

## 2021-11-30 LAB
ALBUMIN SERPL ELPH-MCNC: 1.8 G/DL — LOW (ref 3.5–5)
ALP SERPL-CCNC: 82 U/L — SIGNIFICANT CHANGE UP (ref 40–120)
ALT FLD-CCNC: 31 U/L DA — SIGNIFICANT CHANGE UP (ref 10–60)
ANION GAP SERPL CALC-SCNC: 3 MMOL/L — LOW (ref 5–17)
AST SERPL-CCNC: 39 U/L — SIGNIFICANT CHANGE UP (ref 10–40)
BILIRUB SERPL-MCNC: 0.3 MG/DL — SIGNIFICANT CHANGE UP (ref 0.2–1.2)
BUN SERPL-MCNC: 27 MG/DL — HIGH (ref 7–18)
CALCIUM SERPL-MCNC: 8.2 MG/DL — LOW (ref 8.4–10.5)
CHLORIDE SERPL-SCNC: 103 MMOL/L — SIGNIFICANT CHANGE UP (ref 96–108)
CO2 SERPL-SCNC: 31 MMOL/L — SIGNIFICANT CHANGE UP (ref 22–31)
CREAT SERPL-MCNC: 0.72 MG/DL — SIGNIFICANT CHANGE UP (ref 0.5–1.3)
GLUCOSE SERPL-MCNC: 168 MG/DL — HIGH (ref 70–99)
HCT VFR BLD CALC: 26.7 % — LOW (ref 34.5–45)
HGB BLD-MCNC: 8.3 G/DL — LOW (ref 11.5–15.5)
MAGNESIUM SERPL-MCNC: 2.3 MG/DL — SIGNIFICANT CHANGE UP (ref 1.6–2.6)
MCHC RBC-ENTMCNC: 28.8 PG — SIGNIFICANT CHANGE UP (ref 27–34)
MCHC RBC-ENTMCNC: 31.1 GM/DL — LOW (ref 32–36)
MCV RBC AUTO: 92.7 FL — SIGNIFICANT CHANGE UP (ref 80–100)
NRBC # BLD: 0 /100 WBCS — SIGNIFICANT CHANGE UP (ref 0–0)
PHOSPHATE SERPL-MCNC: 3.4 MG/DL — SIGNIFICANT CHANGE UP (ref 2.5–4.5)
PLATELET # BLD AUTO: 438 K/UL — HIGH (ref 150–400)
POTASSIUM SERPL-MCNC: 5.3 MMOL/L — SIGNIFICANT CHANGE UP (ref 3.5–5.3)
POTASSIUM SERPL-SCNC: 5.3 MMOL/L — SIGNIFICANT CHANGE UP (ref 3.5–5.3)
PROT SERPL-MCNC: 6.8 G/DL — SIGNIFICANT CHANGE UP (ref 6–8.3)
RBC # BLD: 2.88 M/UL — LOW (ref 3.8–5.2)
RBC # FLD: 14.8 % — HIGH (ref 10.3–14.5)
SODIUM SERPL-SCNC: 137 MMOL/L — SIGNIFICANT CHANGE UP (ref 135–145)
WBC # BLD: 18.49 K/UL — HIGH (ref 3.8–10.5)
WBC # FLD AUTO: 18.49 K/UL — HIGH (ref 3.8–10.5)

## 2021-11-30 PROCEDURE — 71045 X-RAY EXAM CHEST 1 VIEW: CPT | Mod: 26

## 2021-11-30 RX ORDER — PANTOPRAZOLE SODIUM 20 MG/1
1 TABLET, DELAYED RELEASE ORAL
Qty: 0 | Refills: 0 | DISCHARGE
Start: 2021-11-30

## 2021-11-30 RX ORDER — FERROUS SULFATE 325(65) MG
5 TABLET ORAL
Qty: 0 | Refills: 0 | DISCHARGE
Start: 2021-11-30

## 2021-11-30 RX ORDER — MULTIVIT-MIN/FERROUS GLUCONATE 9 MG/15 ML
1 LIQUID (ML) ORAL
Qty: 0 | Refills: 0 | DISCHARGE

## 2021-11-30 RX ORDER — FERROUS SULFATE 325(65) MG
7.5 TABLET ORAL
Qty: 0 | Refills: 0 | DISCHARGE

## 2021-11-30 RX ORDER — POLYETHYLENE GLYCOL 3350 17 G/17G
1 POWDER, FOR SOLUTION ORAL
Qty: 0 | Refills: 0 | DISCHARGE

## 2021-11-30 RX ORDER — SENNA PLUS 8.6 MG/1
1 TABLET ORAL
Qty: 0 | Refills: 0 | DISCHARGE

## 2021-11-30 RX ORDER — MULTIVIT-MIN/FERROUS GLUCONATE 9 MG/15 ML
15 LIQUID (ML) ORAL
Qty: 0 | Refills: 0 | DISCHARGE
Start: 2021-11-30

## 2021-11-30 RX ADMIN — MORPHINE SULFATE 1 MILLIGRAM(S): 50 CAPSULE, EXTENDED RELEASE ORAL at 19:10

## 2021-11-30 RX ADMIN — MORPHINE SULFATE 1 MILLIGRAM(S): 50 CAPSULE, EXTENDED RELEASE ORAL at 19:55

## 2021-11-30 RX ADMIN — Medication 650 MILLIGRAM(S): at 18:54

## 2021-11-30 RX ADMIN — Medication 81 MILLIGRAM(S): at 11:22

## 2021-11-30 RX ADMIN — ENOXAPARIN SODIUM 40 MILLIGRAM(S): 100 INJECTION SUBCUTANEOUS at 11:21

## 2021-11-30 RX ADMIN — CHLORHEXIDINE GLUCONATE 15 MILLILITER(S): 213 SOLUTION TOPICAL at 06:51

## 2021-11-30 RX ADMIN — Medication 650 MILLIGRAM(S): at 16:49

## 2021-11-30 RX ADMIN — CHLORHEXIDINE GLUCONATE 15 MILLILITER(S): 213 SOLUTION TOPICAL at 17:18

## 2021-11-30 RX ADMIN — PANTOPRAZOLE SODIUM 40 MILLIGRAM(S): 20 TABLET, DELAYED RELEASE ORAL at 11:22

## 2021-11-30 NOTE — PROGRESS NOTE ADULT - SUBJECTIVE AND OBJECTIVE BOX
HPI:  89 yo female from [facility] with medical h/o of HTN, Dementia, CVA with R sided hemiparesis, aphasia, parkinson's, GERD, CKD bedbound at baseline, dependant on assistance for ADL comes in with respiratory failure. Patient was found to have respiratory distress, saturating in 70s when EMS arrived, was placed on NRB on field. She was emergently intubated on arrival in ED. As per NH records patient was having fever and cough for past few days. Today she was found to have respiratory distress. She is vaccinated with moderna. Spoke to family son,  who stated the patient to remain full code. No other history could be obtained.  (01 Nov 2021 00:14)      Patient is a 89y old  Female who presents with a chief complaint of AHRF (30 Nov 2021 09:25)      INTERVAL HPI/OVERNIGHT EVENTS:  T(C): 37.7 (11-30-21 @ 11:52), Max: 37.7 (11-30-21 @ 11:52)  HR: 100 (11-30-21 @ 11:52) (83 - 114)  BP: 135/61 (11-30-21 @ 11:52) (106/47 - 187/76)  RR: 27 (11-30-21 @ 11:52) (20 - 27)  SpO2: 98% (11-30-21 @ 11:52) (98% - 100%)  Wt(kg): --  I&O's Summary      REVIEW OF SYSTEMS: denies fever, chills, SOB, palpitations, chest pain, abdominal pain, nausea, vomitting, diarrhea, constipation, dizziness    MEDICATIONS  (STANDING):  aspirin  chewable 81 milliGRAM(s) Oral daily  chlorhexidine 0.12% Liquid 15 milliLiter(s) Oral Mucosa every 12 hours  enoxaparin Injectable 40 milliGRAM(s) SubCutaneous daily  pantoprazole   Suspension 40 milliGRAM(s) Oral daily    MEDICATIONS  (PRN):  acetaminophen    Suspension .. 650 milliGRAM(s) Oral every 6 hours PRN Temp greater or equal to 38C (100.4F), Mild Pain (1 - 3), Moderate Pain (4 - 6)  morphine  - Injectable 1 milliGRAM(s) IV Push every 4 hours PRN Respiratory distress  sodium chloride 0.9% lock flush 10 milliLiter(s) IV Push every 1 hour PRN Pre/post blood products, medications, blood draw, and to maintain line patency      PHYSICAL EXAM:  GENERAL: NAD, well-groomed, well-developed  HEAD:  Atraumatic, Normocephalic  EYES: EOMI, PERRLA, conjunctiva and sclera clear  ENMT: No tonsillar erythema, exudates, or enlargement; Moist mucous membranes, Good dentition, No lesions  NECK: Supple, No JVD, Normal thyroid  NERVOUS SYSTEM:  Alert & Oriented X3, Good concentration; Motor Strength 5/5 B/L upper and lower extremities; DTRs 2+ intact and symmetric  CHEST/LUNG: Clear to percussion bilaterally; No rales, rhonchi, wheezing, or rubs  HEART: Regular rate and rhythm; No murmurs, rubs, or gallops  ABDOMEN: Soft, Nontender, Nondistended; Bowel sounds present  EXTREMITIES:  2+ Peripheral Pulses, No clubbing, cyanosis, or edema  LYMPH: No lymphadenopathy noted  SKIN: No rashes or lesions  LABS:                        8.3    18.49 )-----------( 438      ( 30 Nov 2021 07:18 )             26.7     11-30    137  |  103  |  27<H>  ----------------------------<  168<H>  5.3   |  31  |  0.72    Ca    8.2<L>      30 Nov 2021 07:18  Phos  3.4     11-30  Mg     2.3     11-30    TPro  6.8  /  Alb  1.8<L>  /  TBili  0.3  /  DBili  x   /  AST  39  /  ALT  31  /  AlkPhos  82  11-30        CAPILLARY BLOOD GLUCOSE      POCT Blood Glucose.: 144 mg/dL (30 Nov 2021 11:17)  POCT Blood Glucose.: 158 mg/dL (29 Nov 2021 16:34)

## 2021-11-30 NOTE — DISCHARGE NOTE NURSING/CASE MANAGEMENT/SOCIAL WORK - PATIENT PORTAL LINK FT
You can access the FollowMyHealth Patient Portal offered by [facility] by registering at the following website: [email or web address]. By joining [employer]’s FollowMyHealth portal, you will also be able to view your health information using other applications (apps) compatible with our system.

## 2021-11-30 NOTE — PROGRESS NOTE ADULT - ASSESSMENT
seen and examined vsstable afebrile alert awake with trach vent comfortable  focusing eyes  try to respond to questions  labs noted  wbc 18  today afebrile  not on any antibxs  lungs clear  abd soft bs nml non tender, no s/p tenderness. recent cxr was ok   no diarrhea  watch for fever  repeat wbc   poor prognosis

## 2021-11-30 NOTE — DISCHARGE NOTE NURSING/CASE MANAGEMENT/SOCIAL WORK - NSDCPEFALRISK_GEN_ALL_CORE
For information on Fall & Injury Prevention, visit: [email or web address] OR  [email or web address] OR  https://www.cdc.gov/steadi/patient.html

## 2021-11-30 NOTE — PROGRESS NOTE ADULT - SUBJECTIVE AND OBJECTIVE BOX
AKASH CHACKO    SCU progress note    INTERVAL HPI/OVERNIGHT EVENTS: ***No overnight events    DNR [ ]   DNI  [  ]   FULL CODE    Covid - 19 PCR:     The 4Ms    What Matters Most: see GOC  Age appropriate Medications/Screen for High Risk Medication: Yes  Mentation: see CAM below  Mobility: defer to physical exam    The Confusion Assessment Method (CAM) Diagnostic Algorithm     1: Acute Onset or Fluctuating Course  - Is there evidence of an acute change in mental status from the patient’s baseline? Did the (abnormal) behavior  fluctuate during the day, that is, tend to come and go, or increase and decrease in severity?  [ ] YES [x ] NO     2: Inattention  - Did the patient have difficulty focusing attention, being easily distractible, or having difficulty keeping track of what was being said?  [ ] YES [x ] NO     3: Disorganized thinking  -Was the patient’s thinking disorganized or incoherent, such as rambling or irrelevant conversation, unclear or illogical flow of ideas, or unpredictable switching from subject to subject?  [ ] YES [x ] NO    4: Altered Level of consciousness?  [ ] YES [x ] NO    The diagnosis of delirium by CAM requires the presence of features 1 and 2 and either 3 or 4.    PRESSORS: [ ] YES [x ] NO    Cardiovascular:  Heart Failure  Acute   Acute on Chronic  Chronic         Pulmonary:    Hematalogic:  aspirin  chewable 81 milliGRAM(s) Oral daily  enoxaparin Injectable 40 milliGRAM(s) SubCutaneous daily    Other:  acetaminophen    Suspension .. 650 milliGRAM(s) Oral every 6 hours PRN  chlorhexidine 0.12% Liquid 15 milliLiter(s) Oral Mucosa every 12 hours  morphine  - Injectable 1 milliGRAM(s) IV Push every 4 hours PRN  pantoprazole   Suspension 40 milliGRAM(s) Oral daily  sodium chloride 0.9% lock flush 10 milliLiter(s) IV Push every 1 hour PRN    acetaminophen    Suspension .. 650 milliGRAM(s) Oral every 6 hours PRN  aspirin  chewable 81 milliGRAM(s) Oral daily  chlorhexidine 0.12% Liquid 15 milliLiter(s) Oral Mucosa every 12 hours  enoxaparin Injectable 40 milliGRAM(s) SubCutaneous daily  morphine  - Injectable 1 milliGRAM(s) IV Push every 4 hours PRN  pantoprazole   Suspension 40 milliGRAM(s) Oral daily  sodium chloride 0.9% lock flush 10 milliLiter(s) IV Push every 1 hour PRN    Drug Dosing Weight  Height (cm): 167.6 (03 Nov 2021 16:02)  Weight (kg): 40.9 (01 Nov 2021 02:20)  BMI (kg/m2): 14.6 (03 Nov 2021 16:02)  BSA (m2): 1.43 (03 Nov 2021 16:02)    CENTRAL LINE: [ ] YES [x ] NO  LOCATION:   DATE INSERTED:  REMOVE: [ ] YES [ ] NO  EXPLAIN:    SAEED: [ ] YES [x ] NO    DATE INSERTED:  REMOVE:  [ ] YES [ ] NO  EXPLAIN:    PAST MEDICAL & SURGICAL HISTORY:  HTN (hypertension)    Dementia                  Mode: CPAP with PS  FiO2: 30  PEEP: 5  PS: 5  ITime: 0.8      PHYSICAL EXAM:    GENERAL: NAD, cachectic with severe temporal waisting  HEAD:  Atraumatic, Normocephalic  EYES: EOMI, PERRLA, conjunctiva and sclera clear  ENMT: No tonsillar erythema, exudates  NECK: Supple, No JVD, tracheostomy intact  NERVOUS SYSTEM:  Awake and alert. Not orientated. Nonverbal at baseline. Does not follow commands.  CHEST/LUNG: Diminished breath sounds L>R.  Left chest tune to Pneumostat  HEART: Regular rate and rhythm; No murmurs, rubs, or gallops  ABDOMEN: Soft, Nontender, Nondistended; Bowel sounds present; Peg intact  EXTREMITIES:  2+ Peripheral Pulses, No clubbing, cyanosis, or edema  LYMPH: No lymphadenopathy noted  SKIN: No rashes or lesions      LABS:  CBC Full  -  ( 30 Nov 2021 07:18 )  WBC Count : 18.49 K/uL  RBC Count : 2.88 M/uL  Hemoglobin : 8.3 g/dL  Hematocrit : 26.7 %  Platelet Count - Automated : 438 K/uL  Mean Cell Volume : 92.7 fl  Mean Cell Hemoglobin : 28.8 pg  Mean Cell Hemoglobin Concentration : 31.1 gm/dL  Auto Neutrophil # : x  Auto Lymphocyte # : x  Auto Monocyte # : x  Auto Eosinophil # : x  Auto Basophil # : x  Auto Neutrophil % : x  Auto Lymphocyte % : x  Auto Monocyte % : x  Auto Eosinophil % : x  Auto Basophil % : x    11-30    137  |  103  |  27<H>  ----------------------------<  168<H>  5.3   |  31  |  0.72    Ca    8.2<L>      30 Nov 2021 07:18  Phos  3.4     11-30  Mg     2.3     11-30    TPro  6.8  /  Alb  1.8<L>  /  TBili  0.3  /  DBili  x   /  AST  39  /  ALT  31  /  AlkPhos  82  11-30              [  ]  DVT Prophylaxis  [  ]  Nutrition, Brand, Rate         Goal Rate        Abnormal Nutritional Status -  Malnutrition   Cachexia      Morbid Obesity BMI >/=40    RADIOLOGY & ADDITIONAL STUDIES:  ***    Goals of Care Discussion with Family/Proxy/Other   - see note from/family meeting set up for...     AKASH CHACKO    SCU progress note    INTERVAL HPI/OVERNIGHT EVENTS: ***No overnight events    DNR [ ]   DNI  [  ]   FULL CODE    Covid - 19 PCR:     The 4Ms    What Matters Most: see GOC  Age appropriate Medications/Screen for High Risk Medication: Yes  Mentation: see CAM below  Mobility: defer to physical exam    The Confusion Assessment Method (CAM) Diagnostic Algorithm     1: Acute Onset or Fluctuating Course  - Is there evidence of an acute change in mental status from the patient’s baseline? Did the (abnormal) behavior  fluctuate during the day, that is, tend to come and go, or increase and decrease in severity?  [ ] YES [x ] NO     2: Inattention  - Did the patient have difficulty focusing attention, being easily distractible, or having difficulty keeping track of what was being said?  [ ] YES [x ] NO     3: Disorganized thinking  -Was the patient’s thinking disorganized or incoherent, such as rambling or irrelevant conversation, unclear or illogical flow of ideas, or unpredictable switching from subject to subject?  [ ] YES [x ] NO    4: Altered Level of consciousness?  [ ] YES [x ] NO    The diagnosis of delirium by CAM requires the presence of features 1 and 2 and either 3 or 4.    PRESSORS: [ ] YES [x ] NO    Cardiovascular:  Heart Failure  Acute   Acute on Chronic  Chronic         Pulmonary:    Hematalogic:  aspirin  chewable 81 milliGRAM(s) Oral daily  enoxaparin Injectable 40 milliGRAM(s) SubCutaneous daily    Other:  acetaminophen    Suspension .. 650 milliGRAM(s) Oral every 6 hours PRN  chlorhexidine 0.12% Liquid 15 milliLiter(s) Oral Mucosa every 12 hours  morphine  - Injectable 1 milliGRAM(s) IV Push every 4 hours PRN  pantoprazole   Suspension 40 milliGRAM(s) Oral daily  sodium chloride 0.9% lock flush 10 milliLiter(s) IV Push every 1 hour PRN    acetaminophen    Suspension .. 650 milliGRAM(s) Oral every 6 hours PRN  aspirin  chewable 81 milliGRAM(s) Oral daily  chlorhexidine 0.12% Liquid 15 milliLiter(s) Oral Mucosa every 12 hours  enoxaparin Injectable 40 milliGRAM(s) SubCutaneous daily  morphine  - Injectable 1 milliGRAM(s) IV Push every 4 hours PRN  pantoprazole   Suspension 40 milliGRAM(s) Oral daily  sodium chloride 0.9% lock flush 10 milliLiter(s) IV Push every 1 hour PRN    Drug Dosing Weight  Height (cm): 167.6 (03 Nov 2021 16:02)  Weight (kg): 40.9 (01 Nov 2021 02:20)  BMI (kg/m2): 14.6 (03 Nov 2021 16:02)  BSA (m2): 1.43 (03 Nov 2021 16:02)    CENTRAL LINE: [ ] YES [x ] NO  LOCATION:   DATE INSERTED:  REMOVE: [ ] YES [ ] NO  EXPLAIN:    SAEED: [ ] YES [x ] NO    DATE INSERTED:  REMOVE:  [ ] YES [ ] NO  EXPLAIN:    PAST MEDICAL & SURGICAL HISTORY:  HTN (hypertension)    Dementia                  Mode: CPAP with PS  FiO2: 30  PEEP: 5  PS: 5  ITime: 0.8      PHYSICAL EXAM:    GENERAL: NAD, cachectic with severe temporal waisting  HEAD:  Atraumatic, Normocephalic  EYES: EOMI, PERRLA, conjunctiva and sclera clear  ENMT: No tonsillar erythema, exudates  NECK: Supple, No JVD, tracheostomy intact  NERVOUS SYSTEM:  Awake and alert. Not orientated. Nonverbal at baseline. Does not follow commands.  CHEST/LUNG: Diminished breath sounds L>R.  Left chest tube to Pneumostat  HEART: Regular rate and rhythm; No murmurs, rubs, or gallops  ABDOMEN: Soft, Nontender, Nondistended; Bowel sounds present; Peg intact  EXTREMITIES:  +Muscle waisting. 2+ Peripheral Pulses, No clubbing, cyanosis, or edema  LYMPH: No lymphadenopathy noted  SKIN: No rashes or lesions      LABS:  CBC Full  -  ( 30 Nov 2021 07:18 )  WBC Count : 18.49 K/uL  RBC Count : 2.88 M/uL  Hemoglobin : 8.3 g/dL  Hematocrit : 26.7 %  Platelet Count - Automated : 438 K/uL  Mean Cell Volume : 92.7 fl  Mean Cell Hemoglobin : 28.8 pg  Mean Cell Hemoglobin Concentration : 31.1 gm/dL  Auto Neutrophil # : x  Auto Lymphocyte # : x  Auto Monocyte # : x  Auto Eosinophil # : x  Auto Basophil # : x  Auto Neutrophil % : x  Auto Lymphocyte % : x  Auto Monocyte % : x  Auto Eosinophil % : x  Auto Basophil % : x    11-30    137  |  103  |  27<H>  ----------------------------<  168<H>  5.3   |  31  |  0.72    Ca    8.2<L>      30 Nov 2021 07:18  Phos  3.4     11-30  Mg     2.3     11-30    TPro  6.8  /  Alb  1.8<L>  /  TBili  0.3  /  DBili  x   /  AST  39  /  ALT  31  /  AlkPhos  82  11-30              [  ]  DVT Prophylaxis  [  ]  Nutrition, Brand, Rate         Goal Rate        Abnormal Nutritional Status -  Malnutrition   Cachexia          RADIOLOGY & ADDITIONAL STUDIES:  ***  < from: Xray Chest 1 View- PORTABLE-Urgent (Xray Chest 1 View- PORTABLE-Urgent .) (11.27.21 @ 11:16) >  Portable view of the chest is compared to 11/25/2021 and demonstrates a left chest tube in place with no pneumothorax.    Mild cardiomegaly and normal pulmonary vasculature with no right pneumothorax or acute osseous finding.    Patchy perihilar infiltrates sparing the the left upper lobe, grossly unchanged.    Right pleural effusion/atelectasis, unchanged.    IMPRESSION:    Left chest tube in satisfactory position with no pneumothorax, unchanged.    Mild to moderate perihilar interstitial infiltrates, grossly unchanged.    Right pleural effusion/adjacent atelectasis, unchanged      < end of copied text >    Goals of Care Discussion with Family/Proxy/Other   - see note from 11/05

## 2021-11-30 NOTE — PROGRESS NOTE ADULT - PROBLEM SELECTOR PLAN 1
Continue mechanical ventilation with daily SBT as tolerated.  Will atempt trach collar during the day today.  Monitor oxygen saturation.  Will need vent facility.

## 2021-11-30 NOTE — PROGRESS NOTE ADULT - PROBLEM SELECTOR PLAN 4
Left chest tube placed on Pneumostat valve.  Tolerating well.  No pneumothorax noted on CXR  Thoracic surgery recommending follow up in 3 weeks for further management.

## 2021-11-30 NOTE — CHART NOTE - NSCHARTNOTEFT_GEN_A_CORE
Grandson notified that grandmother will not be discharged tonight  Febrile Tmax 100.7. Tylenol given.  Informed that Grandmother will be observed overnight and if no more fevers will  discharge to NH tomorrow.  All questions answered.

## 2021-11-30 NOTE — PROGRESS NOTE ADULT - NUTRITIONAL ASSESSMENT
This patient has been assessed with a concern for Malnutrition and has been determined to have a diagnosis/diagnoses of Severe protein-calorie malnutrition and Underweight (BMI < 19).    This patient is being managed with:   Diet NPO with Tube Feed-  Tube Feeding Modality: Gastrostomy  Jevity 1.5 Bg  Total Volume for 24 Hours (mL): 960  Continuous  Starting Tube Feed Rate {mL per Hour}: 30  Until Goal Tube Feed Rate (mL per Hour): 40  Tube Feed Duration (in Hours): 24  Tube Feed Start Time: 15:00  No Carb Prosource (1pkg = 15gms Protein)     Qty per Day:  1  Entered: Nov 25 2021  3:21PM     This patient has been assessed with a concern for Malnutrition and has been determined to have a diagnosis/diagnoses of Severe protein-calorie malnutrition and Underweight (BMI < 19).  +Severe Temporal waisting  +Muscle waisting all extremities  Protein 6.8   Albumin 1.8    This patient is being managed with:   Diet NPO with Tube Feed-  Tube Feeding Modality: Gastrostomy  Jevity 1.5 Bg  Total Volume for 24 Hours (mL): 960  Continuous  Starting Tube Feed Rate {mL per Hour}: 30  Until Goal Tube Feed Rate (mL per Hour): 40  Tube Feed Duration (in Hours): 24  Tube Feed Start Time: 15:00  No Carb Prosource (1pkg = 15gms Protein)     Qty per Day:  1  Entered: Nov 25 2021  3:21PM

## 2021-11-30 NOTE — PROGRESS NOTE ADULT - PROBLEM SELECTOR PLAN 10
DVT and GI prophylaxis.  Continue mechanical ventilation.  Left chest tube clamped. Placed on pneumostat valve. Tolerating well.  Thoracic surgery f/u  Overall prognosis is extremely poor.  Remains FULL CODE as per family wishes. DVT and GI prophylaxis.  Continue mechanical ventilation.  Left chest tube clamped. Placed on pneumostat valve. Tolerating well.  Thoracic surgery f/u  Overall prognosis is extremely poor.  Remains FULL CODE as per family wishes.  Discharge planning underway. Will need vent facility. DVT and GI prophylaxis.  Continue mechanical ventilation.  Left chest tube clamped. Placed on pneumostat valve. Tolerating well.  Thoracic surgery f/u  Overall prognosis is extremely poor.  Remains FULL CODE as per family wishes. Family has not visited during hospitalization.  Discharge planning underway. Will need vent facility.  Medically stable for discharge.

## 2021-11-30 NOTE — PROGRESS NOTE ADULT - ASSESSMENT
89 y/o F from [facility] w/ Hx of HTN, Dementia, CVA w/ R sided hemiparesis, aphasia, Parkinson's Disease, GERD, CKD. She is bedbound at baseline and needs assistance for ADLs. She was brought to the E.D. due to episodes of desaturation at 70s and respiratory distress. She was also having cough and fever in the nursing home. She was initially on NRB placed by EMS but was eventually intubated in the E.D. She is vaccinated with Moderna. COVID test was negative on admission. Chest Xray showed left lower lobe infiltrate (pneumonia). Blood cultures were sent. She was given 1 dose of Vancomycin and Zosyn in the E.D. and was subsequently admitted to ICU.    In the ICU, she was placed on mechanical ventilator. Her blood pressure was low despite receiving 2L bolus of LR. NGT and Left IJ central line was placed. Levophed was started. CXR revealed Left pneumothorax. Thoracic Surgery was consulted and left chest tube was placed. Patient was also tachycardic and troponin was mildly elevated. EKG showed SR w/ ST changes in the inferior leads. Troponin was monitored and has since trended down. Heart rate has been stable. Lactate was also elevated at 11.2 and has also since trended down. Patient also presented with hypernatremia and elevated creatinine probably secondary to volume deficit. She was also given free water. She was on Heparin for DVT prophylaxis and Protonix for GI prophylaxis. Palliative was consulted regarding the [facility]. Blood culture results showed E. coli bacteremia. She was started on Rocephin IV for 10 days. Azithromycin was continued awaiting Legionella results. Infectious Disease was consulted. Urine culture was ordered. Patient spiked a fever and is tachycardic. Blood cultures were repeated. She was tachycardic however his EKG showed SR w/ new PACs. There was mild air leak in the chest tube drainage but it was fixed by Cardiothoracic Surgery. Urine culture and repeat blood culture was negative. She had 2 febrile episodes overnight at 100.4-100.5F. She was tachypneic at rate of 30. Her repeat Chest Xray showed worsening of bilateral infiltrates. Her albumin is still low at 1.0. Her urine output is net negative. She was given Lasix and Albumin to maintain net negative urine output and decrease congestion. Vancomycin was discontinued and Cefepime decreased from 2g to 1g to complete 14 days since negative culture (11/16/21). Echocardiogram was ordered. Central line was removed. Lactulose was ordered for bowel movement. On November 11, 2021, patient had her tracheostomy and PEG inserted. Patient son Jered was called and informed .     11/20 CXR shows mild enlargement of left apical pneumothorax. Left chest tube unclamped to water seal. Thoracic following.   11/24  Chest tube placed on Pneumostat valve  11/26 No overnight events, tolerating Pneumostat

## 2021-12-01 VITALS
DIASTOLIC BLOOD PRESSURE: 76 MMHG | RESPIRATION RATE: 34 BRPM | TEMPERATURE: 99 F | HEART RATE: 99 BPM | SYSTOLIC BLOOD PRESSURE: 148 MMHG | OXYGEN SATURATION: 99 %

## 2021-12-01 LAB
ALBUMIN SERPL ELPH-MCNC: 1.9 G/DL — LOW (ref 3.5–5)
ALP SERPL-CCNC: 92 U/L — SIGNIFICANT CHANGE UP (ref 40–120)
ALT FLD-CCNC: 29 U/L DA — SIGNIFICANT CHANGE UP (ref 10–60)
ANION GAP SERPL CALC-SCNC: 7 MMOL/L — SIGNIFICANT CHANGE UP (ref 5–17)
AST SERPL-CCNC: 45 U/L — HIGH (ref 10–40)
BILIRUB SERPL-MCNC: 0.3 MG/DL — SIGNIFICANT CHANGE UP (ref 0.2–1.2)
BUN SERPL-MCNC: 27 MG/DL — HIGH (ref 7–18)
CALCIUM SERPL-MCNC: 8.6 MG/DL — SIGNIFICANT CHANGE UP (ref 8.4–10.5)
CHLORIDE SERPL-SCNC: 103 MMOL/L — SIGNIFICANT CHANGE UP (ref 96–108)
CO2 SERPL-SCNC: 28 MMOL/L — SIGNIFICANT CHANGE UP (ref 22–31)
CREAT SERPL-MCNC: 0.74 MG/DL — SIGNIFICANT CHANGE UP (ref 0.5–1.3)
GLUCOSE SERPL-MCNC: 175 MG/DL — HIGH (ref 70–99)
HCT VFR BLD CALC: 31 % — LOW (ref 34.5–45)
HGB BLD-MCNC: 9.5 G/DL — LOW (ref 11.5–15.5)
MAGNESIUM SERPL-MCNC: 2.5 MG/DL — SIGNIFICANT CHANGE UP (ref 1.6–2.6)
MCHC RBC-ENTMCNC: 28.4 PG — SIGNIFICANT CHANGE UP (ref 27–34)
MCHC RBC-ENTMCNC: 30.6 GM/DL — LOW (ref 32–36)
MCV RBC AUTO: 92.8 FL — SIGNIFICANT CHANGE UP (ref 80–100)
NRBC # BLD: 0 /100 WBCS — SIGNIFICANT CHANGE UP (ref 0–0)
PHOSPHATE SERPL-MCNC: 4 MG/DL — SIGNIFICANT CHANGE UP (ref 2.5–4.5)
PLATELET # BLD AUTO: 317 K/UL — SIGNIFICANT CHANGE UP (ref 150–400)
POTASSIUM SERPL-MCNC: 5.5 MMOL/L — HIGH (ref 3.5–5.3)
POTASSIUM SERPL-SCNC: 5.5 MMOL/L — HIGH (ref 3.5–5.3)
PROCALCITONIN SERPL-MCNC: 4.46 NG/ML — HIGH (ref 0.02–0.1)
PROT SERPL-MCNC: 7.6 G/DL — SIGNIFICANT CHANGE UP (ref 6–8.3)
RBC # BLD: 3.34 M/UL — LOW (ref 3.8–5.2)
RBC # FLD: 14.9 % — HIGH (ref 10.3–14.5)
SODIUM SERPL-SCNC: 138 MMOL/L — SIGNIFICANT CHANGE UP (ref 135–145)
WBC # BLD: 13.67 K/UL — HIGH (ref 3.8–10.5)
WBC # FLD AUTO: 13.67 K/UL — HIGH (ref 3.8–10.5)

## 2021-12-01 RX ORDER — SODIUM POLYSTYRENE SULFONATE 4.1 MEQ/G
15 POWDER, FOR SUSPENSION ORAL ONCE
Refills: 0 | Status: COMPLETED | OUTPATIENT
Start: 2021-12-01 | End: 2021-12-01

## 2021-12-01 RX ADMIN — CHLORHEXIDINE GLUCONATE 15 MILLILITER(S): 213 SOLUTION TOPICAL at 05:46

## 2021-12-01 RX ADMIN — Medication 81 MILLIGRAM(S): at 09:41

## 2021-12-01 RX ADMIN — SODIUM POLYSTYRENE SULFONATE 15 GRAM(S): 4.1 POWDER, FOR SUSPENSION ORAL at 09:40

## 2021-12-01 RX ADMIN — ENOXAPARIN SODIUM 40 MILLIGRAM(S): 100 INJECTION SUBCUTANEOUS at 09:40

## 2021-12-01 RX ADMIN — PANTOPRAZOLE SODIUM 40 MILLIGRAM(S): 20 TABLET, DELAYED RELEASE ORAL at 09:41

## 2021-12-01 NOTE — PROGRESS NOTE ADULT - PROBLEM SELECTOR PLAN 6
H&H low but stable.  Continue to monitor.  Transfuse for Hgb less than 7.0.
Supportive measures  Safety measures
Advanced.  Maintain safety precautions.  Strict aspiration precautions.
H&H low but stable.  Continue to monitor.  Transfuse for Hgb less than 7.0.
Supportive measures  Safety measures
H&H low but stable.  Continue to monitor.  Transfuse for Hgb less than 7.0.
Advanced.  Maintain safety precautions.  Strict aspiration precautions.
H&H low but stable.  Continue to monitor.  Transfuse for Hgb less than 7.0.

## 2021-12-01 NOTE — PROGRESS NOTE ADULT - ASSESSMENT
89 y/o F from [facility] w/ Hx of HTN, Dementia, CVA w/ R sided hemiparesis, aphasia, Parkinson's Disease, GERD, CKD. She is bedbound at baseline and needs assistance for ADLs. She was brought to the E.D. due to episodes of desaturation at 70s and respiratory distress. She was also having cough and fever in the nursing home. She was initially on NRB placed by EMS but was eventually intubated in the E.D. She is vaccinated with Moderna. COVID test was negative on admission. Chest Xray showed left lower lobe infiltrate (pneumonia). Blood cultures were sent. She was given 1 dose of Vancomycin and Zosyn in the E.D. and was subsequently admitted to ICU.    In the ICU, she was placed on mechanical ventilator. Her blood pressure was low despite receiving 2L bolus of LR. NGT and Left IJ central line was placed. Levophed was started. CXR revealed Left pneumothorax. Thoracic Surgery was consulted and left chest tube was placed. Patient was also tachycardic and troponin was mildly elevated. EKG showed SR w/ ST changes in the inferior leads. Troponin was monitored and has since trended down. Heart rate has been stable. Lactate was also elevated at 11.2 and has also since trended down. Patient also presented with hypernatremia and elevated creatinine probably secondary to volume deficit. She was also given free water. She was on Heparin for DVT prophylaxis and Protonix for GI prophylaxis. Palliative was consulted regarding the [facility]. Blood culture results showed E. coli bacteremia. She was started on Rocephin IV for 10 days. Azithromycin was continued awaiting Legionella results. Infectious Disease was consulted. Urine culture was ordered. Patient spiked a fever and is tachycardic. Blood cultures were repeated. She was tachycardic however his EKG showed SR w/ new PACs. There was mild air leak in the chest tube drainage but it was fixed by Cardiothoracic Surgery. Urine culture and repeat blood culture was negative. She had 2 febrile episodes overnight at 100.4-100.5F. She was tachypneic at rate of 30. Her repeat Chest Xray showed worsening of bilateral infiltrates. Her albumin is still low at 1.0. Her urine output is net negative. She was given Lasix and Albumin to maintain net negative urine output and decrease congestion. Vancomycin was discontinued and Cefepime decreased from 2g to 1g to complete 14 days since negative culture (11/16/21). Echocardiogram was ordered. Central line was removed. Lactulose was ordered for bowel movement. On November 11, 2021, patient had her tracheostomy and PEG inserted. Patient son Jered was called and informed .     11/20 CXR shows mild enlargement of left apical pneumothorax. Left chest tube unclamped to water seal. Thoracic following.   11/24  Chest tube placed on Pneumostat valve  11/26 No overnight events, tolerating Pneumostat  11/30 Discharged postponed secondary to fever.

## 2021-12-01 NOTE — PROGRESS NOTE ADULT - PROBLEM SELECTOR PROBLEM 10
Advance care planning

## 2021-12-01 NOTE — PROGRESS NOTE ADULT - PROBLEM SELECTOR PROBLEM 9
Severe protein-calorie malnutrition
Functional quadriplegia
Advance care planning
Severe protein-calorie malnutrition
Advance care planning
Severe protein-calorie malnutrition

## 2021-12-01 NOTE — PROGRESS NOTE ADULT - PROBLEM SELECTOR PROBLEM 4
Pneumothorax, left
Severe protein-calorie malnutrition
Pneumothorax, left
Pneumothorax, left
Respiratory alkalosis
Pneumothorax, left
Respiratory alkalosis
Pneumothorax, left
Severe protein-calorie malnutrition
Pneumothorax, left

## 2021-12-01 NOTE — PROGRESS NOTE ADULT - SUBJECTIVE AND OBJECTIVE BOX
AKASH CHACKO    SCU progress note    INTERVAL HPI/OVERNIGHT EVENTS: ***Afebrile today    DNR [ ]   DNI  [  ]   FULL CODE    Covid - 19 PCR: Negative 11/28    The 4Ms    What Matters Most: see GO  Age appropriate Medications/Screen for High Risk Medication: Yes  Mentation: see CAM below  Mobility: defer to physical exam    The Confusion Assessment Method (CAM) Diagnostic Algorithm     1: Acute Onset or Fluctuating Course  - Is there evidence of an acute change in mental status from the patient’s baseline? Did the (abnormal) behavior  fluctuate during the day, that is, tend to come and go, or increase and decrease in severity?  [ ] YES [x ] NO     2: Inattention  - Did the patient have difficulty focusing attention, being easily distractible, or having difficulty keeping track of what was being said?  [ ] YES [x] NO     3: Disorganized thinking  -Was the patient’s thinking disorganized or incoherent, such as rambling or irrelevant conversation, unclear or illogical flow of ideas, or unpredictable switching from subject to subject?  [ ] YES [x ] NO    4: Altered Level of consciousness?  [ ] YES [x ] NO    The diagnosis of delirium by CAM requires the presence of features 1 and 2 and either 3 or 4.    PRESSORS: [ ] YES [x ] NO    Cardiovascular:  Heart Failure  Acute   Acute on Chronic  Chronic         Pulmonary:    Hematalogic:  aspirin  chewable 81 milliGRAM(s) Oral daily  enoxaparin Injectable 40 milliGRAM(s) SubCutaneous daily    Other:  acetaminophen    Suspension .. 650 milliGRAM(s) Oral every 6 hours PRN  chlorhexidine 0.12% Liquid 15 milliLiter(s) Oral Mucosa every 12 hours  morphine  - Injectable 1 milliGRAM(s) IV Push every 4 hours PRN  pantoprazole   Suspension 40 milliGRAM(s) Oral daily  sodium chloride 0.9% lock flush 10 milliLiter(s) IV Push every 1 hour PRN  sodium polystyrene sulfonate Suspension 15 Gram(s) Oral once    acetaminophen    Suspension .. 650 milliGRAM(s) Oral every 6 hours PRN  aspirin  chewable 81 milliGRAM(s) Oral daily  chlorhexidine 0.12% Liquid 15 milliLiter(s) Oral Mucosa every 12 hours  enoxaparin Injectable 40 milliGRAM(s) SubCutaneous daily  morphine  - Injectable 1 milliGRAM(s) IV Push every 4 hours PRN  pantoprazole   Suspension 40 milliGRAM(s) Oral daily  sodium chloride 0.9% lock flush 10 milliLiter(s) IV Push every 1 hour PRN  sodium polystyrene sulfonate Suspension 15 Gram(s) Oral once    Drug Dosing Weight  Height (cm): 167.6 (03 Nov 2021 16:02)  Weight (kg): 40.9 (01 Nov 2021 02:20)  BMI (kg/m2): 14.6 (03 Nov 2021 16:02)  BSA (m2): 1.43 (03 Nov 2021 16:02)    CENTRAL LINE: [ ] YES [x ] NO  LOCATION:   DATE INSERTED:  REMOVE: [ ] YES [ ] NO  EXPLAIN:    SAEED: [ ] YES [x ] NO    DATE INSERTED:  REMOVE:  [ ] YES [ ] NO  EXPLAIN:    PAST MEDICAL & SURGICAL HISTORY:  HTN (hypertension)    Dementia                11-30 @ 07:01  -  12-01 @ 07:00  --------------------------------------------------------  IN: 0 mL / OUT: 10 mL / NET: -10 mL        Mode: AC/ CMV (Assist Control/ Continuous Mandatory Ventilation)  RR (machine): 16  TV (machine): 350  FiO2: 30  PEEP: 5  PS: 5  ITime: 1.3  MAP: 5  PIP: 19      PHYSICAL EXAM:    GENERAL: NAD, cachectic with severe temporal waisting  HEAD:  Atraumatic, Normocephalic  EYES: EOMI, PERRLA, conjunctiva and sclera clear  ENMT: No tonsillar erythema, exudates  NECK: Supple, No JVD, tracheostomy intact  NERVOUS SYSTEM:  Awake and alert. Not orientated. Does not follow commands. Nonverbal at baseline  CHEST/LUNG: Diminished breath left side. Left chest tube to Pneumostat  HEART: Regular rate and rhythm; No murmurs, rubs, or gallops  ABDOMEN: Soft, Nontender, Nondistended; Bowel sounds present; Peg intact  EXTREMITIES: +muscle waisting all extremities  2+ Peripheral Pulses, No clubbing, cyanosis, or edema  LYMPH: No lymphadenopathy noted  SKIN: No rashes or lesions      LABS:  CBC Full  -  ( 01 Dec 2021 06:51 )  WBC Count : 13.67 K/uL  RBC Count : 3.34 M/uL  Hemoglobin : 9.5 g/dL  Hematocrit : 31.0 %  Platelet Count - Automated : 317 K/uL  Mean Cell Volume : 92.8 fl  Mean Cell Hemoglobin : 28.4 pg  Mean Cell Hemoglobin Concentration : 30.6 gm/dL  Auto Neutrophil # : x  Auto Lymphocyte # : x  Auto Monocyte # : x  Auto Eosinophil # : x  Auto Basophil # : x  Auto Neutrophil % : x  Auto Lymphocyte % : x  Auto Monocyte % : x  Auto Eosinophil % : x  Auto Basophil % : x    12-01    138  |  103  |  27<H>  ----------------------------<  175<H>  5.5<H>   |  28  |  0.74    Ca    8.6      01 Dec 2021 06:51  Phos  4.0     12-01  Mg     2.5     12-01    TPro  7.6  /  Alb  1.9<L>  /  TBili  0.3  /  DBili  x   /  AST  45<H>  /  ALT  29  /  AlkPhos  92  12-01              [  ]  DVT Prophylaxis  [  ]  Nutrition, Brand, Rate         Goal Rate        Abnormal Nutritional Status -  Malnutrition   Cachexia         RADIOLOGY & ADDITIONAL STUDIES:  ***  < from: Xray Chest 1 View- PORTABLE-Urgent (Xray Chest 1 View- PORTABLE-Urgent .) (11.27.21 @ 11:16) >  INTERPRETATION:  Clinical history: 89-year-old female, pneumothorax.    Portable view of the chest is compared to 11/25/2021 and demonstrates a left chest tube in place with no pneumothorax.    Mild cardiomegaly and normal pulmonary vasculature with no right pneumothorax or acute osseous finding.    Patchy perihilar infiltrates sparing the the left upper lobe, grossly unchanged.    Right pleural effusion/atelectasis, unchanged.    IMPRESSION:    Left chest tube in satisfactory position with no pneumothorax, unchanged.    Mild to moderate perihilar interstitial infiltrates, grossly unchanged.    Right pleural effusion/adjacent atelectasis, unchanged    < end of copied text >    Goals of Care Discussion with Family/Proxy/Other   - see note from 11/05

## 2021-12-01 NOTE — PROGRESS NOTE ADULT - PROVIDER SPECIALTY LIST ADULT
Critical Care
Infectious Disease
Internal Medicine
Surgery
Thoracic Surgery
Critical Care
Critical Care
Infectious Disease
Internal Medicine
Surgery
Thoracic Surgery
Thoracic Surgery
Critical Care
Infectious Disease
Internal Medicine
Thoracic Surgery
Critical Care
Infectious Disease
Internal Medicine
Surgery
Thoracic Surgery
Critical Care
Internal Medicine
Surgery
Thoracic Surgery
Palliative Care
Critical Care
Palliative Care
Critical Care

## 2021-12-01 NOTE — PROGRESS NOTE ADULT - PROBLEM SELECTOR PROBLEM 5
Thrombocytosis
Palliative care encounter
Thrombocytosis
Thrombocytosis
Anemia of chronic disease
Anemia of chronic disease
Severe protein-calorie malnutrition
Thrombocytosis
Severe protein-calorie malnutrition
Thrombocytosis
Palliative care encounter
Thrombocytosis

## 2021-12-01 NOTE — PROGRESS NOTE ADULT - PROBLEM SELECTOR PROBLEM 8
Functional quadriplegia
Prophylactic measure
Prophylactic measure
Functional quadriplegia
Severe protein-calorie malnutrition
Functional quadriplegia
Severe protein-calorie malnutrition
Functional quadriplegia
Severe protein-calorie malnutrition
Functional quadriplegia

## 2021-12-01 NOTE — PROGRESS NOTE ADULT - PROBLEM SELECTOR PLAN 5
Likely secondary to infection.  Continue to monitor.  Antibiotics are now completed.
Remains full code per 's wishes. Spoke with grandson to set up family meeting to address further GOC as patient unlikely to be weanable. He will coordinate w family to set up a time tentatively for tomorrow afternoon. Palliative will follow.
H&H low but stable.  Continue to monitor.  Transfuse for Hgb less than 7.0
: Likely secondary to infection.  Continue to monitor.  Started on vanco 11/17.
Likely secondary to infection.  Continue to monitor.  Antibiotics are now completed.
H&H low but stable.  Continue to monitor.  Transfuse for Hgb less than 7.0.
Likely secondary to infection.  Continue to monitor.  Antibiotics are now completed.
Likely secondary to infection.  Continue to monitor.  Antibiotics are now completed.
Continue PEG tube feeding
GOC discussion as above. Grandson who is a physician to continue to discuss GOC w family and grandfather who is primary surrogate. Palliative will follow.
Likely secondary to infection.  Continue to monitor.  Started on vanco 11/17.  Consider resuming ASA 81mg
Likely secondary to infection.  Continue to monitor.  Antibiotics are now completed.
Likely secondary to infection.  Continue to monitor.  Antibiotics are now completed.
Likely secondary to infection.  Continue to monitor.  Started on vanco 11/17.
: Likely secondary to infection.  Continue to monitor.  Started on vanco 11/17.
Continue PEG tube feeding
Likely secondary to infection.  Continue to monitor.  Antibiotics are now completed.
: Likely secondary to infection.  Continue to monitor.  Started on vanco 11/17.
Likely secondary to infection.  Continue to monitor.  Antibiotics are now completed.
Likely secondary to infection.  Continue to monitor.  Started on vanco 11/17.
Likely secondary to infection.  Continue to monitor.  Started on vanco 11/17.

## 2021-12-01 NOTE — PROGRESS NOTE ADULT - PROBLEM SELECTOR PLAN 1
Continue mechanical ventilation with daily SBT as tolerated.  Will attempt trach collar during the day today.  Monitor oxygen saturation.  Will need vent facility.

## 2021-12-01 NOTE — PROGRESS NOTE ADULT - PROBLEM SELECTOR PROBLEM 3
E coli bacteremia
Septic shock
E coli bacteremia
Functional quadriplegia
Pneumothorax, left
E coli bacteremia
E coli bacteremia
Functional quadriplegia
E coli bacteremia
Pneumothorax, left
E coli bacteremia

## 2021-12-01 NOTE — PROGRESS NOTE ADULT - PROBLEM SELECTOR PROBLEM 6
Anemia of chronic disease
Anemia of chronic disease
Dementia
Anemia of chronic disease
Dementia
Dementia
Anemia of chronic disease
Dementia
Anemia of chronic disease

## 2021-12-01 NOTE — PROGRESS NOTE ADULT - PROBLEM SELECTOR PROBLEM 7
Dementia
Functional quadriplegia
Dementia
Functional quadriplegia
Dementia
Functional quadriplegia
Functional quadriplegia
Dementia

## 2021-12-01 NOTE — PROGRESS NOTE ADULT - ATTENDING COMMENTS
cxr did not show significant change on pneumostat. will need rehab placement that accepts chest tube. can f/u 3 weeks
89 y/o F, nursing home resident with a significant medical history of CVA with R sided hemiparesis, aphasia, parkinson's, GERD, dementia, CKD, presents to the ED in respiratory distress, intubated while in ED. Patient is being admitted to medicine for sepsis and AHRF likely 2/2 bacterial pneumonia    Assessment:  1. Acute hypoxic respiratory failure  2. Septic shock   3. HCAP   4. Lactic acidosis  5. Joby on CKD  6. Hypernatremia  7. Dementia   8. Severe protein calorie malnutrition  9. Gram negative bacteremia  10. Iatrogenic left pneumothorax    Plan  - Cont. Mechanical ventilation for now, daily weaning trials but not a candidate for extubation yet due to severe debility   - Off sedation opens eyes but not does not follow, which seems to be close to baseline given advanced dementia   - Thoracic surgery consult for tracheostomy and peg placement  - Cont. Antibiotics per ID   - CXR with lung expansion  - Remains with an airleak on the chest tube  - Keep fluid Neg balance   - Cont tube feedings  - Chest tube to suction, + air leak noted   - Thoracic surgery follow up  - Bowel regimen   - Passed void trial  - Palliative care follow up   - Overall prognosis is poor  - DVT and stress ulcer prophylaxis  - Overall prognosis for meaningful recovery are low, at this time CPR in the event cardiac arrest is unlikely to change outcome
failed clamp trial multiple times. tolerating water seal. will plan for possible pneumostat for placement to rehab
patient s/p trach/peg, chest tube placed to water seal, 1 + intermittent air leak on suction.   cxr today in 4 hours and AM
89 y/o F, nursing home resident with a significant medical history of CVA with R sided hemiparesis, aphasia, parkinson's, GERD, dementia, CKD, presents to the ED in respiratory distress, intubated while in ED. Patient is being admitted to medicine for sepsis and AHRF likely 2/2 bacterial pneumonia    Assessment:  1. Acute hypoxic respiratory failure  2. Septic shock   3. HCAP   4. Lactic acidosis  5. Joby on CKD  6. Hypernatremia  7. Dementia   8. Severe protein calorie malnutrition    Plan  - Cont. IV fluid hydration  - Mechanical ventilation  - Vasopressors support  - Broad spectrum antibiotics  - Follow up cultures  - NGT placements  - Start tube feedings  - Palliative care consultation  - Overall prognosis is poor  - DVT and stress ulcer prophylaxis  - In event of cardiac arrest, CPR unlikely to change out come
89 y/o F, nursing home resident with a significant medical history of CVA with R sided hemiparesis, aphasia, parkinson's, GERD, dementia, CKD, presents to the ED in respiratory distress, intubated while in ED. Patient is being admitted to medicine for sepsis and AHRF likely 2/2 bacterial pneumonia    Assessment:  1. Acute hypoxic respiratory failure  2. Septic shock   3. HCAP   4. Lactic acidosis  5. Joby on CKD  6. Hypernatremia  7. Dementia   8. Severe protein calorie malnutrition  9. Gram negative bacteremia  10. Iatrogenic left pneumothorax    Plan  - Cont. Mechanical ventilation for now, daily weaning trials but not a candidate for extubation yet due to severe debility   - Off sedation opens eyes but not does not follow, which seems to be close to baseline given advanced dementia   - Thoracic surgery consult for tracheostomy and peg placement today  - Cont. Antibiotics per ID   - CXR with lung expansion  - Remains with an air leak on the chest tube  - Keep fluid Neg balance   - Cont tube feedings  - Chest tube to suction, + air leak noted   - Bowel regimen   - Passed void trial  - Palliative care follow up   - Overall prognosis is poor  - DVT and stress ulcer prophylaxis  - Overall prognosis for meaningful recovery are low, at this time CPR in the event cardiac arrest is unlikely to change outcome
89 y/o F, nursing home resident with a significant medical history of CVA with R sided hemiparesis, aphasia, parkinson's, GERD, dementia, CKD, presents to the ED in respiratory distress, intubated while in ED. Patient is being admitted to medicine for sepsis and AHRF likely 2/2 bacterial pneumonia    Assessment:  1. Acute hypoxic respiratory failure  2. Septic shock   3. HCAP   4. Lactic acidosis  5. Joby on CKD  6. Hypernatremia  7. Dementia   8. Severe protein calorie malnutrition  9. Gram negative bacteremia  10. Iatrogenic left pneumothorax    Plan  - Cont. IV fluid hydration  - Mechanical ventilation  - Vasopressors support  - Ceftriaxone 2g for bactermia  - Follow up cultures  - NGT placements  - Cont. tube feedings  - Chest tube to suction, + air leak noted   - Thoracic surgery follow up  - Palliative care consultation appreciated  - Overall prognosis is poor  - DVT and stress ulcer prophylaxis  - In event of cardiac arrest, CPR unlikely to change out come .
89 y/o F, nursing home resident with a significant medical history of CVA with R sided hemiparesis, aphasia, parkinson's, GERD, dementia, CKD, presents to the ED in respiratory distress, intubated while in ED. Patient is being admitted to medicine for sepsis and AHRF likely 2/2 bacterial pneumonia    Assessment:  1. Acute hypoxic respiratory failure  2. Septic shock   3. HCAP   4. Lactic acidosis  5. Joby on CKD  6. Hypernatremia  7. Dementia   8. Severe protein calorie malnutrition  9. Gram negative bacteremia  10. Iatrogenic left pneumothorax    Plan  - Spiked fever  - Broaden antibx coverage to cover hosp acquire organism   - F/U cultures   - ALbumin and lasix x 24 hours   - CXR is worst   - Keep fluid Neg balance   - Mechanical ventilation  - Vasopressors support  - NGT placements  - Cont. tube feedings  - Chest tube to suction, + air leak noted   - Thoracic surgery follow up  - Palliative care consultated  - Overall prognosis is poor  - DVT and stress ulcer prophylaxis  - In event of cardiac arrest, CPR unlikely to change out come .
large space after clamping of tube with air leak. will unclamp and leave to water seal. not good surgical candidate for vats
patient with pneumostat - cxr stable without ptx . discussed with primary team - plan for d/c rehab w/ chest tube .
will change to 5-in-1 connector given size of tube and persistent air leak. will keep to suction for now.
89 y/o F, nursing home resident with a significant medical history of CVA with R sided hemiparesis, aphasia, parkinson's, GERD, dementia, CKD, presents to the ED in respiratory distress, intubated while in ED. Patient is being admitted to medicine for sepsis and AHRF likely 2/2 bacterial pneumonia    Assessment:  1. Acute hypoxic respiratory failure  2. Septic shock   3. HCAP   4. Lactic acidosis  5. Joby on CKD  6. Hypernatremia  7. Dementia   8. Severe protein calorie malnutrition  9. Gram negative bacteremia  10. Iatrogenic left pneumothorax    Plan  - S/p tracheostomy and peg placement, no signs of bleeding  - Chest tube remains with a small air leak   - Cont. mechanical ventilation  - Did not tolerated PS this morning  - Cont. Antibiotics per ID   - CXR with lung expansion  - Start tube feedings once cleared by surgery  - Bowel regimen   - Passed void trial  - Palliative care follow up   - Overall prognosis is poor  - DVT and stress ulcer prophylaxis  - Overall prognosis for meaningful recovery are low, at this time CPR in the event cardiac arrest is unlikely to change outcome  - Transfer to SCU service
89 y/o F, nursing home resident with a significant medical history of CVA with R sided hemiparesis, aphasia, parkinson's, GERD, dementia, CKD, presents to the ED in respiratory distress, intubated while in ED. Patient is being admitted to medicine for sepsis and AHRF likely 2/2 bacterial pneumonia    Assessment:  1. Acute hypoxic respiratory failure  2. Septic shock   3. HCAP   4. Lactic acidosis  5. Joby on CKD  6. Hypernatremia  7. Dementia   8. Severe protein calorie malnutrition  9. Gram negative bacteremia  10. Iatrogenic left pneumothorax    Plan  - Cont. IV fluid hydration  - Mechanical ventilation  - Vasopressors support  - Ceftriaxone 2g for bactermia  - Follow up cultures  - NGT placements  - Cont. tube feedings  - Chest tube to suction, + air leak noted   - Thoracic surgery follow up  - Palliative care consultation appreciated  - Overall prognosis is poor  - DVT and stress ulcer prophylaxis  - In event of cardiac arrest, CPR unlikely to change out come
89 y/o F, nursing home resident with a significant medical history of CVA with R sided hemiparesis, aphasia, parkinson's, GERD, dementia, CKD, presents to the ED in respiratory distress, intubated while in ED. Patient is being admitted to medicine for sepsis and AHRF likely 2/2 bacterial pneumonia    Assessment:  1. Acute hypoxic respiratory failure  2. Septic shock   3. HCAP   4. Lactic acidosis  5. Joby on CKD  6. Hypernatremia  7. Dementia   8. Severe protein calorie malnutrition  9. Gram negative bacteremia  10. Iatrogenic left pneumothorax    Plan  - Cont. Mechanical ventilation for now, daily weaning trials but not a candidate for extubation yet due to severe debility   - Off sedation opens eyes but not does not follow, which seems to be close to baseline given advanced dementia   - Thoracic surgery consult for tracheostomy and peg placement  - Cont. Antibiotics per ID   - CXR with lung expansion -  - Remains with an airleak on the chest tube  - Keep fluid Neg balance   - Cont tube feedings  - Chest tube to suction, + air leak noted   - Thoracic surgery follow up  - Bowel regimen   - Palliative care follow up   - Overall prognosis is poor  - DVT and stress ulcer prophylaxis  - Overall prognosis for meaningful recovery are low, at this time CPR in the event cardiac arrest is unlikely to change outcome
89 y/o F, nursing home resident with a significant medical history of CVA with R sided hemiparesis, aphasia, parkinson's, GERD, dementia, CKD, presents to the ED in respiratory distress, intubated while in ED. Patient is being admitted to medicine for sepsis and AHRF likely 2/2 bacterial pneumonia    Assessment:  1. Acute hypoxic respiratory failure  2. Septic shock   3. HCAP   4. Lactic acidosis  5. Joby on CKD  6. Hypernatremia  7. Dementia   8. Severe protein calorie malnutrition  9. Gram negative bacteremia  10. Iatrogenic left pneumothorax    Plan  - Tolerating PS this morning   - Cont. Mechanical ventilation for now  - Off sedation opens eyes but not does not follow, which seems to be close to baseline given advanced dementia   - Cont. Antibiotics per ID   - CXR with lung expansion -  - Remains with an airleak on the chest tube  - Keep fluid Neg balance   - NGT placements  - Restart tube feedings  - Chest tube to suction, + air leak noted   - Thoracic surgery follow up  - No reported BM since admission, may need enema  - Palliative care follow up   - Overall prognosis is poor  - DVT and stress ulcer prophylaxis
89 y/o F, nursing home resident with a significant medical history of CVA with R sided hemiparesis, aphasia, parkinson's, GERD, dementia, CKD, presents to the ED in respiratory distress, intubated while in ED. Patient is being admitted to medicine for sepsis and AHRF likely 2/2 bacterial pneumonia    Assessment:  1. Acute hypoxic respiratory failure  2. Septic shock   3. HCAP   4. Lactic acidosis  5. Joby on CKD  6. Hypernatremia  7. Dementia   8. Severe protein calorie malnutrition  9. Gram negative bacteremia  10. Iatrogenic left pneumothorax    Plan  - Cont. IV fluid hydration  - ALbumin and lasix x 24 hours   - CXR is worst   - Keep fluid Neg balance   - Mechanical ventilation  - Vasopressors support  - Ceftriaxone 2g for bactermia  - Follow up cultures  - NGT placements  - Cont. tube feedings  - Chest tube to suction, + air leak noted   - Thoracic surgery follow up  - Palliative care consultation appreciated  - Overall prognosis is poor  - DVT and stress ulcer prophylaxis  - In event of cardiac arrest, CPR unlikely to change out come .
89 y/o F, nursing home resident with a significant medical history of CVA with R sided hemiparesis, aphasia, parkinson's, GERD, dementia, CKD, presents to the ED in respiratory distress, intubated while in ED. Patient is being admitted to medicine for sepsis and AHRF likely 2/2 bacterial pneumonia    Assessment:  1. Acute hypoxic respiratory failure  2. Septic shock   3. HCAP   4. Lactic acidosis  5. Joby on CKD  6. Hypernatremia  7. Dementia   8. Severe protein calorie malnutrition  9. Gram negative bacteremia  10. Iatrogenic left pneumothorax    Plan  - Spiked fever  - Broaden antibx coverage to cover hosp acquire organism   - F/U cultures   - ALbumin and lasix x 24 hours   - CXR is worst   - Keep fluid Neg balance   - Mechanical ventilation  - Vasopressors support  - NGT placements  - Cont. tube feedings  - Chest tube to suction, + air leak noted   - Thoracic surgery follow up  - Palliative care consultation appreciated  - Overall prognosis is poor  - DVT and stress ulcer prophylaxis  - In event of cardiac arrest, CPR unlikely to change out come .
89 y/o F, nursing home resident with a significant medical history of CVA with R sided hemiparesis, aphasia, parkinson's, GERD, dementia, CKD, presents to the ED in respiratory distress, intubated while in ED. Patient is being admitted to medicine for sepsis and AHRF likely 2/2 bacterial pneumonia    Assessment:  1. Acute hypoxic respiratory failure  2. Septic shock   3. HCAP   4. Lactic acidosis  5. Joby on CKD  6. Hypernatremia  7. Dementia   8. Severe protein calorie malnutrition  9. Gram negative bacteremia  10. Iatrogenic left pneumothorax    Plan  - Spiked fever  - Broaden antibx coverage to cover hosp acquire organism   - F/U cultures   - ALbumin and lasix x 24 hours   - CXR is worst   - Keep fluid Neg balance   - Mechanical ventilation  - Vasopressors support  - NGT placements  - Cont. tube feedings  - Chest tube to suction, + air leak noted   - Thoracic surgery follow up  - Palliative care consultation appreciated  - Overall prognosis is poor  - DVT and stress ulcer prophylaxis  - In event of cardiac arrest, CPR unlikely to change out come .
Problem/Plan - 1:  ·  Problem: Sepsis with acute hypoxic respiratory failure.   ·  Plan: CXR as above  Lactate elevated, now normalized.   Afebrile >4 days  Leukocytosis uptrending, likely due to recent procedure (trach & PEG)  Continue Cefepime Day 11/14  Continue mechanical ventilation   Monitor oxygenation.     Problem/Plan - 2:  ·  Problem: E coli bacteremia.   ·  Plan: BC from 11/1 grew E. coli  Repeat BC NGTD  afebrile > 4 days  Leukocytosis uptrending likely due to recent procedure (trach/PEG)  Continue Cefepime , Day 11/14  ID following.     Problem/Plan - 3:  ·  Problem: Pneumothorax, left.   ·  Plan: with air leak  Serial CXRs  Thoracic Surgery following.     Problem/Plan - 4:  ·  Problem: Respiratory alkalosis.   ·  Plan: ABG's as above  Improved post vent changes  Monitor oxygenation  ABG's prn.     Problem/Plan - 5:  ·  Problem: Severe protein-calorie malnutrition.   ·  Plan: Continue PEG tube feeding.     Problem/Plan - 6:
Problem/Plan - 1:  ·  Problem: Septic shock.   ·  Plan: Secondary to bacteremia.  BC from 11/1 grew E coli  Repeat BC NGTD  Continue cefepime 13/14.     Problem/Plan - 2:  ·  Problem: Acute respiratory failure with hypoxia.   ·  Plan: Continue mechanical ventilation.   Not a candidate for SBT at this time.  Monitor oxygen saturation.  Serial CXRs.     Problem/Plan - 3:  ·  Problem: E coli bacteremia.   ·  Plan: Continue cefepime. 13/14  Repeat BC NGTD.     Problem/Plan - 4:  ·  Problem: Pneumothorax, left.   ·  Plan: Continue chest tube. as per thoracic.  Thoracic surgery f/u  Serial CXR.  F/U CXR from today.
Problem/Plan - 1:  ·  Problem: Septic shock.   ·  Plan: Secondary to bacteremia.  BC from 11/1 grew E coli  Repeat BC NGTD  Last day of cefepime 13/14.     Problem/Plan - 2:  ·  Problem: Acute respiratory failure with hypoxia.   ·  Plan: Continue mechanical ventilation. With daily SBT  Tolerated 1 hour of SBT with PS 10  Monitor oxygen saturation.  Serial CXRs.     Problem/Plan - 3:  ·  Problem: E coli bacteremia.   ·  Plan: Last day of cefepime 14/14.  Repeat BC NGTD.     Problem/Plan - 4:  ·  Problem: Pneumothorax, left.   ·  Plan: Left chest tube clamped by surgery this morning.  F/U CXR.  Possible chest tube removal today.
Problem/Plan - 1:  ·  Problem: Sepsis with acute hypoxic respiratory failure.   ·  Plan: CXR as above  Lactate elevated, now normalized.   Afebrile >4 days  Leukocytosis uptrending, likely due to recent procedure (trach & PEG)  Continue Cefepime Day 11/14  Continue mechanical ventilation   Monitor oxygenation.     Problem/Plan - 2:  ·  Problem: E coli bacteremia.   ·  Plan: BC from 11/1 grew E. coli  Repeat BC NGTD  afebrile > 4 days  Leukocytosis uptrending likely due to recent procedure (trach/PEG)  Continue Cefepime , Day 11/14  ID following.     Problem/Plan - 3:  ·  Problem: Pneumothorax, left.   ·  Plan: with air leak  Serial CXRs  Thoracic Surgery following.     Problem/Plan - 4:  ·  Problem: Respiratory alkalosis.   ·  Plan: ABG's as above  Improved post vent changes  Monitor oxygenation  ABG's prn.     Problem/Plan - 5:  ·  Problem: Severe protein-calorie malnutrition.   ·  Plan: Continue PEG tube feeding.
D/c today
Problem/Plan - 1:  ·  Problem: Acute respiratory failure with hypoxia.   ·  Plan: Continue mechanical ventilation with daily SBT as tolerated  Currently tolerating SBT with PS 12.  Monitor oxygen saturation.     Problem/Plan - 2:  ·  Problem: Septic shock.   ·  Plan: Resolved.   Secondary to bacteremia  BC from 11/1 grew E coli. Completed antibiotics Cefepime and vanco.  BC/UC both  NGTD   ID Dr Wong following.     Problem/Plan - 3:  ·  Problem: E coli bacteremia.   ·  Plan: Completed 14 days of cefepime   Vanco completed  Afebrile.  Repeat BC 11/17 NGTD.     Problem/Plan - 4:  ·  Problem: Pneumothorax, left.   ·  Plan: Left chest tube clamped  as per surgery.  CXR as above,  Serial CXRs.  plan for pneumostat placement
Problem/Plan - 1:  ·  Problem: Acute respiratory failure with hypoxia.   ·  Plan: Continue mechanical ventilation with daily SBT as tolerated.  Monitor oxygen saturation.  Will need vent facility.     Problem/Plan - 2:  ·  Problem: Septic shock.   ·  Plan: Resolved.   Secondary to bacteremia  BC from 11/1 grew E coli. Completed antibiotics Cefepime and vanco.  BC/UC both  NGTD   ID Dr Wong following.     Problem/Plan - 3:  ·  Problem: E coli bacteremia.   ·  Plan: Completed 14 days of cefepime   Vanco completed  Afebrile.  Repeat BC 11/17 negative.     Problem/Plan - 4:  ·  Problem: Pneumothorax, left.   ·  Plan: Left chest tube placed on Pneumostat valve.  Tolerating well.  No pneumonthorax noted on CXR  Thoracic surgery recommending follow up in 3 weeks for further management.
Problem/Plan - 1:  ·  Problem: Septic shock.   ·  Plan: Secondary to bacteremia  BC from 11/1 grew E coli. Completed Cefepime .  Febrile 11/17  BC/UC both  NGTD   Continue Vanco Day 3   ID Dr Wong following.     Problem/Plan - 2:  ·  Problem: Acute respiratory failure with hypoxia.   ·  Plan: Continue mechanical ventilation.  Not a candidate for SBT at this time.  DID not tolerate SBT 11/17  Monitor oxygen saturation.  Daily CXRs.     Problem/Plan - 3:  ·  Problem: E coli bacteremia.   ·  Plan: Completed 14 days of Cefepime   Afebrile > 48 hrs, leukocytosis resolved.   Repeat BC 11/17 NGTD   Continue IV Vanco, Day 3 per ID.     Problem/Plan - 4:  ·  Problem: Pneumothorax, left.   ·  Plan: Left chest tube to water seal as per surgery.  CXR as above,  Serial CXRs.     Problem/Plan - 5:  ·  Problem: Thrombocytosis.   ·  Plan: : Likely secondary to infection.  Continue to monitor.  Started on vanco 11/17.     Problem/Plan - 6:  ·  Problem: Anemia of chronic disease.   ·  Plan: H&H low but stable.  Continue to monitor.  Transfuse for Hgb less than 7.0.     Problem/Plan - 7:  ·  Problem: Dementia.   ·  Plan: Advanced.  Maintain safety precautions.  Strict aspiration precautions.     Problem/Plan - 8:  ·  Problem: Functional quadriplegia.   ·  Plan: Passive ROM exercises daily.  Turn and position every 2 hours.  Keep head of bed elevated during tube feeds.
Problem/Plan - 1:  ·  Problem: Septic shock.   ·  Plan: Secondary to bacteremia  BC from 11/1 grew E coli. Completed Cefepime yesterday  Febrile. Blood cultures sent 11/17  Vanco started. ID Dr Wong following.     Problem/Plan - 2:  ·  Problem: Acute respiratory failure with hypoxia.   ·  Plan: Continue mechanical ventilation.  Did not tolerate SBT today.  Monitor oxygen saturation  Serial CXRs.     Problem/Plan - 3:  ·  Problem: E coli bacteremia.   ·  Plan: Completed 14 days of Cefepime yesterday.  New fevers. Blood cultures sent. F/U results.  Vanco started by ID.     Problem/Plan - 4:  ·  Problem: Pneumothorax, left.   ·  Plan: Left chest tube to water seal as per surgery.  CXR as above,
Problem/Plan - 1:  ·  Problem: Septic shock.   ·  Plan: Secondary to bacteremia  BC from 11/1 grew E coli. Completed Cefepime yesterday  Febrile. Blood cultures sent 11/17.  F/U blood cultures  Vanco started. ID Dr Wong following.     Problem/Plan - 2:  ·  Problem: Acute respiratory failure with hypoxia.   ·  Plan: Continue mechanical ventilation.  Not a candidate for SBT at this time.  DID not tolerate SBT yesterday.  Monitor oxygen saturation.  Daily CXRs.     Problem/Plan - 3:  ·  Problem: E coli bacteremia.   ·  Plan: Completed 14 days of Cefepime yesterday.  New fevers. Blood cultures sent. F/U results.  Vanco started by ID.
Problem/Plan - 1:  ·  Problem: Acute respiratory failure with hypoxia.   ·  Plan: Continue mechanical ventilation with daily SBT as tolerated  SBT with PS 12 for 1 hour daily  Monitor oxygen saturation.     Problem/Plan - 2:  ·  Problem: Septic shock.   ·  Plan: Resolved.   Secondary to bacteremia  BC from 11/1 grew E coli. Completed antibiotics Cefepime and vanco.  BC/UC both  NGTD   ID Dr Wong following.     Problem/Plan - 3:  ·  Problem: E coli bacteremia.   ·  Plan: Completed 14 days of cefepime   Vanco completed  Afebrile.  Repeat BC 11/17 negative.     Problem/Plan - 4:  ·  Problem: Pneumothorax, left.   ·  Plan: Left chest tube placed on Pneumostat valve yesterday.  Hold daily - Serial CXRs as Pneumothorax resolved  So far tolerating valve.  4ml drained today.  Thoracic surgery recommending follow up in 3 weeks for further management.
Problem/Plan - 1:  ·  Problem: Acute respiratory failure with hypoxia.   ·  Plan: Continue mechanical ventilation with daily SBT as tolerated  SBT with PS 12 for 1 hour tomorrow  Monitor oxygen saturation.     Problem/Plan - 2:  ·  Problem: Septic shock.   ·  Plan: Resolved.   Secondary to bacteremia  BC from 11/1 grew E coli. Completed antibiotics Cefepime and vanco.  BC/UC both  NGTD   ID Dr Wong following.     Problem/Plan - 3:  ·  Problem: E coli bacteremia.   ·  Plan: Completed 14 days of cefepime   Vanco completed  Afebrile.  Repeat BC 11/17 negative.     Problem/Plan - 4:  ·  Problem: Pneumothorax, left.   ·  Plan: Left chest tube placed on Pneumostat valve yesterday.  Serial CXRs  So far tolerating valve.  19ml drained today.
Problem/Plan - 1:  ·  Problem: Acute respiratory failure with hypoxia.   ·  Plan: Continue mechanical ventilation with daily SBT as tolerated  C/w SBT trails daily  Monitor oxygen saturation.     Problem/Plan - 2:  ·  Problem: Septic shock.   ·  Plan: Resolved.   Secondary to bacteremia  BC from 11/1 grew E coli. Completed antibiotics Cefepime and vanco.  BC/UC both  NGTD   ID Dr Wong following.     Problem/Plan - 3:  ·  Problem: E coli bacteremia.   ·  Plan: Completed 14 days of cefepime   Vanco completed  Afebrile.  Repeat BC 11/17 negative.
Problem/Plan - 1:  ·  Problem: Acute respiratory failure with hypoxia.   ·  Plan: Continue mechanical ventilation with daily SBT as tolerated  Thus far not tolerating more than a few minutes.  Monitor oxygen saturation.     Problem/Plan - 2:  ·  Problem: Septic shock.   ·  Plan: Resolved.   Secondary to bacteremia  BC from 11/1 grew E coli. Completed Cefepime .  Febrile 11/17  BC/UC both  NGTD   Continue Vanco Day 5  ID Dr Wong following.     Problem/Plan - 3:  ·  Problem: E coli bacteremia.   ·  Plan: Completed 14 days of Cefepime   Afebrile > 48 hrs  leukocytosis   Repeat BC 11/17 NGTD   Continue IV Vanco Day 5  ID following.     Problem/Plan - 4:  ·  Problem: Pneumothorax, left.   ·  Plan: Left chest tube clamped  as per surgery.  CXR as above,  Serial CXRs.
Problem/Plan - 1:  ·  Problem: Acute respiratory failure with hypoxia.   ·  Plan: Continue mechanical ventilation.  Not a candidate for SBT at this time.  DID not tolerate SBT 11/17  Monitor oxygen saturation.  Daily CXRs .     Problem/Plan - 2:  ·  Problem: E coli bacteremia.   ·  Plan: Completed 14 days of Cefepime   Afebrile > 48 hrs  leukocytosis   Repeat BC 11/17 NGTD   Continue IV Vanco Day 5  ID following.     Problem/Plan - 3:  ·  Problem: Septic shock.   ·  Plan: Resolved.   Secondary to bacteremia  BC from 11/1 grew E coli. Completed Cefepime .  Febrile 11/17  BC/UC both  NGTD   Continue Vanco Day 5  ID Dr Wong following.     Problem/Plan - 4:  ·  Problem: Pneumothorax, left.   ·  Plan: Left chest tube clamped  as per surgery.  CXR as above,  Serial CXRs.     Problem/Plan - 5:  ·  Problem: Thrombocytosis.   ·  Plan: Likely secondary to infection.  Continue to monitor.  Started on vanco 11/17.  Consider resuming ASA 81mg.
Problem/Plan - 1:  ·  Problem: Acute respiratory failure with hypoxia.   ·  Plan: Continue mechanical ventilation with daily SBT as tolerated  Thus far not tolerating more than a few minutes.  Monitor oxygen saturation.     Problem/Plan - 2:  ·  Problem: Septic shock.   ·  Plan: Resolved.   Secondary to bacteremia  BC from 11/1 grew E coli. Completed Cefepime .  Febrile 11/17  BC/UC both  NGTD   Continue Vanco Day 5  ID Dr Wong following.     Problem/Plan - 3:  ·  Problem: E coli bacteremia.   ·  Plan: Completed 14 days of Cefepime   febrile 11/22-   leukocytosis resolving    Repeat BC from 11/17 NGTD   ID following.
Problem/Plan - 1:  ·  Problem: Acute respiratory failure with hypoxia.   ·  Plan: Continue mechanical ventilation with daily SBT as tolerated.  Will atempt trach collar during the day today.  Monitor oxygen saturation.  Will need vent facility.     Problem/Plan - 2:  ·  Problem: Septic shock.   ·  Plan: Resolved.   Secondary to bacteremia  BC from 11/1 grew E coli. Completed antibiotics Cefepime and vanco.  BC/UC both  NGTD   ID Dr Wong following.     Problem/Plan - 3:  ·  Problem: E coli bacteremia.   ·  Plan: Completed 14 days of cefepime   Vanco completed  Afebrile.  Repeat BC 11/17 negative.     Problem/Plan - 4:  ·  Problem: Pneumothorax, left.   ·  Plan: Left chest tube placed on Pneumostat valve.  Tolerating well.  No pneumothorax noted on CXR  Thoracic surgery recommending follow up in 3 weeks for further management.
Problem/Plan - 1:  ·  Problem: Septic shock.   ·  Plan: Secondary to bacteremia  BC from 11/1 grew E coli. Completed Cefepime .  Febrile 11/17  BC/UC both  NGTD   Continue Vanco Day 2   ID Dr Wong following.     Problem/Plan - 2:  ·  Problem: Acute respiratory failure with hypoxia.   ·  Plan: Continue mechanical ventilation.  Not a candidate for SBT at this time.  DID not tolerate SBT 11/17  Monitor oxygen saturation.  Daily CXRs.     Problem/Plan - 3:  ·  Problem: E coli bacteremia.   ·  Plan: Completed 14 days of Cefepime   Febrile 11/17  Repeat BC 11/17 NGTD   Continue IV Vanco, Day 2 per ID.     Problem/Plan - 4:  ·  Problem: Pneumothorax, left.   ·  Plan: Left chest tube to water seal as per surgery.  CXR as above,  Serial CXRs.
Problem/Plan - 1:  ·  Problem: Acute respiratory failure with hypoxia.   ·  Plan: Continue mechanical ventilation with daily SBT as tolerated  Currently tolerating SBT with PS 12 for 1 hour.  Monitor oxygen saturation.     Problem/Plan - 2:  ·  Problem: Septic shock.   ·  Plan: Resolved.   Secondary to bacteremia  BC from 11/1 grew E coli. Completed antibiotics Cefepime and vanco.  BC/UC both  NGTD   ID Dr Wong following.     Problem/Plan - 3:  ·  Problem: E coli bacteremia.   ·  Plan: Completed 14 days of cefepime   Vanco completed  Afebrile.  Repeat BC 11/17 negative.     Problem/Plan - 4:  ·  Problem: Pneumothorax, left.   ·  Plan: Left chest tube placed on Pneumostat valve yesterday.  Serial CXRs  So far tolerating valve.  19ml drained today.

## 2021-12-01 NOTE — PROGRESS NOTE ADULT - PROBLEM SELECTOR PLAN 10
DVT and GI prophylaxis.  Continue mechanical ventilation.  Left chest tube clamped. Placed on pneumostat valve. Tolerating well.  Thoracic surgery f/u  Overall prognosis is extremely poor.  Remains FULL CODE as per family wishes. Family has not visited during hospitalization.  Discharge planning underway. Will need vent facility.  Medically stable for discharge.  Afebrile.

## 2021-12-01 NOTE — PROGRESS NOTE ADULT - PROBLEM SELECTOR PROBLEM 2
Dementia
Acute respiratory failure with hypoxia
Septic shock
Septic shock
E coli bacteremia
Septic shock
Septic shock
Acute respiratory failure with hypoxia
Acute respiratory failure with hypoxia
Dementia
E coli bacteremia
E coli bacteremia
Septic shock
Acute respiratory failure with hypoxia
Septic shock
Acute respiratory failure with hypoxia
Acute respiratory failure with hypoxia

## 2021-12-01 NOTE — PROGRESS NOTE ADULT - ATTENDING SUPERVISION STATEMENT
ACP
Resident
ACP
ACP/Resident
ACP

## 2021-12-01 NOTE — PROGRESS NOTE ADULT - PROBLEM SELECTOR PROBLEM 1
Septic shock
Sepsis with acute hypoxic respiratory failure
Acute respiratory failure with hypoxia
Septic shock
Septic shock
Acute respiratory failure with hypoxia
Septic shock
Acute respiratory failure with hypoxia
Acute respiratory failure with hypoxia
Septic shock
Acute respiratory failure with hypoxia
Acute respiratory failure with hypoxia
Sepsis with acute hypoxic respiratory failure
Acute respiratory failure with hypoxia
Septic shock

## 2021-12-01 NOTE — PROGRESS NOTE ADULT - NUTRITIONAL ASSESSMENT
This patient has been assessed with a concern for Malnutrition and has been determined to have a diagnosis/diagnoses of Severe protein-calorie malnutrition and Underweight (BMI < 19).  +Severe temporal waisting  +Muscle waisting    This patient is being managed with:   Diet NPO with Tube Feed-  Tube Feeding Modality: Gastrostomy  Jevity 1.5 Gb  Total Volume for 24 Hours (mL): 960  Continuous  Starting Tube Feed Rate {mL per Hour}: 30  Until Goal Tube Feed Rate (mL per Hour): 40  Tube Feed Duration (in Hours): 24  Tube Feed Start Time: 15:00  No Carb Prosource (1pkg = 15gms Protein)     Qty per Day:  1  Entered: Nov 25 2021  3:21PM

## 2021-12-01 NOTE — PROGRESS NOTE ADULT - REASON FOR ADMISSION
AHRF

## 2021-12-02 PROBLEM — Z00.00 ENCOUNTER FOR PREVENTIVE HEALTH EXAMINATION: Status: ACTIVE | Noted: 2021-12-02

## 2021-12-20 ENCOUNTER — APPOINTMENT (OUTPATIENT)
Dept: THORACIC SURGERY | Facility: CLINIC | Age: 86
End: 2021-12-20
Payer: MEDICARE

## 2021-12-20 ENCOUNTER — APPOINTMENT (OUTPATIENT)
Dept: RADIOLOGY | Facility: HOSPITAL | Age: 86
End: 2021-12-20

## 2021-12-20 ENCOUNTER — OUTPATIENT (OUTPATIENT)
Dept: OUTPATIENT SERVICES | Facility: HOSPITAL | Age: 86
LOS: 1 days | End: 2021-12-20
Payer: MEDICARE

## 2021-12-20 VITALS
SYSTOLIC BLOOD PRESSURE: 142 MMHG | OXYGEN SATURATION: 97 % | RESPIRATION RATE: 18 BRPM | HEART RATE: 102 BPM | DIASTOLIC BLOOD PRESSURE: 74 MMHG

## 2021-12-20 DIAGNOSIS — Z86.59 PERSONAL HISTORY OF OTHER MENTAL AND BEHAVIORAL DISORDERS: ICD-10-CM

## 2021-12-20 DIAGNOSIS — Z86.79 PERSONAL HISTORY OF OTHER DISEASES OF THE CIRCULATORY SYSTEM: ICD-10-CM

## 2021-12-20 DIAGNOSIS — J93.9 PNEUMOTHORAX, UNSPECIFIED: ICD-10-CM

## 2021-12-20 PROCEDURE — 71046 X-RAY EXAM CHEST 2 VIEWS: CPT | Mod: 26

## 2021-12-20 PROCEDURE — 99215 OFFICE O/P EST HI 40 MIN: CPT

## 2021-12-20 RX ORDER — DONEPEZIL HYDROCHLORIDE 10 MG/1
10 TABLET ORAL
Refills: 0 | Status: ACTIVE | COMMUNITY

## 2021-12-20 RX ORDER — OMEPRAZOLE 20 MG/1
20 TABLET, DELAYED RELEASE ORAL
Refills: 0 | Status: ACTIVE | COMMUNITY

## 2021-12-20 RX ORDER — ACETAMINOPHEN 325 MG/1
325 TABLET, FILM COATED ORAL
Refills: 0 | Status: ACTIVE | COMMUNITY

## 2021-12-20 RX ORDER — ENOXAPARIN SODIUM 100 MG/ML
40 INJECTION SUBCUTANEOUS
Refills: 0 | Status: ACTIVE | COMMUNITY

## 2021-12-20 RX ORDER — PNV NO.95/FERROUS FUM/FOLIC AC 28MG-0.8MG
TABLET ORAL
Refills: 0 | Status: ACTIVE | COMMUNITY

## 2021-12-20 RX ORDER — LEVOFLOXACIN 750 MG/1
750 TABLET, FILM COATED ORAL
Refills: 0 | Status: ACTIVE | COMMUNITY

## 2021-12-20 RX ORDER — BENZTROPINE MESYLATE 0.5 MG
0.5 TABLET ORAL
Refills: 0 | Status: ACTIVE | COMMUNITY

## 2021-12-20 RX ORDER — ASPIRIN 81 MG
81 TABLET, DELAYED RELEASE (ENTERIC COATED) ORAL
Refills: 0 | Status: ACTIVE | COMMUNITY

## 2021-12-20 NOTE — PHYSICAL EXAM
[Heart Rate And Rhythm] : heart rate was normal and rhythm regular [Heart Sounds] : normal S1 and S2 [Heart Sounds Gallop] : no gallops [Murmurs] : no murmurs [Heart Sounds Pericardial Friction Rub] : no pericardial rub [Examination Of The Chest] : the chest was normal in appearance [Chest Visual Inspection Thoracic Asymmetry] : no chest asymmetry [Diminished Respiratory Excursion] : normal chest expansion [Bowel Sounds] : normal bowel sounds [Abdomen Soft] : soft [Abdomen Tenderness] : non-tender [Abdomen Mass (___ Cm)] : no abdominal mass palpated [Skin Color & Pigmentation] : normal skin color and pigmentation [Skin Turgor] : normal skin turgor [] : no rash [FreeTextEntry1] : PEG tube

## 2021-12-20 NOTE — HISTORY OF PRESENT ILLNESS
[FreeTextEntry1] : Ms. AKASH CHACKO, 89 year old female, w/ hx of HTN, Dementia, CVA with ride sided hemiparesis, aphasia, PKD, GERD, CKD, at baseline she is bedbound and requires assistance with ADL's, with recent hospitalization (11/1/21) transferred from [facility] to [facility] for respiratory distress, s/p LEFT sided chest tube placement for pneumothorax, tracheostomy and PEG tube placement for continued respiratory failure- minimal output with no air leak on water seal but persistently recurrent pneumothorax after clamping, pneumostat placed. \par \par CXR today--- reviewed. \par \par Pt presents today for follow up. Pt is non-verbal, on ventilator, unable to follow command. History obtained from NH documents. \par \par \par

## 2021-12-20 NOTE — ASSESSMENT
[FreeTextEntry1] : Ms. AKASH CHACKO, 89 year old female, w/ hx of HTN, Dementia, CVA with ride sided hemiparesis, aphasia, PKD, GERD, CKD, at baseline she is bedbound and requires assistance with ADL's, with recent hospitalization (11/1/21) transferred from [facility] to [facility] for respiratory distress, s/p LEFT sided chest tube placement for pneumothorax, tracheostomy and PEG tube placement for continued respiratory failure- minimal output with no air leak on water seal but persistently recurrent pneumothorax after clamping, pneumostat placed. \par \par CXR today--- reviewed. Lt chest tube clamped today in the office. \par \par Repeat CXR today after clamping---reviewed. \par \par I have reviewed the patient's medical records and diagnostic images at time of this office consultation and have made the following recommendation:\par 1. Chest tube clamped today in the office. RTC on Wednesday 12/22/21 with repeat CXR for possible chest tube removal. \par \par \par I personally performed the services described in the documentation, reviewed the documentation recorded by the scribe in my presence and it accurately and completely records my words and actions. \par \par I, Kristine Ferraro, DINORAH, am scribing for and the presence of Dr. Courtney Ruiz the following sections, HISTORY OF PRESENT ILLNESS, PAST MEDICAL/FAMILY/SOCIAL HISTORY; REVIEW OF SYSTEMS; VITAL SIGNS; PHYSICAL EXAM; DISPOSITION.\par

## 2021-12-22 ENCOUNTER — APPOINTMENT (OUTPATIENT)
Dept: THORACIC SURGERY | Facility: CLINIC | Age: 86
End: 2021-12-22
Payer: MEDICARE

## 2021-12-22 ENCOUNTER — APPOINTMENT (OUTPATIENT)
Dept: RADIOLOGY | Facility: HOSPITAL | Age: 86
End: 2021-12-22

## 2021-12-22 ENCOUNTER — OUTPATIENT (OUTPATIENT)
Dept: OUTPATIENT SERVICES | Facility: HOSPITAL | Age: 86
LOS: 1 days | End: 2021-12-22
Payer: MEDICARE

## 2021-12-22 DIAGNOSIS — J93.9 PNEUMOTHORAX, UNSPECIFIED: ICD-10-CM

## 2021-12-22 PROCEDURE — 84145 PROCALCITONIN (PCT): CPT

## 2021-12-22 PROCEDURE — 87641 MR-STAPH DNA AMP PROBE: CPT

## 2021-12-22 PROCEDURE — 80053 COMPREHEN METABOLIC PANEL: CPT

## 2021-12-22 PROCEDURE — 87070 CULTURE OTHR SPECIMN AEROBIC: CPT

## 2021-12-22 PROCEDURE — 87581 M.PNEUMON DNA AMP PROBE: CPT

## 2021-12-22 PROCEDURE — 96374 THER/PROPH/DIAG INJ IV PUSH: CPT

## 2021-12-22 PROCEDURE — 85610 PROTHROMBIN TIME: CPT

## 2021-12-22 PROCEDURE — 99215 OFFICE O/P EST HI 40 MIN: CPT

## 2021-12-22 PROCEDURE — 87635 SARS-COV-2 COVID-19 AMP PRB: CPT

## 2021-12-22 PROCEDURE — 84484 ASSAY OF TROPONIN QUANT: CPT

## 2021-12-22 PROCEDURE — 83880 ASSAY OF NATRIURETIC PEPTIDE: CPT

## 2021-12-22 PROCEDURE — 31500 INSERT EMERGENCY AIRWAY: CPT

## 2021-12-22 PROCEDURE — 36415 COLL VENOUS BLD VENIPUNCTURE: CPT

## 2021-12-22 PROCEDURE — 83036 HEMOGLOBIN GLYCOSYLATED A1C: CPT

## 2021-12-22 PROCEDURE — 86850 RBC ANTIBODY SCREEN: CPT

## 2021-12-22 PROCEDURE — 36430 TRANSFUSION BLD/BLD COMPNT: CPT

## 2021-12-22 PROCEDURE — 71045 X-RAY EXAM CHEST 1 VIEW: CPT

## 2021-12-22 PROCEDURE — C1889: CPT

## 2021-12-22 PROCEDURE — 99291 CRITICAL CARE FIRST HOUR: CPT

## 2021-12-22 PROCEDURE — 82962 GLUCOSE BLOOD TEST: CPT

## 2021-12-22 PROCEDURE — 85730 THROMBOPLASTIN TIME PARTIAL: CPT

## 2021-12-22 PROCEDURE — 83935 ASSAY OF URINE OSMOLALITY: CPT

## 2021-12-22 PROCEDURE — 80048 BASIC METABOLIC PNL TOTAL CA: CPT

## 2021-12-22 PROCEDURE — 87040 BLOOD CULTURE FOR BACTERIA: CPT

## 2021-12-22 PROCEDURE — 85025 COMPLETE CBC W/AUTO DIFF WBC: CPT

## 2021-12-22 PROCEDURE — 82803 BLOOD GASES ANY COMBINATION: CPT

## 2021-12-22 PROCEDURE — 81001 URINALYSIS AUTO W/SCOPE: CPT

## 2021-12-22 PROCEDURE — 82436 ASSAY OF URINE CHLORIDE: CPT

## 2021-12-22 PROCEDURE — P9040: CPT

## 2021-12-22 PROCEDURE — 93306 TTE W/DOPPLER COMPLETE: CPT

## 2021-12-22 PROCEDURE — 71046 X-RAY EXAM CHEST 2 VIEWS: CPT | Mod: 26,76

## 2021-12-22 PROCEDURE — 86901 BLOOD TYPING SEROLOGIC RH(D): CPT

## 2021-12-22 PROCEDURE — 87640 STAPH A DNA AMP PROBE: CPT

## 2021-12-22 PROCEDURE — 83605 ASSAY OF LACTIC ACID: CPT

## 2021-12-22 PROCEDURE — 94002 VENT MGMT INPAT INIT DAY: CPT

## 2021-12-22 PROCEDURE — 80202 ASSAY OF VANCOMYCIN: CPT

## 2021-12-22 PROCEDURE — 87086 URINE CULTURE/COLONY COUNT: CPT

## 2021-12-22 PROCEDURE — 86923 COMPATIBILITY TEST ELECTRIC: CPT

## 2021-12-22 PROCEDURE — 93005 ELECTROCARDIOGRAM TRACING: CPT

## 2021-12-22 PROCEDURE — L8699: CPT

## 2021-12-22 PROCEDURE — 83735 ASSAY OF MAGNESIUM: CPT

## 2021-12-22 PROCEDURE — P9047: CPT

## 2021-12-22 PROCEDURE — 87186 SC STD MICRODIL/AGAR DIL: CPT

## 2021-12-22 PROCEDURE — 94003 VENT MGMT INPAT SUBQ DAY: CPT

## 2021-12-22 PROCEDURE — 86900 BLOOD TYPING SEROLOGIC ABO: CPT

## 2021-12-22 PROCEDURE — 84146 ASSAY OF PROLACTIN: CPT

## 2021-12-22 PROCEDURE — 82570 ASSAY OF URINE CREATININE: CPT

## 2021-12-22 PROCEDURE — 87451 POLYVALENT MULT ORG EA AG IA: CPT

## 2021-12-22 PROCEDURE — 84100 ASSAY OF PHOSPHORUS: CPT

## 2021-12-22 PROCEDURE — 84300 ASSAY OF URINE SODIUM: CPT

## 2021-12-22 PROCEDURE — 85027 COMPLETE CBC AUTOMATED: CPT

## 2021-12-22 PROCEDURE — 87150 DNA/RNA AMPLIFIED PROBE: CPT

## 2021-12-22 PROCEDURE — 84478 ASSAY OF TRIGLYCERIDES: CPT

## 2021-12-22 NOTE — HISTORY OF PRESENT ILLNESS
[FreeTextEntry1] : Ms. AKASH CHACKO, 89 year old female, w/ hx of HTN, Dementia, CVA with ride sided hemiparesis, aphasia, PKD, GERD, CKD, at baseline she is bedbound and requires assistance with ADL's, with recent hospitalization (11/1/21) transferred from [facility] to [facility] for respiratory distress, s/p LEFT sided chest tube placement for pneumothorax, tracheostomy and PEG tube placement for continued respiratory failure- minimal output with no air leak on water seal but persistently recurrent pneumothorax after clamping, pneumostat placed. \par \par CXR 12/20/21--- reviewed. Lt chest tube clamped in the office. \par \par Repeat CXR after clamping---reviewed. \par \par CXR today--- reviewed. \par \par Pt presents today for possible chest tube removal. Pt is on the ventilator accompanied by NH staff. \par \par \par \par \par \par

## 2021-12-22 NOTE — ASSESSMENT
[FreeTextEntry1] : Ms. AKASH CHACKO, 89 year old female, w/ hx of HTN, Dementia, CVA with ride sided hemiparesis, aphasia, PKD, GERD, CKD, at baseline she is bedbound and requires assistance with ADL's, with recent hospitalization (11/1/21) transferred from [facility] to [facility] for respiratory distress, s/p LEFT sided chest tube placement for pneumothorax, tracheostomy and PEG tube placement for continued respiratory failure- minimal output with no air leak on water seal but persistently recurrent pneumothorax after clamping, pneumostat placed. \par \par CXR 12/20/21--- reviewed. Lt chest tube clamped in the office. \par \par Repeat CXR after clamping---reviewed. \par \par CXR today--- reviewed. \par \par I have reviewed the patient's medical records and diagnostic images at time of this office consultation and have made the following recommendation:\par 1. CXR reviewed, chest tube removed today in the office by CRISSY Howard without any complications. Post-removal CXR done and reviewed, stable. Instruction given to nursing aid to keep dressing for 48 hrs. May remove stitch by nursing home provider in 2 wks\par 2. RTC PRN\par \par \par I personally performed the services described in the documentation, reviewed the documentation recorded by the scribe in my presence and it accurately and completely records my words and actions. \par \par I, Kristine Ferraro NP, am scribing for and the presence of Dr. Courtney Ruiz the following sections, HISTORY OF PRESENT ILLNESS, PAST MEDICAL/FAMILY/SOCIAL HISTORY; REVIEW OF SYSTEMS; VITAL SIGNS; PHYSICAL EXAM; DISPOSITION.\par

## 2022-02-08 NOTE — PROGRESS NOTE ADULT - PROBLEM SELECTOR PLAN 8
Detail Level: Detailed
Passive ROM exercises daily.  Turn and position every 2 hours.  Keep head of bed elevated during tube feeds.
Detail Level: Generalized
DVT ppx: Lovenox sc  GI ppx; PPI
Detail Level: Simple
Continue Peg feeds.  Add 2 packets of prosource daily.
Passive ROM exercises daily.  Turn and position every 2 hours.  Keep head of bed elevated during tube feeds.
Passive ROM exercises daily.  Turn and position every 2 hours.  Keep head of bed elevated during tube feeds
Continue tube feeds and supplementation  Protein and albumin continue to drop despite TF and supplementation.  Not absorbing nutrients.  Dietary following.  Continue to monitor.
Passive ROM exercises daily.  Turn and position every 2 hours.  Keep head of bed elevated during tube feeds.
Passive ROM exercises daily.  Turn and position every 2 hours.  Keep head of bed elevated during tube feeds.
Continue Peg feeds.  Add 2 packets of prosource daily.
Passive ROM exercises daily.  Turn and position every 2 hours.  Keep head of bed elevated during tube feeds.
DVT ppx: Lovenox sc  GI ppx; PPI
Passive ROM exercises daily.  Turn and position every 2 hours.  Keep head of bed elevated during tube feeds.
Passive ROM exercises daily.  Turn and position every 2 hours.  Keep head of bed elevated during tube feeds.
Passive ROM exercises daily.  Turn and position every 2 hours.  Keep head of bed elevated during tube feeds

## 2023-01-01 NOTE — PROGRESS NOTE ADULT - PROBLEM SELECTOR PLAN 9
Continue tube feeds and supplementation  Protein and albumin continue to drop despite TF and supplementation.  Not absorbing nutrients.  Dietary following.  Continue to monitor.
Passive ROM exercises daily.  Turn and position every 2 hours.  Keep head of bed elevated during tube feeds.
DVT and GI prophylaxis.  Continue mechanical ventilation.  Serial CXRs  Thoracic surgery f/u  Overall prognosis is extremely poor.  Remains FULL CODE as per family wishes.  SW consult for long term vent placement.
Continue tube feeds and supplementation  Protein and albumin continue to drop despite TF and supplementation.  Not absorbing nutrients.  Dietary following.  Continue to monitor.
Continue tube feeds and supplementation  Protein and albumin continue to drop despite TF and supplementation.  Not absorbing nutrients.  Dietary following.  Continue to monitor.
Continue tube feeds and supplementation  Protein and albumin continue to drop despite TF and supplementation.  Dietary following.  Continue to monitor.
Continue tube feeds and supplementation  Protein and albumin continue to drop despite TF and supplementation.  Not absorbing nutrients.  Dietary following.  Continue to monitor.
DVT and GI prophylaxis.  Continue mechanical ventilation.  Serial CXRs  Thoracic surgery f/u  Overall prognosis is extremely poor.  Remains FULL CODE as per family wishes.  SW consult for long term vent placement.
Continue tube feeds and supplementation  Dietary following.  Continue to monitor.
Continue tube feeds and supplementation  Protein and albumin continue to drop despite TF and supplementation.  Dietary following.  Continue to monitor.
Continue tube feeds and supplementation  Protein and albumin continue to drop despite TF and supplementation.  Dietary following.  Continue to monitor.
Continue tube feeds and supplementation  Protein and albumin continue to drop despite TF and supplementation.  Not absorbing nutrients.  Dietary following.  Continue to monitor.
Continue tube feeds and supplementation  Protein and albumin continue to drop despite TF and supplementation.  Dietary following.  Continue to monitor.
subgaleal hematoma

## 2024-10-03 NOTE — PROGRESS NOTE ADULT - SUBJECTIVE AND OBJECTIVE BOX
Influenza immunization given in L deltoid.  Patient tolerated without incident.  See immunization grid for documentation.  All questions answered.  VIS given       HPI:  89 yo female from [facility] with medical h/o of HTN, Dementia, CVA with R sided hemiparesis, aphasia, parkinson's, GERD, CKD bedbound at baseline, dependant on assistance for ADL comes in with respiratory failure. Patient was found to have respiratory distress, saturating in 70s when EMS arrived, was placed on NRB on field. She was emergently intubated on arrival in ED. As per NH records patient was having fever and cough for past few days. Today she was found to have respiratory distress. She is vaccinated with moderna. Spoke to family son,  who stated the patient to remain full code. No other history could be obtained.  (01 Nov 2021 00:14)      Patient is a 89y old  Female who presents with a chief complaint of AHRF (15 Nov 2021 11:59)      INTERVAL HPI/OVERNIGHT EVENTS:  T(C): 37.8 (11-15-21 @ 11:44), Max: 37.8 (11-15-21 @ 11:44)  HR: 103 (11-15-21 @ 07:56) (100 - 105)  BP: 117/69 (11-15-21 @ 05:45) (117/69 - 157/70)  RR: 22 (11-15-21 @ 05:45) (22 - 28)  SpO2: 100% (11-15-21 @ 07:56) (100% - 100%)  Wt(kg): --  I&O's Summary    14 Nov 2021 07:01  -  15 Nov 2021 07:00  --------------------------------------------------------  IN: 0 mL / OUT: 150 mL / NET: -150 mL        REVIEW OF SYSTEMS: denies fever, chills, SOB, palpitations, chest pain, abdominal pain, nausea, vomitting, diarrhea, constipation, dizziness    MEDICATIONS  (STANDING):  cefepime   IVPB 1000 milliGRAM(s) IV Intermittent two times a day  chlorhexidine 0.12% Liquid 15 milliLiter(s) Oral Mucosa every 12 hours  enoxaparin Injectable 40 milliGRAM(s) SubCutaneous daily  mupirocin 2% Ointment 1 Application(s) Topical two times a day  pantoprazole   Suspension 40 milliGRAM(s) Oral daily    MEDICATIONS  (PRN):  acetaminophen    Suspension .. 650 milliGRAM(s) Oral every 6 hours PRN Temp greater or equal to 38C (100.4F), Mild Pain (1 - 3), Moderate Pain (4 - 6)  sodium chloride 0.9% lock flush 10 milliLiter(s) IV Push every 1 hour PRN Pre/post blood products, medications, blood draw, and to maintain line patency      PHYSICAL EXAM:  GENERAL: NAD, well-groomed, well-developed  HEAD:  Atraumatic, Normocephalic  EYES: EOMI, PERRLA, conjunctiva and sclera clear  ENMT: No tonsillar erythema, exudates, or enlargement; Moist mucous membranes, Good dentition, No lesions  NECK: Supple, No JVD, Normal thyroid  NERVOUS SYSTEM:  Alert & Oriented X3, Good concentration; Motor Strength 5/5 B/L upper and lower extremities; DTRs 2+ intact and symmetric  CHEST/LUNG: Clear to percussion bilaterally; No rales, rhonchi, wheezing, or rubs  HEART: Regular rate and rhythm; No murmurs, rubs, or gallops  ABDOMEN: Soft, Nontender, Nondistended; Bowel sounds present  EXTREMITIES:  2+ Peripheral Pulses, No clubbing, cyanosis, or edema  LYMPH: No lymphadenopathy noted  SKIN: No rashes or lesions  LABS:                        8.9    12.28 )-----------( 571      ( 15 Nov 2021 07:21 )             29.4     11-15    140  |  111<H>  |  23<H>  ----------------------------<  166<H>  4.4   |  23  |  0.62    Ca    8.5      15 Nov 2021 07:21  Phos  2.3     11-15  Mg     2.5     11-15    TPro  6.7  /  Alb  1.7<L>  /  TBili  0.4  /  DBili  x   /  AST  36  /  ALT  35  /  AlkPhos  123<H>  11-15        CAPILLARY BLOOD GLUCOSE      POCT Blood Glucose.: 182 mg/dL (15 Nov 2021 11:30)  POCT Blood Glucose.: 162 mg/dL (15 Nov 2021 06:48)  POCT Blood Glucose.: 179 mg/dL (15 Nov 2021 00:01)  POCT Blood Glucose.: 136 mg/dL (14 Nov 2021 16:56)